# Patient Record
Sex: FEMALE | Race: WHITE | NOT HISPANIC OR LATINO | Employment: OTHER | URBAN - METROPOLITAN AREA
[De-identification: names, ages, dates, MRNs, and addresses within clinical notes are randomized per-mention and may not be internally consistent; named-entity substitution may affect disease eponyms.]

---

## 2017-10-02 ENCOUNTER — CONVERSION ENCOUNTER (OUTPATIENT)
Dept: MAMMOGRAPHY | Facility: HOSPITAL | Age: 67
End: 2017-10-02

## 2018-01-04 ENCOUNTER — OFFICE VISIT CONVERTED (OUTPATIENT)
Dept: ORTHOPEDIC SURGERY | Facility: CLINIC | Age: 68
End: 2018-01-04
Attending: PHYSICIAN ASSISTANT

## 2018-02-06 ENCOUNTER — OFFICE VISIT CONVERTED (OUTPATIENT)
Dept: ORTHOPEDIC SURGERY | Facility: CLINIC | Age: 68
End: 2018-02-06
Attending: PHYSICIAN ASSISTANT

## 2018-02-16 ENCOUNTER — OFFICE VISIT CONVERTED (OUTPATIENT)
Dept: ORTHOPEDIC SURGERY | Facility: CLINIC | Age: 68
End: 2018-02-16
Attending: PHYSICIAN ASSISTANT

## 2018-03-05 ENCOUNTER — OFFICE VISIT CONVERTED (OUTPATIENT)
Dept: UROLOGY | Facility: CLINIC | Age: 68
End: 2018-03-05
Attending: UROLOGY

## 2018-03-06 ENCOUNTER — OFFICE VISIT CONVERTED (OUTPATIENT)
Dept: ORTHOPEDIC SURGERY | Facility: CLINIC | Age: 68
End: 2018-03-06
Attending: PHYSICIAN ASSISTANT

## 2018-04-04 ENCOUNTER — OFFICE VISIT CONVERTED (OUTPATIENT)
Dept: UROLOGY | Facility: CLINIC | Age: 68
End: 2018-04-04
Attending: UROLOGY

## 2018-04-13 ENCOUNTER — PROCEDURE VISIT CONVERTED (OUTPATIENT)
Dept: UROLOGY | Facility: CLINIC | Age: 68
End: 2018-04-13
Attending: UROLOGY

## 2018-04-17 ENCOUNTER — OFFICE VISIT CONVERTED (OUTPATIENT)
Dept: ORTHOPEDIC SURGERY | Facility: CLINIC | Age: 68
End: 2018-04-17
Attending: PHYSICIAN ASSISTANT

## 2018-04-17 ENCOUNTER — CONVERSION ENCOUNTER (OUTPATIENT)
Dept: ORTHOPEDIC SURGERY | Facility: CLINIC | Age: 68
End: 2018-04-17

## 2018-05-09 ENCOUNTER — OFFICE VISIT CONVERTED (OUTPATIENT)
Dept: UROLOGY | Facility: CLINIC | Age: 68
End: 2018-05-09
Attending: UROLOGY

## 2018-05-09 ENCOUNTER — CONVERSION ENCOUNTER (OUTPATIENT)
Dept: SURGERY | Facility: CLINIC | Age: 68
End: 2018-05-09

## 2018-06-25 ENCOUNTER — CONVERSION ENCOUNTER (OUTPATIENT)
Dept: OTHER | Facility: HOSPITAL | Age: 68
End: 2018-06-25

## 2018-06-25 ENCOUNTER — OFFICE VISIT CONVERTED (OUTPATIENT)
Dept: OTHER | Facility: HOSPITAL | Age: 68
End: 2018-06-25
Attending: NURSE PRACTITIONER

## 2018-06-28 ENCOUNTER — OFFICE VISIT CONVERTED (OUTPATIENT)
Dept: SURGERY | Facility: CLINIC | Age: 68
End: 2018-06-28
Attending: NURSE PRACTITIONER

## 2018-07-26 ENCOUNTER — OFFICE VISIT CONVERTED (OUTPATIENT)
Dept: OTHER | Facility: HOSPITAL | Age: 68
End: 2018-07-26
Attending: NURSE PRACTITIONER

## 2018-08-01 ENCOUNTER — CONVERSION ENCOUNTER (OUTPATIENT)
Dept: SURGERY | Facility: CLINIC | Age: 68
End: 2018-08-01

## 2018-08-01 ENCOUNTER — OFFICE VISIT CONVERTED (OUTPATIENT)
Dept: UROLOGY | Facility: CLINIC | Age: 68
End: 2018-08-01
Attending: UROLOGY

## 2018-11-21 ENCOUNTER — OFFICE VISIT CONVERTED (OUTPATIENT)
Dept: OTHER | Facility: HOSPITAL | Age: 68
End: 2018-11-21
Attending: NURSE PRACTITIONER

## 2018-11-24 ENCOUNTER — CONVERSION ENCOUNTER (OUTPATIENT)
Dept: MAMMOGRAPHY | Facility: HOSPITAL | Age: 68
End: 2018-11-24

## 2018-11-30 ENCOUNTER — PATIENT OUTREACH - CONVERTED (OUTPATIENT)
Dept: OTHER | Facility: HOSPITAL | Age: 68
End: 2018-11-30
Attending: NURSE PRACTITIONER

## 2019-01-02 ENCOUNTER — HOSPITAL ENCOUNTER (OUTPATIENT)
Dept: RADIATION ONCOLOGY | Facility: HOSPITAL | Age: 69
Setting detail: RECURRING SERIES
Discharge: HOME | End: 2019-01-31
Attending: RADIOLOGY

## 2019-01-28 ENCOUNTER — HOSPITAL ENCOUNTER (OUTPATIENT)
Dept: OTHER | Facility: HOSPITAL | Age: 69
Discharge: HOME | End: 2019-01-28
Attending: INTERNAL MEDICINE

## 2019-01-28 LAB
ALBUMIN SERPL-MCNC: 4.2 G/DL (ref 3.5–5)
ALBUMIN/GLOB SERPL: 1.4 {RATIO} (ref 1.4–2.6)
ALP SERPL-CCNC: 100 U/L (ref 43–160)
ALT SERPL-CCNC: 8 U/L (ref 10–40)
ANION GAP SERPL CALC-SCNC: 16 MMOL/L (ref 8–19)
AST SERPL-CCNC: 19 U/L (ref 15–50)
BASOPHILS # BLD AUTO: 0.02 10*3/UL (ref 0–0.2)
BASOPHILS NFR BLD AUTO: 0.46 % (ref 0–3)
BILIRUB SERPL-MCNC: 0.22 MG/DL (ref 0.2–1.3)
BUN SERPL-MCNC: 13 MG/DL (ref 5–25)
BUN/CREAT SERPL: 21 {RATIO} (ref 6–20)
CALCIUM SERPL-MCNC: 9.2 MG/DL (ref 8.7–10.4)
CEA SERPL-MCNC: 3 NG/ML (ref 0–5)
CHLORIDE SERPL-SCNC: 108 MMOL/L (ref 99–111)
CONV CO2: 19 MMOL/L (ref 22–32)
CONV TOTAL PROTEIN: 7.1 G/DL (ref 6.3–8.2)
CREAT UR-MCNC: 0.61 MG/DL (ref 0.5–0.9)
EOSINOPHIL # BLD AUTO: 0.07 10*3/UL (ref 0–0.7)
EOSINOPHIL # BLD AUTO: 1.38 % (ref 0–7)
ERYTHROCYTE [DISTWIDTH] IN BLOOD BY AUTOMATED COUNT: 13.7 % (ref 11.5–14.5)
GFR SERPLBLD BASED ON 1.73 SQ M-ARVRAT: >60 ML/MIN/{1.73_M2}
GLOBULIN UR ELPH-MCNC: 2.9 G/DL (ref 2–3.5)
GLUCOSE SERPL-MCNC: 92 MG/DL (ref 65–99)
HBA1C MFR BLD: 11.5 G/DL (ref 12–16)
HCT VFR BLD AUTO: 31.4 % (ref 37–47)
LYMPHOCYTES # BLD AUTO: 1.12 10*3/UL (ref 1–5)
MCH RBC QN AUTO: 34.6 PG (ref 27–31)
MCHC RBC AUTO-ENTMCNC: 36.5 G/DL (ref 33–37)
MCV RBC AUTO: 95 FL (ref 81–99)
MONOCYTES # BLD AUTO: 0.33 10*3/UL (ref 0.2–1.2)
MONOCYTES NFR BLD AUTO: 6.82 % (ref 3–10)
NEUTROPHILS # BLD AUTO: 3.24 10*3/UL (ref 2–8)
NEUTROPHILS NFR BLD AUTO: 67.9 % (ref 30–85)
NRBC BLD AUTO-RTO: 0 % (ref 0–0.01)
OSMOLALITY SERPL CALC.SUM OF ELEC: 288 MOSM/KG (ref 273–304)
PLATELET # BLD AUTO: 205 10*3/UL (ref 130–400)
PMV BLD AUTO: 7.9 FL (ref 7.4–10.4)
POTASSIUM SERPL-SCNC: 4.1 MMOL/L (ref 3.5–5.3)
RBC # BLD AUTO: 3.31 10*6/UL (ref 4.2–5.4)
SODIUM SERPL-SCNC: 139 MMOL/L (ref 135–147)
VARIANT LYMPHS NFR BLD MANUAL: 23.4 % (ref 20–45)
WBC # BLD AUTO: 4.77 10*3/UL (ref 4.8–10.8)

## 2019-02-06 ENCOUNTER — HOSPITAL ENCOUNTER (OUTPATIENT)
Dept: RADIATION ONCOLOGY | Facility: HOSPITAL | Age: 69
Setting detail: RECURRING SERIES
Discharge: HOME | End: 2019-02-28
Attending: RADIOLOGY

## 2019-02-07 ENCOUNTER — HOSPITAL ENCOUNTER (OUTPATIENT)
Dept: NUCLEAR MEDICINE | Facility: HOSPITAL | Age: 69
Discharge: HOME | End: 2019-02-07
Attending: RADIOLOGY

## 2019-02-21 ENCOUNTER — HOSPITAL ENCOUNTER (OUTPATIENT)
Dept: OTHER | Facility: HOSPITAL | Age: 69
Discharge: HOME | End: 2019-02-21

## 2019-02-21 ENCOUNTER — OFFICE VISIT CONVERTED (OUTPATIENT)
Dept: OTHER | Facility: HOSPITAL | Age: 69
End: 2019-02-21
Attending: NURSE PRACTITIONER

## 2019-02-21 LAB
ALBUMIN SERPL-MCNC: 4.2 G/DL (ref 3.5–5)
ALBUMIN/GLOB SERPL: 1.4 {RATIO} (ref 1.4–2.6)
ALP SERPL-CCNC: 91 U/L (ref 43–160)
ALT SERPL-CCNC: 10 U/L (ref 10–40)
ANION GAP SERPL CALC-SCNC: 15 MMOL/L (ref 8–19)
AST SERPL-CCNC: 25 U/L (ref 15–50)
BASOPHILS # BLD AUTO: 0.03 10*3/UL (ref 0–0.2)
BASOPHILS NFR BLD AUTO: 0.8 % (ref 0–3)
BILIRUB SERPL-MCNC: 0.27 MG/DL (ref 0.2–1.3)
BUN SERPL-MCNC: 15 MG/DL (ref 5–25)
BUN/CREAT SERPL: 19 {RATIO} (ref 6–20)
CALCIUM SERPL-MCNC: 9.6 MG/DL (ref 8.7–10.4)
CHLORIDE SERPL-SCNC: 105 MMOL/L (ref 99–111)
CHOLEST SERPL-MCNC: 178 MG/DL (ref 107–200)
CHOLEST/HDLC SERPL: 3 {RATIO} (ref 3–6)
CONV ABS IMM GRAN: 0.02 10*3/UL (ref 0–0.2)
CONV CO2: 22 MMOL/L (ref 22–32)
CONV CREATININE URINE, RANDOM: 70.3 MG/DL (ref 10–300)
CONV IMMATURE GRAN: 0.5 % (ref 0–1.8)
CONV MICROALBUM.,U,RANDOM: <12 MG/L (ref 0–20)
CONV TOTAL PROTEIN: 7.1 G/DL (ref 6.3–8.2)
CREAT UR-MCNC: 0.77 MG/DL (ref 0.5–0.9)
DEPRECATED RDW RBC AUTO: 51.2 FL (ref 36.4–46.3)
EOSINOPHIL # BLD AUTO: 0.03 10*3/UL (ref 0–0.7)
EOSINOPHIL # BLD AUTO: 0.8 % (ref 0–7)
ERYTHROCYTE [DISTWIDTH] IN BLOOD BY AUTOMATED COUNT: 13.6 % (ref 11.7–14.4)
EST. AVERAGE GLUCOSE BLD GHB EST-MCNC: 120 MG/DL
GFR SERPLBLD BASED ON 1.73 SQ M-ARVRAT: >60 ML/MIN/{1.73_M2}
GLOBULIN UR ELPH-MCNC: 2.9 G/DL (ref 2–3.5)
GLUCOSE SERPL-MCNC: 82 MG/DL (ref 65–99)
HBA1C MFR BLD: 11.4 G/DL (ref 12–16)
HBA1C MFR BLD: 5.8 % (ref 3.5–5.7)
HCT VFR BLD AUTO: 35.3 % (ref 37–47)
HDLC SERPL-MCNC: 60 MG/DL (ref 40–60)
LDLC SERPL CALC-MCNC: 81 MG/DL (ref 70–100)
LYMPHOCYTES # BLD AUTO: 0.96 10*3/UL (ref 1–5)
MCH RBC QN AUTO: 32.9 PG (ref 27–31)
MCHC RBC AUTO-ENTMCNC: 32.3 G/DL (ref 33–37)
MCV RBC AUTO: 102 FL (ref 81–99)
MICROALBUMIN/CREAT UR: 17.1 MG/G{CRE} (ref 0–35)
MONOCYTES # BLD AUTO: 0.33 10*3/UL (ref 0.2–1.2)
MONOCYTES NFR BLD AUTO: 8.4 % (ref 3–10)
NEUTROPHILS # BLD AUTO: 2.54 10*3/UL (ref 2–8)
NEUTROPHILS NFR BLD AUTO: 64.9 % (ref 30–85)
NRBC CBCN: 0 % (ref 0–0.7)
OSMOLALITY SERPL CALC.SUM OF ELEC: 284 MOSM/KG (ref 273–304)
PLATELET # BLD AUTO: 205 10*3/UL (ref 130–400)
PMV BLD AUTO: 11.2 FL (ref 9.4–12.3)
POTASSIUM SERPL-SCNC: 5.1 MMOL/L (ref 3.5–5.3)
RBC # BLD AUTO: 3.46 10*6/UL (ref 4.2–5.4)
SODIUM SERPL-SCNC: 137 MMOL/L (ref 135–147)
TRIGL SERPL-MCNC: 186 MG/DL (ref 40–150)
TSH SERPL-ACNC: 2.26 M[IU]/L (ref 0.27–4.2)
VARIANT LYMPHS NFR BLD MANUAL: 24.6 % (ref 20–45)
VLDLC SERPL-MCNC: 37 MG/DL (ref 5–37)
WBC # BLD AUTO: 3.91 10*3/UL (ref 4.8–10.8)

## 2019-02-28 ENCOUNTER — PATIENT OUTREACH - CONVERTED (OUTPATIENT)
Dept: OTHER | Facility: HOSPITAL | Age: 69
End: 2019-02-28
Attending: NURSE PRACTITIONER

## 2019-03-11 ENCOUNTER — HOSPITAL ENCOUNTER (OUTPATIENT)
Dept: PET IMAGING | Facility: HOSPITAL | Age: 69
Discharge: HOME | End: 2019-03-11
Attending: INTERNAL MEDICINE

## 2019-03-14 ENCOUNTER — HOSPITAL ENCOUNTER (OUTPATIENT)
Dept: RADIATION ONCOLOGY | Facility: HOSPITAL | Age: 69
Setting detail: RECURRING SERIES
Discharge: HOME | End: 2019-03-31
Attending: RADIOLOGY

## 2019-03-19 ENCOUNTER — HOSPITAL ENCOUNTER (OUTPATIENT)
Dept: OTHER | Facility: HOSPITAL | Age: 69
Discharge: HOME | End: 2019-03-19
Attending: NURSE PRACTITIONER

## 2019-03-19 LAB
ALBUMIN SERPL-MCNC: 4.5 G/DL (ref 3.5–5)
ALBUMIN/GLOB SERPL: 1.6 {RATIO} (ref 1.4–2.6)
ALP SERPL-CCNC: 103 U/L (ref 43–160)
ALT SERPL-CCNC: 8 U/L (ref 10–40)
ANION GAP SERPL CALC-SCNC: 15 MMOL/L (ref 8–19)
AST SERPL-CCNC: 22 U/L (ref 15–50)
BASOPHILS # BLD AUTO: 0.02 10*3/UL (ref 0–0.2)
BASOPHILS NFR BLD AUTO: 0.5 % (ref 0–3)
BILIRUB SERPL-MCNC: 0.37 MG/DL (ref 0.2–1.3)
BUN SERPL-MCNC: 12 MG/DL (ref 5–25)
BUN/CREAT SERPL: 17 {RATIO} (ref 6–20)
CALCIUM SERPL-MCNC: 9.5 MG/DL (ref 8.7–10.4)
CHLORIDE SERPL-SCNC: 103 MMOL/L (ref 99–111)
CONV ABS IMM GRAN: 0.02 10*3/UL (ref 0–0.2)
CONV CO2: 24 MMOL/L (ref 22–32)
CONV IMMATURE GRAN: 0.5 % (ref 0–1.8)
CONV TOTAL PROTEIN: 7.3 G/DL (ref 6.3–8.2)
CREAT UR-MCNC: 0.69 MG/DL (ref 0.5–0.9)
DEPRECATED RDW RBC AUTO: 45.1 FL (ref 36.4–46.3)
EOSINOPHIL # BLD AUTO: 0.03 10*3/UL (ref 0–0.7)
EOSINOPHIL # BLD AUTO: 0.7 % (ref 0–7)
ERYTHROCYTE [DISTWIDTH] IN BLOOD BY AUTOMATED COUNT: 12.8 % (ref 11.7–14.4)
GFR SERPLBLD BASED ON 1.73 SQ M-ARVRAT: >60 ML/MIN/{1.73_M2}
GLOBULIN UR ELPH-MCNC: 2.8 G/DL (ref 2–3.5)
GLUCOSE SERPL-MCNC: 90 MG/DL (ref 65–99)
HBA1C MFR BLD: 11.7 G/DL (ref 12–16)
HCT VFR BLD AUTO: 35 % (ref 37–47)
LYMPHOCYTES # BLD AUTO: 1.11 10*3/UL (ref 1–5)
MCH RBC QN AUTO: 32.4 PG (ref 27–31)
MCHC RBC AUTO-ENTMCNC: 33.4 G/DL (ref 33–37)
MCV RBC AUTO: 97 FL (ref 81–99)
MONOCYTES # BLD AUTO: 0.39 10*3/UL (ref 0.2–1.2)
MONOCYTES NFR BLD AUTO: 8.9 % (ref 3–10)
NEUTROPHILS # BLD AUTO: 2.81 10*3/UL (ref 2–8)
NEUTROPHILS NFR BLD AUTO: 64.1 % (ref 30–85)
NRBC CBCN: 0 % (ref 0–0.7)
OSMOLALITY SERPL CALC.SUM OF ELEC: 285 MOSM/KG (ref 273–304)
PLATELET # BLD AUTO: 213 10*3/UL (ref 130–400)
PMV BLD AUTO: 10.9 FL (ref 9.4–12.3)
POTASSIUM SERPL-SCNC: 3.9 MMOL/L (ref 3.5–5.3)
RBC # BLD AUTO: 3.61 10*6/UL (ref 4.2–5.4)
SODIUM SERPL-SCNC: 138 MMOL/L (ref 135–147)
VARIANT LYMPHS NFR BLD MANUAL: 25.3 % (ref 20–45)
WBC # BLD AUTO: 4.38 10*3/UL (ref 4.8–10.8)

## 2019-04-29 ENCOUNTER — HOSPITAL ENCOUNTER (OUTPATIENT)
Dept: ONCOLOGY | Facility: HOSPITAL | Age: 69
Discharge: HOME | End: 2019-04-29
Attending: NURSE PRACTITIONER

## 2019-04-30 ENCOUNTER — PATIENT OUTREACH - CONVERTED (OUTPATIENT)
Dept: OTHER | Facility: HOSPITAL | Age: 69
End: 2019-04-30
Attending: NURSE PRACTITIONER

## 2019-06-03 ENCOUNTER — OFFICE VISIT CONVERTED (OUTPATIENT)
Dept: OTHER | Facility: HOSPITAL | Age: 69
End: 2019-06-03
Attending: NURSE PRACTITIONER

## 2019-06-03 ENCOUNTER — CONVERSION ENCOUNTER (OUTPATIENT)
Dept: OTHER | Facility: HOSPITAL | Age: 69
End: 2019-06-03

## 2019-06-03 ENCOUNTER — HOSPITAL ENCOUNTER (OUTPATIENT)
Dept: OTHER | Facility: HOSPITAL | Age: 69
Discharge: HOME | End: 2019-06-03

## 2019-06-03 LAB
ANION GAP SERPL CALC-SCNC: 19 MMOL/L (ref 8–19)
BASOPHILS # BLD AUTO: 0.03 10*3/UL (ref 0–0.2)
BASOPHILS NFR BLD AUTO: 0.4 % (ref 0–3)
BUN SERPL-MCNC: 15 MG/DL (ref 5–25)
BUN/CREAT SERPL: 20 {RATIO} (ref 6–20)
CALCIUM SERPL-MCNC: 9.5 MG/DL (ref 8.7–10.4)
CHLORIDE SERPL-SCNC: 106 MMOL/L (ref 99–111)
CONV ABS IMM GRAN: 0.04 10*3/UL (ref 0–0.2)
CONV CO2: 20 MMOL/L (ref 22–32)
CONV IMMATURE GRAN: 0.6 % (ref 0–1.8)
CREAT UR-MCNC: 0.75 MG/DL (ref 0.5–0.9)
DEPRECATED RDW RBC AUTO: 48.4 FL (ref 36.4–46.3)
EOSINOPHIL # BLD AUTO: 0.04 10*3/UL (ref 0–0.7)
EOSINOPHIL # BLD AUTO: 0.6 % (ref 0–7)
ERYTHROCYTE [DISTWIDTH] IN BLOOD BY AUTOMATED COUNT: 13.7 % (ref 11.7–14.4)
EST. AVERAGE GLUCOSE BLD GHB EST-MCNC: 100 MG/DL
FERRITIN SERPL-MCNC: 59 NG/ML (ref 10–200)
GFR SERPLBLD BASED ON 1.73 SQ M-ARVRAT: >60 ML/MIN/{1.73_M2}
GLUCOSE SERPL-MCNC: 93 MG/DL (ref 65–99)
HBA1C MFR BLD: 11.6 G/DL (ref 12–16)
HBA1C MFR BLD: 5.1 % (ref 3.5–5.7)
HCT VFR BLD AUTO: 35.3 % (ref 37–47)
IRON SATN MFR SERPL: 19 % (ref 20–55)
IRON SERPL-MCNC: 68 UG/DL (ref 60–170)
LYMPHOCYTES # BLD AUTO: 1.19 10*3/UL (ref 1–5)
MCH RBC QN AUTO: 31.9 PG (ref 27–31)
MCHC RBC AUTO-ENTMCNC: 32.9 G/DL (ref 33–37)
MCV RBC AUTO: 97 FL (ref 81–99)
MONOCYTES # BLD AUTO: 0.55 10*3/UL (ref 0.2–1.2)
MONOCYTES NFR BLD AUTO: 8.2 % (ref 3–10)
NEUTROPHILS # BLD AUTO: 4.88 10*3/UL (ref 2–8)
NEUTROPHILS NFR BLD AUTO: 72.5 % (ref 30–85)
NRBC CBCN: 0 % (ref 0–0.7)
OSMOLALITY SERPL CALC.SUM OF ELEC: 293 MOSM/KG (ref 273–304)
PLATELET # BLD AUTO: 202 10*3/UL (ref 130–400)
PMV BLD AUTO: 11.5 FL (ref 9.4–12.3)
POTASSIUM SERPL-SCNC: 4.1 MMOL/L (ref 3.5–5.3)
RBC # BLD AUTO: 3.64 10*6/UL (ref 4.2–5.4)
SODIUM SERPL-SCNC: 141 MMOL/L (ref 135–147)
TIBC SERPL-MCNC: 363 UG/DL (ref 245–450)
TRANSFERRIN SERPL-MCNC: 254 MG/DL (ref 250–380)
VARIANT LYMPHS NFR BLD MANUAL: 17.7 % (ref 20–45)
WBC # BLD AUTO: 6.73 10*3/UL (ref 4.8–10.8)

## 2019-06-19 ENCOUNTER — HOSPITAL ENCOUNTER (OUTPATIENT)
Dept: CT IMAGING | Facility: HOSPITAL | Age: 69
Discharge: HOME | End: 2019-06-19
Attending: NURSE PRACTITIONER

## 2019-06-21 ENCOUNTER — HOSPITAL ENCOUNTER (OUTPATIENT)
Dept: OTHER | Facility: HOSPITAL | Age: 69
Discharge: HOME | End: 2019-06-21
Attending: INTERNAL MEDICINE

## 2019-06-25 ENCOUNTER — HOSPITAL ENCOUNTER (OUTPATIENT)
Dept: RADIATION ONCOLOGY | Facility: HOSPITAL | Age: 69
Discharge: HOME | End: 2019-06-25
Attending: RADIOLOGY

## 2019-06-25 ENCOUNTER — HOSPITAL ENCOUNTER (OUTPATIENT)
Dept: OTHER | Facility: HOSPITAL | Age: 69
Discharge: HOME | End: 2019-06-25
Attending: INTERNAL MEDICINE

## 2019-06-25 LAB
ALBUMIN SERPL-MCNC: 4.3 G/DL (ref 3.5–5)
ALBUMIN/GLOB SERPL: 1.6 {RATIO} (ref 1.4–2.6)
ALP SERPL-CCNC: 107 U/L (ref 43–160)
ALT SERPL-CCNC: 11 U/L (ref 10–40)
ANION GAP SERPL CALC-SCNC: 17 MMOL/L (ref 8–19)
AST SERPL-CCNC: 25 U/L (ref 15–50)
BASOPHILS # BLD AUTO: 0.03 10*3/UL (ref 0–0.2)
BASOPHILS NFR BLD AUTO: 0.7 % (ref 0–3)
BILIRUB SERPL-MCNC: 0.4 MG/DL (ref 0.2–1.3)
BUN SERPL-MCNC: 15 MG/DL (ref 5–25)
BUN/CREAT SERPL: 18 {RATIO} (ref 6–20)
CALCIUM SERPL-MCNC: 9.2 MG/DL (ref 8.7–10.4)
CEA SERPL-MCNC: 2.3 NG/ML (ref 0–5)
CHLORIDE SERPL-SCNC: 101 MMOL/L (ref 99–111)
CONV ABS IMM GRAN: 0.03 10*3/UL (ref 0–0.2)
CONV CO2: 24 MMOL/L (ref 22–32)
CONV IMMATURE GRAN: 0.7 % (ref 0–1.8)
CONV TOTAL PROTEIN: 7 G/DL (ref 6.3–8.2)
CREAT UR-MCNC: 0.82 MG/DL (ref 0.5–0.9)
DEPRECATED RDW RBC AUTO: 49 FL (ref 36.4–46.3)
EOSINOPHIL # BLD AUTO: 0.04 10*3/UL (ref 0–0.7)
EOSINOPHIL # BLD AUTO: 1 % (ref 0–7)
ERYTHROCYTE [DISTWIDTH] IN BLOOD BY AUTOMATED COUNT: 13.8 % (ref 11.7–14.4)
GFR SERPLBLD BASED ON 1.73 SQ M-ARVRAT: >60 ML/MIN/{1.73_M2}
GLOBULIN UR ELPH-MCNC: 2.7 G/DL (ref 2–3.5)
GLUCOSE SERPL-MCNC: 144 MG/DL (ref 65–99)
HBA1C MFR BLD: 11.5 G/DL (ref 12–16)
HCT VFR BLD AUTO: 35.2 % (ref 37–47)
LYMPHOCYTES # BLD AUTO: 1.22 10*3/UL (ref 1–5)
MCH RBC QN AUTO: 31.8 PG (ref 27–31)
MCHC RBC AUTO-ENTMCNC: 32.7 G/DL (ref 33–37)
MCV RBC AUTO: 97.2 FL (ref 81–99)
MONOCYTES # BLD AUTO: 0.44 10*3/UL (ref 0.2–1.2)
MONOCYTES NFR BLD AUTO: 10.5 % (ref 3–10)
NEUTROPHILS # BLD AUTO: 2.43 10*3/UL (ref 2–8)
NEUTROPHILS NFR BLD AUTO: 58 % (ref 30–85)
NRBC CBCN: 0 % (ref 0–0.7)
OSMOLALITY SERPL CALC.SUM OF ELEC: 289 MOSM/KG (ref 273–304)
PLATELET # BLD AUTO: 196 10*3/UL (ref 130–400)
PMV BLD AUTO: 11 FL (ref 9.4–12.3)
POTASSIUM SERPL-SCNC: 4.3 MMOL/L (ref 3.5–5.3)
RBC # BLD AUTO: 3.62 10*6/UL (ref 4.2–5.4)
SODIUM SERPL-SCNC: 138 MMOL/L (ref 135–147)
VARIANT LYMPHS NFR BLD MANUAL: 29.1 % (ref 20–45)
WBC # BLD AUTO: 4.19 10*3/UL (ref 4.8–10.8)

## 2019-07-03 ENCOUNTER — OFFICE VISIT CONVERTED (OUTPATIENT)
Dept: SURGERY | Facility: CLINIC | Age: 69
End: 2019-07-03
Attending: NURSE PRACTITIONER

## 2019-07-03 ENCOUNTER — CONVERSION ENCOUNTER (OUTPATIENT)
Dept: SURGERY | Facility: CLINIC | Age: 69
End: 2019-07-03

## 2019-07-12 ENCOUNTER — HOSPITAL ENCOUNTER (OUTPATIENT)
Dept: SURGERY | Facility: HOSPITAL | Age: 69
Setting detail: HOSPITAL OUTPATIENT SURGERY
Discharge: HOME | End: 2019-07-12
Attending: SURGERY

## 2019-07-25 ENCOUNTER — HOSPITAL ENCOUNTER (OUTPATIENT)
Dept: OTHER | Facility: HOSPITAL | Age: 69
Discharge: HOME | End: 2019-07-25
Attending: INTERNAL MEDICINE

## 2019-07-29 ENCOUNTER — PATIENT OUTREACH - CONVERTED (OUTPATIENT)
Dept: OTHER | Facility: HOSPITAL | Age: 69
End: 2019-07-29
Attending: NURSE PRACTITIONER

## 2019-08-22 ENCOUNTER — HOSPITAL ENCOUNTER (OUTPATIENT)
Dept: OTHER | Facility: HOSPITAL | Age: 69
Discharge: HOME | End: 2019-08-22
Attending: INTERNAL MEDICINE

## 2019-08-23 ENCOUNTER — CONVERSION ENCOUNTER (OUTPATIENT)
Dept: OTHER | Facility: HOSPITAL | Age: 69
End: 2019-08-23

## 2019-08-23 ENCOUNTER — OFFICE VISIT CONVERTED (OUTPATIENT)
Dept: OTHER | Facility: HOSPITAL | Age: 69
End: 2019-08-23
Attending: NURSE PRACTITIONER

## 2019-08-29 ENCOUNTER — PATIENT OUTREACH - CONVERTED (OUTPATIENT)
Dept: OTHER | Facility: HOSPITAL | Age: 69
End: 2019-08-29
Attending: NURSE PRACTITIONER

## 2019-09-09 ENCOUNTER — HOSPITAL ENCOUNTER (OUTPATIENT)
Dept: OTHER | Facility: HOSPITAL | Age: 69
Discharge: HOME | End: 2019-09-09

## 2019-09-09 LAB
BASOPHILS # BLD AUTO: 0.03 10*3/UL (ref 0–0.2)
BASOPHILS NFR BLD AUTO: 0.6 % (ref 0–3)
CONV ABS IMM GRAN: 0.04 10*3/UL (ref 0–0.2)
CONV IMMATURE GRAN: 0.8 % (ref 0–1.8)
DEPRECATED RDW RBC AUTO: 47.6 FL (ref 36.4–46.3)
EOSINOPHIL # BLD AUTO: 0.06 10*3/UL (ref 0–0.7)
EOSINOPHIL # BLD AUTO: 1.3 % (ref 0–7)
ERYTHROCYTE [DISTWIDTH] IN BLOOD BY AUTOMATED COUNT: 13.3 % (ref 11.7–14.4)
EST. AVERAGE GLUCOSE BLD GHB EST-MCNC: 126 MG/DL
HBA1C MFR BLD: 6 % (ref 3.5–5.7)
HCT VFR BLD AUTO: 35.5 % (ref 37–47)
HGB BLD-MCNC: 11.8 G/DL (ref 12–16)
LYMPHOCYTES # BLD AUTO: 1.46 10*3/UL (ref 1–5)
LYMPHOCYTES NFR BLD AUTO: 30.5 % (ref 20–45)
MCH RBC QN AUTO: 32.2 PG (ref 27–31)
MCHC RBC AUTO-ENTMCNC: 33.2 G/DL (ref 33–37)
MCV RBC AUTO: 96.7 FL (ref 81–99)
MONOCYTES # BLD AUTO: 0.42 10*3/UL (ref 0.2–1.2)
MONOCYTES NFR BLD AUTO: 8.8 % (ref 3–10)
NEUTROPHILS # BLD AUTO: 2.77 10*3/UL (ref 2–8)
NEUTROPHILS NFR BLD AUTO: 58 % (ref 30–85)
NRBC CBCN: 0 % (ref 0–0.7)
PLATELET # BLD AUTO: 198 10*3/UL (ref 130–400)
PMV BLD AUTO: 12 FL (ref 9.4–12.3)
RBC # BLD AUTO: 3.67 10*6/UL (ref 4.2–5.4)
WBC # BLD AUTO: 4.78 10*3/UL (ref 4.8–10.8)

## 2019-09-17 ENCOUNTER — HOSPITAL ENCOUNTER (OUTPATIENT)
Dept: CT IMAGING | Facility: HOSPITAL | Age: 69
Discharge: HOME | End: 2019-09-17
Attending: INTERNAL MEDICINE

## 2019-09-17 LAB
CREAT BLD-MCNC: 0.7 MG/DL (ref 0.6–1.4)
GFR SERPLBLD BASED ON 1.73 SQ M-ARVRAT: >60 ML/MIN/{1.73_M2}

## 2019-09-25 ENCOUNTER — HOSPITAL ENCOUNTER (OUTPATIENT)
Dept: ONCOLOGY | Facility: HOSPITAL | Age: 69
Discharge: HOME | End: 2019-09-25
Attending: INTERNAL MEDICINE

## 2019-09-25 LAB
ALBUMIN SERPL-MCNC: 4.5 G/DL (ref 3.5–5)
ALBUMIN/GLOB SERPL: 1.7 {RATIO} (ref 1.4–2.6)
ALP SERPL-CCNC: 124 U/L (ref 43–160)
ALT SERPL-CCNC: 10 U/L (ref 10–40)
ANION GAP SERPL CALC-SCNC: 18 MMOL/L (ref 8–19)
AST SERPL-CCNC: 25 U/L (ref 15–50)
BASOPHILS # BLD AUTO: 0.03 10*3/UL (ref 0–0.2)
BASOPHILS NFR BLD AUTO: 0.7 % (ref 0–3)
BILIRUB SERPL-MCNC: 0.23 MG/DL (ref 0.2–1.3)
BUN SERPL-MCNC: 13 MG/DL (ref 5–25)
BUN/CREAT SERPL: 19 {RATIO} (ref 6–20)
CALCIUM SERPL-MCNC: 9 MG/DL (ref 8.7–10.4)
CEA SERPL-MCNC: 3.1 NG/ML (ref 0–5)
CHLORIDE SERPL-SCNC: 103 MMOL/L (ref 99–111)
CONV ABS IMM GRAN: 0.04 10*3/UL (ref 0–0.2)
CONV CO2: 22 MMOL/L (ref 22–32)
CONV IMMATURE GRAN: 0.9 % (ref 0–1.8)
CONV TOTAL PROTEIN: 7.2 G/DL (ref 6.3–8.2)
CREAT UR-MCNC: 0.67 MG/DL (ref 0.5–0.9)
DEPRECATED RDW RBC AUTO: 46.9 FL (ref 36.4–46.3)
EOSINOPHIL # BLD AUTO: 0.04 10*3/UL (ref 0–0.7)
EOSINOPHIL # BLD AUTO: 0.9 % (ref 0–7)
ERYTHROCYTE [DISTWIDTH] IN BLOOD BY AUTOMATED COUNT: 13.2 % (ref 11.7–14.4)
GFR SERPLBLD BASED ON 1.73 SQ M-ARVRAT: >60 ML/MIN/{1.73_M2}
GLOBULIN UR ELPH-MCNC: 2.7 G/DL (ref 2–3.5)
GLUCOSE SERPL-MCNC: 104 MG/DL (ref 65–99)
HCT VFR BLD AUTO: 35.5 % (ref 37–47)
HGB BLD-MCNC: 11.7 G/DL (ref 12–16)
LYMPHOCYTES # BLD AUTO: 1.64 10*3/UL (ref 1–5)
LYMPHOCYTES NFR BLD AUTO: 38.4 % (ref 20–45)
MCH RBC QN AUTO: 32.1 PG (ref 27–31)
MCHC RBC AUTO-ENTMCNC: 33 G/DL (ref 33–37)
MCV RBC AUTO: 97.5 FL (ref 81–99)
MONOCYTES # BLD AUTO: 0.45 10*3/UL (ref 0.2–1.2)
MONOCYTES NFR BLD AUTO: 10.5 % (ref 3–10)
NEUTROPHILS # BLD AUTO: 2.07 10*3/UL (ref 2–8)
NEUTROPHILS NFR BLD AUTO: 48.6 % (ref 30–85)
NRBC CBCN: 0 % (ref 0–0.7)
OSMOLALITY SERPL CALC.SUM OF ELEC: 288 MOSM/KG (ref 273–304)
PLATELET # BLD AUTO: 200 10*3/UL (ref 130–400)
PMV BLD AUTO: 11.7 FL (ref 9.4–12.3)
POTASSIUM SERPL-SCNC: 3.9 MMOL/L (ref 3.5–5.3)
RBC # BLD AUTO: 3.64 10*6/UL (ref 4.2–5.4)
SODIUM SERPL-SCNC: 139 MMOL/L (ref 135–147)
WBC # BLD AUTO: 4.27 10*3/UL (ref 4.8–10.8)

## 2019-09-26 ENCOUNTER — HOSPITAL ENCOUNTER (OUTPATIENT)
Dept: GENERAL RADIOLOGY | Facility: HOSPITAL | Age: 69
Discharge: HOME | End: 2019-09-26
Attending: INTERNAL MEDICINE

## 2019-09-26 LAB
CANCER AG15-3 SERPL-ACNC: 38.4 U/ML (ref 0–25)
CANCER AG27-29 SERPL-ACNC: 40 U/ML (ref 0–38.6)

## 2019-09-30 ENCOUNTER — PATIENT OUTREACH - CONVERTED (OUTPATIENT)
Dept: OTHER | Facility: HOSPITAL | Age: 69
End: 2019-09-30
Attending: NURSE PRACTITIONER

## 2019-10-02 ENCOUNTER — HOSPITAL ENCOUNTER (OUTPATIENT)
Dept: GENERAL RADIOLOGY | Facility: HOSPITAL | Age: 69
Discharge: HOME | End: 2019-10-02
Attending: INTERNAL MEDICINE

## 2019-10-03 ENCOUNTER — HOSPITAL ENCOUNTER (OUTPATIENT)
Dept: OTHER | Facility: HOSPITAL | Age: 69
Discharge: HOME | End: 2019-10-03
Attending: INTERNAL MEDICINE

## 2019-11-06 ENCOUNTER — OFFICE VISIT CONVERTED (OUTPATIENT)
Dept: NEUROSURGERY | Facility: CLINIC | Age: 69
End: 2019-11-06
Attending: PHYSICIAN ASSISTANT

## 2019-11-13 ENCOUNTER — HOSPITAL ENCOUNTER (OUTPATIENT)
Dept: OTHER | Facility: HOSPITAL | Age: 69
Discharge: HOME | End: 2019-11-13
Attending: NURSE PRACTITIONER

## 2019-11-25 ENCOUNTER — PATIENT OUTREACH - CONVERTED (OUTPATIENT)
Dept: OTHER | Facility: HOSPITAL | Age: 69
End: 2019-11-25
Attending: NURSE PRACTITIONER

## 2019-12-03 ENCOUNTER — HOSPITAL ENCOUNTER (OUTPATIENT)
Dept: GENERAL RADIOLOGY | Facility: HOSPITAL | Age: 69
Discharge: HOME | End: 2019-12-03
Attending: INTERNAL MEDICINE

## 2019-12-11 ENCOUNTER — HOSPITAL ENCOUNTER (OUTPATIENT)
Dept: CT IMAGING | Facility: HOSPITAL | Age: 69
Discharge: HOME | End: 2019-12-11
Attending: NURSE PRACTITIONER

## 2019-12-27 ENCOUNTER — PATIENT OUTREACH - CONVERTED (OUTPATIENT)
Dept: OTHER | Facility: HOSPITAL | Age: 69
End: 2019-12-27
Attending: NURSE PRACTITIONER

## 2020-01-13 ENCOUNTER — HOSPITAL ENCOUNTER (OUTPATIENT)
Dept: OTHER | Facility: HOSPITAL | Age: 70
Discharge: HOME | End: 2020-01-13
Attending: INTERNAL MEDICINE

## 2020-01-13 ENCOUNTER — HOSPITAL ENCOUNTER (OUTPATIENT)
Dept: CT IMAGING | Facility: HOSPITAL | Age: 70
Discharge: HOME | End: 2020-01-13
Attending: INTERNAL MEDICINE

## 2020-01-13 LAB
ALBUMIN SERPL-MCNC: 4.3 G/DL (ref 3.5–5)
ALBUMIN/GLOB SERPL: 1.5 {RATIO} (ref 1.4–2.6)
ALP SERPL-CCNC: 109 U/L (ref 43–160)
ALT SERPL-CCNC: 11 U/L (ref 10–40)
ANION GAP SERPL CALC-SCNC: 17 MMOL/L (ref 8–19)
AST SERPL-CCNC: 27 U/L (ref 15–50)
BASOPHILS # BLD AUTO: 0.04 10*3/UL (ref 0–0.2)
BASOPHILS NFR BLD AUTO: 0.7 % (ref 0–3)
BILIRUB SERPL-MCNC: 0.23 MG/DL (ref 0.2–1.3)
BUN SERPL-MCNC: 11 MG/DL (ref 5–25)
BUN/CREAT SERPL: 16 {RATIO} (ref 6–20)
CALCIUM SERPL-MCNC: 9.2 MG/DL (ref 8.7–10.4)
CEA SERPL-MCNC: 2.7 NG/ML (ref 0–5)
CHLORIDE SERPL-SCNC: 102 MMOL/L (ref 99–111)
CONV ABS IMM GRAN: 0.11 10*3/UL (ref 0–0.2)
CONV CO2: 21 MMOL/L (ref 22–32)
CONV IMMATURE GRAN: 1.8 % (ref 0–1.8)
CONV TOTAL PROTEIN: 7.2 G/DL (ref 6.3–8.2)
CREAT BLD-MCNC: 0.7 MG/DL (ref 0.6–1.4)
CREAT UR-MCNC: 0.68 MG/DL (ref 0.5–0.9)
DEPRECATED RDW RBC AUTO: 49.3 FL (ref 36.4–46.3)
EOSINOPHIL # BLD AUTO: 0.07 10*3/UL (ref 0–0.7)
EOSINOPHIL # BLD AUTO: 1.2 % (ref 0–7)
ERYTHROCYTE [DISTWIDTH] IN BLOOD BY AUTOMATED COUNT: 13.8 % (ref 11.7–14.4)
GFR SERPLBLD BASED ON 1.73 SQ M-ARVRAT: >60 ML/MIN/{1.73_M2}
GFR SERPLBLD BASED ON 1.73 SQ M-ARVRAT: >60 ML/MIN/{1.73_M2}
GLOBULIN UR ELPH-MCNC: 2.9 G/DL (ref 2–3.5)
GLUCOSE SERPL-MCNC: 100 MG/DL (ref 65–99)
HCT VFR BLD AUTO: 34.4 % (ref 37–47)
HGB BLD-MCNC: 11.3 G/DL (ref 12–16)
LYMPHOCYTES # BLD AUTO: 1.8 10*3/UL (ref 1–5)
LYMPHOCYTES NFR BLD AUTO: 29.7 % (ref 20–45)
MCH RBC QN AUTO: 31.7 PG (ref 27–31)
MCHC RBC AUTO-ENTMCNC: 32.8 G/DL (ref 33–37)
MCV RBC AUTO: 96.4 FL (ref 81–99)
MONOCYTES # BLD AUTO: 0.41 10*3/UL (ref 0.2–1.2)
MONOCYTES NFR BLD AUTO: 6.8 % (ref 3–10)
NEUTROPHILS # BLD AUTO: 3.64 10*3/UL (ref 2–8)
NEUTROPHILS NFR BLD AUTO: 59.8 % (ref 30–85)
NRBC CBCN: 0 % (ref 0–0.7)
OSMOLALITY SERPL CALC.SUM OF ELEC: 281 MOSM/KG (ref 273–304)
PLATELET # BLD AUTO: 242 10*3/UL (ref 130–400)
PMV BLD AUTO: 10.6 FL (ref 9.4–12.3)
POTASSIUM SERPL-SCNC: 4.4 MMOL/L (ref 3.5–5.3)
RBC # BLD AUTO: 3.57 10*6/UL (ref 4.2–5.4)
SODIUM SERPL-SCNC: 136 MMOL/L (ref 135–147)
WBC # BLD AUTO: 6.07 10*3/UL (ref 4.8–10.8)

## 2020-01-14 LAB
CANCER AG15-3 SERPL-ACNC: 33.7 U/ML (ref 0–25)
CANCER AG27-29 SERPL-ACNC: 28.1 U/ML (ref 0–38.6)

## 2020-01-15 ENCOUNTER — HOSPITAL ENCOUNTER (OUTPATIENT)
Dept: OTHER | Facility: HOSPITAL | Age: 70
Discharge: HOME | End: 2020-01-15
Attending: NURSE PRACTITIONER

## 2020-02-20 ENCOUNTER — HOSPITAL ENCOUNTER (OUTPATIENT)
Dept: OTHER | Facility: HOSPITAL | Age: 70
Discharge: HOME | End: 2020-02-20
Attending: NURSE PRACTITIONER

## 2020-04-15 ENCOUNTER — HOSPITAL ENCOUNTER (OUTPATIENT)
Dept: OTHER | Facility: HOSPITAL | Age: 70
Discharge: HOME | End: 2020-04-15
Attending: NURSE PRACTITIONER

## 2020-04-15 LAB
ALBUMIN SERPL-MCNC: 4.4 G/DL (ref 3.5–5)
ALBUMIN/GLOB SERPL: 1.6 {RATIO} (ref 1.4–2.6)
ALP SERPL-CCNC: 119 U/L (ref 43–160)
ALT SERPL-CCNC: 11 U/L (ref 10–40)
ANION GAP SERPL CALC-SCNC: 19 MMOL/L (ref 8–19)
AST SERPL-CCNC: 27 U/L (ref 15–50)
BASOPHILS # BLD AUTO: 0.03 10*3/UL (ref 0–0.2)
BASOPHILS NFR BLD AUTO: 0.6 % (ref 0–3)
BILIRUB SERPL-MCNC: 0.21 MG/DL (ref 0.2–1.3)
BUN SERPL-MCNC: 14 MG/DL (ref 5–25)
BUN/CREAT SERPL: 18 {RATIO} (ref 6–20)
CALCIUM SERPL-MCNC: 9.2 MG/DL (ref 8.7–10.4)
CEA SERPL-MCNC: 3.1 NG/ML (ref 0–5)
CHLORIDE SERPL-SCNC: 100 MMOL/L (ref 99–111)
CONV ABS IMM GRAN: 0.06 10*3/UL (ref 0–0.2)
CONV CO2: 21 MMOL/L (ref 22–32)
CONV IMMATURE GRAN: 1.2 % (ref 0–1.8)
CONV TOTAL PROTEIN: 7.1 G/DL (ref 6.3–8.2)
CREAT UR-MCNC: 0.77 MG/DL (ref 0.5–0.9)
DEPRECATED RDW RBC AUTO: 46.5 FL (ref 36.4–46.3)
EOSINOPHIL # BLD AUTO: 0.08 10*3/UL (ref 0–0.7)
EOSINOPHIL # BLD AUTO: 1.6 % (ref 0–7)
ERYTHROCYTE [DISTWIDTH] IN BLOOD BY AUTOMATED COUNT: 13.7 % (ref 11.7–14.4)
GFR SERPLBLD BASED ON 1.73 SQ M-ARVRAT: >60 ML/MIN/{1.73_M2}
GLOBULIN UR ELPH-MCNC: 2.7 G/DL (ref 2–3.5)
GLUCOSE SERPL-MCNC: 139 MG/DL (ref 65–99)
HCT VFR BLD AUTO: 31.9 % (ref 37–47)
HGB BLD-MCNC: 10.2 G/DL (ref 12–16)
LDH SERPL-CCNC: 188 U/L (ref 120–240)
LYMPHOCYTES # BLD AUTO: 1.42 10*3/UL (ref 1–5)
LYMPHOCYTES NFR BLD AUTO: 28.1 % (ref 20–45)
MCH RBC QN AUTO: 29.5 PG (ref 27–31)
MCHC RBC AUTO-ENTMCNC: 32 G/DL (ref 33–37)
MCV RBC AUTO: 92.2 FL (ref 81–99)
MONOCYTES # BLD AUTO: 0.46 10*3/UL (ref 0.2–1.2)
MONOCYTES NFR BLD AUTO: 9.1 % (ref 3–10)
NEUTROPHILS # BLD AUTO: 3.01 10*3/UL (ref 2–8)
NEUTROPHILS NFR BLD AUTO: 59.4 % (ref 30–85)
NRBC CBCN: 0 % (ref 0–0.7)
OSMOLALITY SERPL CALC.SUM OF ELEC: 285 MOSM/KG (ref 273–304)
PLATELET # BLD AUTO: 253 10*3/UL (ref 130–400)
PMV BLD AUTO: 11 FL (ref 9.4–12.3)
POTASSIUM SERPL-SCNC: 4.2 MMOL/L (ref 3.5–5.3)
RBC # BLD AUTO: 3.46 10*6/UL (ref 4.2–5.4)
SODIUM SERPL-SCNC: 136 MMOL/L (ref 135–147)
WBC # BLD AUTO: 5.06 10*3/UL (ref 4.8–10.8)

## 2020-04-16 LAB
CANCER AG15-3 SERPL-ACNC: 32.9 U/ML (ref 0–25)
FERRITIN SERPL-MCNC: 27 NG/ML (ref 10–200)
IRON SATN MFR SERPL: 11 % (ref 20–55)
IRON SERPL-MCNC: 45 UG/DL (ref 60–170)
TIBC SERPL-MCNC: 418 UG/DL (ref 245–450)
TRANSFERRIN SERPL-MCNC: 292 MG/DL (ref 250–380)

## 2020-04-30 ENCOUNTER — HOSPITAL ENCOUNTER (OUTPATIENT)
Dept: OTHER | Facility: HOSPITAL | Age: 70
Setting detail: RECURRING SERIES
Discharge: STILL ADMITTED OR ADMINISTRATIVE | End: 2020-07-10
Attending: NURSE PRACTITIONER

## 2020-06-19 ENCOUNTER — HOSPITAL ENCOUNTER (OUTPATIENT)
Dept: OTHER | Facility: HOSPITAL | Age: 70
Discharge: HOME | End: 2020-06-19
Attending: NURSE PRACTITIONER

## 2020-07-09 ENCOUNTER — OFFICE VISIT CONVERTED (OUTPATIENT)
Dept: ONCOLOGY | Facility: HOSPITAL | Age: 70
End: 2020-07-09
Attending: INTERNAL MEDICINE

## 2020-07-09 ENCOUNTER — HOSPITAL ENCOUNTER (OUTPATIENT)
Dept: OTHER | Facility: HOSPITAL | Age: 70
Discharge: HOME | End: 2020-07-09
Attending: INTERNAL MEDICINE

## 2020-07-09 LAB
ALBUMIN SERPL-MCNC: 4.1 G/DL (ref 3.5–5)
ALBUMIN/GLOB SERPL: 1.7 {RATIO} (ref 1.4–2.6)
ALP SERPL-CCNC: 119 U/L (ref 43–160)
ALT SERPL-CCNC: 10 U/L (ref 10–40)
ANION GAP SERPL CALC-SCNC: 13 MMOL/L (ref 8–19)
AST SERPL-CCNC: 24 U/L (ref 15–50)
BASOPHILS # BLD AUTO: 0.05 10*3/UL (ref 0–0.2)
BASOPHILS NFR BLD AUTO: 0.9 % (ref 0–3)
BILIRUB SERPL-MCNC: 0.18 MG/DL (ref 0.2–1.3)
BUN SERPL-MCNC: 11 MG/DL (ref 5–25)
BUN/CREAT SERPL: 15 {RATIO} (ref 6–20)
CALCIUM SERPL-MCNC: 9 MG/DL (ref 8.7–10.4)
CEA SERPL-MCNC: 3.1 NG/ML (ref 0–5)
CHLORIDE SERPL-SCNC: 104 MMOL/L (ref 99–111)
CONV ABS IMM GRAN: 0.06 10*3/UL (ref 0–0.54)
CONV CO2: 22 MMOL/L (ref 22–32)
CONV EOSINOPHILS PERCENT BY MANUAL COUNT: 0.9 % (ref 0–7)
CONV IMMATURE GRAN: 1 % (ref 0–0.4)
CONV TOTAL PROTEIN: 6.5 G/DL (ref 6.3–8.2)
CREAT UR-MCNC: 0.73 MG/DL (ref 0.5–0.9)
EOSINOPHIL # BLD MANUAL: 0.05 10*3/UL (ref 0–0.7)
ERYTHROCYTE [DISTWIDTH] IN BLOOD BY AUTOMATED COUNT: 15.7 % (ref 11.5–14.5)
ERYTHROCYTE [DISTWIDTH] IN BLOOD BY AUTOMATED COUNT: 56.1 FL
FERRITIN SERPL-MCNC: 130 NG/ML (ref 10–200)
GFR SERPLBLD BASED ON 1.73 SQ M-ARVRAT: >60 ML/MIN/{1.73_M2}
GLOBULIN UR ELPH-MCNC: 2.4 G/DL (ref 2–3.5)
GLUCOSE SERPL-MCNC: 115 MG/DL (ref 65–99)
HBA1C MFR BLD: 10.7 G/DL (ref 12–16)
HCT VFR BLD AUTO: 32.3 % (ref 37–47)
IRON SATN MFR SERPL: 19 % (ref 20–55)
IRON SERPL-MCNC: 67 UG/DL (ref 60–170)
LYMPHOCYTES # BLD AUTO: 1.83 10*3/UL (ref 1–5)
LYMPHOCYTES NFR BLD AUTO: 31.7 % (ref 20–45)
MCH RBC QN AUTO: 32.2 PG (ref 27–31)
MCHC RBC AUTO-ENTMCNC: 33.1 G/DL (ref 33–37)
MCV RBC AUTO: 97.3 FL (ref 81–99)
MONOCYTES # BLD AUTO: 0.47 10*3/UL (ref 0.2–1.2)
MONOCYTES NFR BLD MANUAL: 8.1 % (ref 3–10)
NEUTROPHILS # BLD AUTO: 3.31 10*3/UL (ref 2–8)
NEUTROPHILS NFR BLD MANUAL: 57.4 % (ref 30–85)
OSMOLALITY SERPL CALC.SUM OF ELEC: 280 MOSM/KG (ref 273–304)
PLATELET # BLD AUTO: 209 10*3/UL (ref 130–400)
PMV BLD AUTO: 10.2 FL (ref 7.4–10.4)
POTASSIUM SERPL-SCNC: 4.2 MMOL/L (ref 3.5–5.3)
RBC MORPH BLD: 3.32 10*6/UL (ref 4.2–5.4)
SODIUM SERPL-SCNC: 135 MMOL/L (ref 135–147)
TIBC SERPL-MCNC: 346 UG/DL (ref 245–450)
TRANSFERRIN SERPL-MCNC: 242 MG/DL (ref 250–380)
WBC # BLD AUTO: 5.77 10*3/UL (ref 4.8–10.8)

## 2020-07-15 ENCOUNTER — HOSPITAL ENCOUNTER (OUTPATIENT)
Dept: CT IMAGING | Facility: HOSPITAL | Age: 70
Discharge: HOME | End: 2020-07-15
Attending: INTERNAL MEDICINE

## 2020-07-23 ENCOUNTER — HOSPITAL ENCOUNTER (OUTPATIENT)
Dept: OTHER | Facility: HOSPITAL | Age: 70
Discharge: HOME | End: 2020-07-23
Attending: INTERNAL MEDICINE

## 2020-07-23 ENCOUNTER — OFFICE VISIT CONVERTED (OUTPATIENT)
Dept: ONCOLOGY | Facility: HOSPITAL | Age: 70
End: 2020-07-23
Attending: INTERNAL MEDICINE

## 2020-07-23 LAB
ALBUMIN SERPL-MCNC: 4.2 G/DL (ref 3.5–5)
ALBUMIN/GLOB SERPL: 1.8 {RATIO} (ref 1.4–2.6)
ALP SERPL-CCNC: 103 U/L (ref 43–160)
ALT SERPL-CCNC: 8 U/L (ref 10–40)
ANION GAP SERPL CALC-SCNC: 13 MMOL/L (ref 8–19)
AST SERPL-CCNC: 26 U/L (ref 15–50)
BASOPHILS # BLD AUTO: 0.03 10*3/UL (ref 0–0.2)
BASOPHILS NFR BLD AUTO: 0.4 % (ref 0–3)
BILIRUB SERPL-MCNC: <0.15 MG/DL (ref 0.2–1.3)
BUN SERPL-MCNC: 15 MG/DL (ref 5–25)
BUN/CREAT SERPL: 21 {RATIO} (ref 6–20)
CALCIUM SERPL-MCNC: 9.1 MG/DL (ref 8.7–10.4)
CHLORIDE SERPL-SCNC: 105 MMOL/L (ref 99–111)
CONV ABS IMM GRAN: 0.08 10*3/UL (ref 0–0.54)
CONV CO2: 23 MMOL/L (ref 22–32)
CONV EOSINOPHILS PERCENT BY MANUAL COUNT: 1.1 % (ref 0–7)
CONV IMMATURE GRAN: 1.1 % (ref 0–0.4)
CONV TOTAL PROTEIN: 6.5 G/DL (ref 6.3–8.2)
CREAT UR-MCNC: 0.7 MG/DL (ref 0.5–0.9)
EOSINOPHIL # BLD MANUAL: 0.08 10*3/UL (ref 0–0.7)
ERYTHROCYTE [DISTWIDTH] IN BLOOD BY AUTOMATED COUNT: 15.7 % (ref 11.5–14.5)
ERYTHROCYTE [DISTWIDTH] IN BLOOD BY AUTOMATED COUNT: 56.2 FL
GFR SERPLBLD BASED ON 1.73 SQ M-ARVRAT: >60 ML/MIN/{1.73_M2}
GLOBULIN UR ELPH-MCNC: 2.3 G/DL (ref 2–3.5)
GLUCOSE SERPL-MCNC: 107 MG/DL (ref 65–99)
HBA1C MFR BLD: 9 G/DL (ref 12–16)
HCT VFR BLD AUTO: 28 % (ref 37–47)
LYMPHOCYTES # BLD AUTO: 2.1 10*3/UL (ref 1–5)
LYMPHOCYTES NFR BLD AUTO: 29.9 % (ref 20–45)
MCH RBC QN AUTO: 31.6 PG (ref 27–31)
MCHC RBC AUTO-ENTMCNC: 32.1 G/DL (ref 33–37)
MCV RBC AUTO: 98.2 FL (ref 81–99)
MONOCYTES # BLD AUTO: 0.57 10*3/UL (ref 0.2–1.2)
MONOCYTES NFR BLD MANUAL: 8.1 % (ref 3–10)
NEUTROPHILS # BLD AUTO: 4.16 10*3/UL (ref 2–8)
NEUTROPHILS NFR BLD MANUAL: 59.4 % (ref 30–85)
OSMOLALITY SERPL CALC.SUM OF ELEC: 285 MOSM/KG (ref 273–304)
PATHOLOGY REVIEW: NORMAL
PLATELET # BLD AUTO: 220 10*3/UL (ref 130–400)
PMV BLD AUTO: 9.9 FL (ref 7.4–10.4)
POTASSIUM SERPL-SCNC: 4 MMOL/L (ref 3.5–5.3)
RBC MORPH BLD: 2.85 10*6/UL (ref 4.2–5.4)
SODIUM SERPL-SCNC: 137 MMOL/L (ref 135–147)
WBC # BLD AUTO: 7.02 10*3/UL (ref 4.8–10.8)

## 2020-07-24 ENCOUNTER — HOSPITAL ENCOUNTER (OUTPATIENT)
Dept: PREADMISSION TESTING | Facility: HOSPITAL | Age: 70
Discharge: HOME | End: 2020-07-24
Attending: SURGERY

## 2020-07-24 ENCOUNTER — OFFICE VISIT CONVERTED (OUTPATIENT)
Dept: SURGERY | Facility: CLINIC | Age: 70
End: 2020-07-24
Attending: SURGERY

## 2020-07-24 ENCOUNTER — CONVERSION ENCOUNTER (OUTPATIENT)
Dept: SURGERY | Facility: CLINIC | Age: 70
End: 2020-07-24

## 2020-07-27 LAB — SARS-COV-2 RNA SPEC QL NAA+PROBE: NOT DETECTED

## 2020-07-29 ENCOUNTER — HOSPITAL ENCOUNTER (OUTPATIENT)
Dept: GASTROENTEROLOGY | Facility: HOSPITAL | Age: 70
Setting detail: HOSPITAL OUTPATIENT SURGERY
Discharge: HOME | End: 2020-07-29
Attending: SURGERY

## 2020-07-29 LAB — GLUCOSE BLD-MCNC: 124 MG/DL (ref 65–99)

## 2020-08-04 ENCOUNTER — HOSPITAL ENCOUNTER (OUTPATIENT)
Dept: OTHER | Facility: HOSPITAL | Age: 70
Discharge: HOME | End: 2020-08-04
Attending: INTERNAL MEDICINE

## 2020-08-04 ENCOUNTER — OFFICE VISIT CONVERTED (OUTPATIENT)
Dept: ONCOLOGY | Facility: HOSPITAL | Age: 70
End: 2020-08-04
Attending: INTERNAL MEDICINE

## 2020-08-04 LAB
ALBUMIN SERPL-MCNC: 4.1 G/DL (ref 3.5–5)
ALBUMIN/GLOB SERPL: 1.8 {RATIO} (ref 1.4–2.6)
ALP SERPL-CCNC: 115 U/L (ref 43–160)
ALT SERPL-CCNC: 8 U/L (ref 10–40)
ANION GAP SERPL CALC-SCNC: 13 MMOL/L (ref 8–19)
AST SERPL-CCNC: 21 U/L (ref 15–50)
BASOPHILS # BLD AUTO: 0.05 10*3/UL (ref 0–0.2)
BASOPHILS NFR BLD AUTO: 0.8 % (ref 0–3)
BILIRUB SERPL-MCNC: <0.15 MG/DL (ref 0.2–1.3)
BUN SERPL-MCNC: 16 MG/DL (ref 5–25)
BUN/CREAT SERPL: 18 {RATIO} (ref 6–20)
CALCIUM SERPL-MCNC: 9 MG/DL (ref 8.7–10.4)
CHLORIDE SERPL-SCNC: 105 MMOL/L (ref 99–111)
CONV ABS IMM GRAN: 0.08 10*3/UL (ref 0–0.54)
CONV CO2: 23 MMOL/L (ref 22–32)
CONV EOSINOPHILS PERCENT BY MANUAL COUNT: 1.3 % (ref 0–7)
CONV IMMATURE GRAN: 1.3 % (ref 0–0.4)
CONV TOTAL PROTEIN: 6.4 G/DL (ref 6.3–8.2)
CREAT UR-MCNC: 0.88 MG/DL (ref 0.5–0.9)
EOSINOPHIL # BLD MANUAL: 0.08 10*3/UL (ref 0–0.7)
ERYTHROCYTE [DISTWIDTH] IN BLOOD BY AUTOMATED COUNT: 15.4 % (ref 11.5–14.5)
ERYTHROCYTE [DISTWIDTH] IN BLOOD BY AUTOMATED COUNT: 55.5 FL
FERRITIN SERPL-MCNC: 31 NG/ML (ref 10–200)
GFR SERPLBLD BASED ON 1.73 SQ M-ARVRAT: >60 ML/MIN/{1.73_M2}
GLOBULIN UR ELPH-MCNC: 2.3 G/DL (ref 2–3.5)
GLUCOSE SERPL-MCNC: 187 MG/DL (ref 65–99)
HBA1C MFR BLD: 8.3 G/DL (ref 12–16)
HCT VFR BLD AUTO: 26.2 % (ref 37–47)
IRON SATN MFR SERPL: 8 % (ref 20–55)
IRON SERPL-MCNC: 33 UG/DL (ref 60–170)
LYMPHOCYTES # BLD AUTO: 2.25 10*3/UL (ref 1–5)
LYMPHOCYTES NFR BLD AUTO: 35.6 % (ref 20–45)
MCH RBC QN AUTO: 30.7 PG (ref 27–31)
MCHC RBC AUTO-ENTMCNC: 31.7 G/DL (ref 33–37)
MCV RBC AUTO: 97 FL (ref 81–99)
MONOCYTES # BLD AUTO: 0.49 10*3/UL (ref 0.2–1.2)
MONOCYTES NFR BLD MANUAL: 7.8 % (ref 3–10)
NEUTROPHILS # BLD AUTO: 3.37 10*3/UL (ref 2–8)
NEUTROPHILS NFR BLD MANUAL: 53.2 % (ref 30–85)
OSMOLALITY SERPL CALC.SUM OF ELEC: 290 MOSM/KG (ref 273–304)
PATHOLOGY REVIEW: NORMAL
PLATELET # BLD AUTO: 233 10*3/UL (ref 130–400)
PMV BLD AUTO: 9.5 FL (ref 7.4–10.4)
POTASSIUM SERPL-SCNC: 3.9 MMOL/L (ref 3.5–5.3)
RBC MORPH BLD: 2.7 10*6/UL (ref 4.2–5.4)
SODIUM SERPL-SCNC: 137 MMOL/L (ref 135–147)
TIBC SERPL-MCNC: 413 UG/DL (ref 245–450)
TRANSFERRIN SERPL-MCNC: 289 MG/DL (ref 250–380)
WBC # BLD AUTO: 6.32 10*3/UL (ref 4.8–10.8)

## 2020-08-10 ENCOUNTER — HOSPITAL ENCOUNTER (OUTPATIENT)
Dept: OTHER | Facility: HOSPITAL | Age: 70
Setting detail: RECURRING SERIES
Discharge: HOME | End: 2020-11-08
Attending: INTERNAL MEDICINE

## 2020-08-27 ENCOUNTER — CONVERSION ENCOUNTER (OUTPATIENT)
Dept: GASTROENTEROLOGY | Facility: CLINIC | Age: 70
End: 2020-08-27

## 2020-08-27 ENCOUNTER — OFFICE VISIT CONVERTED (OUTPATIENT)
Dept: GASTROENTEROLOGY | Facility: CLINIC | Age: 70
End: 2020-08-27
Attending: NURSE PRACTITIONER

## 2020-09-03 ENCOUNTER — HOSPITAL ENCOUNTER (OUTPATIENT)
Dept: OTHER | Facility: HOSPITAL | Age: 70
Discharge: HOME | End: 2020-09-03
Attending: NURSE PRACTITIONER

## 2020-09-04 LAB
C DIFF TOX B STL QL CT TISS CULT: NEGATIVE
CONV 027 TOXIN: NEGATIVE

## 2020-09-06 LAB — BACTERIA SPEC AEROBE CULT: NORMAL

## 2020-09-16 ENCOUNTER — PROCEDURE VISIT CONVERTED (OUTPATIENT)
Dept: GASTROENTEROLOGY | Facility: CLINIC | Age: 70
End: 2020-09-16
Attending: NURSE PRACTITIONER

## 2020-09-18 ENCOUNTER — HOSPITAL ENCOUNTER (OUTPATIENT)
Dept: OTHER | Facility: HOSPITAL | Age: 70
Discharge: HOME | End: 2020-09-18
Attending: INTERNAL MEDICINE

## 2020-09-18 LAB
ALBUMIN SERPL-MCNC: 4.2 G/DL (ref 3.5–5)
ALBUMIN/GLOB SERPL: 1.8 {RATIO} (ref 1.4–2.6)
ALP SERPL-CCNC: 121 U/L (ref 43–160)
ALT SERPL-CCNC: 11 U/L (ref 10–40)
ANION GAP SERPL CALC-SCNC: 12 MMOL/L (ref 8–19)
AST SERPL-CCNC: 30 U/L (ref 15–50)
BASOPHILS # BLD AUTO: 0.04 10*3/UL (ref 0–0.2)
BASOPHILS NFR BLD AUTO: 0.5 % (ref 0–3)
BILIRUB SERPL-MCNC: 0.16 MG/DL (ref 0.2–1.3)
BUN SERPL-MCNC: 12 MG/DL (ref 5–25)
BUN/CREAT SERPL: 18 {RATIO} (ref 6–20)
CALCIUM SERPL-MCNC: 9 MG/DL (ref 8.7–10.4)
CHLORIDE SERPL-SCNC: 104 MMOL/L (ref 99–111)
CONV ABS IMM GRAN: 0.09 10*3/UL (ref 0–0.54)
CONV CO2: 23 MMOL/L (ref 22–32)
CONV EOSINOPHILS PERCENT BY MANUAL COUNT: 0.7 % (ref 0–7)
CONV IMMATURE GRAN: 1.2 % (ref 0–0.4)
CONV TOTAL PROTEIN: 6.5 G/DL (ref 6.3–8.2)
CREAT UR-MCNC: 0.68 MG/DL (ref 0.5–0.9)
EOSINOPHIL # BLD MANUAL: 0.05 10*3/UL (ref 0–0.7)
ERYTHROCYTE [DISTWIDTH] IN BLOOD BY AUTOMATED COUNT: 17.5 % (ref 11.5–14.5)
ERYTHROCYTE [DISTWIDTH] IN BLOOD BY AUTOMATED COUNT: 65.8 FL
FERRITIN SERPL-MCNC: 254 NG/ML (ref 10–200)
GFR SERPLBLD BASED ON 1.73 SQ M-ARVRAT: >60 ML/MIN/{1.73_M2}
GLOBULIN UR ELPH-MCNC: 2.3 G/DL (ref 2–3.5)
GLUCOSE SERPL-MCNC: 154 MG/DL (ref 65–99)
HBA1C MFR BLD: 9.8 G/DL (ref 12–16)
HCT VFR BLD AUTO: 30 % (ref 37–47)
IRON SATN MFR SERPL: 20 % (ref 20–55)
IRON SERPL-MCNC: 77 UG/DL (ref 60–170)
LYMPHOCYTES # BLD AUTO: 2.48 10*3/UL (ref 1–5)
LYMPHOCYTES NFR BLD AUTO: 32.7 % (ref 20–45)
MCH RBC QN AUTO: 33.2 PG (ref 27–31)
MCHC RBC AUTO-ENTMCNC: 32.7 G/DL (ref 33–37)
MCV RBC AUTO: 101.7 FL (ref 81–99)
MONOCYTES # BLD AUTO: 0.58 10*3/UL (ref 0.2–1.2)
MONOCYTES NFR BLD MANUAL: 7.7 % (ref 3–10)
NEUTROPHILS # BLD AUTO: 4.34 10*3/UL (ref 2–8)
NEUTROPHILS NFR BLD MANUAL: 57.2 % (ref 30–85)
OSMOLALITY SERPL CALC.SUM OF ELEC: 283 MOSM/KG (ref 273–304)
PLATELET # BLD AUTO: 202 10*3/UL (ref 130–400)
PMV BLD AUTO: 9.9 FL (ref 7.4–10.4)
POTASSIUM SERPL-SCNC: 3.8 MMOL/L (ref 3.5–5.3)
RBC MORPH BLD: 2.95 10*6/UL (ref 4.2–5.4)
SODIUM SERPL-SCNC: 135 MMOL/L (ref 135–147)
TIBC SERPL-MCNC: 386 UG/DL (ref 245–450)
TRANSFERRIN SERPL-MCNC: 270 MG/DL (ref 250–380)
WBC # BLD AUTO: 7.58 10*3/UL (ref 4.8–10.8)

## 2020-09-22 ENCOUNTER — OFFICE VISIT CONVERTED (OUTPATIENT)
Dept: ONCOLOGY | Facility: HOSPITAL | Age: 70
End: 2020-09-22
Attending: INTERNAL MEDICINE

## 2020-09-22 ENCOUNTER — HOSPITAL ENCOUNTER (OUTPATIENT)
Dept: ONCOLOGY | Facility: HOSPITAL | Age: 70
Discharge: HOME | End: 2020-09-22
Attending: INTERNAL MEDICINE

## 2020-09-26 ENCOUNTER — HOSPITAL ENCOUNTER (OUTPATIENT)
Dept: PREADMISSION TESTING | Facility: HOSPITAL | Age: 70
Discharge: HOME | End: 2020-09-26
Attending: INTERNAL MEDICINE

## 2020-09-27 LAB — SARS-COV-2 RNA SPEC QL NAA+PROBE: NOT DETECTED

## 2020-10-01 ENCOUNTER — HOSPITAL ENCOUNTER (OUTPATIENT)
Dept: GASTROENTEROLOGY | Facility: HOSPITAL | Age: 70
Setting detail: HOSPITAL OUTPATIENT SURGERY
Discharge: HOME | End: 2020-10-01
Attending: INTERNAL MEDICINE

## 2020-10-01 LAB — GLUCOSE BLD-MCNC: 123 MG/DL (ref 65–99)

## 2020-11-04 ENCOUNTER — OFFICE VISIT CONVERTED (OUTPATIENT)
Dept: GASTROENTEROLOGY | Facility: CLINIC | Age: 70
End: 2020-11-04
Attending: NURSE PRACTITIONER

## 2020-11-24 ENCOUNTER — OFFICE VISIT CONVERTED (OUTPATIENT)
Dept: ONCOLOGY | Facility: HOSPITAL | Age: 70
End: 2020-11-24
Attending: NURSE PRACTITIONER

## 2020-11-24 ENCOUNTER — HOSPITAL ENCOUNTER (OUTPATIENT)
Dept: OTHER | Facility: HOSPITAL | Age: 70
Discharge: HOME | End: 2020-11-24
Attending: NURSE PRACTITIONER

## 2020-11-24 LAB
ALBUMIN SERPL-MCNC: 3.9 G/DL (ref 3.5–5)
ALBUMIN/GLOB SERPL: 1.4 {RATIO} (ref 1.4–2.6)
ALP SERPL-CCNC: 116 U/L (ref 43–160)
ALT SERPL-CCNC: 8 U/L (ref 10–40)
ANION GAP SERPL CALC-SCNC: 11 MMOL/L (ref 8–19)
AST SERPL-CCNC: 26 U/L (ref 15–50)
BASOPHILS # BLD AUTO: 0.07 10*3/UL (ref 0–0.2)
BASOPHILS NFR BLD AUTO: 0.8 % (ref 0–3)
BILIRUB SERPL-MCNC: 0.18 MG/DL (ref 0.2–1.3)
BUN SERPL-MCNC: 13 MG/DL (ref 5–25)
BUN/CREAT SERPL: 13 {RATIO} (ref 6–20)
CALCIUM SERPL-MCNC: 8.7 MG/DL (ref 8.7–10.4)
CHLORIDE SERPL-SCNC: 103 MMOL/L (ref 99–111)
CONV ABS IMM GRAN: 0.12 10*3/UL (ref 0–0.54)
CONV CO2: 24 MMOL/L (ref 22–32)
CONV EOSINOPHILS PERCENT BY MANUAL COUNT: 0.4 % (ref 0–7)
CONV IMMATURE GRAN: 1.4 % (ref 0–0.4)
CONV TOTAL PROTEIN: 6.6 G/DL (ref 6.3–8.2)
CREAT UR-MCNC: 0.97 MG/DL (ref 0.5–0.9)
EOSINOPHIL # BLD MANUAL: 0.03 10*3/UL (ref 0–0.7)
ERYTHROCYTE [DISTWIDTH] IN BLOOD BY AUTOMATED COUNT: 20.7 % (ref 11.5–14.5)
ERYTHROCYTE [DISTWIDTH] IN BLOOD BY AUTOMATED COUNT: 74.2 FL
GFR SERPLBLD BASED ON 1.73 SQ M-ARVRAT: 58 ML/MIN/{1.73_M2}
GLOBULIN UR ELPH-MCNC: 2.7 G/DL (ref 2–3.5)
GLUCOSE SERPL-MCNC: 141 MG/DL (ref 65–99)
HBA1C MFR BLD: 8.1 G/DL (ref 12–16)
HCT VFR BLD AUTO: 24.6 % (ref 37–47)
LYMPHOCYTES # BLD AUTO: 2.54 10*3/UL (ref 1–5)
LYMPHOCYTES NFR BLD AUTO: 30.6 % (ref 20–45)
MCH RBC QN AUTO: 32.9 PG (ref 27–31)
MCHC RBC AUTO-ENTMCNC: 32.9 G/DL (ref 33–37)
MCV RBC AUTO: 100 FL (ref 81–99)
MONOCYTES # BLD AUTO: 0.59 10*3/UL (ref 0.2–1.2)
MONOCYTES NFR BLD MANUAL: 7.1 % (ref 3–10)
NEUTROPHILS # BLD AUTO: 4.96 10*3/UL (ref 2–8)
NEUTROPHILS NFR BLD MANUAL: 59.7 % (ref 30–85)
OSMOLALITY SERPL CALC.SUM OF ELEC: 283 MOSM/KG (ref 273–304)
PLATELET # BLD AUTO: 144 10*3/UL (ref 130–400)
PMV BLD AUTO: 10 FL (ref 7.4–10.4)
POTASSIUM SERPL-SCNC: 3.4 MMOL/L (ref 3.5–5.3)
RBC MORPH BLD: 2.46 10*6/UL (ref 4.2–5.4)
SODIUM SERPL-SCNC: 135 MMOL/L (ref 135–147)
WBC # BLD AUTO: 8.31 10*3/UL (ref 4.8–10.8)

## 2020-12-01 ENCOUNTER — HOSPITAL ENCOUNTER (OUTPATIENT)
Dept: OTHER | Facility: HOSPITAL | Age: 70
Discharge: HOME | End: 2020-12-01
Attending: NURSE PRACTITIONER

## 2020-12-01 ENCOUNTER — LAB REQUISITION (OUTPATIENT)
Dept: LAB | Facility: HOSPITAL | Age: 70
End: 2020-12-01

## 2020-12-01 DIAGNOSIS — Z00.00 ROUTINE GENERAL MEDICAL EXAMINATION AT A HEALTH CARE FACILITY: ICD-10-CM

## 2020-12-01 LAB
ALBUMIN SERPL-MCNC: 3.8 G/DL (ref 3.5–5)
ALBUMIN/GLOB SERPL: 1.4 {RATIO} (ref 1.4–2.6)
ALP SERPL-CCNC: 116 U/L (ref 43–160)
ALT SERPL-CCNC: 8 U/L (ref 10–40)
ANION GAP SERPL CALC-SCNC: 10 MMOL/L (ref 8–19)
AST SERPL-CCNC: 23 U/L (ref 15–50)
BASOPHILS # BLD AUTO: 0.07 10*3/UL (ref 0–0.2)
BASOPHILS NFR BLD AUTO: 1 % (ref 0–3)
BILIRUB SERPL-MCNC: 0.15 MG/DL (ref 0.2–1.3)
BUN SERPL-MCNC: 14 MG/DL (ref 5–25)
BUN/CREAT SERPL: 14 {RATIO} (ref 6–20)
CALCIUM SERPL-MCNC: 8.9 MG/DL (ref 8.7–10.4)
CEA SERPL-MCNC: 5.8 NG/ML (ref 0–5)
CHLORIDE SERPL-SCNC: 107 MMOL/L (ref 99–111)
CONV ABS IMM GRAN: 0.1 10*3/UL (ref 0–0.54)
CONV CO2: 21 MMOL/L (ref 22–32)
CONV EOSINOPHILS PERCENT BY MANUAL COUNT: 0.4 % (ref 0–7)
CONV IMMATURE GRAN: 1.4 % (ref 0–0.4)
CONV TOTAL PROTEIN: 6.5 G/DL (ref 6.3–8.2)
CREAT UR-MCNC: 0.97 MG/DL (ref 0.5–0.9)
EOSINOPHIL # BLD MANUAL: 0.03 10*3/UL (ref 0–0.7)
ERYTHROCYTE [DISTWIDTH] IN BLOOD BY AUTOMATED COUNT: 20.1 % (ref 11.5–14.5)
ERYTHROCYTE [DISTWIDTH] IN BLOOD BY AUTOMATED COUNT: 75.3 FL
FERRITIN SERPL-MCNC: 148 NG/ML (ref 10–200)
FOLATE SERPL-MCNC: 19.5 NG/ML (ref 4.78–24.2)
GFR SERPLBLD BASED ON 1.73 SQ M-ARVRAT: 58 ML/MIN/{1.73_M2}
GLOBULIN UR ELPH-MCNC: 2.7 G/DL (ref 2–3.5)
GLUCOSE SERPL-MCNC: 144 MG/DL (ref 65–99)
HBA1C MFR BLD: 7.6 G/DL (ref 12–16)
HCT VFR BLD AUTO: 23.4 % (ref 37–47)
IRON SATN MFR SERPL: 15 % (ref 20–55)
IRON SERPL-MCNC: 51 UG/DL (ref 60–170)
LDH SERPL-CCNC: 160 U/L (ref 120–240)
LYMPHOCYTES # BLD AUTO: 2.22 10*3/UL (ref 1–5)
LYMPHOCYTES NFR BLD AUTO: 30.2 % (ref 20–45)
MCH RBC QN AUTO: 32.8 PG (ref 27–31)
MCHC RBC AUTO-ENTMCNC: 32.5 G/DL (ref 33–37)
MCV RBC AUTO: 100.9 FL (ref 81–99)
MONOCYTES # BLD AUTO: 0.5 10*3/UL (ref 0.2–1.2)
MONOCYTES NFR BLD MANUAL: 6.8 % (ref 3–10)
NEUTROPHILS # BLD AUTO: 4.44 10*3/UL (ref 2–8)
NEUTROPHILS NFR BLD MANUAL: 60.2 % (ref 30–85)
OSMOLALITY SERPL CALC.SUM OF ELEC: 283 MOSM/KG (ref 273–304)
PLATELET # BLD AUTO: 202 10*3/UL (ref 130–400)
PMV BLD AUTO: 9.9 FL (ref 7.4–10.4)
POTASSIUM SERPL-SCNC: 3.2 MMOL/L (ref 3.5–5.3)
RBC MORPH BLD: 2.32 10*6/UL (ref 4.2–5.4)
SODIUM SERPL-SCNC: 135 MMOL/L (ref 135–147)
TIBC SERPL-MCNC: 343 UG/DL (ref 245–450)
TRANSFERRIN SERPL-MCNC: 240 MG/DL (ref 250–380)
VIT B12 SERPL-MCNC: 279 PG/ML (ref 211–911)
WBC # BLD AUTO: 7.36 10*3/UL (ref 4.8–10.8)

## 2020-12-01 PROCEDURE — 82746 ASSAY OF FOLIC ACID SERUM: CPT

## 2020-12-03 ENCOUNTER — OFFICE (OUTPATIENT)
Dept: URBAN - METROPOLITAN AREA CLINIC 75 | Facility: CLINIC | Age: 70
End: 2020-12-03

## 2020-12-03 VITALS — OXYGEN SATURATION: 95 % | HEART RATE: 68 BPM | HEIGHT: 66 IN | TEMPERATURE: 97.1 F | WEIGHT: 190 LBS

## 2020-12-03 DIAGNOSIS — D50.9 IRON DEFICIENCY ANEMIA, UNSPECIFIED: ICD-10-CM

## 2020-12-03 DIAGNOSIS — C18.9 MALIGNANT NEOPLASM OF COLON, UNSPECIFIED: ICD-10-CM

## 2020-12-03 DIAGNOSIS — K21.9 GASTRO-ESOPHAGEAL REFLUX DISEASE WITHOUT ESOPHAGITIS: ICD-10-CM

## 2020-12-03 DIAGNOSIS — K28.9 GASTROJEJUNAL ULCER, UNSPECIFIED AS ACUTE OR CHRONIC, WITHOU: ICD-10-CM

## 2020-12-03 PROCEDURE — 99204 OFFICE O/P NEW MOD 45 MIN: CPT | Performed by: INTERNAL MEDICINE

## 2020-12-10 ENCOUNTER — HOSPITAL ENCOUNTER (OUTPATIENT)
Dept: OTHER | Facility: HOSPITAL | Age: 70
Discharge: HOME | End: 2020-12-10
Attending: NURSE PRACTITIONER

## 2020-12-10 LAB
BASOPHILS # BLD AUTO: 0.07 10*3/UL (ref 0–0.2)
BASOPHILS NFR BLD AUTO: 0.9 % (ref 0–3)
CONV ABS IMM GRAN: 0.05 10*3/UL (ref 0–0.54)
CONV EOSINOPHILS PERCENT BY MANUAL COUNT: 0.5 % (ref 0–7)
CONV IMMATURE GRAN: 0.6 % (ref 0–0.4)
EOSINOPHIL # BLD MANUAL: 0.04 10*3/UL (ref 0–0.7)
ERYTHROCYTE [DISTWIDTH] IN BLOOD BY AUTOMATED COUNT: 18.1 % (ref 11.5–14.5)
ERYTHROCYTE [DISTWIDTH] IN BLOOD BY AUTOMATED COUNT: 68.2 FL
HBA1C MFR BLD: 6.9 G/DL (ref 12–16)
HCT VFR BLD AUTO: 22.6 % (ref 37–47)
LYMPHOCYTES # BLD AUTO: 2.66 10*3/UL (ref 1–5)
LYMPHOCYTES NFR BLD AUTO: 33.3 % (ref 20–45)
MCH RBC QN AUTO: 31.2 PG (ref 27–31)
MCHC RBC AUTO-ENTMCNC: 30.5 G/DL (ref 33–37)
MCV RBC AUTO: 102.3 FL (ref 81–99)
MONOCYTES # BLD AUTO: 0.58 10*3/UL (ref 0.2–1.2)
MONOCYTES NFR BLD MANUAL: 7.3 % (ref 3–10)
NEUTROPHILS # BLD AUTO: 4.58 10*3/UL (ref 2–8)
NEUTROPHILS NFR BLD MANUAL: 57.4 % (ref 30–85)
PLATELET # BLD AUTO: 257 10*3/UL (ref 130–400)
PMV BLD AUTO: 9.9 FL (ref 7.4–10.4)
RBC MORPH BLD: 2.21 10*6/UL (ref 4.2–5.4)
WBC # BLD AUTO: 7.98 10*3/UL (ref 4.8–10.8)

## 2020-12-11 ENCOUNTER — HOSPITAL ENCOUNTER (OUTPATIENT)
Dept: INFUSION THERAPY | Facility: HOSPITAL | Age: 70
Setting detail: RECURRING SERIES
Discharge: HOME | End: 2021-03-11
Attending: NURSE PRACTITIONER

## 2020-12-11 LAB
ABO GROUP BLD: NORMAL
ALBUMIN SERPL-MCNC: 4 G/DL (ref 3.5–5)
ALBUMIN/GLOB SERPL: 1.3 {RATIO} (ref 1.4–2.6)
ALP SERPL-CCNC: 122 U/L (ref 43–160)
ALT SERPL-CCNC: 8 U/L (ref 10–40)
ANION GAP SERPL CALC-SCNC: 16 MMOL/L (ref 8–19)
AST SERPL-CCNC: 27 U/L (ref 15–50)
BILIRUB SERPL-MCNC: <0.15 MG/DL (ref 0.2–1.3)
BLD GP AB SCN SERPL QL: NORMAL
BUN SERPL-MCNC: 12 MG/DL (ref 5–25)
BUN/CREAT SERPL: 11 {RATIO} (ref 6–20)
CALCIUM SERPL-MCNC: 8.8 MG/DL (ref 8.7–10.4)
CHLORIDE SERPL-SCNC: 106 MMOL/L (ref 99–111)
CONV ABD CONTROL: NORMAL
CONV CO2: 21 MMOL/L (ref 22–32)
CONV TOTAL PROTEIN: 7.1 G/DL (ref 6.3–8.2)
CREAT UR-MCNC: 1.13 MG/DL (ref 0.5–0.9)
GFR SERPLBLD BASED ON 1.73 SQ M-ARVRAT: 49 ML/MIN/{1.73_M2}
GLOBULIN UR ELPH-MCNC: 3.1 G/DL (ref 2–3.5)
GLUCOSE SERPL-MCNC: 192 MG/DL (ref 65–99)
Lab: NORMAL
OSMOLALITY SERPL CALC.SUM OF ELEC: 293 MOSM/KG (ref 273–304)
POTASSIUM SERPL-SCNC: 3.6 MMOL/L (ref 3.5–5.3)
RH BLD: NORMAL
SODIUM SERPL-SCNC: 139 MMOL/L (ref 135–147)

## 2020-12-12 LAB — BLOOD GROUP ANTIBODIES SERPL: NORMAL

## 2020-12-22 ENCOUNTER — HOSPITAL ENCOUNTER (OUTPATIENT)
Dept: PREADMISSION TESTING | Facility: HOSPITAL | Age: 70
Discharge: HOME | End: 2020-12-22
Attending: INTERNAL MEDICINE

## 2020-12-23 LAB — SARS-COV-2 RNA SPEC QL NAA+PROBE: NOT DETECTED

## 2020-12-29 ENCOUNTER — HOSPITAL ENCOUNTER (OUTPATIENT)
Dept: MAMMOGRAPHY | Facility: HOSPITAL | Age: 70
Discharge: HOME | End: 2020-12-29
Attending: INTERNAL MEDICINE

## 2020-12-30 ENCOUNTER — ON CAMPUS - OUTPATIENT (OUTPATIENT)
Dept: URBAN - METROPOLITAN AREA HOSPITAL 108 | Facility: HOSPITAL | Age: 70
End: 2020-12-30
Payer: MEDICARE

## 2020-12-30 DIAGNOSIS — K63.81 DIEULAFOY LESION OF INTESTINE: ICD-10-CM

## 2020-12-30 DIAGNOSIS — D50.0 IRON DEFICIENCY ANEMIA SECONDARY TO BLOOD LOSS (CHRONIC): ICD-10-CM

## 2020-12-30 DIAGNOSIS — R93.3 ABNORMAL FINDINGS ON DIAGNOSTIC IMAGING OF OTHER PARTS OF DI: ICD-10-CM

## 2020-12-30 PROCEDURE — 44378 SMALL BOWEL ENDOSCOPY: CPT

## 2021-01-01 ENCOUNTER — HOSPITAL ENCOUNTER (OUTPATIENT)
Dept: ONCOLOGY | Facility: HOSPITAL | Age: 71
Setting detail: INFUSION SERIES
Discharge: HOME | End: 2021-10-25

## 2021-01-01 ENCOUNTER — APPOINTMENT (OUTPATIENT)
Dept: NUCLEAR MEDICINE | Facility: HOSPITAL | Age: 71
End: 2021-01-01

## 2021-01-01 ENCOUNTER — TELEPHONE (OUTPATIENT)
Dept: ONCOLOGY | Facility: HOSPITAL | Age: 71
End: 2021-01-01

## 2021-01-01 ENCOUNTER — APPOINTMENT (OUTPATIENT)
Dept: ONCOLOGY | Facility: HOSPITAL | Age: 71
End: 2021-01-01

## 2021-01-01 ENCOUNTER — DOCUMENTATION (OUTPATIENT)
Dept: UROLOGY | Facility: CLINIC | Age: 71
End: 2021-01-01

## 2021-01-01 ENCOUNTER — SPECIALTY PHARMACY (OUTPATIENT)
Dept: PHARMACY | Facility: TELEHEALTH | Age: 71
End: 2021-01-01

## 2021-01-01 ENCOUNTER — HOSPITAL ENCOUNTER (OUTPATIENT)
Dept: ONCOLOGY | Facility: HOSPITAL | Age: 71
Setting detail: INFUSION SERIES
Discharge: HOME | End: 2021-12-07

## 2021-01-01 ENCOUNTER — SPECIALTY PHARMACY (OUTPATIENT)
Dept: PHARMACY | Facility: HOSPITAL | Age: 71
End: 2021-01-01

## 2021-01-01 ENCOUNTER — HOSPITAL ENCOUNTER (OUTPATIENT)
Dept: GENERAL RADIOLOGY | Facility: HOSPITAL | Age: 71
Discharge: HOME | End: 2021-12-07
Admitting: INTERNAL MEDICINE

## 2021-01-01 ENCOUNTER — HOSPITAL ENCOUNTER (OUTPATIENT)
Dept: ONCOLOGY | Facility: HOSPITAL | Age: 71
Setting detail: INFUSION SERIES
Discharge: HOME | End: 2021-08-23

## 2021-01-01 ENCOUNTER — HOSPITAL ENCOUNTER (OUTPATIENT)
Dept: ONCOLOGY | Facility: HOSPITAL | Age: 71
Setting detail: INFUSION SERIES
Discharge: HOME | End: 2021-09-14

## 2021-01-01 ENCOUNTER — APPOINTMENT (OUTPATIENT)
Dept: CT IMAGING | Facility: HOSPITAL | Age: 71
End: 2021-01-01

## 2021-01-01 ENCOUNTER — PREP FOR SURGERY (OUTPATIENT)
Dept: OTHER | Facility: HOSPITAL | Age: 71
End: 2021-01-01

## 2021-01-01 ENCOUNTER — OFFICE VISIT (OUTPATIENT)
Dept: ONCOLOGY | Facility: HOSPITAL | Age: 71
End: 2021-01-01

## 2021-01-01 ENCOUNTER — HOSPITAL ENCOUNTER (EMERGENCY)
Facility: HOSPITAL | Age: 71
Discharge: HOME | End: 2021-12-21
Attending: EMERGENCY MEDICINE | Admitting: EMERGENCY MEDICINE

## 2021-01-01 ENCOUNTER — HOSPITAL ENCOUNTER (OUTPATIENT)
Dept: ONCOLOGY | Facility: HOSPITAL | Age: 71
Setting detail: INFUSION SERIES
Discharge: HOME | End: 2021-12-14

## 2021-01-01 ENCOUNTER — TRANSCRIBE ORDERS (OUTPATIENT)
Dept: CASE MANAGEMENT | Facility: OTHER | Age: 71
End: 2021-01-01

## 2021-01-01 ENCOUNTER — HOSPITAL ENCOUNTER (OUTPATIENT)
Dept: ONCOLOGY | Facility: HOSPITAL | Age: 71
Setting detail: INFUSION SERIES
Discharge: HOME | End: 2021-11-23

## 2021-01-01 ENCOUNTER — HOSPITAL ENCOUNTER (OUTPATIENT)
Dept: INFUSION THERAPY | Facility: HOSPITAL | Age: 71
Discharge: HOME | End: 2021-10-18
Attending: INTERNAL MEDICINE | Admitting: INTERNAL MEDICINE

## 2021-01-01 ENCOUNTER — HOSPITAL ENCOUNTER (OUTPATIENT)
Dept: ONCOLOGY | Facility: HOSPITAL | Age: 71
Setting detail: INFUSION SERIES
Discharge: HOME | End: 2021-10-28

## 2021-01-01 ENCOUNTER — HOSPITAL ENCOUNTER (OUTPATIENT)
Dept: INFUSION THERAPY | Facility: HOSPITAL | Age: 71
Discharge: HOME | End: 2021-08-25
Attending: INTERNAL MEDICINE | Admitting: INTERNAL MEDICINE

## 2021-01-01 ENCOUNTER — HOSPITAL ENCOUNTER (EMERGENCY)
Facility: HOSPITAL | Age: 71
Discharge: HOME | End: 2021-11-09
Attending: EMERGENCY MEDICINE | Admitting: EMERGENCY MEDICINE

## 2021-01-01 ENCOUNTER — HOSPITAL ENCOUNTER (OUTPATIENT)
Dept: ONCOLOGY | Facility: HOSPITAL | Age: 71
Setting detail: INFUSION SERIES
Discharge: HOME | End: 2021-07-26

## 2021-01-01 ENCOUNTER — HOSPITAL ENCOUNTER (OUTPATIENT)
Dept: ONCOLOGY | Facility: HOSPITAL | Age: 71
Setting detail: INFUSION SERIES
Discharge: HOME | End: 2021-11-30

## 2021-01-01 ENCOUNTER — ANESTHESIA (OUTPATIENT)
Dept: PERIOP | Facility: HOSPITAL | Age: 71
End: 2021-01-01

## 2021-01-01 ENCOUNTER — TRANSCRIBE ORDERS (OUTPATIENT)
Dept: ADMINISTRATIVE | Facility: HOSPITAL | Age: 71
End: 2021-01-01

## 2021-01-01 ENCOUNTER — APPOINTMENT (OUTPATIENT)
Dept: GENERAL RADIOLOGY | Facility: HOSPITAL | Age: 71
End: 2021-01-01

## 2021-01-01 ENCOUNTER — HOSPITAL ENCOUNTER (OUTPATIENT)
Dept: ULTRASOUND IMAGING | Facility: HOSPITAL | Age: 71
Discharge: HOME | End: 2021-12-16

## 2021-01-01 ENCOUNTER — HOSPITAL ENCOUNTER (OUTPATIENT)
Dept: ONCOLOGY | Facility: HOSPITAL | Age: 71
Setting detail: INFUSION SERIES
Discharge: HOME | End: 2021-09-20

## 2021-01-01 ENCOUNTER — HOSPITAL ENCOUNTER (OUTPATIENT)
Dept: CT IMAGING | Facility: HOSPITAL | Age: 71
Discharge: HOME | End: 2021-10-26
Admitting: SPECIALIST

## 2021-01-01 ENCOUNTER — HOSPITAL ENCOUNTER (OUTPATIENT)
Dept: INFUSION THERAPY | Facility: HOSPITAL | Age: 71
Discharge: HOME | End: 2021-08-24
Attending: INTERNAL MEDICINE | Admitting: INTERNAL MEDICINE

## 2021-01-01 ENCOUNTER — HOSPITAL ENCOUNTER (OUTPATIENT)
Dept: ONCOLOGY | Facility: HOSPITAL | Age: 71
Setting detail: INFUSION SERIES
Discharge: HOME | End: 2021-10-21

## 2021-01-01 ENCOUNTER — TELEPHONE (OUTPATIENT)
Dept: UROLOGY | Facility: CLINIC | Age: 71
End: 2021-01-01

## 2021-01-01 ENCOUNTER — HOSPITAL ENCOUNTER (OUTPATIENT)
Dept: CARDIOLOGY | Facility: HOSPITAL | Age: 71
Discharge: HOME | End: 2021-11-17
Admitting: INTERNAL MEDICINE

## 2021-01-01 ENCOUNTER — ANESTHESIA EVENT (OUTPATIENT)
Dept: PERIOP | Facility: HOSPITAL | Age: 71
End: 2021-01-01

## 2021-01-01 ENCOUNTER — HOSPITAL ENCOUNTER (OUTPATIENT)
Dept: ONCOLOGY | Facility: HOSPITAL | Age: 71
Setting detail: INFUSION SERIES
Discharge: HOME | End: 2021-10-18

## 2021-01-01 ENCOUNTER — HOSPITAL ENCOUNTER (OUTPATIENT)
Dept: MAMMOGRAPHY | Facility: HOSPITAL | Age: 71
Discharge: HOME | End: 2021-12-16

## 2021-01-01 ENCOUNTER — HOSPITAL ENCOUNTER (OUTPATIENT)
Dept: ONCOLOGY | Facility: HOSPITAL | Age: 71
Setting detail: INFUSION SERIES
Discharge: HOME | End: 2021-12-13

## 2021-01-01 ENCOUNTER — CONSULT (OUTPATIENT)
Dept: RADIATION ONCOLOGY | Facility: HOSPITAL | Age: 71
End: 2021-01-01

## 2021-01-01 ENCOUNTER — HOSPITAL ENCOUNTER (OUTPATIENT)
Dept: ONCOLOGY | Facility: HOSPITAL | Age: 71
Setting detail: INFUSION SERIES
Discharge: HOME | End: 2021-12-21

## 2021-01-01 ENCOUNTER — HOSPITAL ENCOUNTER (OUTPATIENT)
Dept: ONCOLOGY | Facility: HOSPITAL | Age: 71
Setting detail: INFUSION SERIES
Discharge: HOME | End: 2021-12-28

## 2021-01-01 ENCOUNTER — TRANSCRIBE ORDERS (OUTPATIENT)
Dept: LAB | Facility: HOSPITAL | Age: 71
End: 2021-01-01

## 2021-01-01 ENCOUNTER — HOSPITAL ENCOUNTER (OUTPATIENT)
Dept: ONCOLOGY | Facility: HOSPITAL | Age: 71
Setting detail: INFUSION SERIES
End: 2021-01-01

## 2021-01-01 ENCOUNTER — HOSPITAL ENCOUNTER (OUTPATIENT)
Dept: ONCOLOGY | Facility: HOSPITAL | Age: 71
Setting detail: INFUSION SERIES
Discharge: HOME | End: 2021-11-08

## 2021-01-01 ENCOUNTER — HOSPITAL ENCOUNTER (OUTPATIENT)
Dept: CT IMAGING | Facility: HOSPITAL | Age: 71
Discharge: HOME | End: 2021-09-21

## 2021-01-01 ENCOUNTER — TELEPHONE (OUTPATIENT)
Dept: SURGERY | Facility: CLINIC | Age: 71
End: 2021-01-01

## 2021-01-01 ENCOUNTER — HOSPITAL ENCOUNTER (OUTPATIENT)
Dept: ONCOLOGY | Facility: HOSPITAL | Age: 71
Setting detail: INFUSION SERIES
Discharge: HOME | End: 2021-09-30

## 2021-01-01 ENCOUNTER — OFFICE VISIT (OUTPATIENT)
Dept: SURGERY | Facility: CLINIC | Age: 71
End: 2021-01-01

## 2021-01-01 ENCOUNTER — HOSPITAL ENCOUNTER (OUTPATIENT)
Facility: HOSPITAL | Age: 71
Setting detail: HOSPITAL OUTPATIENT SURGERY
Discharge: HOME | End: 2021-08-17
Attending: UROLOGY | Admitting: UROLOGY

## 2021-01-01 ENCOUNTER — HOSPITAL ENCOUNTER (OUTPATIENT)
Dept: INFUSION THERAPY | Facility: HOSPITAL | Age: 71
Discharge: HOME | End: 2021-01-01
Attending: INTERNAL MEDICINE | Admitting: INTERNAL MEDICINE

## 2021-01-01 VITALS
WEIGHT: 151.68 LBS | RESPIRATION RATE: 18 BRPM | OXYGEN SATURATION: 99 % | DIASTOLIC BLOOD PRESSURE: 72 MMHG | BODY MASS INDEX: 25.24 KG/M2 | SYSTOLIC BLOOD PRESSURE: 140 MMHG | TEMPERATURE: 98.2 F | HEART RATE: 89 BPM

## 2021-01-01 VITALS
DIASTOLIC BLOOD PRESSURE: 77 MMHG | WEIGHT: 158.73 LBS | BODY MASS INDEX: 26.45 KG/M2 | TEMPERATURE: 98.3 F | SYSTOLIC BLOOD PRESSURE: 142 MMHG | HEIGHT: 65 IN | HEART RATE: 87 BPM | OXYGEN SATURATION: 97 % | RESPIRATION RATE: 22 BRPM

## 2021-01-01 VITALS
DIASTOLIC BLOOD PRESSURE: 55 MMHG | WEIGHT: 153.22 LBS | OXYGEN SATURATION: 99 % | BODY MASS INDEX: 25.53 KG/M2 | SYSTOLIC BLOOD PRESSURE: 114 MMHG | TEMPERATURE: 97.9 F | HEART RATE: 82 BPM | RESPIRATION RATE: 18 BRPM | HEIGHT: 65 IN

## 2021-01-01 VITALS
BODY MASS INDEX: 27.58 KG/M2 | TEMPERATURE: 98.5 F | HEART RATE: 72 BPM | HEIGHT: 65 IN | WEIGHT: 165.57 LBS | SYSTOLIC BLOOD PRESSURE: 119 MMHG | OXYGEN SATURATION: 96 % | RESPIRATION RATE: 16 BRPM | DIASTOLIC BLOOD PRESSURE: 61 MMHG

## 2021-01-01 VITALS
DIASTOLIC BLOOD PRESSURE: 55 MMHG | WEIGHT: 153.22 LBS | BODY MASS INDEX: 25.5 KG/M2 | OXYGEN SATURATION: 99 % | TEMPERATURE: 97.9 F | RESPIRATION RATE: 18 BRPM | SYSTOLIC BLOOD PRESSURE: 114 MMHG | HEART RATE: 82 BPM

## 2021-01-01 VITALS
OXYGEN SATURATION: 99 % | TEMPERATURE: 97.8 F | WEIGHT: 156.53 LBS | RESPIRATION RATE: 20 BRPM | DIASTOLIC BLOOD PRESSURE: 61 MMHG | HEART RATE: 87 BPM | SYSTOLIC BLOOD PRESSURE: 132 MMHG | BODY MASS INDEX: 26.05 KG/M2

## 2021-01-01 VITALS
BODY MASS INDEX: 27.92 KG/M2 | HEART RATE: 81 BPM | WEIGHT: 167.55 LBS | HEIGHT: 65 IN | TEMPERATURE: 98.6 F | OXYGEN SATURATION: 99 % | DIASTOLIC BLOOD PRESSURE: 50 MMHG | SYSTOLIC BLOOD PRESSURE: 117 MMHG | RESPIRATION RATE: 16 BRPM

## 2021-01-01 VITALS — BODY MASS INDEX: 27.9 KG/M2 | WEIGHT: 167.44 LBS | HEIGHT: 65 IN

## 2021-01-01 VITALS
DIASTOLIC BLOOD PRESSURE: 66 MMHG | TEMPERATURE: 98.9 F | SYSTOLIC BLOOD PRESSURE: 123 MMHG | WEIGHT: 160 LBS | RESPIRATION RATE: 18 BRPM | HEIGHT: 65 IN | HEART RATE: 94 BPM | BODY MASS INDEX: 26.66 KG/M2 | OXYGEN SATURATION: 93 %

## 2021-01-01 VITALS
HEART RATE: 95 BPM | DIASTOLIC BLOOD PRESSURE: 79 MMHG | SYSTOLIC BLOOD PRESSURE: 151 MMHG | OXYGEN SATURATION: 98 % | TEMPERATURE: 99.1 F | RESPIRATION RATE: 18 BRPM

## 2021-01-01 VITALS
DIASTOLIC BLOOD PRESSURE: 57 MMHG | OXYGEN SATURATION: 100 % | SYSTOLIC BLOOD PRESSURE: 109 MMHG | RESPIRATION RATE: 16 BRPM | HEART RATE: 94 BPM | TEMPERATURE: 97.1 F | BODY MASS INDEX: 26.41 KG/M2 | WEIGHT: 158.73 LBS

## 2021-01-01 VITALS
TEMPERATURE: 97.6 F | DIASTOLIC BLOOD PRESSURE: 64 MMHG | HEART RATE: 94 BPM | OXYGEN SATURATION: 96 % | SYSTOLIC BLOOD PRESSURE: 122 MMHG | RESPIRATION RATE: 18 BRPM | WEIGHT: 158.73 LBS | BODY MASS INDEX: 26.41 KG/M2

## 2021-01-01 VITALS
RESPIRATION RATE: 20 BRPM | SYSTOLIC BLOOD PRESSURE: 116 MMHG | DIASTOLIC BLOOD PRESSURE: 59 MMHG | OXYGEN SATURATION: 97 % | WEIGHT: 154.32 LBS | HEART RATE: 96 BPM | OXYGEN SATURATION: 96 % | TEMPERATURE: 97 F | WEIGHT: 164.68 LBS | RESPIRATION RATE: 16 BRPM | BODY MASS INDEX: 25.68 KG/M2 | BODY MASS INDEX: 27.4 KG/M2 | TEMPERATURE: 98.4 F | DIASTOLIC BLOOD PRESSURE: 50 MMHG | SYSTOLIC BLOOD PRESSURE: 126 MMHG | HEART RATE: 94 BPM

## 2021-01-01 VITALS
TEMPERATURE: 98.1 F | OXYGEN SATURATION: 98 % | BODY MASS INDEX: 27.26 KG/M2 | HEART RATE: 83 BPM | SYSTOLIC BLOOD PRESSURE: 131 MMHG | DIASTOLIC BLOOD PRESSURE: 63 MMHG | WEIGHT: 163.8 LBS | RESPIRATION RATE: 18 BRPM

## 2021-01-01 VITALS
SYSTOLIC BLOOD PRESSURE: 118 MMHG | HEART RATE: 84 BPM | BODY MASS INDEX: 26.49 KG/M2 | TEMPERATURE: 99.1 F | DIASTOLIC BLOOD PRESSURE: 60 MMHG | WEIGHT: 159 LBS | OXYGEN SATURATION: 99 % | RESPIRATION RATE: 18 BRPM | HEIGHT: 65 IN

## 2021-01-01 VITALS — HEIGHT: 65 IN | BODY MASS INDEX: 26.42 KG/M2 | WEIGHT: 158.6 LBS

## 2021-01-01 VITALS
OXYGEN SATURATION: 97 % | DIASTOLIC BLOOD PRESSURE: 51 MMHG | TEMPERATURE: 98.2 F | HEART RATE: 91 BPM | SYSTOLIC BLOOD PRESSURE: 105 MMHG | RESPIRATION RATE: 18 BRPM

## 2021-01-01 VITALS
HEIGHT: 65 IN | TEMPERATURE: 98.1 F | RESPIRATION RATE: 20 BRPM | HEART RATE: 80 BPM | BODY MASS INDEX: 26.81 KG/M2 | WEIGHT: 160.94 LBS | DIASTOLIC BLOOD PRESSURE: 52 MMHG | OXYGEN SATURATION: 99 % | SYSTOLIC BLOOD PRESSURE: 111 MMHG

## 2021-01-01 VITALS
WEIGHT: 159.17 LBS | BODY MASS INDEX: 26.49 KG/M2 | SYSTOLIC BLOOD PRESSURE: 117 MMHG | RESPIRATION RATE: 18 BRPM | HEART RATE: 87 BPM | TEMPERATURE: 97.5 F | OXYGEN SATURATION: 100 % | DIASTOLIC BLOOD PRESSURE: 57 MMHG

## 2021-01-01 VITALS
DIASTOLIC BLOOD PRESSURE: 53 MMHG | HEART RATE: 64 BPM | WEIGHT: 158.95 LBS | TEMPERATURE: 99.2 F | BODY MASS INDEX: 26.45 KG/M2 | OXYGEN SATURATION: 96 % | RESPIRATION RATE: 18 BRPM | SYSTOLIC BLOOD PRESSURE: 108 MMHG

## 2021-01-01 VITALS
TEMPERATURE: 96.5 F | HEART RATE: 74 BPM | WEIGHT: 160.94 LBS | BODY MASS INDEX: 26.78 KG/M2 | RESPIRATION RATE: 18 BRPM | OXYGEN SATURATION: 100 % | SYSTOLIC BLOOD PRESSURE: 108 MMHG | DIASTOLIC BLOOD PRESSURE: 53 MMHG

## 2021-01-01 VITALS
HEART RATE: 97 BPM | RESPIRATION RATE: 22 BRPM | TEMPERATURE: 98.7 F | HEIGHT: 65 IN | DIASTOLIC BLOOD PRESSURE: 58 MMHG | SYSTOLIC BLOOD PRESSURE: 130 MMHG | OXYGEN SATURATION: 96 % | WEIGHT: 152.34 LBS | BODY MASS INDEX: 25.38 KG/M2

## 2021-01-01 VITALS
OXYGEN SATURATION: 98 % | SYSTOLIC BLOOD PRESSURE: 137 MMHG | TEMPERATURE: 99.3 F | DIASTOLIC BLOOD PRESSURE: 66 MMHG | RESPIRATION RATE: 18 BRPM | HEART RATE: 82 BPM

## 2021-01-01 VITALS
RESPIRATION RATE: 18 BRPM | HEIGHT: 65 IN | WEIGHT: 154.32 LBS | SYSTOLIC BLOOD PRESSURE: 122 MMHG | BODY MASS INDEX: 25.71 KG/M2 | HEART RATE: 92 BPM | OXYGEN SATURATION: 97 % | DIASTOLIC BLOOD PRESSURE: 54 MMHG | TEMPERATURE: 98.8 F

## 2021-01-01 VITALS
HEART RATE: 92 BPM | SYSTOLIC BLOOD PRESSURE: 122 MMHG | OXYGEN SATURATION: 97 % | BODY MASS INDEX: 25.68 KG/M2 | RESPIRATION RATE: 18 BRPM | WEIGHT: 154.32 LBS | TEMPERATURE: 98.8 F | DIASTOLIC BLOOD PRESSURE: 54 MMHG

## 2021-01-01 VITALS
DIASTOLIC BLOOD PRESSURE: 54 MMHG | WEIGHT: 152.12 LBS | BODY MASS INDEX: 25.31 KG/M2 | OXYGEN SATURATION: 95 % | SYSTOLIC BLOOD PRESSURE: 117 MMHG | RESPIRATION RATE: 20 BRPM | HEART RATE: 81 BPM | TEMPERATURE: 98.2 F

## 2021-01-01 VITALS
HEIGHT: 65 IN | TEMPERATURE: 97.8 F | HEART RATE: 87 BPM | RESPIRATION RATE: 20 BRPM | OXYGEN SATURATION: 99 % | WEIGHT: 156.53 LBS | BODY MASS INDEX: 26.08 KG/M2 | DIASTOLIC BLOOD PRESSURE: 61 MMHG | SYSTOLIC BLOOD PRESSURE: 132 MMHG

## 2021-01-01 VITALS — WEIGHT: 156.12 LBS | HEIGHT: 65 IN | BODY MASS INDEX: 26.01 KG/M2

## 2021-01-01 VITALS
HEART RATE: 98 BPM | TEMPERATURE: 99.5 F | DIASTOLIC BLOOD PRESSURE: 72 MMHG | OXYGEN SATURATION: 100 % | SYSTOLIC BLOOD PRESSURE: 121 MMHG | RESPIRATION RATE: 20 BRPM

## 2021-01-01 DIAGNOSIS — Z45.2 ENCOUNTER FOR ADJUSTMENT OR MANAGEMENT OF VASCULAR ACCESS DEVICE: ICD-10-CM

## 2021-01-01 DIAGNOSIS — D64.9 ANEMIA, UNSPECIFIED TYPE: Primary | ICD-10-CM

## 2021-01-01 DIAGNOSIS — D64.9 ANEMIA, UNSPECIFIED TYPE: ICD-10-CM

## 2021-01-01 DIAGNOSIS — R22.9 LOCALIZED SUPERFICIAL SWELLING, MASS, OR LUMP: ICD-10-CM

## 2021-01-01 DIAGNOSIS — Z85.3 HISTORY OF MALIGNANT NEOPLASM OF BREAST: ICD-10-CM

## 2021-01-01 DIAGNOSIS — C17.9 ADENOCARCINOMA OF SMALL BOWEL (HCC): Primary | ICD-10-CM

## 2021-01-01 DIAGNOSIS — C18.2 MALIGNANT NEOPLASM OF ASCENDING COLON (HCC): Primary | ICD-10-CM

## 2021-01-01 DIAGNOSIS — C78.6 PERITONEAL CARCINOMATOSIS (HCC): ICD-10-CM

## 2021-01-01 DIAGNOSIS — C18.8 OVERLAPPING MALIGNANT NEOPLASM OF COLON (HCC): Primary | ICD-10-CM

## 2021-01-01 DIAGNOSIS — Z45.2 ENCOUNTER FOR ADJUSTMENT OR MANAGEMENT OF VASCULAR ACCESS DEVICE: Primary | ICD-10-CM

## 2021-01-01 DIAGNOSIS — R53.81 PHYSICAL DECONDITIONING: ICD-10-CM

## 2021-01-01 DIAGNOSIS — K90.9 MALABSORPTION OF IRON: Primary | ICD-10-CM

## 2021-01-01 DIAGNOSIS — R53.83 OTHER FATIGUE: ICD-10-CM

## 2021-01-01 DIAGNOSIS — R51.9 ACUTE INTRACTABLE HEADACHE, UNSPECIFIED HEADACHE TYPE: ICD-10-CM

## 2021-01-01 DIAGNOSIS — R73.9 HYPERGLYCEMIA: Primary | ICD-10-CM

## 2021-01-01 DIAGNOSIS — Z12.11 SPECIAL SCREENING FOR MALIGNANT NEOPLASMS, COLON: ICD-10-CM

## 2021-01-01 DIAGNOSIS — R22.9 LOCALIZED SUPERFICIAL SWELLING, MASS, OR LUMP: Primary | ICD-10-CM

## 2021-01-01 DIAGNOSIS — E86.0 DEHYDRATION: ICD-10-CM

## 2021-01-01 DIAGNOSIS — Z12.11 SCREENING FOR MALIGNANT NEOPLASM OF COLON: Primary | ICD-10-CM

## 2021-01-01 DIAGNOSIS — M25.531 ACUTE PAIN OF RIGHT WRIST: ICD-10-CM

## 2021-01-01 DIAGNOSIS — Z00.00 ROUTINE GENERAL MEDICAL EXAMINATION AT A HEALTH CARE FACILITY: Primary | ICD-10-CM

## 2021-01-01 DIAGNOSIS — C17.9 ADENOCARCINOMA OF SMALL BOWEL (HCC): ICD-10-CM

## 2021-01-01 DIAGNOSIS — C18.9 MALIGNANT NEOPLASM OF COLON, UNSPECIFIED PART OF COLON (HCC): Primary | ICD-10-CM

## 2021-01-01 DIAGNOSIS — C18.8 OVERLAPPING MALIGNANT NEOPLASM OF COLON (HCC): ICD-10-CM

## 2021-01-01 DIAGNOSIS — Z12.31 SCREENING MAMMOGRAM, ENCOUNTER FOR: ICD-10-CM

## 2021-01-01 DIAGNOSIS — I77.89 ENLARGEMENT OF AORTIC ROOT (HCC): ICD-10-CM

## 2021-01-01 DIAGNOSIS — C18.9 MALIGNANT NEOPLASM OF COLON, UNSPECIFIED PART OF COLON (HCC): ICD-10-CM

## 2021-01-01 DIAGNOSIS — R73.9 HYPERGLYCEMIA: ICD-10-CM

## 2021-01-01 DIAGNOSIS — Z80.0 FAMILY HISTORY OF COLON CANCER: ICD-10-CM

## 2021-01-01 DIAGNOSIS — I77.89 ENLARGEMENT OF AORTIC ROOT (HCC): Primary | ICD-10-CM

## 2021-01-01 DIAGNOSIS — Z12.31 SCREENING MAMMOGRAM, ENCOUNTER FOR: Primary | ICD-10-CM

## 2021-01-01 DIAGNOSIS — R92.8 ABNORMAL SCREENING MAMMOGRAM: ICD-10-CM

## 2021-01-01 DIAGNOSIS — Z85.038 HISTORY OF COLON CANCER: ICD-10-CM

## 2021-01-01 DIAGNOSIS — R92.8 ABNORMAL SCREENING MAMMOGRAM: Primary | ICD-10-CM

## 2021-01-01 DIAGNOSIS — R11.0 MODERATE NAUSEA: ICD-10-CM

## 2021-01-01 DIAGNOSIS — R10.84 GENERALIZED ABDOMINAL PAIN: Primary | ICD-10-CM

## 2021-01-01 DIAGNOSIS — N39.0 UTI (URINARY TRACT INFECTION) WITH PYURIA: ICD-10-CM

## 2021-01-01 DIAGNOSIS — N13.30 HYDROURETERONEPHROSIS: ICD-10-CM

## 2021-01-01 DIAGNOSIS — C79.51 METASTASIS TO BONE (HCC): ICD-10-CM

## 2021-01-01 DIAGNOSIS — G89.3 CANCER RELATED PAIN: ICD-10-CM

## 2021-01-01 DIAGNOSIS — C79.51 BONE METASTASIS: ICD-10-CM

## 2021-01-01 DIAGNOSIS — E87.6 HYPOKALEMIA: ICD-10-CM

## 2021-01-01 DIAGNOSIS — E83.51 HYPOCALCEMIA: Primary | ICD-10-CM

## 2021-01-01 DIAGNOSIS — C18.2 MALIGNANT NEOPLASM OF ASCENDING COLON (HCC): ICD-10-CM

## 2021-01-01 DIAGNOSIS — R10.9 CHRONIC ABDOMINAL PAIN: Primary | ICD-10-CM

## 2021-01-01 DIAGNOSIS — G89.29 CHRONIC ABDOMINAL PAIN: Primary | ICD-10-CM

## 2021-01-01 DIAGNOSIS — R10.84 GENERALIZED ABDOMINAL PAIN: ICD-10-CM

## 2021-01-01 DIAGNOSIS — Z85.038 PERSONAL HISTORY OF COLON CANCER: ICD-10-CM

## 2021-01-01 DIAGNOSIS — R06.02 SOB (SHORTNESS OF BREATH): Primary | ICD-10-CM

## 2021-01-01 LAB
ABO GROUP BLD: NORMAL
ALBUMIN SERPL-MCNC: 2.96 G/DL (ref 3.5–5.2)
ALBUMIN SERPL-MCNC: 3 G/DL (ref 3.5–5.2)
ALBUMIN SERPL-MCNC: 3.23 G/DL (ref 3.5–5.2)
ALBUMIN SERPL-MCNC: 3.32 G/DL (ref 3.5–5.2)
ALBUMIN SERPL-MCNC: 3.45 G/DL (ref 3.5–5.2)
ALBUMIN SERPL-MCNC: 3.45 G/DL (ref 3.5–5.2)
ALBUMIN SERPL-MCNC: 3.6 G/DL (ref 3.5–5.2)
ALBUMIN SERPL-MCNC: 3.67 G/DL (ref 3.5–5.2)
ALBUMIN SERPL-MCNC: 3.7 G/DL (ref 3.5–5.2)
ALBUMIN SERPL-MCNC: 3.84 G/DL (ref 3.5–5.2)
ALBUMIN SERPL-MCNC: 3.86 G/DL (ref 3.5–5.2)
ALBUMIN SERPL-MCNC: 3.87 G/DL (ref 3.5–5.2)
ALBUMIN SERPL-MCNC: 3.87 G/DL (ref 3.5–5.2)
ALBUMIN SERPL-MCNC: 3.9 G/DL (ref 3.5–5.2)
ALBUMIN SERPL-MCNC: 3.98 G/DL (ref 3.5–5.2)
ALBUMIN SERPL-MCNC: 4.07 G/DL (ref 3.5–5.2)
ALBUMIN/GLOB SERPL: 0.7 G/DL
ALBUMIN/GLOB SERPL: 0.8 G/DL
ALBUMIN/GLOB SERPL: 0.8 G/DL
ALBUMIN/GLOB SERPL: 0.9 G/DL
ALBUMIN/GLOB SERPL: 1 G/DL
ALBUMIN/GLOB SERPL: 1.1 G/DL
ALBUMIN/GLOB SERPL: 1.2 G/DL
ALBUMIN/GLOB SERPL: 1.2 G/DL
ALP SERPL-CCNC: 115 U/L (ref 39–117)
ALP SERPL-CCNC: 118 U/L (ref 39–117)
ALP SERPL-CCNC: 121 U/L (ref 39–117)
ALP SERPL-CCNC: 124 U/L (ref 39–117)
ALP SERPL-CCNC: 127 U/L (ref 39–117)
ALP SERPL-CCNC: 130 U/L (ref 39–117)
ALP SERPL-CCNC: 131 U/L (ref 39–117)
ALP SERPL-CCNC: 132 U/L (ref 39–117)
ALP SERPL-CCNC: 134 U/L (ref 39–117)
ALP SERPL-CCNC: 140 U/L (ref 39–117)
ALP SERPL-CCNC: 141 U/L (ref 39–117)
ALP SERPL-CCNC: 144 U/L (ref 39–117)
ALP SERPL-CCNC: 146 U/L (ref 39–117)
ALP SERPL-CCNC: 147 U/L (ref 39–117)
ALP SERPL-CCNC: 172 U/L (ref 39–117)
ALP SERPL-CCNC: 222 U/L (ref 39–117)
ALT SERPL W P-5'-P-CCNC: 10 U/L (ref 1–33)
ALT SERPL W P-5'-P-CCNC: 20 U/L (ref 1–33)
ALT SERPL W P-5'-P-CCNC: 4 U/L (ref 1–33)
ALT SERPL W P-5'-P-CCNC: 5 U/L (ref 1–33)
ALT SERPL W P-5'-P-CCNC: 5 U/L (ref 1–33)
ALT SERPL W P-5'-P-CCNC: 6 U/L (ref 1–33)
ALT SERPL W P-5'-P-CCNC: 7 U/L (ref 1–33)
ALT SERPL W P-5'-P-CCNC: 8 U/L (ref 1–33)
ALT SERPL W P-5'-P-CCNC: <5 U/L (ref 1–33)
ANION GAP SERPL CALCULATED.3IONS-SCNC: 10.2 MMOL/L (ref 5–15)
ANION GAP SERPL CALCULATED.3IONS-SCNC: 10.6 MMOL/L (ref 5–15)
ANION GAP SERPL CALCULATED.3IONS-SCNC: 10.8 MMOL/L (ref 5–15)
ANION GAP SERPL CALCULATED.3IONS-SCNC: 10.8 MMOL/L (ref 5–15)
ANION GAP SERPL CALCULATED.3IONS-SCNC: 11.6 MMOL/L (ref 5–15)
ANION GAP SERPL CALCULATED.3IONS-SCNC: 12.3 MMOL/L (ref 5–15)
ANION GAP SERPL CALCULATED.3IONS-SCNC: 12.4 MMOL/L (ref 5–15)
ANION GAP SERPL CALCULATED.3IONS-SCNC: 12.7 MMOL/L (ref 5–15)
ANION GAP SERPL CALCULATED.3IONS-SCNC: 12.7 MMOL/L (ref 5–15)
ANION GAP SERPL CALCULATED.3IONS-SCNC: 14 MMOL/L (ref 5–15)
ANION GAP SERPL CALCULATED.3IONS-SCNC: 14.1 MMOL/L (ref 5–15)
ANION GAP SERPL CALCULATED.3IONS-SCNC: 5.3 MMOL/L (ref 5–15)
ANION GAP SERPL CALCULATED.3IONS-SCNC: 8.3 MMOL/L (ref 5–15)
ANION GAP SERPL CALCULATED.3IONS-SCNC: 8.7 MMOL/L (ref 5–15)
ANISOCYTOSIS BLD QL: NORMAL
ANTI-K: NORMAL
ANTIBODY TO LOW FREQUENCY ANTIGEN: NORMAL
AST SERPL-CCNC: 15 U/L (ref 1–32)
AST SERPL-CCNC: 20 U/L (ref 1–32)
AST SERPL-CCNC: 22 U/L (ref 1–32)
AST SERPL-CCNC: 22 U/L (ref 1–32)
AST SERPL-CCNC: 23 U/L (ref 1–32)
AST SERPL-CCNC: 23 U/L (ref 1–32)
AST SERPL-CCNC: 25 U/L (ref 1–32)
AST SERPL-CCNC: 25 U/L (ref 1–32)
AST SERPL-CCNC: 27 U/L (ref 1–32)
AST SERPL-CCNC: 27 U/L (ref 1–32)
AST SERPL-CCNC: 29 U/L (ref 1–32)
AST SERPL-CCNC: 30 U/L (ref 1–32)
AST SERPL-CCNC: 30 U/L (ref 1–32)
AST SERPL-CCNC: 31 U/L (ref 1–32)
AST SERPL-CCNC: 36 U/L (ref 1–32)
AST SERPL-CCNC: 44 U/L (ref 1–32)
BACTERIA UR QL AUTO: ABNORMAL /HPF
BACTERIA UR QL AUTO: ABNORMAL /HPF
BASOPHILS # BLD AUTO: 0.03 10*3/MM3 (ref 0–0.2)
BASOPHILS # BLD AUTO: 0.04 10*3/MM3 (ref 0–0.2)
BASOPHILS # BLD AUTO: 0.05 10*3/MM3 (ref 0–0.2)
BASOPHILS # BLD AUTO: 0.05 10*3/MM3 (ref 0–0.2)
BASOPHILS # BLD AUTO: 0.06 10*3/MM3 (ref 0–0.2)
BASOPHILS # BLD AUTO: 0.06 10*3/MM3 (ref 0–0.2)
BASOPHILS # BLD AUTO: 0.07 10*3/MM3 (ref 0–0.2)
BASOPHILS # BLD AUTO: 0.08 10*3/MM3 (ref 0–0.2)
BASOPHILS NFR BLD AUTO: 0.3 % (ref 0–1.5)
BASOPHILS NFR BLD AUTO: 0.4 % (ref 0–1.5)
BASOPHILS NFR BLD AUTO: 0.5 % (ref 0–1.5)
BASOPHILS NFR BLD AUTO: 0.6 % (ref 0–1.5)
BASOPHILS NFR BLD AUTO: 0.7 % (ref 0–1.5)
BASOPHILS NFR BLD AUTO: 0.7 % (ref 0–1.5)
BASOPHILS NFR BLD AUTO: 0.8 % (ref 0–1.5)
BB REFERENCE LAB SENDOUT: NORMAL
BB REFERENCE LAB SENDOUT: NORMAL
BH BB BLOOD EXPIRATION DATE: NORMAL
BH BB BLOOD TYPE BARCODE: 9500
BH BB DISPENSE STATUS: NORMAL
BH BB PRODUCT CODE: NORMAL
BH BB UNIT NUMBER: NORMAL
BILIRUB SERPL-MCNC: 0.2 MG/DL (ref 0–1.2)
BILIRUB SERPL-MCNC: 0.3 MG/DL (ref 0–1.2)
BILIRUB SERPL-MCNC: 0.3 MG/DL (ref 0–1.2)
BILIRUB SERPL-MCNC: <0.2 MG/DL (ref 0–1.2)
BILIRUB UR QL STRIP: NEGATIVE
BILIRUB UR QL STRIP: NEGATIVE
BLD GP AB SCN SERPL QL: NEGATIVE
BLD GP AB SCN SERPL QL: NEGATIVE
BLD GP AB SCN SERPL QL: POSITIVE
BUN SERPL-MCNC: 10 MG/DL (ref 8–23)
BUN SERPL-MCNC: 11 MG/DL (ref 8–23)
BUN SERPL-MCNC: 11 MG/DL (ref 8–23)
BUN SERPL-MCNC: 12 MG/DL (ref 8–23)
BUN SERPL-MCNC: 15 MG/DL (ref 8–23)
BUN SERPL-MCNC: 16 MG/DL (ref 8–23)
BUN SERPL-MCNC: 17 MG/DL (ref 8–23)
BUN SERPL-MCNC: 18 MG/DL (ref 8–23)
BUN SERPL-MCNC: 19 MG/DL (ref 8–23)
BUN SERPL-MCNC: 20 MG/DL (ref 8–23)
BUN SERPL-MCNC: 25 MG/DL (ref 8–23)
BUN/CREAT SERPL: 10.9 (ref 7–25)
BUN/CREAT SERPL: 11.3 (ref 7–25)
BUN/CREAT SERPL: 11.5 (ref 7–25)
BUN/CREAT SERPL: 13.5 (ref 7–25)
BUN/CREAT SERPL: 15.5 (ref 7–25)
BUN/CREAT SERPL: 16 (ref 7–25)
BUN/CREAT SERPL: 16.3 (ref 7–25)
BUN/CREAT SERPL: 16.5 (ref 7–25)
BUN/CREAT SERPL: 16.7 (ref 7–25)
BUN/CREAT SERPL: 17.3 (ref 7–25)
BUN/CREAT SERPL: 18.1 (ref 7–25)
BUN/CREAT SERPL: 18.2 (ref 7–25)
BUN/CREAT SERPL: 18.3 (ref 7–25)
BUN/CREAT SERPL: 20.6 (ref 7–25)
BUN/CREAT SERPL: 21.8 (ref 7–25)
BUN/CREAT SERPL: 22.7 (ref 7–25)
CA-I BLD-MCNC: 4.7 MG/DL (ref 4.6–5.4)
CA-I SERPL ISE-MCNC: 1.17 MMOL/L (ref 1.15–1.35)
CALCIUM SPEC-SCNC: 7.3 MG/DL (ref 8.6–10.5)
CALCIUM SPEC-SCNC: 7.6 MG/DL (ref 8.6–10.5)
CALCIUM SPEC-SCNC: 7.8 MG/DL (ref 8.6–10.5)
CALCIUM SPEC-SCNC: 8.1 MG/DL (ref 8.6–10.5)
CALCIUM SPEC-SCNC: 8.2 MG/DL (ref 8.6–10.5)
CALCIUM SPEC-SCNC: 8.3 MG/DL (ref 8.6–10.5)
CALCIUM SPEC-SCNC: 8.4 MG/DL (ref 8.6–10.5)
CALCIUM SPEC-SCNC: 8.5 MG/DL (ref 8.6–10.5)
CALCIUM SPEC-SCNC: 8.5 MG/DL (ref 8.6–10.5)
CALCIUM SPEC-SCNC: 8.7 MG/DL (ref 8.6–10.5)
CALCIUM SPEC-SCNC: 8.8 MG/DL (ref 8.6–10.5)
CALCIUM SPEC-SCNC: 8.9 MG/DL (ref 8.6–10.5)
CALCIUM SPEC-SCNC: 8.9 MG/DL (ref 8.6–10.5)
CALCIUM SPEC-SCNC: 9.1 MG/DL (ref 8.6–10.5)
CALCIUM SPEC-SCNC: 9.1 MG/DL (ref 8.6–10.5)
CALCIUM SPEC-SCNC: 9.4 MG/DL (ref 8.6–10.5)
CEA SERPL-MCNC: 12.5 NG/ML
CEA SERPL-MCNC: 13.2 NG/ML
CEA SERPL-MCNC: 13.8 NG/ML
CEA SERPL-MCNC: 14.3 NG/ML
CHLORIDE SERPL-SCNC: 100 MMOL/L (ref 98–107)
CHLORIDE SERPL-SCNC: 102 MMOL/L (ref 98–107)
CHLORIDE SERPL-SCNC: 103 MMOL/L (ref 98–107)
CHLORIDE SERPL-SCNC: 105 MMOL/L (ref 98–107)
CHLORIDE SERPL-SCNC: 94 MMOL/L (ref 98–107)
CHLORIDE SERPL-SCNC: 96 MMOL/L (ref 98–107)
CHLORIDE SERPL-SCNC: 96 MMOL/L (ref 98–107)
CHLORIDE SERPL-SCNC: 97 MMOL/L (ref 98–107)
CHLORIDE SERPL-SCNC: 98 MMOL/L (ref 98–107)
CHLORIDE SERPL-SCNC: 99 MMOL/L (ref 98–107)
CLARITY UR: ABNORMAL
CLARITY UR: ABNORMAL
CO2 SERPL-SCNC: 20 MMOL/L (ref 22–29)
CO2 SERPL-SCNC: 21.2 MMOL/L (ref 22–29)
CO2 SERPL-SCNC: 21.6 MMOL/L (ref 22–29)
CO2 SERPL-SCNC: 21.8 MMOL/L (ref 22–29)
CO2 SERPL-SCNC: 22.3 MMOL/L (ref 22–29)
CO2 SERPL-SCNC: 22.3 MMOL/L (ref 22–29)
CO2 SERPL-SCNC: 22.4 MMOL/L (ref 22–29)
CO2 SERPL-SCNC: 22.7 MMOL/L (ref 22–29)
CO2 SERPL-SCNC: 23.2 MMOL/L (ref 22–29)
CO2 SERPL-SCNC: 23.3 MMOL/L (ref 22–29)
CO2 SERPL-SCNC: 23.4 MMOL/L (ref 22–29)
CO2 SERPL-SCNC: 23.7 MMOL/L (ref 22–29)
CO2 SERPL-SCNC: 24.3 MMOL/L (ref 22–29)
CO2 SERPL-SCNC: 24.9 MMOL/L (ref 22–29)
CO2 SERPL-SCNC: 25.3 MMOL/L (ref 22–29)
CO2 SERPL-SCNC: 25.7 MMOL/L (ref 22–29)
COLD AUTO ANTIBODY: NORMAL
COLOR UR: YELLOW
COLOR UR: YELLOW
CREAT SERPL-MCNC: 0.87 MG/DL (ref 0.57–1)
CREAT SERPL-MCNC: 0.89 MG/DL (ref 0.57–1)
CREAT SERPL-MCNC: 0.92 MG/DL (ref 0.57–1)
CREAT SERPL-MCNC: 0.94 MG/DL (ref 0.57–1)
CREAT SERPL-MCNC: 0.96 MG/DL (ref 0.57–1)
CREAT SERPL-MCNC: 0.97 MG/DL (ref 0.57–1)
CREAT SERPL-MCNC: 0.98 MG/DL (ref 0.57–1)
CREAT SERPL-MCNC: 0.99 MG/DL (ref 0.57–1)
CREAT SERPL-MCNC: 1.04 MG/DL (ref 0.57–1)
CREAT SERPL-MCNC: 1.04 MG/DL (ref 0.57–1)
CREAT SERPL-MCNC: 1.05 MG/DL (ref 0.57–1)
CREAT SERPL-MCNC: 1.1 MG/DL (ref 0.57–1)
CREAT SERPL-MCNC: 1.1 MG/DL (ref 0.57–1)
CREAT SERPL-MCNC: 1.14 MG/DL (ref 0.57–1)
CROSSMATCH INTERPRETATION: NORMAL
D-LACTATE SERPL-SCNC: 1.2 MMOL/L (ref 0.5–2)
DACRYOCYTES BLD QL SMEAR: NORMAL
DEPRECATED RDW RBC AUTO: 67 FL (ref 37–54)
DEPRECATED RDW RBC AUTO: 68.2 FL (ref 37–54)
DEPRECATED RDW RBC AUTO: 68.7 FL (ref 37–54)
DEPRECATED RDW RBC AUTO: 70.2 FL (ref 37–54)
DEPRECATED RDW RBC AUTO: 70.3 FL (ref 37–54)
DEPRECATED RDW RBC AUTO: 71.8 FL (ref 37–54)
DEPRECATED RDW RBC AUTO: 74 FL (ref 37–54)
DEPRECATED RDW RBC AUTO: 75.5 FL (ref 37–54)
DEPRECATED RDW RBC AUTO: 75.7 FL (ref 37–54)
DEPRECATED RDW RBC AUTO: 76.3 FL (ref 37–54)
DEPRECATED RDW RBC AUTO: 77.8 FL (ref 37–54)
DEPRECATED RDW RBC AUTO: 77.8 FL (ref 37–54)
DEPRECATED RDW RBC AUTO: 78.8 FL (ref 37–54)
DEPRECATED RDW RBC AUTO: 80.7 FL (ref 37–54)
DEPRECATED RDW RBC AUTO: 85.4 FL (ref 37–54)
DEPRECATED RDW RBC AUTO: 90.2 FL (ref 37–54)
DIGOXIN SERPL-MCNC: 0.7 NG/ML (ref 0.6–1.2)
DIGOXIN SERPL-MCNC: 0.71 NG/ML (ref 0.6–1.2)
EOSINOPHIL # BLD AUTO: 0.04 10*3/MM3 (ref 0–0.4)
EOSINOPHIL # BLD AUTO: 0.05 10*3/MM3 (ref 0–0.4)
EOSINOPHIL # BLD AUTO: 0.05 10*3/MM3 (ref 0–0.4)
EOSINOPHIL # BLD AUTO: 0.06 10*3/MM3 (ref 0–0.4)
EOSINOPHIL # BLD AUTO: 0.07 10*3/MM3 (ref 0–0.4)
EOSINOPHIL # BLD AUTO: 0.08 10*3/MM3 (ref 0–0.4)
EOSINOPHIL # BLD AUTO: 0.08 10*3/MM3 (ref 0–0.4)
EOSINOPHIL # BLD AUTO: 0.09 10*3/MM3 (ref 0–0.4)
EOSINOPHIL # BLD AUTO: 0.12 10*3/MM3 (ref 0–0.4)
EOSINOPHIL NFR BLD AUTO: 0.4 % (ref 0.3–6.2)
EOSINOPHIL NFR BLD AUTO: 0.5 % (ref 0.3–6.2)
EOSINOPHIL NFR BLD AUTO: 0.6 % (ref 0.3–6.2)
EOSINOPHIL NFR BLD AUTO: 0.6 % (ref 0.3–6.2)
EOSINOPHIL NFR BLD AUTO: 0.7 % (ref 0.3–6.2)
EOSINOPHIL NFR BLD AUTO: 0.8 % (ref 0.3–6.2)
EOSINOPHIL NFR BLD AUTO: 0.9 % (ref 0.3–6.2)
EOSINOPHIL NFR BLD AUTO: 1 % (ref 0.3–6.2)
EOSINOPHIL NFR BLD AUTO: 1 % (ref 0.3–6.2)
EOSINOPHIL NFR BLD AUTO: 1.2 % (ref 0.3–6.2)
ERYTHROCYTE [DISTWIDTH] IN BLOOD BY AUTOMATED COUNT: 19.3 % (ref 12.3–15.4)
ERYTHROCYTE [DISTWIDTH] IN BLOOD BY AUTOMATED COUNT: 19.4 % (ref 12.3–15.4)
ERYTHROCYTE [DISTWIDTH] IN BLOOD BY AUTOMATED COUNT: 19.5 % (ref 12.3–15.4)
ERYTHROCYTE [DISTWIDTH] IN BLOOD BY AUTOMATED COUNT: 19.6 % (ref 12.3–15.4)
ERYTHROCYTE [DISTWIDTH] IN BLOOD BY AUTOMATED COUNT: 19.6 % (ref 12.3–15.4)
ERYTHROCYTE [DISTWIDTH] IN BLOOD BY AUTOMATED COUNT: 19.9 % (ref 12.3–15.4)
ERYTHROCYTE [DISTWIDTH] IN BLOOD BY AUTOMATED COUNT: 20.8 % (ref 12.3–15.4)
ERYTHROCYTE [DISTWIDTH] IN BLOOD BY AUTOMATED COUNT: 21.2 % (ref 12.3–15.4)
ERYTHROCYTE [DISTWIDTH] IN BLOOD BY AUTOMATED COUNT: 21.3 % (ref 12.3–15.4)
ERYTHROCYTE [DISTWIDTH] IN BLOOD BY AUTOMATED COUNT: 21.8 % (ref 12.3–15.4)
ERYTHROCYTE [DISTWIDTH] IN BLOOD BY AUTOMATED COUNT: 22 % (ref 12.3–15.4)
ERYTHROCYTE [DISTWIDTH] IN BLOOD BY AUTOMATED COUNT: 22.1 % (ref 12.3–15.4)
ERYTHROCYTE [DISTWIDTH] IN BLOOD BY AUTOMATED COUNT: 22.5 % (ref 12.3–15.4)
ERYTHROCYTE [DISTWIDTH] IN BLOOD BY AUTOMATED COUNT: 22.9 % (ref 12.3–15.4)
ERYTHROCYTE [DISTWIDTH] IN BLOOD BY AUTOMATED COUNT: 23.3 % (ref 12.3–15.4)
ERYTHROCYTE [DISTWIDTH] IN BLOOD BY AUTOMATED COUNT: 24.4 % (ref 12.3–15.4)
FERRITIN SERPL-MCNC: 48.04 NG/ML (ref 13–150)
FOLATE SERPL-MCNC: 10.2 NG/ML (ref 4.78–24.2)
GFR SERPL CREATININE-BSD FRML MDRD: 47 ML/MIN/1.73
GFR SERPL CREATININE-BSD FRML MDRD: 49 ML/MIN/1.73
GFR SERPL CREATININE-BSD FRML MDRD: 49 ML/MIN/1.73
GFR SERPL CREATININE-BSD FRML MDRD: 52 ML/MIN/1.73
GFR SERPL CREATININE-BSD FRML MDRD: 55 ML/MIN/1.73
GFR SERPL CREATININE-BSD FRML MDRD: 56 ML/MIN/1.73
GFR SERPL CREATININE-BSD FRML MDRD: 57 ML/MIN/1.73
GFR SERPL CREATININE-BSD FRML MDRD: 59 ML/MIN/1.73
GFR SERPL CREATININE-BSD FRML MDRD: 60 ML/MIN/1.73
GFR SERPL CREATININE-BSD FRML MDRD: 62 ML/MIN/1.73
GFR SERPL CREATININE-BSD FRML MDRD: 64 ML/MIN/1.73
GLOBULIN UR ELPH-MCNC: 3.3 GM/DL
GLOBULIN UR ELPH-MCNC: 3.3 GM/DL
GLOBULIN UR ELPH-MCNC: 3.4 GM/DL
GLOBULIN UR ELPH-MCNC: 3.4 GM/DL
GLOBULIN UR ELPH-MCNC: 3.5 GM/DL
GLOBULIN UR ELPH-MCNC: 3.5 GM/DL
GLOBULIN UR ELPH-MCNC: 3.6 GM/DL
GLOBULIN UR ELPH-MCNC: 3.7 GM/DL
GLOBULIN UR ELPH-MCNC: 3.7 GM/DL
GLOBULIN UR ELPH-MCNC: 4 GM/DL
GLOBULIN UR ELPH-MCNC: 4.1 GM/DL
GLOBULIN UR ELPH-MCNC: 4.4 GM/DL
GLOBULIN UR ELPH-MCNC: 4.5 GM/DL
GLUCOSE BLDC GLUCOMTR-MCNC: 144 MG/DL (ref 70–99)
GLUCOSE BLDC GLUCOMTR-MCNC: 307 MG/DL (ref 70–99)
GLUCOSE BLDC GLUCOMTR-MCNC: 460 MG/DL (ref 70–99)
GLUCOSE SERPL-MCNC: 128 MG/DL (ref 65–99)
GLUCOSE SERPL-MCNC: 154 MG/DL (ref 65–99)
GLUCOSE SERPL-MCNC: 160 MG/DL (ref 65–99)
GLUCOSE SERPL-MCNC: 161 MG/DL (ref 65–99)
GLUCOSE SERPL-MCNC: 163 MG/DL (ref 65–99)
GLUCOSE SERPL-MCNC: 178 MG/DL (ref 65–99)
GLUCOSE SERPL-MCNC: 191 MG/DL (ref 65–99)
GLUCOSE SERPL-MCNC: 196 MG/DL (ref 65–99)
GLUCOSE SERPL-MCNC: 206 MG/DL (ref 65–99)
GLUCOSE SERPL-MCNC: 230 MG/DL (ref 65–99)
GLUCOSE SERPL-MCNC: 254 MG/DL (ref 65–99)
GLUCOSE SERPL-MCNC: 280 MG/DL (ref 65–99)
GLUCOSE SERPL-MCNC: 282 MG/DL (ref 65–99)
GLUCOSE SERPL-MCNC: 337 MG/DL (ref 65–99)
GLUCOSE SERPL-MCNC: 432 MG/DL (ref 65–99)
GLUCOSE SERPL-MCNC: 495 MG/DL (ref 65–99)
GLUCOSE UR STRIP-MCNC: ABNORMAL MG/DL
GLUCOSE UR STRIP-MCNC: NEGATIVE MG/DL
HCT VFR BLD AUTO: 18.8 % (ref 34–46.6)
HCT VFR BLD AUTO: 19.3 % (ref 34–46.6)
HCT VFR BLD AUTO: 22.5 % (ref 34–46.6)
HCT VFR BLD AUTO: 23.3 % (ref 34–46.6)
HCT VFR BLD AUTO: 23.4 % (ref 34–46.6)
HCT VFR BLD AUTO: 23.4 % (ref 34–46.6)
HCT VFR BLD AUTO: 24.1 % (ref 34–46.6)
HCT VFR BLD AUTO: 24.7 % (ref 34–46.6)
HCT VFR BLD AUTO: 24.7 % (ref 34–46.6)
HCT VFR BLD AUTO: 24.9 % (ref 34–46.6)
HCT VFR BLD AUTO: 25 % (ref 34–46.6)
HCT VFR BLD AUTO: 25.9 % (ref 34–46.6)
HCT VFR BLD AUTO: 26.3 % (ref 34–46.6)
HCT VFR BLD AUTO: 26.3 % (ref 34–46.6)
HCT VFR BLD AUTO: 27.3 % (ref 34–46.6)
HCT VFR BLD AUTO: 27.8 % (ref 34–46.6)
HGB BLD-MCNC: 5.6 G/DL (ref 12–15.9)
HGB BLD-MCNC: 5.8 G/DL (ref 12–15.9)
HGB BLD-MCNC: 6.9 G/DL (ref 12–15.9)
HGB BLD-MCNC: 7.3 G/DL (ref 12–15.9)
HGB BLD-MCNC: 7.4 G/DL (ref 12–15.9)
HGB BLD-MCNC: 7.7 G/DL (ref 12–15.9)
HGB BLD-MCNC: 7.7 G/DL (ref 12–15.9)
HGB BLD-MCNC: 7.8 G/DL (ref 12–15.9)
HGB BLD-MCNC: 7.8 G/DL (ref 12–15.9)
HGB BLD-MCNC: 8.1 G/DL (ref 12–15.9)
HGB BLD-MCNC: 8.3 G/DL (ref 12–15.9)
HGB BLD-MCNC: 8.4 G/DL (ref 12–15.9)
HGB BLD-MCNC: 8.6 G/DL (ref 12–15.9)
HGB BLD-MCNC: 8.8 G/DL (ref 12–15.9)
HGB UR QL STRIP.AUTO: ABNORMAL
HGB UR QL STRIP.AUTO: ABNORMAL
HIGH TITER LOW AVIDITY ANTIBODY: NORMAL
HIGH TITER LOW AVIDITY ANTIBODY: NORMAL
HOLD SPECIMEN: NORMAL
HYALINE CASTS UR QL AUTO: ABNORMAL /LPF
HYALINE CASTS UR QL AUTO: ABNORMAL /LPF
HYPOCHROMIA BLD QL: NORMAL
IMM GRANULOCYTES # BLD AUTO: 0.06 10*3/MM3 (ref 0–0.05)
IMM GRANULOCYTES # BLD AUTO: 0.07 10*3/MM3 (ref 0–0.05)
IMM GRANULOCYTES # BLD AUTO: 0.08 10*3/MM3 (ref 0–0.05)
IMM GRANULOCYTES # BLD AUTO: 0.09 10*3/MM3 (ref 0–0.05)
IMM GRANULOCYTES # BLD AUTO: 0.11 10*3/MM3 (ref 0–0.05)
IMM GRANULOCYTES # BLD AUTO: 0.11 10*3/MM3 (ref 0–0.05)
IMM GRANULOCYTES # BLD AUTO: 0.12 10*3/MM3 (ref 0–0.05)
IMM GRANULOCYTES # BLD AUTO: 0.12 10*3/MM3 (ref 0–0.05)
IMM GRANULOCYTES # BLD AUTO: 0.13 10*3/MM3 (ref 0–0.05)
IMM GRANULOCYTES # BLD AUTO: 0.14 10*3/MM3 (ref 0–0.05)
IMM GRANULOCYTES # BLD AUTO: 0.15 10*3/MM3 (ref 0–0.05)
IMM GRANULOCYTES # BLD AUTO: 0.16 10*3/MM3 (ref 0–0.05)
IMM GRANULOCYTES NFR BLD AUTO: 0.6 % (ref 0–0.5)
IMM GRANULOCYTES NFR BLD AUTO: 0.8 % (ref 0–0.5)
IMM GRANULOCYTES NFR BLD AUTO: 0.8 % (ref 0–0.5)
IMM GRANULOCYTES NFR BLD AUTO: 0.9 % (ref 0–0.5)
IMM GRANULOCYTES NFR BLD AUTO: 1 % (ref 0–0.5)
IMM GRANULOCYTES NFR BLD AUTO: 1.3 % (ref 0–0.5)
IMM GRANULOCYTES NFR BLD AUTO: 1.3 % (ref 0–0.5)
IMM GRANULOCYTES NFR BLD AUTO: 1.4 % (ref 0–0.5)
IMM GRANULOCYTES NFR BLD AUTO: 1.5 % (ref 0–0.5)
IMM GRANULOCYTES NFR BLD AUTO: 1.5 % (ref 0–0.5)
IMM GRANULOCYTES NFR BLD AUTO: 1.6 % (ref 0–0.5)
IMM GRANULOCYTES NFR BLD AUTO: 1.7 % (ref 0–0.5)
IRON 24H UR-MRATE: 21 MCG/DL (ref 37–145)
IRON SATN MFR SERPL: 6 % (ref 20–50)
KETONES UR QL STRIP: NEGATIVE
KETONES UR QL STRIP: NEGATIVE
LARGE PLATELETS: NORMAL
LARGE PLATELETS: NORMAL
LEUKOCYTE ESTERASE UR QL STRIP.AUTO: ABNORMAL
LEUKOCYTE ESTERASE UR QL STRIP.AUTO: ABNORMAL
LIPASE SERPL-CCNC: 145 U/L (ref 13–60)
LIPASE SERPL-CCNC: 21 U/L (ref 13–60)
LIPASE SERPL-CCNC: 54 U/L (ref 13–60)
LYMPHOCYTES # BLD AUTO: 1.43 10*3/MM3 (ref 0.7–3.1)
LYMPHOCYTES # BLD AUTO: 1.79 10*3/MM3 (ref 0.7–3.1)
LYMPHOCYTES # BLD AUTO: 2.01 10*3/MM3 (ref 0.7–3.1)
LYMPHOCYTES # BLD AUTO: 2.52 10*3/MM3 (ref 0.7–3.1)
LYMPHOCYTES # BLD AUTO: 2.65 10*3/MM3 (ref 0.7–3.1)
LYMPHOCYTES # BLD AUTO: 2.68 10*3/MM3 (ref 0.7–3.1)
LYMPHOCYTES # BLD AUTO: 2.73 10*3/MM3 (ref 0.7–3.1)
LYMPHOCYTES # BLD AUTO: 2.74 10*3/MM3 (ref 0.7–3.1)
LYMPHOCYTES # BLD AUTO: 2.76 10*3/MM3 (ref 0.7–3.1)
LYMPHOCYTES # BLD AUTO: 2.81 10*3/MM3 (ref 0.7–3.1)
LYMPHOCYTES # BLD AUTO: 2.82 10*3/MM3 (ref 0.7–3.1)
LYMPHOCYTES # BLD AUTO: 2.88 10*3/MM3 (ref 0.7–3.1)
LYMPHOCYTES # BLD AUTO: 2.96 10*3/MM3 (ref 0.7–3.1)
LYMPHOCYTES # BLD AUTO: 2.97 10*3/MM3 (ref 0.7–3.1)
LYMPHOCYTES # BLD AUTO: 3.62 10*3/MM3 (ref 0.7–3.1)
LYMPHOCYTES # BLD AUTO: 4.07 10*3/MM3 (ref 0.7–3.1)
LYMPHOCYTES NFR BLD AUTO: 16.6 % (ref 19.6–45.3)
LYMPHOCYTES NFR BLD AUTO: 21 % (ref 19.6–45.3)
LYMPHOCYTES NFR BLD AUTO: 21.9 % (ref 19.6–45.3)
LYMPHOCYTES NFR BLD AUTO: 24.9 % (ref 19.6–45.3)
LYMPHOCYTES NFR BLD AUTO: 26.8 % (ref 19.6–45.3)
LYMPHOCYTES NFR BLD AUTO: 27.4 % (ref 19.6–45.3)
LYMPHOCYTES NFR BLD AUTO: 27.5 % (ref 19.6–45.3)
LYMPHOCYTES NFR BLD AUTO: 28.2 % (ref 19.6–45.3)
LYMPHOCYTES NFR BLD AUTO: 28.6 % (ref 19.6–45.3)
LYMPHOCYTES NFR BLD AUTO: 28.7 % (ref 19.6–45.3)
LYMPHOCYTES NFR BLD AUTO: 31.4 % (ref 19.6–45.3)
LYMPHOCYTES NFR BLD AUTO: 31.9 % (ref 19.6–45.3)
LYMPHOCYTES NFR BLD AUTO: 32.5 % (ref 19.6–45.3)
LYMPHOCYTES NFR BLD AUTO: 33.7 % (ref 19.6–45.3)
LYMPHOCYTES NFR BLD AUTO: 35.6 % (ref 19.6–45.3)
LYMPHOCYTES NFR BLD AUTO: 39.7 % (ref 19.6–45.3)
MACROCYTES BLD QL SMEAR: NORMAL
MACROCYTES BLD QL SMEAR: NORMAL
MAGNESIUM SERPL-MCNC: 1.1 MG/DL (ref 1.6–2.4)
MAGNESIUM SERPL-MCNC: 1.2 MG/DL (ref 1.6–2.4)
MAGNESIUM SERPL-MCNC: 1.3 MG/DL (ref 1.6–2.4)
MAGNESIUM SERPL-MCNC: 1.3 MG/DL (ref 1.6–2.4)
MAGNESIUM SERPL-MCNC: 1.4 MG/DL (ref 1.6–2.4)
MAGNESIUM SERPL-MCNC: 1.4 MG/DL (ref 1.6–2.4)
MAGNESIUM SERPL-MCNC: 1.5 MG/DL (ref 1.6–2.4)
MAGNESIUM SERPL-MCNC: 1.5 MG/DL (ref 1.6–2.4)
MAGNESIUM SERPL-MCNC: 1.6 MG/DL (ref 1.6–2.4)
MAGNESIUM SERPL-MCNC: 1.6 MG/DL (ref 1.6–2.4)
MCH RBC QN AUTO: 29.2 PG (ref 26.6–33)
MCH RBC QN AUTO: 29.5 PG (ref 26.6–33)
MCH RBC QN AUTO: 29.9 PG (ref 26.6–33)
MCH RBC QN AUTO: 30 PG (ref 26.6–33)
MCH RBC QN AUTO: 30.4 PG (ref 26.6–33)
MCH RBC QN AUTO: 30.4 PG (ref 26.6–33)
MCH RBC QN AUTO: 30.5 PG (ref 26.6–33)
MCH RBC QN AUTO: 30.8 PG (ref 26.6–33)
MCH RBC QN AUTO: 31 PG (ref 26.6–33)
MCH RBC QN AUTO: 31.1 PG (ref 26.6–33)
MCH RBC QN AUTO: 31.2 PG (ref 26.6–33)
MCH RBC QN AUTO: 31.4 PG (ref 26.6–33)
MCH RBC QN AUTO: 31.5 PG (ref 26.6–33)
MCH RBC QN AUTO: 31.9 PG (ref 26.6–33)
MCH RBC QN AUTO: 32.1 PG (ref 26.6–33)
MCH RBC QN AUTO: 32.2 PG (ref 26.6–33)
MCHC RBC AUTO-ENTMCNC: 29.8 G/DL (ref 31.5–35.7)
MCHC RBC AUTO-ENTMCNC: 30.1 G/DL (ref 31.5–35.7)
MCHC RBC AUTO-ENTMCNC: 30.7 G/DL (ref 31.5–35.7)
MCHC RBC AUTO-ENTMCNC: 30.7 G/DL (ref 31.5–35.7)
MCHC RBC AUTO-ENTMCNC: 30.8 G/DL (ref 31.5–35.7)
MCHC RBC AUTO-ENTMCNC: 30.8 G/DL (ref 31.5–35.7)
MCHC RBC AUTO-ENTMCNC: 31.2 G/DL (ref 31.5–35.7)
MCHC RBC AUTO-ENTMCNC: 31.2 G/DL (ref 31.5–35.7)
MCHC RBC AUTO-ENTMCNC: 31.3 G/DL (ref 31.5–35.7)
MCHC RBC AUTO-ENTMCNC: 31.5 G/DL (ref 31.5–35.7)
MCHC RBC AUTO-ENTMCNC: 31.6 G/DL (ref 31.5–35.7)
MCHC RBC AUTO-ENTMCNC: 31.6 G/DL (ref 31.5–35.7)
MCHC RBC AUTO-ENTMCNC: 31.7 G/DL (ref 31.5–35.7)
MCHC RBC AUTO-ENTMCNC: 31.8 G/DL (ref 31.5–35.7)
MCHC RBC AUTO-ENTMCNC: 31.9 G/DL (ref 31.5–35.7)
MCHC RBC AUTO-ENTMCNC: 32 G/DL (ref 31.5–35.7)
MCV RBC AUTO: 100.4 FL (ref 79–97)
MCV RBC AUTO: 100.4 FL (ref 79–97)
MCV RBC AUTO: 101.6 FL (ref 79–97)
MCV RBC AUTO: 103.9 FL (ref 79–97)
MCV RBC AUTO: 95.6 FL (ref 79–97)
MCV RBC AUTO: 96.1 FL (ref 79–97)
MCV RBC AUTO: 96.3 FL (ref 79–97)
MCV RBC AUTO: 97.4 FL (ref 79–97)
MCV RBC AUTO: 97.9 FL (ref 79–97)
MCV RBC AUTO: 97.9 FL (ref 79–97)
MCV RBC AUTO: 98.3 FL (ref 79–97)
MCV RBC AUTO: 98.8 FL (ref 79–97)
MCV RBC AUTO: 98.8 FL (ref 79–97)
MCV RBC AUTO: 99.2 FL (ref 79–97)
MCV RBC AUTO: 99.6 FL (ref 79–97)
MCV RBC AUTO: 99.6 FL (ref 79–97)
MICROCYTES BLD QL: NORMAL
MONOCYTES # BLD AUTO: 0.46 10*3/MM3 (ref 0.1–0.9)
MONOCYTES # BLD AUTO: 0.47 10*3/MM3 (ref 0.1–0.9)
MONOCYTES # BLD AUTO: 0.5 10*3/MM3 (ref 0.1–0.9)
MONOCYTES # BLD AUTO: 0.57 10*3/MM3 (ref 0.1–0.9)
MONOCYTES # BLD AUTO: 0.58 10*3/MM3 (ref 0.1–0.9)
MONOCYTES # BLD AUTO: 0.6 10*3/MM3 (ref 0.1–0.9)
MONOCYTES # BLD AUTO: 0.64 10*3/MM3 (ref 0.1–0.9)
MONOCYTES # BLD AUTO: 0.65 10*3/MM3 (ref 0.1–0.9)
MONOCYTES # BLD AUTO: 0.67 10*3/MM3 (ref 0.1–0.9)
MONOCYTES # BLD AUTO: 0.7 10*3/MM3 (ref 0.1–0.9)
MONOCYTES # BLD AUTO: 0.7 10*3/MM3 (ref 0.1–0.9)
MONOCYTES # BLD AUTO: 0.71 10*3/MM3 (ref 0.1–0.9)
MONOCYTES # BLD AUTO: 0.74 10*3/MM3 (ref 0.1–0.9)
MONOCYTES # BLD AUTO: 0.77 10*3/MM3 (ref 0.1–0.9)
MONOCYTES # BLD AUTO: 0.77 10*3/MM3 (ref 0.1–0.9)
MONOCYTES # BLD AUTO: 0.97 10*3/MM3 (ref 0.1–0.9)
MONOCYTES NFR BLD AUTO: 4.6 % (ref 5–12)
MONOCYTES NFR BLD AUTO: 5.4 % (ref 5–12)
MONOCYTES NFR BLD AUTO: 5.8 % (ref 5–12)
MONOCYTES NFR BLD AUTO: 6.4 % (ref 5–12)
MONOCYTES NFR BLD AUTO: 6.5 % (ref 5–12)
MONOCYTES NFR BLD AUTO: 6.6 % (ref 5–12)
MONOCYTES NFR BLD AUTO: 6.9 % (ref 5–12)
MONOCYTES NFR BLD AUTO: 7 % (ref 5–12)
MONOCYTES NFR BLD AUTO: 7 % (ref 5–12)
MONOCYTES NFR BLD AUTO: 7.1 % (ref 5–12)
MONOCYTES NFR BLD AUTO: 7.1 % (ref 5–12)
MONOCYTES NFR BLD AUTO: 7.5 % (ref 5–12)
MONOCYTES NFR BLD AUTO: 7.6 % (ref 5–12)
MONOCYTES NFR BLD AUTO: 8.1 % (ref 5–12)
MONOCYTES NFR BLD AUTO: 8.1 % (ref 5–12)
MONOCYTES NFR BLD AUTO: 8.9 % (ref 5–12)
NEUTROPHILS NFR BLD AUTO: 4.46 10*3/MM3 (ref 1.7–7)
NEUTROPHILS NFR BLD AUTO: 5.08 10*3/MM3 (ref 1.7–7)
NEUTROPHILS NFR BLD AUTO: 5.47 10*3/MM3 (ref 1.7–7)
NEUTROPHILS NFR BLD AUTO: 5.5 10*3/MM3 (ref 1.7–7)
NEUTROPHILS NFR BLD AUTO: 5.5 10*3/MM3 (ref 1.7–7)
NEUTROPHILS NFR BLD AUTO: 5.56 10*3/MM3 (ref 1.7–7)
NEUTROPHILS NFR BLD AUTO: 5.68 10*3/MM3 (ref 1.7–7)
NEUTROPHILS NFR BLD AUTO: 5.78 10*3/MM3 (ref 1.7–7)
NEUTROPHILS NFR BLD AUTO: 5.87 10*3/MM3 (ref 1.7–7)
NEUTROPHILS NFR BLD AUTO: 53.7 % (ref 42.7–76)
NEUTROPHILS NFR BLD AUTO: 54.1 % (ref 42.7–76)
NEUTROPHILS NFR BLD AUTO: 56.6 % (ref 42.7–76)
NEUTROPHILS NFR BLD AUTO: 58.4 % (ref 42.7–76)
NEUTROPHILS NFR BLD AUTO: 59.7 % (ref 42.7–76)
NEUTROPHILS NFR BLD AUTO: 59.8 % (ref 42.7–76)
NEUTROPHILS NFR BLD AUTO: 6.01 10*3/MM3 (ref 1.7–7)
NEUTROPHILS NFR BLD AUTO: 6.16 10*3/MM3 (ref 1.7–7)
NEUTROPHILS NFR BLD AUTO: 6.24 10*3/MM3 (ref 1.7–7)
NEUTROPHILS NFR BLD AUTO: 6.27 10*3/MM3 (ref 1.7–7)
NEUTROPHILS NFR BLD AUTO: 6.4 10*3/MM3 (ref 1.7–7)
NEUTROPHILS NFR BLD AUTO: 6.62 10*3/MM3 (ref 1.7–7)
NEUTROPHILS NFR BLD AUTO: 61.8 % (ref 42.7–76)
NEUTROPHILS NFR BLD AUTO: 61.8 % (ref 42.7–76)
NEUTROPHILS NFR BLD AUTO: 61.9 % (ref 42.7–76)
NEUTROPHILS NFR BLD AUTO: 62.5 % (ref 42.7–76)
NEUTROPHILS NFR BLD AUTO: 64 % (ref 42.7–76)
NEUTROPHILS NFR BLD AUTO: 64.3 % (ref 42.7–76)
NEUTROPHILS NFR BLD AUTO: 65 % (ref 42.7–76)
NEUTROPHILS NFR BLD AUTO: 68.2 % (ref 42.7–76)
NEUTROPHILS NFR BLD AUTO: 7.74 10*3/MM3 (ref 1.7–7)
NEUTROPHILS NFR BLD AUTO: 70.6 % (ref 42.7–76)
NEUTROPHILS NFR BLD AUTO: 71.4 % (ref 42.7–76)
NITRITE UR QL STRIP: NEGATIVE
NITRITE UR QL STRIP: NEGATIVE
NRBC BLD AUTO-RTO: 0 /100 WBC (ref 0–0.2)
OVALOCYTES BLD QL SMEAR: NORMAL
OVALOCYTES BLD QL SMEAR: NORMAL
PH UR STRIP.AUTO: 5.5 [PH] (ref 5–8)
PH UR STRIP.AUTO: 5.5 [PH] (ref 5–8)
PHOSPHATE SERPL-MCNC: 3.1 MG/DL (ref 2.5–4.5)
PHOSPHATE SERPL-MCNC: 3.3 MG/DL (ref 2.5–4.5)
PLAT MORPH BLD: NORMAL
PLATELET # BLD AUTO: 283 10*3/MM3 (ref 140–450)
PLATELET # BLD AUTO: 322 10*3/MM3 (ref 140–450)
PLATELET # BLD AUTO: 335 10*3/MM3 (ref 140–450)
PLATELET # BLD AUTO: 347 10*3/MM3 (ref 140–450)
PLATELET # BLD AUTO: 367 10*3/MM3 (ref 140–450)
PLATELET # BLD AUTO: 382 10*3/MM3 (ref 140–450)
PLATELET # BLD AUTO: 383 10*3/MM3 (ref 140–450)
PLATELET # BLD AUTO: 384 10*3/MM3 (ref 140–450)
PLATELET # BLD AUTO: 392 10*3/MM3 (ref 140–450)
PLATELET # BLD AUTO: 398 10*3/MM3 (ref 140–450)
PLATELET # BLD AUTO: 410 10*3/MM3 (ref 140–450)
PLATELET # BLD AUTO: 459 10*3/MM3 (ref 140–450)
PLATELET # BLD AUTO: 460 10*3/MM3 (ref 140–450)
PLATELET # BLD AUTO: 471 10*3/MM3 (ref 140–450)
PLATELET # BLD AUTO: 491 10*3/MM3 (ref 140–450)
PLATELET # BLD AUTO: 514 10*3/MM3 (ref 140–450)
PMV BLD AUTO: 9.1 FL (ref 6–12)
PMV BLD AUTO: 9.2 FL (ref 6–12)
PMV BLD AUTO: 9.2 FL (ref 6–12)
PMV BLD AUTO: 9.3 FL (ref 6–12)
PMV BLD AUTO: 9.3 FL (ref 6–12)
PMV BLD AUTO: 9.4 FL (ref 6–12)
PMV BLD AUTO: 9.5 FL (ref 6–12)
PMV BLD AUTO: 9.6 FL (ref 6–12)
PMV BLD AUTO: 9.6 FL (ref 6–12)
PMV BLD AUTO: 9.7 FL (ref 6–12)
PMV BLD AUTO: 9.8 FL (ref 6–12)
PMV BLD AUTO: 9.9 FL (ref 6–12)
POIKILOCYTOSIS BLD QL SMEAR: NORMAL
POLYCHROMASIA BLD QL SMEAR: NORMAL
POLYCHROMASIA BLD QL SMEAR: NORMAL
POTASSIUM SERPL-SCNC: 2.7 MMOL/L (ref 3.5–5.2)
POTASSIUM SERPL-SCNC: 3 MMOL/L (ref 3.5–5.2)
POTASSIUM SERPL-SCNC: 3.2 MMOL/L (ref 3.5–5.2)
POTASSIUM SERPL-SCNC: 3.4 MMOL/L (ref 3.5–5.2)
POTASSIUM SERPL-SCNC: 3.5 MMOL/L (ref 3.5–5.2)
POTASSIUM SERPL-SCNC: 3.5 MMOL/L (ref 3.5–5.2)
POTASSIUM SERPL-SCNC: 3.7 MMOL/L (ref 3.5–5.2)
POTASSIUM SERPL-SCNC: 3.9 MMOL/L (ref 3.5–5.2)
POTASSIUM SERPL-SCNC: 4.1 MMOL/L (ref 3.5–5.2)
POTASSIUM SERPL-SCNC: 4.2 MMOL/L (ref 3.5–5.2)
POTASSIUM SERPL-SCNC: 4.7 MMOL/L (ref 3.5–5.2)
POTASSIUM SERPL-SCNC: 4.7 MMOL/L (ref 3.5–5.2)
PROT SERPL-MCNC: 6.5 G/DL (ref 6–8.5)
PROT SERPL-MCNC: 6.7 G/DL (ref 6–8.5)
PROT SERPL-MCNC: 6.8 G/DL (ref 6–8.5)
PROT SERPL-MCNC: 6.9 G/DL (ref 6–8.5)
PROT SERPL-MCNC: 7.1 G/DL (ref 6–8.5)
PROT SERPL-MCNC: 7.1 G/DL (ref 6–8.5)
PROT SERPL-MCNC: 7.2 G/DL (ref 6–8.5)
PROT SERPL-MCNC: 7.3 G/DL (ref 6–8.5)
PROT SERPL-MCNC: 7.4 G/DL (ref 6–8.5)
PROT SERPL-MCNC: 7.4 G/DL (ref 6–8.5)
PROT SERPL-MCNC: 7.7 G/DL (ref 6–8.5)
PROT SERPL-MCNC: 7.9 G/DL (ref 6–8.5)
PROT SERPL-MCNC: 8.1 G/DL (ref 6–8.5)
PROT SERPL-MCNC: 8.3 G/DL (ref 6–8.5)
PROT UR QL STRIP: ABNORMAL
PROT UR QL STRIP: ABNORMAL
QT INTERVAL: 331 MS
RBC # BLD AUTO: 1.9 10*6/MM3 (ref 3.77–5.28)
RBC # BLD AUTO: 1.92 10*6/MM3 (ref 3.77–5.28)
RBC # BLD AUTO: 2.31 10*6/MM3 (ref 3.77–5.28)
RBC # BLD AUTO: 2.32 10*6/MM3 (ref 3.77–5.28)
RBC # BLD AUTO: 2.35 10*6/MM3 (ref 3.77–5.28)
RBC # BLD AUTO: 2.37 10*6/MM3 (ref 3.77–5.28)
RBC # BLD AUTO: 2.43 10*6/MM3 (ref 3.77–5.28)
RBC # BLD AUTO: 2.5 10*6/MM3 (ref 3.77–5.28)
RBC # BLD AUTO: 2.51 10*6/MM3 (ref 3.77–5.28)
RBC # BLD AUTO: 2.53 10*6/MM3 (ref 3.77–5.28)
RBC # BLD AUTO: 2.57 10*6/MM3 (ref 3.77–5.28)
RBC # BLD AUTO: 2.58 10*6/MM3 (ref 3.77–5.28)
RBC # BLD AUTO: 2.62 10*6/MM3 (ref 3.77–5.28)
RBC # BLD AUTO: 2.74 10*6/MM3 (ref 3.77–5.28)
RBC # BLD AUTO: 2.75 10*6/MM3 (ref 3.77–5.28)
RBC # BLD AUTO: 2.84 10*6/MM3 (ref 3.77–5.28)
RBC # UR STRIP: ABNORMAL /HPF
RBC # UR: ABNORMAL /HPF
REF LAB TEST METHOD: ABNORMAL
REF LAB TEST METHOD: ABNORMAL
RH BLD: NEGATIVE
SMALL PLATELETS BLD QL SMEAR: NORMAL
SODIUM SERPL-SCNC: 129 MMOL/L (ref 136–145)
SODIUM SERPL-SCNC: 130 MMOL/L (ref 136–145)
SODIUM SERPL-SCNC: 130 MMOL/L (ref 136–145)
SODIUM SERPL-SCNC: 131 MMOL/L (ref 136–145)
SODIUM SERPL-SCNC: 132 MMOL/L (ref 136–145)
SODIUM SERPL-SCNC: 132 MMOL/L (ref 136–145)
SODIUM SERPL-SCNC: 134 MMOL/L (ref 136–145)
SODIUM SERPL-SCNC: 135 MMOL/L (ref 136–145)
SODIUM SERPL-SCNC: 137 MMOL/L (ref 136–145)
SP GR UR STRIP: 1.01 (ref 1–1.03)
SP GR UR STRIP: 1.02 (ref 1–1.03)
SQUAMOUS #/AREA URNS HPF: ABNORMAL /HPF
SQUAMOUS #/AREA URNS HPF: ABNORMAL /HPF
T&S EXPIRATION DATE: NORMAL
TARGETS BLD QL SMEAR: NORMAL
TIBC SERPL-MCNC: 352 MCG/DL (ref 298–536)
TRANSFERRIN SERPL-MCNC: 236 MG/DL (ref 200–360)
TSH SERPL DL<=0.05 MIU/L-ACNC: 1.86 UIU/ML (ref 0.27–4.2)
UNIT  ABO: NORMAL
UNIT  RH: NORMAL
UROBILINOGEN UR QL STRIP: ABNORMAL
UROBILINOGEN UR QL STRIP: ABNORMAL
VIT B12 BLD-MCNC: 164 PG/ML (ref 211–946)
WBC # BLD AUTO: 10 10*3/MM3 (ref 3.4–10.8)
WBC # BLD AUTO: 10.16 10*3/MM3 (ref 3.4–10.8)
WBC # BLD AUTO: 11.92 10*3/MM3 (ref 3.4–10.8)
WBC # BLD AUTO: 8.68 10*3/MM3 (ref 3.4–10.8)
WBC # BLD AUTO: 9.16 10*3/MM3 (ref 3.4–10.8)
WBC # BLD AUTO: 9.17 10*3/MM3 (ref 3.4–10.8)
WBC # BLD AUTO: 9.29 10*3/MM3 (ref 3.4–10.8)
WBC # BLD AUTO: 9.36 10*3/MM3 (ref 3.4–10.8)
WBC # BLD AUTO: 9.5 10*3/MM3 (ref 3.4–10.8)
WBC # UR STRIP: ABNORMAL /HPF
WBC MORPH BLD: NORMAL
WBC NRBC COR # BLD: 10.24 10*3/MM3 (ref 3.4–10.8)
WBC NRBC COR # BLD: 10.29 10*3/MM3 (ref 3.4–10.8)
WBC NRBC COR # BLD: 7.87 10*3/MM3 (ref 3.4–10.8)
WBC NRBC COR # BLD: 8.52 10*3/MM3 (ref 3.4–10.8)
WBC NRBC COR # BLD: 8.63 10*3/MM3 (ref 3.4–10.8)
WBC NRBC COR # BLD: 9.18 10*3/MM3 (ref 3.4–10.8)
WBC NRBC COR # BLD: 9.98 10*3/MM3 (ref 3.4–10.8)
WBC UR QL AUTO: ABNORMAL /HPF
WHOLE BLOOD HOLD SPECIMEN: NORMAL
YEAST URNS QL MICRO: ABNORMAL /HPF

## 2021-01-01 PROCEDURE — 84100 ASSAY OF PHOSPHORUS: CPT | Performed by: INTERNAL MEDICINE

## 2021-01-01 PROCEDURE — 86900 BLOOD TYPING SEROLOGIC ABO: CPT | Performed by: INTERNAL MEDICINE

## 2021-01-01 PROCEDURE — 86880 COOMBS TEST DIRECT: CPT

## 2021-01-01 PROCEDURE — 25010000002 HEPARIN LOCK FLUSH PER 10 UNITS: Performed by: INTERNAL MEDICINE

## 2021-01-01 PROCEDURE — 83735 ASSAY OF MAGNESIUM: CPT | Performed by: INTERNAL MEDICINE

## 2021-01-01 PROCEDURE — 85025 COMPLETE CBC W/AUTO DIFF WBC: CPT

## 2021-01-01 PROCEDURE — 82728 ASSAY OF FERRITIN: CPT | Performed by: INTERNAL MEDICINE

## 2021-01-01 PROCEDURE — 99283 EMERGENCY DEPT VISIT LOW MDM: CPT

## 2021-01-01 PROCEDURE — 82962 GLUCOSE BLOOD TEST: CPT

## 2021-01-01 PROCEDURE — 85025 COMPLETE CBC W/AUTO DIFF WBC: CPT | Performed by: EMERGENCY MEDICINE

## 2021-01-01 PROCEDURE — 25010000002 CETUXIMAB PER 10 MG: Performed by: INTERNAL MEDICINE

## 2021-01-01 PROCEDURE — 36430 TRANSFUSION BLD/BLD COMPNT: CPT

## 2021-01-01 PROCEDURE — 96375 TX/PRO/DX INJ NEW DRUG ADDON: CPT

## 2021-01-01 PROCEDURE — 80053 COMPREHEN METABOLIC PANEL: CPT | Performed by: EMERGENCY MEDICINE

## 2021-01-01 PROCEDURE — 83690 ASSAY OF LIPASE: CPT | Performed by: INTERNAL MEDICINE

## 2021-01-01 PROCEDURE — 86901 BLOOD TYPING SEROLOGIC RH(D): CPT | Performed by: INTERNAL MEDICINE

## 2021-01-01 PROCEDURE — 96374 THER/PROPH/DIAG INJ IV PUSH: CPT

## 2021-01-01 PROCEDURE — 25010000002 METOCLOPRAMIDE PER 10 MG: Performed by: ANESTHESIOLOGY

## 2021-01-01 PROCEDURE — 36415 COLL VENOUS BLD VENIPUNCTURE: CPT

## 2021-01-01 PROCEDURE — 25010000002 METHYLPREDNISOLONE PER 125 MG: Performed by: INTERNAL MEDICINE

## 2021-01-01 PROCEDURE — 77065 DX MAMMO INCL CAD UNI: CPT

## 2021-01-01 PROCEDURE — 25010000002 MIDAZOLAM PER 1MG: Performed by: ANESTHESIOLOGY

## 2021-01-01 PROCEDURE — S0260 H&P FOR SURGERY: HCPCS | Performed by: NURSE PRACTITIONER

## 2021-01-01 PROCEDURE — 80053 COMPREHEN METABOLIC PANEL: CPT | Performed by: INTERNAL MEDICINE

## 2021-01-01 PROCEDURE — 86906 BLD TYPING SEROLOGIC RH PHNT: CPT

## 2021-01-01 PROCEDURE — 80053 COMPREHEN METABOLIC PANEL: CPT

## 2021-01-01 PROCEDURE — 86902 BLOOD TYPE ANTIGEN DONOR EA: CPT

## 2021-01-01 PROCEDURE — 96413 CHEMO IV INFUSION 1 HR: CPT

## 2021-01-01 PROCEDURE — 86900 BLOOD TYPING SEROLOGIC ABO: CPT

## 2021-01-01 PROCEDURE — 0 IOPAMIDOL PER 1 ML: Performed by: EMERGENCY MEDICINE

## 2021-01-01 PROCEDURE — 86920 COMPATIBILITY TEST SPIN: CPT

## 2021-01-01 PROCEDURE — 99214 OFFICE O/P EST MOD 30 MIN: CPT | Performed by: INTERNAL MEDICINE

## 2021-01-01 PROCEDURE — 99215 OFFICE O/P EST HI 40 MIN: CPT | Performed by: INTERNAL MEDICINE

## 2021-01-01 PROCEDURE — C2617 STENT, NON-COR, TEM W/O DEL: HCPCS | Performed by: UROLOGY

## 2021-01-01 PROCEDURE — 86921 COMPATIBILITY TEST INCUBATE: CPT

## 2021-01-01 PROCEDURE — 25010000002 MAGNESIUM SULFATE 2 GM/50ML SOLUTION: Performed by: EMERGENCY MEDICINE

## 2021-01-01 PROCEDURE — 25010000002 DIPHENHYDRAMINE PER 50 MG: Performed by: INTERNAL MEDICINE

## 2021-01-01 PROCEDURE — 74018 RADEX ABDOMEN 1 VIEW: CPT

## 2021-01-01 PROCEDURE — 96415 CHEMO IV INFUSION ADDL HR: CPT

## 2021-01-01 PROCEDURE — 80162 ASSAY OF DIGOXIN TOTAL: CPT | Performed by: EMERGENCY MEDICINE

## 2021-01-01 PROCEDURE — 73100 X-RAY EXAM OF WRIST: CPT

## 2021-01-01 PROCEDURE — G0463 HOSPITAL OUTPT CLINIC VISIT: HCPCS

## 2021-01-01 PROCEDURE — 93010 ELECTROCARDIOGRAM REPORT: CPT | Performed by: INTERNAL MEDICINE

## 2021-01-01 PROCEDURE — 83690 ASSAY OF LIPASE: CPT | Performed by: EMERGENCY MEDICINE

## 2021-01-01 PROCEDURE — 74177 CT ABD & PELVIS W/CONTRAST: CPT

## 2021-01-01 PROCEDURE — 25010000002 FERRIC CARBOXYMALTOSE 750 MG/15ML SOLUTION: Performed by: INTERNAL MEDICINE

## 2021-01-01 PROCEDURE — 96366 THER/PROPH/DIAG IV INF ADDON: CPT

## 2021-01-01 PROCEDURE — P9016 RBC LEUKOCYTES REDUCED: HCPCS

## 2021-01-01 PROCEDURE — 36591 DRAW BLOOD OFF VENOUS DEVICE: CPT

## 2021-01-01 PROCEDURE — 86870 RBC ANTIBODY IDENTIFICATION: CPT | Performed by: INTERNAL MEDICINE

## 2021-01-01 PROCEDURE — 70470 CT HEAD/BRAIN W/O & W/DYE: CPT

## 2021-01-01 PROCEDURE — 25010000002 ONDANSETRON PER 1 MG: Performed by: EMERGENCY MEDICINE

## 2021-01-01 PROCEDURE — 85025 COMPLETE CBC W/AUTO DIFF WBC: CPT | Performed by: INTERNAL MEDICINE

## 2021-01-01 PROCEDURE — 86850 RBC ANTIBODY SCREEN: CPT | Performed by: INTERNAL MEDICINE

## 2021-01-01 PROCEDURE — 82746 ASSAY OF FOLIC ACID SERUM: CPT | Performed by: INTERNAL MEDICINE

## 2021-01-01 PROCEDURE — 25010000002 MAGNESIUM SULFATE IN D5W 1G/100ML (PREMIX) 1-5 GM/100ML-% SOLUTION: Performed by: INTERNAL MEDICINE

## 2021-01-01 PROCEDURE — 83690 ASSAY OF LIPASE: CPT

## 2021-01-01 PROCEDURE — 81001 URINALYSIS AUTO W/SCOPE: CPT | Performed by: EMERGENCY MEDICINE

## 2021-01-01 PROCEDURE — 82378 CARCINOEMBRYONIC ANTIGEN: CPT | Performed by: INTERNAL MEDICINE

## 2021-01-01 PROCEDURE — 93005 ELECTROCARDIOGRAM TRACING: CPT | Performed by: INTERNAL MEDICINE

## 2021-01-01 PROCEDURE — P9040 RBC LEUKOREDUCED IRRADIATED: HCPCS

## 2021-01-01 PROCEDURE — 86870 RBC ANTIBODY IDENTIFICATION: CPT

## 2021-01-01 PROCEDURE — G0463 HOSPITAL OUTPT CLINIC VISIT: HCPCS | Performed by: INTERNAL MEDICINE

## 2021-01-01 PROCEDURE — 86922 COMPATIBILITY TEST ANTIGLOB: CPT

## 2021-01-01 PROCEDURE — 96365 THER/PROPH/DIAG IV INF INIT: CPT

## 2021-01-01 PROCEDURE — 85007 BL SMEAR W/DIFF WBC COUNT: CPT | Performed by: INTERNAL MEDICINE

## 2021-01-01 PROCEDURE — 96360 HYDRATION IV INFUSION INIT: CPT

## 2021-01-01 PROCEDURE — 25010000002 LEVOFLOXACIN PER 250 MG: Performed by: UROLOGY

## 2021-01-01 PROCEDURE — 86850 RBC ANTIBODY SCREEN: CPT

## 2021-01-01 PROCEDURE — 83735 ASSAY OF MAGNESIUM: CPT | Performed by: EMERGENCY MEDICINE

## 2021-01-01 PROCEDURE — C1769 GUIDE WIRE: HCPCS | Performed by: UROLOGY

## 2021-01-01 PROCEDURE — 82330 ASSAY OF CALCIUM: CPT | Performed by: INTERNAL MEDICINE

## 2021-01-01 PROCEDURE — 85007 BL SMEAR W/DIFF WBC COUNT: CPT | Performed by: EMERGENCY MEDICINE

## 2021-01-01 PROCEDURE — 82607 VITAMIN B-12: CPT | Performed by: INTERNAL MEDICINE

## 2021-01-01 PROCEDURE — 99204 OFFICE O/P NEW MOD 45 MIN: CPT | Performed by: RADIOLOGY

## 2021-01-01 PROCEDURE — 84443 ASSAY THYROID STIM HORMONE: CPT | Performed by: INTERNAL MEDICINE

## 2021-01-01 PROCEDURE — 76642 ULTRASOUND BREAST LIMITED: CPT

## 2021-01-01 PROCEDURE — 0 IOPAMIDOL PER 1 ML: Performed by: INTERNAL MEDICINE

## 2021-01-01 PROCEDURE — 63710000001 INSULIN REGULAR HUMAN PER 5 UNITS: Performed by: EMERGENCY MEDICINE

## 2021-01-01 PROCEDURE — 84466 ASSAY OF TRANSFERRIN: CPT | Performed by: INTERNAL MEDICINE

## 2021-01-01 PROCEDURE — 71250 CT THORAX DX C-: CPT

## 2021-01-01 PROCEDURE — 25010000002 PROPOFOL 10 MG/ML EMULSION: Performed by: NURSE ANESTHETIST, CERTIFIED REGISTERED

## 2021-01-01 PROCEDURE — 83605 ASSAY OF LACTIC ACID: CPT | Performed by: EMERGENCY MEDICINE

## 2021-01-01 PROCEDURE — 25010000002 MORPHINE PER 10 MG: Performed by: EMERGENCY MEDICINE

## 2021-01-01 PROCEDURE — 76000 FLUOROSCOPY <1 HR PHYS/QHP: CPT

## 2021-01-01 PROCEDURE — 86901 BLOOD TYPING SEROLOGIC RH(D): CPT

## 2021-01-01 PROCEDURE — 25010000002 HYDROMORPHONE 1 MG/ML SOLUTION: Performed by: EMERGENCY MEDICINE

## 2021-01-01 PROCEDURE — 25010000002 CEFTRIAXONE PER 250 MG: Performed by: EMERGENCY MEDICINE

## 2021-01-01 PROCEDURE — 71260 CT THORAX DX C+: CPT

## 2021-01-01 PROCEDURE — 96361 HYDRATE IV INFUSION ADD-ON: CPT

## 2021-01-01 PROCEDURE — 81001 URINALYSIS AUTO W/SCOPE: CPT

## 2021-01-01 PROCEDURE — 86886 COOMBS TEST INDIRECT TITER: CPT

## 2021-01-01 PROCEDURE — 52332 CYSTOSCOPY AND TREATMENT: CPT | Performed by: UROLOGY

## 2021-01-01 PROCEDURE — 83540 ASSAY OF IRON: CPT | Performed by: INTERNAL MEDICINE

## 2021-01-01 PROCEDURE — G0279 TOMOSYNTHESIS, MAMMO: HCPCS

## 2021-01-01 DEVICE — STNT LITHOSTENT 7F 24CM: Type: IMPLANTABLE DEVICE | Site: URETER | Status: FUNCTIONAL

## 2021-01-01 RX ORDER — HEPARIN SODIUM (PORCINE) LOCK FLUSH IV SOLN 100 UNIT/ML 100 UNIT/ML
500 SOLUTION INTRAVENOUS AS NEEDED
Status: DISCONTINUED | OUTPATIENT
Start: 2021-01-01 | End: 2021-01-01 | Stop reason: HOSPADM

## 2021-01-01 RX ORDER — SODIUM CHLORIDE 9 MG/ML
250 INJECTION, SOLUTION INTRAVENOUS ONCE
Status: COMPLETED | OUTPATIENT
Start: 2021-01-01 | End: 2021-01-01

## 2021-01-01 RX ORDER — SODIUM CHLORIDE 0.9 % (FLUSH) 0.9 %
20 SYRINGE (ML) INJECTION AS NEEDED
Status: DISCONTINUED | OUTPATIENT
Start: 2021-01-01 | End: 2021-01-01 | Stop reason: HOSPADM

## 2021-01-01 RX ORDER — HEPARIN SODIUM (PORCINE) LOCK FLUSH IV SOLN 100 UNIT/ML 100 UNIT/ML
500 SOLUTION INTRAVENOUS AS NEEDED
Status: CANCELLED | OUTPATIENT
Start: 2021-01-01

## 2021-01-01 RX ORDER — METOCLOPRAMIDE HYDROCHLORIDE 5 MG/ML
10 INJECTION INTRAMUSCULAR; INTRAVENOUS ONCE AS NEEDED
Status: COMPLETED | OUTPATIENT
Start: 2021-01-01 | End: 2021-01-01

## 2021-01-01 RX ORDER — DIPHENHYDRAMINE HYDROCHLORIDE 50 MG/ML
50 INJECTION INTRAMUSCULAR; INTRAVENOUS AS NEEDED
Status: CANCELLED | OUTPATIENT
Start: 2021-01-01

## 2021-01-01 RX ORDER — SODIUM CHLORIDE 9 MG/ML
250 INJECTION, SOLUTION INTRAVENOUS AS NEEDED
Status: DISCONTINUED | OUTPATIENT
Start: 2021-01-01 | End: 2021-01-01 | Stop reason: HOSPADM

## 2021-01-01 RX ORDER — POTASSIUM CHLORIDE 750 MG/1
40 CAPSULE, EXTENDED RELEASE ORAL ONCE
Status: DISCONTINUED | OUTPATIENT
Start: 2021-01-01 | End: 2021-01-01

## 2021-01-01 RX ORDER — ONDANSETRON 4 MG/1
4 TABLET, FILM COATED ORAL ONCE AS NEEDED
Status: DISCONTINUED | OUTPATIENT
Start: 2021-01-01 | End: 2021-01-01 | Stop reason: HOSPADM

## 2021-01-01 RX ORDER — CEFDINIR 300 MG/1
CAPSULE ORAL EVERY 12 HOURS
Status: ON HOLD | COMMUNITY
End: 2022-01-01

## 2021-01-01 RX ORDER — METHYLPREDNISOLONE SODIUM SUCCINATE 125 MG/2ML
125 INJECTION, POWDER, LYOPHILIZED, FOR SOLUTION INTRAMUSCULAR; INTRAVENOUS ONCE
Status: CANCELLED | OUTPATIENT
Start: 2021-01-01

## 2021-01-01 RX ORDER — MEPERIDINE HYDROCHLORIDE 25 MG/ML
12.5 INJECTION INTRAMUSCULAR; INTRAVENOUS; SUBCUTANEOUS
Status: DISCONTINUED | OUTPATIENT
Start: 2021-01-01 | End: 2021-01-01 | Stop reason: HOSPADM

## 2021-01-01 RX ORDER — SODIUM CHLORIDE 0.9 % (FLUSH) 0.9 %
20 SYRINGE (ML) INJECTION AS NEEDED
Status: CANCELLED | OUTPATIENT
Start: 2021-01-01

## 2021-01-01 RX ORDER — MIDAZOLAM HYDROCHLORIDE 2 MG/2ML
2 INJECTION, SOLUTION INTRAMUSCULAR; INTRAVENOUS ONCE
Status: COMPLETED | OUTPATIENT
Start: 2021-01-01 | End: 2021-01-01

## 2021-01-01 RX ORDER — LEVOFLOXACIN 5 MG/ML
500 INJECTION, SOLUTION INTRAVENOUS ONCE
Status: CANCELLED | OUTPATIENT
Start: 2021-01-01 | End: 2021-01-01

## 2021-01-01 RX ORDER — IBUPROFEN 600 MG/1
600 TABLET ORAL EVERY 6 HOURS PRN
Status: DISCONTINUED | OUTPATIENT
Start: 2021-01-01 | End: 2021-01-01 | Stop reason: HOSPADM

## 2021-01-01 RX ORDER — SODIUM CHLORIDE 9 MG/ML
250 INJECTION, SOLUTION INTRAVENOUS ONCE
Status: CANCELLED | OUTPATIENT
Start: 2021-01-01

## 2021-01-01 RX ORDER — MEPERIDINE HYDROCHLORIDE 25 MG/ML
25 INJECTION INTRAMUSCULAR; INTRAVENOUS; SUBCUTANEOUS
Status: CANCELLED | OUTPATIENT
Start: 2021-01-01

## 2021-01-01 RX ORDER — SODIUM CHLORIDE 0.9 % (FLUSH) 0.9 %
10 SYRINGE (ML) INJECTION AS NEEDED
Status: DISCONTINUED | OUTPATIENT
Start: 2021-01-01 | End: 2021-01-01 | Stop reason: HOSPADM

## 2021-01-01 RX ORDER — LEVOFLOXACIN 5 MG/ML
500 INJECTION, SOLUTION INTRAVENOUS ONCE
Status: COMPLETED | OUTPATIENT
Start: 2021-01-01 | End: 2021-01-01

## 2021-01-01 RX ORDER — FAMOTIDINE 10 MG/ML
20 INJECTION, SOLUTION INTRAVENOUS ONCE
Status: COMPLETED | OUTPATIENT
Start: 2021-01-01 | End: 2021-01-01

## 2021-01-01 RX ORDER — AMOXICILLIN AND CLAVULANATE POTASSIUM 875; 125 MG/1; MG/1
1 TABLET, FILM COATED ORAL 2 TIMES DAILY
Qty: 14 TABLET | Refills: 0 | Status: ON HOLD | OUTPATIENT
Start: 2021-01-01 | End: 2022-01-01

## 2021-01-01 RX ORDER — SODIUM CHLORIDE 0.9 % (FLUSH) 0.9 %
10 SYRINGE (ML) INJECTION AS NEEDED
Status: CANCELLED | OUTPATIENT
Start: 2021-01-01

## 2021-01-01 RX ORDER — FAMOTIDINE 10 MG/ML
20 INJECTION, SOLUTION INTRAVENOUS ONCE
Status: CANCELLED | OUTPATIENT
Start: 2021-01-01

## 2021-01-01 RX ORDER — POTASSIUM CHLORIDE 750 MG/1
40 CAPSULE, EXTENDED RELEASE ORAL ONCE
Status: COMPLETED | OUTPATIENT
Start: 2021-01-01 | End: 2021-01-01

## 2021-01-01 RX ORDER — SODIUM CHLORIDE 9 MG/ML
250 INJECTION, SOLUTION INTRAVENOUS AS NEEDED
Status: CANCELLED | OUTPATIENT
Start: 2021-01-01

## 2021-01-01 RX ORDER — HEPARIN SODIUM (PORCINE) LOCK FLUSH IV SOLN 100 UNIT/ML 100 UNIT/ML
400 SOLUTION INTRAVENOUS ONCE
Status: COMPLETED | OUTPATIENT
Start: 2021-01-01 | End: 2021-01-01

## 2021-01-01 RX ORDER — OXYCODONE HYDROCHLORIDE 5 MG/1
5 CAPSULE ORAL EVERY 4 HOURS PRN
Qty: 90 CAPSULE | Refills: 0 | Status: ON HOLD | OUTPATIENT
Start: 2021-01-01 | End: 2022-01-01

## 2021-01-01 RX ORDER — PROMETHAZINE HYDROCHLORIDE 12.5 MG/1
12.5 TABLET ORAL ONCE AS NEEDED
Status: DISCONTINUED | OUTPATIENT
Start: 2021-01-01 | End: 2021-01-01 | Stop reason: HOSPADM

## 2021-01-01 RX ORDER — SODIUM CHLORIDE 9 MG/ML
100 INJECTION, SOLUTION INTRAVENOUS CONTINUOUS
Status: DISCONTINUED | OUTPATIENT
Start: 2021-01-01 | End: 2021-01-01 | Stop reason: HOSPADM

## 2021-01-01 RX ORDER — ONDANSETRON 2 MG/ML
4 INJECTION INTRAMUSCULAR; INTRAVENOUS ONCE
Status: COMPLETED | OUTPATIENT
Start: 2021-01-01 | End: 2021-01-01

## 2021-01-01 RX ORDER — ONDANSETRON 2 MG/ML
4 INJECTION INTRAMUSCULAR; INTRAVENOUS ONCE AS NEEDED
Status: DISCONTINUED | OUTPATIENT
Start: 2021-01-01 | End: 2021-01-01 | Stop reason: HOSPADM

## 2021-01-01 RX ORDER — CROMOLYN SODIUM 40 MG/ML
1 SOLUTION/ DROPS OPHTHALMIC 4 TIMES DAILY
COMMUNITY

## 2021-01-01 RX ORDER — SODIUM, POTASSIUM,MAG SULFATES 17.5-3.13G
SOLUTION, RECONSTITUTED, ORAL ORAL
Qty: 354 ML | Refills: 0 | Status: SHIPPED | OUTPATIENT
Start: 2021-01-01 | End: 2021-01-01

## 2021-01-01 RX ORDER — ONDANSETRON HYDROCHLORIDE 8 MG/1
8 TABLET, FILM COATED ORAL EVERY 8 HOURS PRN
Qty: 60 TABLET | Refills: 4 | Status: SHIPPED | OUTPATIENT
Start: 2021-01-01

## 2021-01-01 RX ORDER — ACETAMINOPHEN 325 MG/1
650 TABLET ORAL ONCE
Status: CANCELLED | OUTPATIENT
Start: 2021-01-01

## 2021-01-01 RX ORDER — ACETAMINOPHEN 325 MG/1
650 TABLET ORAL ONCE
Status: COMPLETED | OUTPATIENT
Start: 2021-01-01 | End: 2021-01-01

## 2021-01-01 RX ORDER — MAGNESIUM SULFATE HEPTAHYDRATE 40 MG/ML
2 INJECTION, SOLUTION INTRAVENOUS ONCE
Status: COMPLETED | OUTPATIENT
Start: 2021-01-01 | End: 2021-01-01

## 2021-01-01 RX ORDER — PROMETHAZINE HYDROCHLORIDE 12.5 MG/1
25 TABLET ORAL ONCE AS NEEDED
Status: DISCONTINUED | OUTPATIENT
Start: 2021-01-01 | End: 2021-01-01 | Stop reason: HOSPADM

## 2021-01-01 RX ORDER — POTASSIUM CHLORIDE 1.5 G/1.77G
POWDER, FOR SOLUTION ORAL EVERY 12 HOURS SCHEDULED
COMMUNITY
End: 2021-01-01

## 2021-01-01 RX ORDER — FAMOTIDINE 10 MG/ML
20 INJECTION, SOLUTION INTRAVENOUS AS NEEDED
Status: CANCELLED | OUTPATIENT
Start: 2021-01-01

## 2021-01-01 RX ORDER — SODIUM CHLORIDE 0.9 % (FLUSH) 0.9 %
10 SYRINGE (ML) INJECTION EVERY 12 HOURS SCHEDULED
Status: DISCONTINUED | OUTPATIENT
Start: 2021-01-01 | End: 2021-01-01 | Stop reason: HOSPADM

## 2021-01-01 RX ORDER — SODIUM CHLORIDE 0.9 % (FLUSH) 0.9 %
3 SYRINGE (ML) INJECTION EVERY 12 HOURS SCHEDULED
Status: CANCELLED | OUTPATIENT
Start: 2021-01-01

## 2021-01-01 RX ORDER — UREA 10 %
1 LOTION (ML) TOPICAL DAILY
COMMUNITY
End: 2022-01-01

## 2021-01-01 RX ORDER — CEFTRIAXONE SODIUM 1 G/50ML
1 INJECTION, SOLUTION INTRAVENOUS ONCE
Status: COMPLETED | OUTPATIENT
Start: 2021-01-01 | End: 2021-01-01

## 2021-01-01 RX ORDER — ACETAMINOPHEN 325 MG/1
650 TABLET ORAL ONCE
Status: DISCONTINUED | OUTPATIENT
Start: 2021-01-01 | End: 2021-01-01 | Stop reason: HOSPADM

## 2021-01-01 RX ORDER — CAPECITABINE 500 MG/1
1500 TABLET, FILM COATED ORAL
Qty: 42 TABLET | Refills: 11 | Status: SHIPPED | OUTPATIENT
Start: 2021-01-01 | End: 2022-01-01 | Stop reason: ALTCHOICE

## 2021-01-01 RX ORDER — POTASSIUM CHLORIDE 20 MEQ/1
TABLET, EXTENDED RELEASE ORAL
COMMUNITY
Start: 2021-01-01 | End: 2021-01-01 | Stop reason: SDUPTHER

## 2021-01-01 RX ORDER — SODIUM CHLORIDE 9 MG/ML
100 INJECTION, SOLUTION INTRAVENOUS CONTINUOUS
Status: CANCELLED | OUTPATIENT
Start: 2021-01-01

## 2021-01-01 RX ORDER — MAGNESIUM SULFATE 1 G/100ML
2 INJECTION INTRAVENOUS ONCE
Status: CANCELLED
Start: 2021-01-01 | End: 2021-01-01

## 2021-01-01 RX ORDER — SODIUM CHLORIDE 9 MG/ML
10 INJECTION INTRAVENOUS AS NEEDED
Status: DISCONTINUED | OUTPATIENT
Start: 2021-01-01 | End: 2021-01-01 | Stop reason: HOSPADM

## 2021-01-01 RX ORDER — METHYLPREDNISOLONE SODIUM SUCCINATE 125 MG/2ML
125 INJECTION, POWDER, LYOPHILIZED, FOR SOLUTION INTRAMUSCULAR; INTRAVENOUS ONCE
Status: COMPLETED | OUTPATIENT
Start: 2021-01-01 | End: 2021-01-01

## 2021-01-01 RX ORDER — CEFTRIAXONE SODIUM 1 G/50ML
1 INJECTION, SOLUTION INTRAVENOUS ONCE
Status: DISCONTINUED | OUTPATIENT
Start: 2021-01-01 | End: 2021-01-01 | Stop reason: SDUPTHER

## 2021-01-01 RX ORDER — HEPARIN SODIUM (PORCINE) LOCK FLUSH IV SOLN 100 UNIT/ML 100 UNIT/ML
500 SOLUTION INTRAVENOUS ONCE
Status: COMPLETED | OUTPATIENT
Start: 2021-01-01 | End: 2021-01-01

## 2021-01-01 RX ORDER — MAGNESIUM OXIDE 400 MG/1
TABLET ORAL
COMMUNITY
End: 2022-01-01 | Stop reason: SDUPTHER

## 2021-01-01 RX ORDER — HYDROCODONE BITARTRATE AND ACETAMINOPHEN 5; 325 MG/1; MG/1
1 TABLET ORAL EVERY 6 HOURS PRN
Qty: 120 TABLET | Refills: 0 | Status: ON HOLD | OUTPATIENT
Start: 2021-01-01 | End: 2022-01-01

## 2021-01-01 RX ORDER — ACETAMINOPHEN 500 MG
1000 TABLET ORAL ONCE
Status: COMPLETED | OUTPATIENT
Start: 2021-01-01 | End: 2021-01-01

## 2021-01-01 RX ORDER — PROMETHAZINE HYDROCHLORIDE 25 MG/1
25 SUPPOSITORY RECTAL ONCE AS NEEDED
Status: DISCONTINUED | OUTPATIENT
Start: 2021-01-01 | End: 2021-01-01 | Stop reason: HOSPADM

## 2021-01-01 RX ORDER — OXYCODONE HYDROCHLORIDE 5 MG/1
5 TABLET ORAL
Status: DISCONTINUED | OUTPATIENT
Start: 2021-01-01 | End: 2021-01-01 | Stop reason: HOSPADM

## 2021-01-01 RX ORDER — HEPARIN SODIUM (PORCINE) LOCK FLUSH IV SOLN 100 UNIT/ML 100 UNIT/ML
500 SOLUTION INTRAVENOUS AS NEEDED
Status: DISCONTINUED | OUTPATIENT
Start: 2021-01-01 | End: 2022-01-01 | Stop reason: HOSPADM

## 2021-01-01 RX ORDER — HEPARIN SODIUM (PORCINE) LOCK FLUSH IV SOLN 100 UNIT/ML 100 UNIT/ML
500 SOLUTION INTRAVENOUS ONCE
Status: DISCONTINUED | OUTPATIENT
Start: 2021-01-01 | End: 2021-01-01

## 2021-01-01 RX ORDER — SODIUM CHLORIDE 0.9 % (FLUSH) 0.9 %
10 SYRINGE (ML) INJECTION EVERY 12 HOURS SCHEDULED
Status: CANCELLED | OUTPATIENT
Start: 2021-01-01

## 2021-01-01 RX ORDER — SODIUM CHLORIDE, SODIUM LACTATE, POTASSIUM CHLORIDE, CALCIUM CHLORIDE 600; 310; 30; 20 MG/100ML; MG/100ML; MG/100ML; MG/100ML
9 INJECTION, SOLUTION INTRAVENOUS CONTINUOUS PRN
Status: DISCONTINUED | OUTPATIENT
Start: 2021-01-01 | End: 2021-01-01 | Stop reason: HOSPADM

## 2021-01-01 RX ORDER — MAGNESIUM SULFATE 1 G/100ML
2 INJECTION INTRAVENOUS ONCE
Status: COMPLETED | OUTPATIENT
Start: 2021-01-01 | End: 2021-01-01

## 2021-01-01 RX ORDER — POTASSIUM CHLORIDE 20 MEQ/1
20 TABLET, EXTENDED RELEASE ORAL DAILY
Qty: 14 TABLET | Refills: 0 | Status: SHIPPED | OUTPATIENT
Start: 2021-01-01 | End: 2022-01-01

## 2021-01-01 RX ORDER — ONDANSETRON HYDROCHLORIDE 8 MG/1
8 TABLET, FILM COATED ORAL 3 TIMES DAILY PRN
Qty: 30 TABLET | Refills: 5 | Status: CANCELLED | OUTPATIENT
Start: 2021-01-01

## 2021-01-01 RX ADMIN — ACETAMINOPHEN 650 MG: 325 TABLET ORAL at 10:02

## 2021-01-01 RX ADMIN — METHYLPREDNISOLONE SODIUM SUCCINATE 125 MG: 125 INJECTION, POWDER, FOR SOLUTION INTRAMUSCULAR; INTRAVENOUS at 12:18

## 2021-01-01 RX ADMIN — SODIUM CHLORIDE 250 ML: 9 INJECTION, SOLUTION INTRAVENOUS at 09:55

## 2021-01-01 RX ADMIN — INSULIN HUMAN 10 UNITS: 100 INJECTION, SOLUTION PARENTERAL at 21:59

## 2021-01-01 RX ADMIN — SODIUM CHLORIDE, PRESERVATIVE FREE 20 ML: 5 INJECTION INTRAVENOUS at 12:38

## 2021-01-01 RX ADMIN — SODIUM CHLORIDE, PRESERVATIVE FREE 20 ML: 5 INJECTION INTRAVENOUS at 09:55

## 2021-01-01 RX ADMIN — MORPHINE SULFATE 4 MG: 4 INJECTION INTRAVENOUS at 19:43

## 2021-01-01 RX ADMIN — HEPARIN SODIUM (PORCINE) LOCK FLUSH IV SOLN 100 UNIT/ML 500 UNITS: 100 SOLUTION at 09:55

## 2021-01-01 RX ADMIN — HEPARIN SODIUM (PORCINE) LOCK FLUSH IV SOLN 100 UNIT/ML 500 UNITS: 100 SOLUTION at 11:02

## 2021-01-01 RX ADMIN — POTASSIUM CHLORIDE 40 MEQ: 750 CAPSULE, EXTENDED RELEASE ORAL at 18:23

## 2021-01-01 RX ADMIN — DIPHENHYDRAMINE HYDROCHLORIDE 50 MG: 50 INJECTION, SOLUTION INTRAMUSCULAR; INTRAVENOUS at 12:20

## 2021-01-01 RX ADMIN — FAMOTIDINE 20 MG: 10 INJECTION INTRAVENOUS at 10:03

## 2021-01-01 RX ADMIN — SODIUM CHLORIDE, PRESERVATIVE FREE 20 ML: 5 INJECTION INTRAVENOUS at 11:33

## 2021-01-01 RX ADMIN — HEPARIN SODIUM (PORCINE) LOCK FLUSH IV SOLN 100 UNIT/ML 500 UNITS: 100 SOLUTION at 13:47

## 2021-01-01 RX ADMIN — HEPARIN SODIUM (PORCINE) LOCK FLUSH IV SOLN 100 UNIT/ML 500 UNITS: 100 SOLUTION at 10:14

## 2021-01-01 RX ADMIN — ACETAMINOPHEN 650 MG: 325 TABLET ORAL at 09:57

## 2021-01-01 RX ADMIN — DIPHENHYDRAMINE HYDROCHLORIDE 50 MG: 50 INJECTION, SOLUTION INTRAMUSCULAR; INTRAVENOUS at 10:09

## 2021-01-01 RX ADMIN — HEPARIN SODIUM (PORCINE) LOCK FLUSH IV SOLN 100 UNIT/ML 500 UNITS: 100 SOLUTION at 08:19

## 2021-01-01 RX ADMIN — ONDANSETRON 4 MG: 2 INJECTION INTRAMUSCULAR; INTRAVENOUS at 18:27

## 2021-01-01 RX ADMIN — FAMOTIDINE 20 MG: 10 INJECTION INTRAVENOUS at 09:58

## 2021-01-01 RX ADMIN — SODIUM CHLORIDE 250 ML: 9 INJECTION, SOLUTION INTRAVENOUS at 10:18

## 2021-01-01 RX ADMIN — HEPARIN SODIUM (PORCINE) LOCK FLUSH IV SOLN 100 UNIT/ML 500 UNITS: 100 SOLUTION at 15:06

## 2021-01-01 RX ADMIN — IOPAMIDOL 100 ML: 755 INJECTION, SOLUTION INTRAVENOUS at 20:20

## 2021-01-01 RX ADMIN — METHYLPREDNISOLONE SODIUM SUCCINATE 125 MG: 125 INJECTION, POWDER, FOR SOLUTION INTRAMUSCULAR; INTRAVENOUS at 10:19

## 2021-01-01 RX ADMIN — HEPARIN SODIUM (PORCINE) LOCK FLUSH IV SOLN 100 UNIT/ML 500 UNITS: 100 SOLUTION at 16:14

## 2021-01-01 RX ADMIN — SODIUM CHLORIDE, POTASSIUM CHLORIDE, SODIUM LACTATE AND CALCIUM CHLORIDE 1000 ML: 600; 310; 30; 20 INJECTION, SOLUTION INTRAVENOUS at 18:32

## 2021-01-01 RX ADMIN — FAMOTIDINE 20 MG: 10 INJECTION INTRAVENOUS at 12:16

## 2021-01-01 RX ADMIN — HEPARIN SODIUM (PORCINE) LOCK FLUSH IV SOLN 100 UNIT/ML 500 UNITS: 100 SOLUTION at 10:01

## 2021-01-01 RX ADMIN — HEPARIN SODIUM (PORCINE) LOCK FLUSH IV SOLN 100 UNIT/ML 500 UNITS: 100 SOLUTION at 13:26

## 2021-01-01 RX ADMIN — HYDROMORPHONE HYDROCHLORIDE 1 MG: 1 INJECTION, SOLUTION INTRAMUSCULAR; INTRAVENOUS; SUBCUTANEOUS at 18:29

## 2021-01-01 RX ADMIN — SODIUM CHLORIDE, PRESERVATIVE FREE 20 ML: 5 INJECTION INTRAVENOUS at 10:14

## 2021-01-01 RX ADMIN — SODIUM CHLORIDE 2000 ML: 9 INJECTION, SOLUTION INTRAVENOUS at 12:00

## 2021-01-01 RX ADMIN — MAGNESIUM SULFATE 2 G: 2 INJECTION INTRAVENOUS at 22:08

## 2021-01-01 RX ADMIN — SODIUM CHLORIDE 1000 ML: 9 INJECTION, SOLUTION INTRAVENOUS at 08:46

## 2021-01-01 RX ADMIN — SODIUM CHLORIDE, PRESERVATIVE FREE 20 ML: 5 INJECTION INTRAVENOUS at 10:40

## 2021-01-01 RX ADMIN — MAGNESIUM SULFATE IN DEXTROSE 2 G: 10 INJECTION, SOLUTION INTRAVENOUS at 13:07

## 2021-01-01 RX ADMIN — HEPARIN SODIUM (PORCINE) LOCK FLUSH IV SOLN 100 UNIT/ML 500 UNITS: 100 SOLUTION at 12:39

## 2021-01-01 RX ADMIN — LEVOFLOXACIN 500 MG: 5 INJECTION, SOLUTION INTRAVENOUS at 08:03

## 2021-01-01 RX ADMIN — CETUXIMAB 450 MG: 2 SOLUTION INTRAVENOUS at 12:41

## 2021-01-01 RX ADMIN — METHYLPREDNISOLONE SODIUM SUCCINATE 125 MG: 125 INJECTION, POWDER, FOR SOLUTION INTRAMUSCULAR; INTRAVENOUS at 10:00

## 2021-01-01 RX ADMIN — FAMOTIDINE 20 MG: 10 INJECTION INTRAVENOUS at 10:21

## 2021-01-01 RX ADMIN — SODIUM CHLORIDE, POTASSIUM CHLORIDE, SODIUM LACTATE AND CALCIUM CHLORIDE 9 ML/HR: 600; 310; 30; 20 INJECTION, SOLUTION INTRAVENOUS at 07:25

## 2021-01-01 RX ADMIN — HEPARIN SODIUM (PORCINE) LOCK FLUSH IV SOLN 100 UNIT/ML 500 UNITS: 100 SOLUTION at 11:08

## 2021-01-01 RX ADMIN — PROPOFOL 150 MCG/KG/MIN: 10 INJECTION, EMULSION INTRAVENOUS at 08:04

## 2021-01-01 RX ADMIN — SODIUM CHLORIDE 250 ML: 9 INJECTION, SOLUTION INTRAVENOUS at 12:55

## 2021-01-01 RX ADMIN — SODIUM CHLORIDE, PRESERVATIVE FREE 20 ML: 5 INJECTION INTRAVENOUS at 10:01

## 2021-01-01 RX ADMIN — ACETAMINOPHEN 650 MG: 325 TABLET ORAL at 10:18

## 2021-01-01 RX ADMIN — MIDAZOLAM HYDROCHLORIDE 2 MG: 1 INJECTION, SOLUTION INTRAMUSCULAR; INTRAVENOUS at 07:43

## 2021-01-01 RX ADMIN — METOCLOPRAMIDE HYDROCHLORIDE 10 MG: 5 INJECTION INTRAMUSCULAR; INTRAVENOUS at 07:43

## 2021-01-01 RX ADMIN — IOPAMIDOL 100 ML: 755 INJECTION, SOLUTION INTRAVENOUS at 19:14

## 2021-01-01 RX ADMIN — SODIUM CHLORIDE, PRESERVATIVE FREE 20 ML: 5 INJECTION INTRAVENOUS at 13:32

## 2021-01-01 RX ADMIN — SODIUM CHLORIDE 250 ML: 9 INJECTION, SOLUTION INTRAVENOUS at 10:11

## 2021-01-01 RX ADMIN — SODIUM CHLORIDE, PRESERVATIVE FREE 20 ML: 5 INJECTION INTRAVENOUS at 12:39

## 2021-01-01 RX ADMIN — HEPARIN SODIUM (PORCINE) LOCK FLUSH IV SOLN 100 UNIT/ML 500 UNITS: 100 SOLUTION at 22:31

## 2021-01-01 RX ADMIN — CEFTRIAXONE SODIUM 1 G: 1 INJECTION, SOLUTION INTRAVENOUS at 22:03

## 2021-01-01 RX ADMIN — SODIUM CHLORIDE, PRESERVATIVE FREE 20 ML: 5 INJECTION INTRAVENOUS at 11:02

## 2021-01-01 RX ADMIN — SODIUM CHLORIDE, PRESERVATIVE FREE 20 ML: 5 INJECTION INTRAVENOUS at 13:46

## 2021-01-01 RX ADMIN — METHYLPREDNISOLONE SODIUM SUCCINATE 125 MG: 125 INJECTION, POWDER, FOR SOLUTION INTRAMUSCULAR; INTRAVENOUS at 10:07

## 2021-01-01 RX ADMIN — DIPHENHYDRAMINE HYDROCHLORIDE 50 MG: 50 INJECTION, SOLUTION INTRAMUSCULAR; INTRAVENOUS at 10:23

## 2021-01-01 RX ADMIN — SODIUM CHLORIDE, PRESERVATIVE FREE 20 ML: 5 INJECTION INTRAVENOUS at 08:19

## 2021-01-01 RX ADMIN — SODIUM CHLORIDE, PRESERVATIVE FREE 20 ML: 5 INJECTION INTRAVENOUS at 13:25

## 2021-01-01 RX ADMIN — SODIUM CHLORIDE, PRESERVATIVE FREE 20 ML: 5 INJECTION INTRAVENOUS at 11:57

## 2021-01-01 RX ADMIN — HEPARIN SODIUM (PORCINE) LOCK FLUSH IV SOLN 100 UNIT/ML 500 UNITS: 100 SOLUTION at 15:22

## 2021-01-01 RX ADMIN — SODIUM CHLORIDE, PRESERVATIVE FREE 20 ML: 5 INJECTION INTRAVENOUS at 10:37

## 2021-01-01 RX ADMIN — HEPARIN SODIUM (PORCINE) LOCK FLUSH IV SOLN 100 UNIT/ML 500 UNITS: 100 SOLUTION at 13:32

## 2021-01-01 RX ADMIN — IOPAMIDOL 100 ML: 755 INJECTION, SOLUTION INTRAVENOUS at 16:22

## 2021-01-01 RX ADMIN — SODIUM CHLORIDE 250 ML: 9 INJECTION, SOLUTION INTRAVENOUS at 12:11

## 2021-01-01 RX ADMIN — HEPARIN SODIUM (PORCINE) LOCK FLUSH IV SOLN 100 UNIT/ML 500 UNITS: 100 SOLUTION at 11:33

## 2021-01-01 RX ADMIN — CETUXIMAB 450 MG: 2 SOLUTION INTRAVENOUS at 10:41

## 2021-01-01 RX ADMIN — CETUXIMAB 700 MG: 2 SOLUTION INTRAVENOUS at 11:01

## 2021-01-01 RX ADMIN — FAMOTIDINE 20 MG: 10 INJECTION INTRAVENOUS at 19:43

## 2021-01-01 RX ADMIN — SODIUM CHLORIDE 10 ML: 9 INJECTION, SOLUTION INTRAMUSCULAR; INTRAVENOUS; SUBCUTANEOUS at 19:11

## 2021-01-01 RX ADMIN — HEPARIN SODIUM (PORCINE) LOCK FLUSH IV SOLN 100 UNIT/ML 500 UNITS: 100 SOLUTION at 11:57

## 2021-01-01 RX ADMIN — SODIUM CHLORIDE 1000 ML: 9 INJECTION, SOLUTION INTRAVENOUS at 19:43

## 2021-01-01 RX ADMIN — SODIUM CHLORIDE, PRESERVATIVE FREE 20 ML: 5 INJECTION INTRAVENOUS at 11:07

## 2021-01-01 RX ADMIN — SODIUM CHLORIDE 1000 ML: 9 INJECTION, SOLUTION INTRAVENOUS at 13:15

## 2021-01-01 RX ADMIN — HEPARIN SODIUM (PORCINE) LOCK FLUSH IV SOLN 100 UNIT/ML 500 UNITS: 100 SOLUTION at 10:41

## 2021-01-01 RX ADMIN — FERRIC CARBOXYMALTOSE INJECTION 750 MG: 50 INJECTION, SOLUTION INTRAVENOUS at 12:56

## 2021-01-01 RX ADMIN — FERRIC CARBOXYMALTOSE INJECTION 750 MG: 50 INJECTION, SOLUTION INTRAVENOUS at 10:12

## 2021-01-01 RX ADMIN — DIPHENHYDRAMINE HYDROCHLORIDE 50 MG: 50 INJECTION, SOLUTION INTRAMUSCULAR; INTRAVENOUS at 10:02

## 2021-01-01 RX ADMIN — HEPARIN SODIUM (PORCINE) LOCK FLUSH IV SOLN 100 UNIT/ML 500 UNITS: 100 SOLUTION at 10:37

## 2021-01-01 RX ADMIN — SODIUM CHLORIDE, PRESERVATIVE FREE 20 ML: 5 INJECTION INTRAVENOUS at 16:14

## 2021-01-01 RX ADMIN — HEPARIN SODIUM (PORCINE) LOCK FLUSH IV SOLN 100 UNIT/ML 400 UNITS: 100 SOLUTION at 19:10

## 2021-01-01 RX ADMIN — ONDANSETRON 4 MG: 2 INJECTION INTRAMUSCULAR; INTRAVENOUS at 19:43

## 2021-01-01 RX ADMIN — ACETAMINOPHEN 1000 MG: 500 TABLET ORAL at 07:25

## 2021-01-01 RX ADMIN — CETUXIMAB 450 MG: 2 SOLUTION INTRAVENOUS at 10:28

## 2021-01-01 RX ADMIN — ACETAMINOPHEN 650 MG: 325 TABLET ORAL at 12:14

## 2021-01-01 RX ADMIN — HEPARIN SODIUM (PORCINE) LOCK FLUSH IV SOLN 100 UNIT/ML 500 UNITS: 100 SOLUTION at 12:38

## 2021-01-05 ENCOUNTER — OFFICE VISIT CONVERTED (OUTPATIENT)
Dept: ONCOLOGY | Facility: HOSPITAL | Age: 71
End: 2021-01-05
Attending: INTERNAL MEDICINE

## 2021-01-05 ENCOUNTER — HOSPITAL ENCOUNTER (OUTPATIENT)
Dept: OTHER | Facility: HOSPITAL | Age: 71
Discharge: HOME | End: 2021-01-05
Attending: INTERNAL MEDICINE

## 2021-01-05 LAB
ABO GROUP BLD: NORMAL
ALBUMIN SERPL-MCNC: 4.1 G/DL (ref 3.5–5)
ALBUMIN/GLOB SERPL: 1.3 {RATIO} (ref 1.4–2.6)
ALP SERPL-CCNC: 135 U/L (ref 43–160)
ALT SERPL-CCNC: 9 U/L (ref 10–40)
ANION GAP SERPL CALC-SCNC: 14 MMOL/L (ref 8–19)
AST SERPL-CCNC: 21 U/L (ref 15–50)
BASOPHILS # BLD AUTO: 0.07 10*3/UL (ref 0–0.2)
BASOPHILS NFR BLD AUTO: 1 % (ref 0–3)
BB HOLD TUBE: NORMAL
BILIRUB SERPL-MCNC: 0.17 MG/DL (ref 0.2–1.3)
BLD GP AB SCN SERPL QL: NORMAL
BUN SERPL-MCNC: 21 MG/DL (ref 5–25)
BUN/CREAT SERPL: 19 {RATIO} (ref 6–20)
CALCIUM SERPL-MCNC: 8.9 MG/DL (ref 8.7–10.4)
CHLORIDE SERPL-SCNC: 105 MMOL/L (ref 99–111)
CONV ABD CONTROL: NORMAL
CONV ABS IMM GRAN: 0.05 10*3/UL (ref 0–0.54)
CONV CO2: 22 MMOL/L (ref 22–32)
CONV EOSINOPHILS PERCENT BY MANUAL COUNT: 1 % (ref 0–7)
CONV IMMATURE GRAN: 0.7 % (ref 0–0.4)
CONV TOTAL PROTEIN: 7.3 G/DL (ref 6.3–8.2)
CREAT UR-MCNC: 1.08 MG/DL (ref 0.5–0.9)
EOSINOPHIL # BLD MANUAL: 0.07 10*3/UL (ref 0–0.7)
ERYTHROCYTE [DISTWIDTH] IN BLOOD BY AUTOMATED COUNT: 16.1 % (ref 11.5–14.5)
ERYTHROCYTE [DISTWIDTH] IN BLOOD BY AUTOMATED COUNT: 55 FL
GFR SERPLBLD BASED ON 1.73 SQ M-ARVRAT: 51 ML/MIN/{1.73_M2}
GLOBULIN UR ELPH-MCNC: 3.2 G/DL (ref 2–3.5)
GLUCOSE SERPL-MCNC: 110 MG/DL (ref 65–99)
HBA1C MFR BLD: 7.7 G/DL (ref 12–16)
HCT VFR BLD AUTO: 24.7 % (ref 37–47)
LYMPHOCYTES # BLD AUTO: 2.33 10*3/UL (ref 1–5)
LYMPHOCYTES NFR BLD AUTO: 32.6 % (ref 20–45)
MCH RBC QN AUTO: 29.2 PG (ref 27–31)
MCHC RBC AUTO-ENTMCNC: 31.2 G/DL (ref 33–37)
MCV RBC AUTO: 93.6 FL (ref 81–99)
MONOCYTES # BLD AUTO: 0.57 10*3/UL (ref 0.2–1.2)
MONOCYTES NFR BLD MANUAL: 8 % (ref 3–10)
NEUTROPHILS # BLD AUTO: 4.06 10*3/UL (ref 2–8)
NEUTROPHILS NFR BLD MANUAL: 56.7 % (ref 30–85)
OSMOLALITY SERPL CALC.SUM OF ELEC: 288 MOSM/KG (ref 273–304)
PLATELET # BLD AUTO: 322 10*3/UL (ref 130–400)
PMV BLD AUTO: 10.1 FL (ref 7.4–10.4)
POTASSIUM SERPL-SCNC: 3.7 MMOL/L (ref 3.5–5.3)
RBC MORPH BLD: 2.64 10*6/UL (ref 4.2–5.4)
RH BLD: NORMAL
SODIUM SERPL-SCNC: 137 MMOL/L (ref 135–147)
WBC # BLD AUTO: 7.15 10*3/UL (ref 4.8–10.8)

## 2021-01-06 ENCOUNTER — HOSPITAL ENCOUNTER (OUTPATIENT)
Dept: ONCOLOGY | Facility: HOSPITAL | Age: 71
Setting detail: RECURRING SERIES
Discharge: HOME | End: 2021-04-06
Attending: INTERNAL MEDICINE

## 2021-01-08 ENCOUNTER — HOSPITAL ENCOUNTER (OUTPATIENT)
Dept: INFUSION THERAPY | Facility: HOSPITAL | Age: 71
Discharge: HOME | End: 2021-01-08
Attending: INTERNAL MEDICINE

## 2021-01-21 ENCOUNTER — HOSPITAL ENCOUNTER (OUTPATIENT)
Dept: CT IMAGING | Facility: HOSPITAL | Age: 71
Discharge: HOME | End: 2021-01-21
Attending: INTERNAL MEDICINE

## 2021-01-22 ENCOUNTER — OFFICE VISIT CONVERTED (OUTPATIENT)
Dept: ONCOLOGY | Facility: HOSPITAL | Age: 71
End: 2021-01-22
Attending: INTERNAL MEDICINE

## 2021-01-22 ENCOUNTER — HOSPITAL ENCOUNTER (OUTPATIENT)
Dept: OTHER | Facility: HOSPITAL | Age: 71
Discharge: HOME | End: 2021-01-22
Attending: INTERNAL MEDICINE

## 2021-01-25 ENCOUNTER — HOSPITAL ENCOUNTER (OUTPATIENT)
Dept: PREADMISSION TESTING | Facility: HOSPITAL | Age: 71
Discharge: HOME | End: 2021-01-25
Attending: UROLOGY

## 2021-01-25 ENCOUNTER — CONVERSION ENCOUNTER (OUTPATIENT)
Dept: SURGERY | Facility: CLINIC | Age: 71
End: 2021-01-25

## 2021-01-25 ENCOUNTER — OFFICE VISIT CONVERTED (OUTPATIENT)
Dept: UROLOGY | Facility: CLINIC | Age: 71
End: 2021-01-25
Attending: UROLOGY

## 2021-01-26 LAB — SARS-COV-2 RNA SPEC QL NAA+PROBE: NOT DETECTED

## 2021-01-28 ENCOUNTER — HOSPITAL ENCOUNTER (OUTPATIENT)
Dept: PERIOP | Facility: HOSPITAL | Age: 71
Setting detail: HOSPITAL OUTPATIENT SURGERY
Discharge: HOME | End: 2021-01-28
Attending: UROLOGY

## 2021-01-28 LAB
ANION GAP SERPL CALC-SCNC: 15 MMOL/L (ref 8–19)
BUN SERPL-MCNC: 20 MG/DL (ref 5–25)
BUN/CREAT SERPL: 16 {RATIO} (ref 6–20)
CALCIUM SERPL-MCNC: 8.8 MG/DL (ref 8.7–10.4)
CHLORIDE SERPL-SCNC: 103 MMOL/L (ref 99–111)
CONV CO2: 22 MMOL/L (ref 22–32)
CREAT UR-MCNC: 1.23 MG/DL (ref 0.5–0.9)
GFR SERPLBLD BASED ON 1.73 SQ M-ARVRAT: 44 ML/MIN/{1.73_M2}
GLUCOSE BLD-MCNC: 106 MG/DL (ref 65–99)
GLUCOSE SERPL-MCNC: 132 MG/DL (ref 65–99)
OSMOLALITY SERPL CALC.SUM OF ELEC: 286 MOSM/KG (ref 273–304)
POTASSIUM SERPL-SCNC: 3.8 MMOL/L (ref 3.5–5.3)
SODIUM SERPL-SCNC: 136 MMOL/L (ref 135–147)

## 2021-01-29 ENCOUNTER — HOSPITAL ENCOUNTER (OUTPATIENT)
Dept: OTHER | Facility: HOSPITAL | Age: 71
Discharge: HOME | End: 2021-01-29
Attending: NURSE PRACTITIONER

## 2021-01-29 ENCOUNTER — OFFICE VISIT CONVERTED (OUTPATIENT)
Dept: ONCOLOGY | Facility: HOSPITAL | Age: 71
End: 2021-01-29
Attending: INTERNAL MEDICINE

## 2021-01-29 LAB
ABO GROUP BLD: NORMAL
ALBUMIN SERPL-MCNC: 4 G/DL (ref 3.5–5)
ALBUMIN/GLOB SERPL: 1.2 {RATIO} (ref 1.4–2.6)
ALP SERPL-CCNC: 113 U/L (ref 43–160)
ALT SERPL-CCNC: 10 U/L (ref 10–40)
ANION GAP SERPL CALC-SCNC: 17 MMOL/L (ref 8–19)
AST SERPL-CCNC: 26 U/L (ref 15–50)
BASOPHILS # BLD AUTO: 0.07 10*3/UL (ref 0–0.2)
BASOPHILS NFR BLD AUTO: 0.3 % (ref 0–3)
BB HOLD TUBE: NORMAL
BILIRUB SERPL-MCNC: 0.2 MG/DL (ref 0.2–1.3)
BLD GP AB SCN SERPL QL: NORMAL
BUN SERPL-MCNC: 19 MG/DL (ref 5–25)
BUN/CREAT SERPL: 15 {RATIO} (ref 6–20)
CALCIUM SERPL-MCNC: 8.7 MG/DL (ref 8.7–10.4)
CEA SERPL-MCNC: 6.4 NG/ML (ref 0–5)
CHLORIDE SERPL-SCNC: 102 MMOL/L (ref 99–111)
CONV ABD CONTROL: NORMAL
CONV ABS IMM GRAN: 0.26 10*3/UL (ref 0–0.2)
CONV CO2: 21 MMOL/L (ref 22–32)
CONV IMMATURE GRAN: 1.1 % (ref 0–1.8)
CONV TOTAL PROTEIN: 7.4 G/DL (ref 6.3–8.2)
CREAT UR-MCNC: 1.31 MG/DL (ref 0.5–0.9)
DEPRECATED RDW RBC AUTO: 54.1 FL (ref 36.4–46.3)
EOSINOPHIL # BLD AUTO: 0.06 10*3/UL (ref 0–0.7)
EOSINOPHIL # BLD AUTO: 0.3 % (ref 0–7)
ERYTHROCYTE [DISTWIDTH] IN BLOOD BY AUTOMATED COUNT: 16.8 % (ref 11.7–14.4)
GFR SERPLBLD BASED ON 1.73 SQ M-ARVRAT: 41 ML/MIN/{1.73_M2}
GLOBULIN UR ELPH-MCNC: 3.4 G/DL (ref 2–3.5)
GLUCOSE SERPL-MCNC: 160 MG/DL (ref 65–99)
HCT VFR BLD AUTO: 19.5 % (ref 37–47)
HGB BLD-MCNC: 5.9 G/DL (ref 12–16)
LYMPHOCYTES # BLD AUTO: 3.33 10*3/UL (ref 1–5)
LYMPHOCYTES NFR BLD AUTO: 14.7 % (ref 20–45)
MCH RBC QN AUTO: 26.6 PG (ref 27–31)
MCHC RBC AUTO-ENTMCNC: 30.3 G/DL (ref 33–37)
MCV RBC AUTO: 87.8 FL (ref 81–99)
MONOCYTES # BLD AUTO: 1.04 10*3/UL (ref 0.2–1.2)
MONOCYTES NFR BLD AUTO: 4.6 % (ref 3–10)
NEUTROPHILS # BLD AUTO: 17.94 10*3/UL (ref 2–8)
NEUTROPHILS NFR BLD AUTO: 79 % (ref 30–85)
NRBC CBCN: 0.1 % (ref 0–0.7)
OSMOLALITY SERPL CALC.SUM OF ELEC: 288 MOSM/KG (ref 273–304)
PLATELET # BLD AUTO: 367 10*3/UL (ref 130–400)
PMV BLD AUTO: 9.9 FL (ref 9.4–12.3)
POTASSIUM SERPL-SCNC: 3.6 MMOL/L (ref 3.5–5.3)
RBC # BLD AUTO: 2.22 10*6/UL (ref 4.2–5.4)
RH BLD: NORMAL
SODIUM SERPL-SCNC: 136 MMOL/L (ref 135–147)
WBC # BLD AUTO: 22.7 10*3/UL (ref 4.8–10.8)

## 2021-01-30 ENCOUNTER — HOSPITAL ENCOUNTER (OUTPATIENT)
Dept: INFUSION THERAPY | Facility: HOSPITAL | Age: 71
Discharge: HOME | End: 2021-01-30
Attending: INTERNAL MEDICINE

## 2021-01-30 LAB
BLOOD GROUP ANTIBODIES SERPL: NORMAL
Lab: NORMAL

## 2021-02-01 ENCOUNTER — HOSPITAL ENCOUNTER (OUTPATIENT)
Dept: INFUSION THERAPY | Facility: HOSPITAL | Age: 71
Discharge: HOME | End: 2021-02-01
Attending: INTERNAL MEDICINE

## 2021-02-03 ENCOUNTER — TELEPHONE CONVERTED (OUTPATIENT)
Dept: UROLOGY | Facility: CLINIC | Age: 71
End: 2021-02-03
Attending: UROLOGY

## 2021-02-23 ENCOUNTER — HOSPITAL ENCOUNTER (OUTPATIENT)
Dept: OTHER | Facility: HOSPITAL | Age: 71
Discharge: HOME | End: 2021-02-23
Attending: INTERNAL MEDICINE

## 2021-02-23 ENCOUNTER — OFFICE VISIT CONVERTED (OUTPATIENT)
Dept: ONCOLOGY | Facility: HOSPITAL | Age: 71
End: 2021-02-23
Attending: INTERNAL MEDICINE

## 2021-02-23 LAB
ALBUMIN SERPL-MCNC: 4.2 G/DL (ref 3.5–5)
ALBUMIN/GLOB SERPL: 1.3 {RATIO} (ref 1.4–2.6)
ALP SERPL-CCNC: 146 U/L (ref 43–160)
ALT SERPL-CCNC: 10 U/L (ref 10–40)
ANION GAP SERPL CALC-SCNC: 17 MMOL/L (ref 8–19)
APPEARANCE UR: ABNORMAL
AST SERPL-CCNC: 27 U/L (ref 15–50)
BASOPHILS # BLD AUTO: 0.06 10*3/UL (ref 0–0.2)
BASOPHILS NFR BLD AUTO: 0.7 % (ref 0–3)
BILIRUB SERPL-MCNC: <0.15 MG/DL (ref 0.2–1.3)
BILIRUB UR QL: NEGATIVE
BUN SERPL-MCNC: 14 MG/DL (ref 5–25)
BUN/CREAT SERPL: 11 {RATIO} (ref 6–20)
CALCIUM SERPL-MCNC: 9 MG/DL (ref 8.7–10.4)
CHLORIDE SERPL-SCNC: 101 MMOL/L (ref 99–111)
COLOR UR: YELLOW
CONV ABS IMM GRAN: 0.09 10*3/UL (ref 0–0.2)
CONV BACTERIA: ABNORMAL
CONV CO2: 21 MMOL/L (ref 22–32)
CONV COLLECTION SOURCE (UA): ABNORMAL
CONV IMMATURE GRAN: 1.1 % (ref 0–1.8)
CONV TOTAL PROTEIN: 7.5 G/DL (ref 6.3–8.2)
CONV UROBILINOGEN IN URINE BY AUTOMATED TEST STRIP: 0.2 {EHRLICHU}/DL (ref 0.1–1)
CREAT UR-MCNC: 1.22 MG/DL (ref 0.5–0.9)
DEPRECATED RDW RBC AUTO: 53.9 FL (ref 36.4–46.3)
EOSINOPHIL # BLD AUTO: 0.09 10*3/UL (ref 0–0.7)
EOSINOPHIL # BLD AUTO: 1.1 % (ref 0–7)
ERYTHROCYTE [DISTWIDTH] IN BLOOD BY AUTOMATED COUNT: 17.2 % (ref 11.7–14.4)
GFR SERPLBLD BASED ON 1.73 SQ M-ARVRAT: 44 ML/MIN/{1.73_M2}
GLOBULIN UR ELPH-MCNC: 3.3 G/DL (ref 2–3.5)
GLUCOSE SERPL-MCNC: 123 MG/DL (ref 65–99)
GLUCOSE UR QL: NEGATIVE MG/DL
HCT VFR BLD AUTO: 22.7 % (ref 37–47)
HGB BLD-MCNC: 6.6 G/DL (ref 12–16)
HGB UR QL STRIP: ABNORMAL
KETONES UR QL STRIP: NEGATIVE MG/DL
LEUKOCYTE ESTERASE UR QL STRIP: ABNORMAL
LYMPHOCYTES # BLD AUTO: 3.17 10*3/UL (ref 1–5)
LYMPHOCYTES NFR BLD AUTO: 37.5 % (ref 20–45)
MCH RBC QN AUTO: 24.8 PG (ref 27–31)
MCHC RBC AUTO-ENTMCNC: 29.1 G/DL (ref 33–37)
MCV RBC AUTO: 85.3 FL (ref 81–99)
MONOCYTES # BLD AUTO: 0.5 10*3/UL (ref 0.2–1.2)
MONOCYTES NFR BLD AUTO: 5.9 % (ref 3–10)
NEUTROPHILS # BLD AUTO: 4.54 10*3/UL (ref 2–8)
NEUTROPHILS NFR BLD AUTO: 53.7 % (ref 30–85)
NITRITE UR QL STRIP: NEGATIVE
NRBC CBCN: 0.2 % (ref 0–0.7)
OSMOLALITY SERPL CALC.SUM OF ELEC: 282 MOSM/KG (ref 273–304)
PH UR STRIP.AUTO: 5 [PH] (ref 5–8)
PLATELET # BLD AUTO: 444 10*3/UL (ref 130–400)
PMV BLD AUTO: 10 FL (ref 9.4–12.3)
POTASSIUM SERPL-SCNC: 3.8 MMOL/L (ref 3.5–5.3)
PROT UR QL: ABNORMAL MG/DL
RBC # BLD AUTO: 2.66 10*6/UL (ref 4.2–5.4)
RBC #/AREA URNS HPF: ABNORMAL /[HPF]
SODIUM SERPL-SCNC: 135 MMOL/L (ref 135–147)
SP GR UR: 1.01 (ref 1–1.03)
WBC # BLD AUTO: 8.45 10*3/UL (ref 4.8–10.8)
WBC #/AREA URNS HPF: ABNORMAL /[HPF]

## 2021-02-24 LAB
ABO GROUP BLD: NORMAL
BB HOLD TUBE: NORMAL
BLD GP AB SCN SERPL QL: NORMAL
BLOOD GROUP ANTIBODIES SERPL: NORMAL
CONV ABD CONTROL: NORMAL
FERRITIN SERPL-MCNC: 21 NG/ML (ref 10–200)
IRON SATN MFR SERPL: 6 % (ref 20–55)
IRON SERPL-MCNC: 25 UG/DL (ref 60–170)
RH BLD: NORMAL
TIBC SERPL-MCNC: 436 UG/DL (ref 245–450)
TRANSFERRIN SERPL-MCNC: 305 MG/DL (ref 250–380)

## 2021-02-25 ENCOUNTER — HOSPITAL ENCOUNTER (OUTPATIENT)
Dept: INFUSION THERAPY | Facility: HOSPITAL | Age: 71
Discharge: HOME | End: 2021-02-25
Attending: NURSE PRACTITIONER

## 2021-02-25 LAB
AMPICILLIN SUSC ISLT: 16
AMPICILLIN+SULBAC SUSC ISLT: 4
BACTERIA UR CULT: ABNORMAL
CEFAZOLIN SUSC ISLT: <=4
CEFEPIME SUSC ISLT: <=0.12
CEFTAZIDIME SUSC ISLT: <=1
CEFTRIAXONE SUSC ISLT: <=0.25
CIPROFLOXACIN SUSC ISLT: <=0.25
ERTAPENEM SUSC ISLT: <=0.12
GENTAMICIN SUSC ISLT: <=1
LEVOFLOXACIN SUSC ISLT: <=0.12
NITROFURANTOIN SUSC ISLT: 64
PIP+TAZO SUSC ISLT: <=4
TMP SMX SUSC ISLT: >=320
TOBRAMYCIN SUSC ISLT: <=1

## 2021-03-03 ENCOUNTER — HOSPITAL ENCOUNTER (OUTPATIENT)
Dept: OTHER | Facility: HOSPITAL | Age: 71
Setting detail: RECURRING SERIES
Discharge: HOME | End: 2021-03-03
Attending: INTERNAL MEDICINE

## 2021-03-03 LAB
BASOPHILS # BLD AUTO: 0.05 10*3/UL (ref 0–0.2)
BASOPHILS NFR BLD AUTO: 0.6 % (ref 0–3)
BB HOLD TUBE: NORMAL
CONV ABS IMM GRAN: 0.05 10*3/UL (ref 0–0.54)
CONV EOSINOPHILS PERCENT BY MANUAL COUNT: 0.8 % (ref 0–7)
CONV IMMATURE GRAN: 0.6 % (ref 0–0.4)
EOSINOPHIL # BLD MANUAL: 0.07 10*3/UL (ref 0–0.7)
ERYTHROCYTE [DISTWIDTH] IN BLOOD BY AUTOMATED COUNT: 18.4 % (ref 11.5–14.5)
ERYTHROCYTE [DISTWIDTH] IN BLOOD BY AUTOMATED COUNT: 54.4 FL
HBA1C MFR BLD: 7 G/DL (ref 12–16)
HCT VFR BLD AUTO: 23.6 % (ref 37–47)
LYMPHOCYTES # BLD AUTO: 2.84 10*3/UL (ref 1–5)
LYMPHOCYTES NFR BLD AUTO: 32.4 % (ref 20–45)
MCH RBC QN AUTO: 23.8 PG (ref 27–31)
MCHC RBC AUTO-ENTMCNC: 29.7 G/DL (ref 33–37)
MCV RBC AUTO: 80.3 FL (ref 81–99)
MONOCYTES # BLD AUTO: 0.59 10*3/UL (ref 0.2–1.2)
MONOCYTES NFR BLD MANUAL: 6.7 % (ref 3–10)
NEUTROPHILS # BLD AUTO: 5.16 10*3/UL (ref 2–8)
NEUTROPHILS NFR BLD MANUAL: 58.9 % (ref 30–85)
PLATELET # BLD AUTO: 331 10*3/UL (ref 130–400)
PMV BLD AUTO: 9.3 FL (ref 7.4–10.4)
RBC MORPH BLD: 2.94 10*6/UL (ref 4.2–5.4)
WBC # BLD AUTO: 8.76 10*3/UL (ref 4.8–10.8)

## 2021-03-11 ENCOUNTER — OFFICE VISIT CONVERTED (OUTPATIENT)
Dept: ONCOLOGY | Facility: HOSPITAL | Age: 71
End: 2021-03-11
Attending: INTERNAL MEDICINE

## 2021-03-11 ENCOUNTER — HOSPITAL ENCOUNTER (OUTPATIENT)
Dept: OTHER | Facility: HOSPITAL | Age: 71
Discharge: HOME | End: 2021-03-11
Attending: INTERNAL MEDICINE

## 2021-03-11 LAB
BASOPHILS # BLD AUTO: 0.05 10*3/UL (ref 0–0.2)
BASOPHILS NFR BLD AUTO: 0.6 % (ref 0–3)
BB HOLD TUBE: NORMAL
CONV ABS IMM GRAN: 0.12 10*3/UL (ref 0–0.54)
CONV EOSINOPHILS PERCENT BY MANUAL COUNT: 0.9 % (ref 0–7)
CONV IMMATURE GRAN: 1.4 % (ref 0–0.4)
EOSINOPHIL # BLD MANUAL: 0.08 10*3/UL (ref 0–0.7)
ERYTHROCYTE [DISTWIDTH] IN BLOOD BY AUTOMATED COUNT: 23.3 % (ref 11.5–14.5)
ERYTHROCYTE [DISTWIDTH] IN BLOOD BY AUTOMATED COUNT: 62.2 FL
HBA1C MFR BLD: 7.4 G/DL (ref 12–16)
HCT VFR BLD AUTO: 24.6 % (ref 37–47)
LYMPHOCYTES # BLD AUTO: 2.31 10*3/UL (ref 1–5)
LYMPHOCYTES NFR BLD AUTO: 27 % (ref 20–45)
MCH RBC QN AUTO: 25.3 PG (ref 27–31)
MCHC RBC AUTO-ENTMCNC: 30.1 G/DL (ref 33–37)
MCV RBC AUTO: 84 FL (ref 81–99)
MONOCYTES # BLD AUTO: 0.55 10*3/UL (ref 0.2–1.2)
MONOCYTES NFR BLD MANUAL: 6.4 % (ref 3–10)
NEUTROPHILS # BLD AUTO: 5.44 10*3/UL (ref 2–8)
NEUTROPHILS NFR BLD MANUAL: 63.7 % (ref 30–85)
PLATELET # BLD AUTO: 379 10*3/UL (ref 130–400)
PMV BLD AUTO: 9.3 FL (ref 7.4–10.4)
RBC MORPH BLD: 2.93 10*6/UL (ref 4.2–5.4)
WBC # BLD AUTO: 8.55 10*3/UL (ref 4.8–10.8)

## 2021-03-25 ENCOUNTER — HOSPITAL ENCOUNTER (OUTPATIENT)
Dept: CT IMAGING | Facility: HOSPITAL | Age: 71
Discharge: HOME | End: 2021-03-25
Attending: INTERNAL MEDICINE

## 2021-03-25 LAB
BASOPHILS # BLD AUTO: 0.08 10*3/UL (ref 0–0.2)
BASOPHILS NFR BLD AUTO: 0.9 % (ref 0–3)
CEA SERPL-MCNC: 9.4 NG/ML (ref 0–5)
CONV ABS IMM GRAN: 0.11 10*3/UL (ref 0–0.2)
CONV ANISOCYTES: NORMAL
CONV HYPOCHROMIA IN BLOOD BY LIGHT MICROSCOPY: SLIGHT
CONV IMMATURE GRAN: 1.3 % (ref 0–1.8)
CREAT BLD-MCNC: 1 MG/DL (ref 0.6–1.4)
DEPRECATED RDW RBC AUTO: 94.4 FL (ref 36.4–46.3)
EOSINOPHIL # BLD AUTO: 0.08 10*3/UL (ref 0–0.7)
EOSINOPHIL # BLD AUTO: 0.9 % (ref 0–7)
ERYTHROCYTE [DISTWIDTH] IN BLOOD BY AUTOMATED COUNT: 28.5 % (ref 11.7–14.4)
GFR SERPLBLD BASED ON 1.73 SQ M-ARVRAT: 56 ML/MIN/{1.73_M2}
HCT VFR BLD AUTO: 29.2 % (ref 37–47)
HGB BLD-MCNC: 8.7 G/DL (ref 12–16)
LYMPHOCYTES # BLD AUTO: 2.79 10*3/UL (ref 1–5)
LYMPHOCYTES NFR BLD AUTO: 32.2 % (ref 20–45)
MCH RBC QN AUTO: 28 PG (ref 27–31)
MCHC RBC AUTO-ENTMCNC: 29.8 G/DL (ref 33–37)
MCV RBC AUTO: 93.9 FL (ref 81–99)
MONOCYTES # BLD AUTO: 0.69 10*3/UL (ref 0.2–1.2)
MONOCYTES NFR BLD AUTO: 8 % (ref 3–10)
NEUTROPHILS # BLD AUTO: 4.91 10*3/UL (ref 2–8)
NEUTROPHILS NFR BLD AUTO: 56.7 % (ref 30–85)
NRBC CBCN: 0 % (ref 0–0.7)
OVALOCYTES BLD QL SMEAR: NORMAL
PLATELET # BLD AUTO: 416 10*3/UL (ref 130–400)
PMV BLD AUTO: 9.8 FL (ref 9.4–12.3)
POIKILOCYTOSIS BLD QL SMEAR: SLIGHT
POLYCHROMASIA BLD QL SMEAR: SLIGHT
RBC # BLD AUTO: 3.11 10*6/UL (ref 4.2–5.4)
WBC # BLD AUTO: 8.66 10*3/UL (ref 4.8–10.8)

## 2021-03-30 ENCOUNTER — OFFICE VISIT CONVERTED (OUTPATIENT)
Dept: ONCOLOGY | Facility: HOSPITAL | Age: 71
End: 2021-03-30
Attending: INTERNAL MEDICINE

## 2021-03-30 ENCOUNTER — HOSPITAL ENCOUNTER (OUTPATIENT)
Dept: OTHER | Facility: HOSPITAL | Age: 71
Discharge: HOME | End: 2021-03-30
Attending: INTERNAL MEDICINE

## 2021-03-30 LAB
ALBUMIN SERPL-MCNC: 3.7 G/DL (ref 3.5–5)
ALBUMIN/GLOB SERPL: 0.9 {RATIO} (ref 1.4–2.6)
ALP SERPL-CCNC: 132 U/L (ref 43–160)
ALT SERPL-CCNC: 8 U/L (ref 10–40)
ANION GAP SERPL CALC-SCNC: 16 MMOL/L (ref 8–19)
AST SERPL-CCNC: 25 U/L (ref 15–50)
BASOPHILS # BLD AUTO: 0.08 10*3/UL (ref 0–0.2)
BASOPHILS NFR BLD AUTO: 0.8 % (ref 0–3)
BB HOLD TUBE: NORMAL
BILIRUB SERPL-MCNC: 0.16 MG/DL (ref 0.2–1.3)
BUN SERPL-MCNC: 16 MG/DL (ref 5–25)
BUN/CREAT SERPL: 14 {RATIO} (ref 6–20)
CALCIUM SERPL-MCNC: 9.3 MG/DL (ref 8.7–10.4)
CHLORIDE SERPL-SCNC: 99 MMOL/L (ref 99–111)
CONV ABS IMM GRAN: 0.06 10*3/UL (ref 0–0.54)
CONV CO2: 24 MMOL/L (ref 22–32)
CONV EOSINOPHILS PERCENT BY MANUAL COUNT: 0.7 % (ref 0–7)
CONV IMMATURE GRAN: 0.6 % (ref 0–0.4)
CONV TOTAL PROTEIN: 7.6 G/DL (ref 6.3–8.2)
CREAT UR-MCNC: 1.17 MG/DL (ref 0.5–0.9)
EOSINOPHIL # BLD MANUAL: 0.07 10*3/UL (ref 0–0.7)
ERYTHROCYTE [DISTWIDTH] IN BLOOD BY AUTOMATED COUNT: ABNORMAL % (ref 11.5–14.5)
ERYTHROCYTE [DISTWIDTH] IN BLOOD BY AUTOMATED COUNT: ABNORMAL FL
FERRITIN SERPL-MCNC: 764 NG/ML (ref 10–200)
GFR SERPLBLD BASED ON 1.73 SQ M-ARVRAT: 47 ML/MIN/{1.73_M2}
GLOBULIN UR ELPH-MCNC: 3.9 G/DL (ref 2–3.5)
GLUCOSE SERPL-MCNC: 154 MG/DL (ref 65–99)
HBA1C MFR BLD: 8.8 G/DL (ref 12–16)
HCT VFR BLD AUTO: 28.4 % (ref 37–47)
IRON SERPL-MCNC: 59 UG/DL (ref 60–170)
LYMPHOCYTES # BLD AUTO: 2.61 10*3/UL (ref 1–5)
LYMPHOCYTES NFR BLD AUTO: 27.7 % (ref 20–45)
MCH RBC QN AUTO: 29.2 PG (ref 27–31)
MCHC RBC AUTO-ENTMCNC: 31 G/DL (ref 33–37)
MCV RBC AUTO: 94.4 FL (ref 81–99)
MONOCYTES # BLD AUTO: 0.64 10*3/UL (ref 0.2–1.2)
MONOCYTES NFR BLD MANUAL: 6.8 % (ref 3–10)
NEUTROPHILS # BLD AUTO: 5.96 10*3/UL (ref 2–8)
NEUTROPHILS NFR BLD MANUAL: 63.4 % (ref 30–85)
OSMOLALITY SERPL CALC.SUM OF ELEC: 284 MOSM/KG (ref 273–304)
PLATELET # BLD AUTO: 357 10*3/UL (ref 130–400)
PMV BLD AUTO: 9.3 FL (ref 7.4–10.4)
POTASSIUM SERPL-SCNC: 3.5 MMOL/L (ref 3.5–5.3)
RBC MORPH BLD: 3.01 10*6/UL (ref 4.2–5.4)
SODIUM SERPL-SCNC: 135 MMOL/L (ref 135–147)
WBC # BLD AUTO: 9.42 10*3/UL (ref 4.8–10.8)

## 2021-04-29 ENCOUNTER — OFFICE VISIT CONVERTED (OUTPATIENT)
Dept: SURGERY | Facility: CLINIC | Age: 71
End: 2021-04-29
Attending: SURGERY

## 2021-05-05 ENCOUNTER — OFFICE VISIT CONVERTED (OUTPATIENT)
Dept: SURGERY | Facility: CLINIC | Age: 71
End: 2021-05-05
Attending: SURGERY

## 2021-05-10 NOTE — H&P
History and Physical      Patient Name: Lorraine Dominguez   Patient ID: [id number]   Sex: Female   YOB: 1950    Primary Care Provider: Jo Ann SMITH   Referring Provider: Nicolás Bustillos MD    Visit Date: July 24, 2020    Provider: Cristian Hinton MD   Location: [facility]   Location Address: [address] [place] [address]  [id number]   Location Phone: [phone]          Chief Complaint  · Outpatient History & Physical / Surgical Orders  · Colon Consult      History Of Present Illness  Lorraine Dominguez is a 70 year old /White female who presents to the office today as a consult from Nicolás Bustillos MD.      Patient was referred for endoscopic evaluation for anemia and black-colored stools.  Patient recently saw Dr. Bustillos.  Her hemoglobin decreased from 11 to the 9 range in a 2-week time span.  She has been having black-colored stools.  No abdominal pain.  The patient takes pantoprazole and ranitidine.  She has a history of colon cancer and had surgery in 2018.  She also has a history of breast cancer.  She last had an EGD and a colonoscopy just about 1 year ago by Dr. Burton.  Patient denies any abdominal pain or nausea or vomiting.  Patient takes Advil 2 tablets in the morning and 2 in the evening for arthritis.       Past Medical History  Disease Name Date Onset Notes   Aftercare following right hip joint replacement surgery 02/06/2018 --    Allergic rhinitis, chronic --  --    Anemia, Unspecified --  --    Anxiety --  --    Arthritis --  --    Breast cancer 03/05/2018 --    Cancer --  --    Cataract --  --    Colon cancer 03/05/2018 --    Degenerative Disc Disease  --  --    Depression --  --    Diabetes mellitus type 2, noninsulin dependent --  --    Diabetic Foot Exam Last Completed 11/2018 --    Diabetic neuropathy 05/13/2019 --    Hiatal hernia --  --    High blood pressure --  --    High cholesterol --  --    Hydroureteronephrosis --  --    Hyperlipidemia  --  --    Leg pain --  --    Limb Swelling --  --    Malignant neoplasm of colon, unspecified part of colon 03/05/2018 --    Muscle cramps --  --    Neuropathy --  --    Primary osteoarthritis of right hip 01/04/2018 --    Reflux --  --    Screening for breast cancer 10/1/2017 [facility]   Screening for colon cancer 7/2018 [facility]   Shortness Of Air --  --    Type 2 diabetes mellitus with stage 2 chronic kidney disease, without long-term current use of insulin, uncontrolled 05/13/2019 --    Ureteral mass --  --          Past Surgical History  Procedure Name Date Notes   *Other Surgery 5/1/18 Robotic Resection Periureteral Mass   Back surgery --  --    Biopsy of pelvic mass with ultrasound guidance --  --    Breast --  --    Colon --  --    Colonoscopy 3/2016 --    Cornea transplant --  --    Cystoscopy and ureteroscopy with stent placement --  --    Cystoscopy with stent placement --  --    Cystoscopy with ureteroscopy and biopsy --  --    Cystoureteroscopy, with retrograde pyelogram and stent insertion or removal 7/27/18 Cysto, Right Retrograde, Right Ureteral Stent Removal   Hemicolectomy, right --  --    Hip replacement, right --  --    Joint Surgery --  --    Mastectomy, Left --  --    Partial Hysterectomy --  --    Port Placement --  --    Stents in Renal Arteries --  --          Medication List  Name Date Started Instructions   Accu-Chek Yelena Plus test strp miscellaneous strip 12/26/2019 test blood sugar bid dx e11.9   Accu-Chek Cora miscellaneous misc 12/17/2019 check glucose BID   Accu-Chek SmartView Test Strip miscellaneous strip 12/17/2019 Check glucose BID   BD Alcohol Swabs topical pads, medicated 03/27/2019 TEST BLOOD GLUCOSE EVERY DAY   blood-glucose meter miscellaneous kit 12/09/2019 Dexcom Glucometer , Strips , lancets. Check twice a day check once in AM and once before bed .   Celebrex 400 mg oral capsule 11/15/2019 TAKE 1 CAPSULE EVERY DAY   escitalopram oxalate 10 mg oral tablet 10/23/2019 TAKE 1 TABLET IN  THE MORNING AND TAKE 2 TABLETS AT BEDTIME   gabapentin 300 mg oral capsule 08/23/2019 take 1 capsule (300 mg) by oral route 3 times per day for 90 days   lancets miscellaneous misc 12/26/2019 test blood sugar bid dx e11.9   lorazepam 1 mg oral tablet 10/10/2019 1 po in am and 2 po qhs as needed for insomnia and anxiety   pantoprazole 40 mg oral tablet,delayed release (DR/EC) 12/03/2019 TAKE 1 TABLET EVERY DAY   pravastatin 80 mg oral tablet  take 1 tablet (80 mg) by oral route once daily         Allergy List  Allergen Name Date Reaction Notes   NO KNOWN DRUG ALLERGIES --  --  --        Allergies Reconciled  Family Medical History  Disease Name Relative/Age Notes   Stroke Mother/   Mother   Heart Disease Father/   Father   Cancer, Unspecified Sister/   Sister   Diabetes, unspecified type Mother/  Son/   Mother; Son  Mother   Heart Attack (MI)  --    Family history of breast cancer Sister/30s   Sister/30s   Family history of certain chronic disabling diseases; arthritis Father/  Mother/  Son/   Mother; Father; Son   Family history of Arthritis Brother/  Father/  Mother/  Sister/  Son/   Mother; Father; Sister; Brother; Son   Family history of cancer Sister/   Sister   Family history of stroke Mother/   Mother   Family history of heart disease Mother/   Mother   Family history of diabetes mellitus Brother/  Daughter/  Mother/  Son/   Mother; Brother; Daughter; Son         Social History  Finding Status Start/Stop Quantity Notes   Alcohol Never --/-- --  11/06/2019 - does not drink  02/16/2018 - 01/04/2018 - drinks no   Caffeine Current - status unknown --/-- --  drinks coffee  drinks coffee; 5 or more times per day  drinks coffee   Homemaker --  --/-- --  --    lives with spouse --  --/-- --  --     --  --/-- --  --    Recreational Drug Use Never --/-- --  no   Second hand smoke exposure Never --/-- --  no   Tobacco Never --/-- --  never smoker  never smoker  never a smoker         Immunizations  NameDate  "Admin Mfg Trade Name Lot Number Route Inj VIS Given VIS Publication   [id number]/01/[id number] NE Not Entered  NE NE     Comments:          Review of Systems  · Constitutional  o Denies  o : chills, fever  · Gastrointestinal  o Denies  o : nausea, vomiting      Vitals  Date Time BP Position Site L\R Cuff Size HR RR TEMP (F) WT  HT  BMI kg/m2 BSA m2 O2 Sat HC       07/24/2020 01:01 PM       15  180lbs 0oz 5'  5\" 29.95 1.94           Physical Examination  · Constitutional  o Appearance  o :  present in room, alert and in no acute distress  · Head and Face  o Head  o :   § Inspection  § : no visable deformities or lesions  · Eyes  o Conjunctivae  o : clear  o Sclerae  o : clear  · Neck  o Inspection/Palpation  o : normal appearance, no masses, trachea midline  · Respiratory  o Respiratory Effort  o : breathing unlabored, respiratory effort appears normal  o Inspection of Chest  o : normal appearance, no retractions  · Cardiovascular  o Heart  o : regular rate and rhythm  · Gastrointestinal  o Abdominal Examination  o :   § Abdomen  § : soft, nondistended  · Skin and Subcutaneous Tissue  o General Inspection  o : no visible concerning rashes or lesions present  · Neurologic  o Cranial Nerves  o : no obvious motor deficits  o Sensation  o : no obvious sensory deficits  o Gait and Station  o :   § Gait Screening  § : normal gait, able to stand without diffculty  o Cerebellar Function  o : no obvious abnormalities  · Psychiatric  o Judgement and Insight  o : judgment and insight intact  o Mood and Affect  o : mood normal, affect appropriate          Assessment  · Pre-Surgical Orders     V72.84  · Colon Cancer, Personal History     V10.05  · Rectal Bleeding     569.3/K62.5  · Preop testing     V72.84/Z01.818  · Anemia     285.9/D64.9  · Black stools     792.1/K92.1      Plan  · Orders  o Colonoscopy (89894) - V72.84 - 07/29/2020  o Endoscopy (22991) - V72.84 - 07/29/2020  o [facility] Pre-Op Covid-19 Screening (12574) - - " 07/24/2020  · Medications  o Medications have been Reconciled  o Transition of Care or Provider Policy  · Instructions  o PLAN: Colonoscopy and Esophagogastroduodenoscopy  o Outpatient  o The indications, options, risks, benefits, and expected outcomes of the planned procedure were discussed with the patient and the patient agrees to proceed.   o IV: LR@ 30 ml/hr  o Anesthesia: MAC  o PLEASE SIGN PERMIT FOR: Colonscopy with possible biopsy and possible polypectomy  o PLEASE SIGN PERMIT FOR: Esophagogastroduodenoscopy with possible biopsy  o Electronically Identified Patient Education Materials Provided Electronically            Electronically Signed by: Cristian Hinton MD -Author on July 24, 2020 01:33:13 PM

## 2021-05-10 NOTE — PROCEDURES
Procedure Note      Patient Name: Lorraine Dominguez   Patient ID: [id number]   Sex: Female   YOB: 1950    Primary Care Provider: Shayna SMITH   Referring Provider: Nicolás Bustillos MD    Visit Date: September 16, 2020    Provider: LUIS Laurent   Location: [facility]   Location Address: [address] [place] [address], [address]  [id number]   Location Phone: [phone]          Lorraine Dominguez presents today for Capsule Endoscopy Procedure for anemia. She successfully swallowed capsule without difficulty or complications.           Assessment  · H/O endoscopy     V45.89/Z98.890  · Anemia due to blood loss     280.0/D50.0      Plan  · Orders  o Capsule endoscopy esophagus through ileum [facility] (02282) - - 09/16/2020  · Instructions  o Please see Capsule endoscopy report scanned into patient record.             Electronically Signed by: Buffy Mcclure, -Author on September 16, 2020 08:27:56 AM

## 2021-05-10 NOTE — H&P
History and Physical      Patient Name: Lorraine Dominguez   Patient ID: [id number]   Sex: Female   YOB: 1950    Primary Care Provider: Shayna SMITH   Referring Provider: Nicolás Bustillos MD    Visit Date: August 27, 2020    Provider: LUIS Laurent   Location: [facility]   Location Address: [address] [place] [address], [address]  [place], [place]  [id number]   Location Phone: [phone]          Chief Complaint  · Anemia      History Of Present Illness  The patient is a 70 year old /White female who presents on referral from Nicolás Bustillos MD for a gastroenterology evaluation.      She presents for evaluation of anemia.  PMH includes colon cancer (2015) with colon resection and chemo.      8/4/2020 CBC:  Hgb 8.3, Hct 26.2, plt 233.    CMP:  Creatinine 0.88, alk phos 115, AST 21, ALT 8, tbili <0.15.   Iron profile:  Iron 33, Iron sat 8%.      7/29/2020 EGD/Colonoscopy (Dr. Hinton) - normal esophagus and duodenum.  Stomach bx - chronic active gastritis with reactive gastropathy.  Moderate diverticulosis of the left side of the colon.        She reports receiving 2 iron infusions.       She admits intermittent diarrhea and reports that yesterday she experienced fecal incontinence.  2-3 bowel movements per day.  Reports that stool is black.  States that this has been present for the past 2 months.      Reports intermittent generalized abdominal pain that usually resolves within 20 minutes.      She admits GERD and h/o hiatal hernia.  Previously managed symptoms with zantac.  States that she hasn't had any medication for reflux since it was discontinued and is having heartburn on a daily basis.        >       Past Medical History  Aftercare following right hip joint replacement surgery; Allergic rhinitis, chronic; Anemia, Unspecified; Anxiety; Arthritis; Breast cancer; Cancer; Cataract; Colon cancer; Degenerative Disc Disease ; Depression; Diabetes mellitus type 2,  noninsulin dependent; Diabetic Foot Exam Last Completed; Diabetic neuropathy; Hiatal hernia; High blood pressure; High cholesterol; Hydroureteronephrosis; Hyperlipidemia; Leg pain; Limb Swelling; Malignant neoplasm of colon, unspecified part of colon; Muscle cramps; Neuropathy; Primary osteoarthritis of right hip; Reflux; Screening for breast cancer; Screening for colon cancer; Shortness Of Air; Type 2 diabetes mellitus with stage 2 chronic kidney disease, without long-term current use of insulin, uncontrolled; Ureteral mass         Past Surgical History  *Other Surgery; Back surgery; Biopsy of pelvic mass with ultrasound guidance; Breast; Colon; Colonoscopy; Cornea transplant; Cystoscopy and ureteroscopy with stent placement; Cystoscopy with stent placement; Cystoscopy with ureteroscopy and biopsy; Cystoureteroscopy, with retrograde pyelogram and stent insertion or removal; Hemicolectomy, right; Hip replacement, right; Joint Surgery; Mastectomy, Left; Partial Hysterectomy; Port Placement; Stents in Renal Arteries         Medication List  Accu-Chek Yelena Plus test strp miscellaneous strip; Accu-Chek Cora miscellaneous misc; Accu-Chek SmartView Test Strip miscellaneous strip; BD Alcohol Swabs topical pads, medicated; blood-glucose meter miscellaneous kit; Celebrex 400 mg oral capsule; escitalopram oxalate 10 mg oral tablet; gabapentin 300 mg oral capsule; lancets miscellaneous misc; lorazepam 1 mg oral tablet; metformin 500 mg oral tablet; pantoprazole 40 mg oral tablet,delayed release (DR/EC); pravastatin 80 mg oral tablet         Allergy List  NO KNOWN DRUG ALLERGIES       Allergies Reconciled  Family Medical History  Stroke; Heart Disease; Cancer, Unspecified; Diabetes, unspecified type; Heart Attack (MI); Family history of breast cancer; Family history of certain chronic disabling diseases; arthritis; Family history of Arthritis; Family history of cancer; Family history of stroke; Family history of heart disease;  "Family history of diabetes mellitus         Social History  Alcohol (Never); Caffeine (Current - status unknown); Homemaker; lives with spouse; ; Recreational Drug Use (Never); Second hand smoke exposure (Never); Tobacco (Never)         Immunizations  Name Date Admin   Influenza          Review of Systems  · Constitutional  o Denies  o : chills, fever  · Eyes  o Denies  o : blurred vision, changes in vision  · Cardiovascular  o Denies  o : chest pain, irregular heart beats  · Respiratory  o Denies  o : shortness of breath, cough  · Gastrointestinal  o Admits  o : See HPI  · Genitourinary  o Denies  o : dysuria, blood in urine  · Integument  o Denies  o : rash, new skin lesions  · Neurologic  o Denies  o : tingling or numbness, seizures  · Musculoskeletal  o Denies  o : joint pain, joint swelling  · Endocrine  o Denies  o : weight gain, weight loss  · Psychiatric  o Denies  o : anxiety, depression      Vitals  Date Time BP Position Site L\R Cuff Size HR RR TEMP (F) WT  HT  BMI kg/m2 BSA m2 O2 Sat        08/27/2020 02:30 /54 Sitting      99.8 181lbs 8oz 5'  5\" 30.2 1.94           Physical Examination  · Constitutional  o Appearance  o : well developed, well-nourished, in no acute distress  · Eyes  o Vision  o :   § Visual Fields  § : eyes move symmetrical in all directions  o Sclerae  o : anicteric  o Pupils and Irises  o : pupils equal and symmetrical  · Neck  o Inspection/Palpation  o : supple  · Respiratory  o Respiratory Effort  o : breathing unlabored  o Inspection of Chest  o : normal appearance, no retractions  o Auscultation of Lungs  o : clear to auscultation bilaterally  · Cardiovascular  o Heart  o :   § Auscultation of Heart  § : no murmurs, gallops or rubs  · Gastrointestinal  o Abdominal Examination  o : soft, nontender to palpation, with normal active bowel sounds, no appreciable hepatosplenomegaly  o Digital Rectal Exam  o : deferred  · Lymphatic  o Neck  o : no palpable " lymphadenopathy  · Skin and Subcutaneous Tissue  o General Inspection  o : without focal lesions; turgor is normal  · Psychiatric  o General  o : Alert and oriented x3  o Mood and Affect  o : Mood and affect are appropriate to circumstances  · Extremities  o Extremities  o : No edema, no cyanosis          Assessment  · Iron deficiency anemia due to chronic blood loss     280.0/D50.0  · Diarrhea     787.91/R19.7  · GERD (gastroesophageal reflux disease)     530.81/K21.9  · History of colon cancer     V10.05/Z85.038      Plan  · Orders  o C difficile Toxigenic Assay (PCR) [facility] (42366) - - 08/27/2020  o Stool culture and sensitivity (05178) - - 08/27/2020  o Fecal Occult Blood Diagnostic (Send to Lab) [facility] (96573) - - 08/27/2020  o Giardia and Cryptosporidium Enzyme Immunoassay [facility] (99078, 37728) - - 08/27/2020  o Lactoferrin (Fecal) Qualitative (24513) - - 08/27/2020  o Capsule endoscopy esophagus through ileum [facility] (47700) - - 08/27/2020  · Medications  o famotidine 20 mg oral tablet   SIG: take 1 tablet (20 mg) by oral route once daily at bedtime   DISP: (90) tablets with 2 refills  Prescribed on 08/27/2020     o Medications have been Reconciled  o Transition of Care or Provider Policy  · Instructions  o Information given on current diagnoses.  · Disposition  o Follow up 3 months            Electronically Signed by: LUIS Laurent -Author on August 27, 2020 07:48:47 PM

## 2021-05-11 ENCOUNTER — OFFICE VISIT CONVERTED (OUTPATIENT)
Dept: ONCOLOGY | Facility: HOSPITAL | Age: 71
End: 2021-05-11
Attending: INTERNAL MEDICINE

## 2021-05-11 ENCOUNTER — HOSPITAL ENCOUNTER (OUTPATIENT)
Dept: OTHER | Facility: HOSPITAL | Age: 71
Discharge: HOME | End: 2021-05-11
Attending: INTERNAL MEDICINE

## 2021-05-11 LAB
ALBUMIN SERPL-MCNC: 3.1 G/DL (ref 3.5–5)
ALBUMIN/GLOB SERPL: 0.9 {RATIO} (ref 1.4–2.6)
ALP SERPL-CCNC: 169 U/L (ref 43–160)
ALT SERPL-CCNC: 6 U/L (ref 10–40)
ANION GAP SERPL CALC-SCNC: 12 MMOL/L (ref 8–19)
AST SERPL-CCNC: 30 U/L (ref 15–50)
BASOPHILS # BLD AUTO: 0.06 10*3/UL (ref 0–0.2)
BASOPHILS NFR BLD AUTO: 0.6 % (ref 0–3)
BILIRUB SERPL-MCNC: 0.15 MG/DL (ref 0.2–1.3)
BUN SERPL-MCNC: 12 MG/DL (ref 5–25)
BUN/CREAT SERPL: 14 {RATIO} (ref 6–20)
CALCIUM SERPL-MCNC: 8.7 MG/DL (ref 8.7–10.4)
CEA SERPL-MCNC: 7.8 NG/ML (ref 0–5)
CHLORIDE SERPL-SCNC: 101 MMOL/L (ref 99–111)
CONV ABS IMM GRAN: 0.1 10*3/UL (ref 0–0.54)
CONV CO2: 28 MMOL/L (ref 22–32)
CONV EOSINOPHILS PERCENT BY MANUAL COUNT: 0.7 % (ref 0–7)
CONV IMMATURE GRAN: 1 % (ref 0–0.4)
CONV TOTAL PROTEIN: 6.4 G/DL (ref 6.3–8.2)
CREAT UR-MCNC: 0.83 MG/DL (ref 0.5–0.9)
EOSINOPHIL # BLD MANUAL: 0.07 10*3/UL (ref 0–0.7)
ERYTHROCYTE [DISTWIDTH] IN BLOOD BY AUTOMATED COUNT: 19.9 % (ref 11.5–14.5)
ERYTHROCYTE [DISTWIDTH] IN BLOOD BY AUTOMATED COUNT: 66.5 FL
FERRITIN SERPL-MCNC: 324 NG/ML (ref 10–200)
GFR SERPLBLD BASED ON 1.73 SQ M-ARVRAT: >60 ML/MIN/{1.73_M2}
GLOBULIN UR ELPH-MCNC: 3.3 G/DL (ref 2–3.5)
GLUCOSE SERPL-MCNC: 124 MG/DL (ref 65–99)
HBA1C MFR BLD: 8.7 G/DL (ref 12–16)
HCT VFR BLD AUTO: 27 % (ref 37–47)
IRON SATN MFR SERPL: 26 % (ref 20–55)
IRON SERPL-MCNC: 62 UG/DL (ref 60–170)
LYMPHOCYTES # BLD AUTO: 3.06 10*3/UL (ref 1–5)
LYMPHOCYTES NFR BLD AUTO: 30 % (ref 20–45)
MCH RBC QN AUTO: 29.7 PG (ref 27–31)
MCHC RBC AUTO-ENTMCNC: 32.2 G/DL (ref 33–37)
MCV RBC AUTO: 92.2 FL (ref 81–99)
MONOCYTES # BLD AUTO: 0.68 10*3/UL (ref 0.2–1.2)
MONOCYTES NFR BLD MANUAL: 6.7 % (ref 3–10)
NEUTROPHILS # BLD AUTO: 6.23 10*3/UL (ref 2–8)
NEUTROPHILS NFR BLD MANUAL: 61 % (ref 30–85)
OSMOLALITY SERPL CALC.SUM OF ELEC: 287 MOSM/KG (ref 273–304)
PLATELET # BLD AUTO: 429 10*3/UL (ref 130–400)
PMV BLD AUTO: 9.3 FL (ref 7.4–10.4)
POTASSIUM SERPL-SCNC: 2.8 MMOL/L (ref 3.5–5.3)
RBC MORPH BLD: 2.93 10*6/UL (ref 4.2–5.4)
SODIUM SERPL-SCNC: 138 MMOL/L (ref 135–147)
TIBC SERPL-MCNC: 242 UG/DL (ref 245–450)
TRANSFERRIN SERPL-MCNC: 169 MG/DL (ref 250–380)
WBC # BLD AUTO: 10.2 10*3/UL (ref 4.8–10.8)

## 2021-05-13 NOTE — PROGRESS NOTES
Progress Note      Patient Name: Lorraine Dominguez   Patient ID: [id number]   Sex: Female   YOB: 1950    Primary Care Provider: Shayna SMITH   Referring Provider: Nicolás Bustillos MD    Visit Date: November 4, 2020    Provider: LUIS Laurent   Location: [facility]   Location Address: [address] [place] [address], [address]  [place], [place]  [id number]   Location Phone: [phone]          Chief Complaint  · Follow-up of EGD      History Of Present Illness     Ms. Dominguez presents for follow-up of iron deficiency anemia, diarrhea, GERD and history of colon cancer.    8/28/2020 CBC: Hemoglobin 8.3, hematocrit 26.2, platelets 233.  CDF-negative, stool culture-normal, occult blood stool-positive, Giardia and cryptosporidium-negative, lactoferrin-positive.    9/16/2020 capsule endoscopy-large depressed ulcerated lesion with edematous edges bleeding is observed from the center of the ulcer it is located in the proximal jejunum.    10/1/2020 EGD-normal mucosa in the whole esophagus.  Small hiatal hernia.  Erythema in the antrum.  Normal duodenum.  Normal-appearing mucosa in the proximal jejunum.  Recommend referral for single balloon enteroscopy.    10/27/2020 CBC: Hemoglobin 7.1, hematocrit 23, platelets 240.  Iron 29 iron saturation 8%.    Reports that she had a blood transfusion and 2 iron infusions.  Completed these on Monday.  Reports melena.      Has f/u with PCP next week to recheck labs.             Past Medical History  Aftercare following right hip joint replacement surgery; Allergic rhinitis, chronic; Anemia, Unspecified; Anxiety; Arthritis; Breast cancer; Cancer; Cataract; Colon cancer; Degenerative Disc Disease ; Depression; Diabetes mellitus type 2, noninsulin dependent; Diabetic Foot Exam Last Completed; Diabetic neuropathy; Hiatal hernia; High blood pressure; High cholesterol; Hydroureteronephrosis; Hyperlipidemia; Leg pain; Limb Swelling; Malignant neoplasm  of colon, unspecified part of colon; Muscle cramps; Neuropathy; Primary osteoarthritis of right hip; Reflux; Screening for breast cancer; Screening for colon cancer; Shortness Of Air; Type 2 diabetes mellitus with stage 2 chronic kidney disease, without long-term current use of insulin, uncontrolled; Ureteral mass         Past Surgical History  *Other Surgery; Back surgery; Biopsy of pelvic mass with ultrasound guidance; Breast; Colon; Colonoscopy; Cornea transplant; Cystoscopy and ureteroscopy with stent placement; Cystoscopy with stent placement; Cystoscopy with ureteroscopy and biopsy; Cystoureteroscopy, with retrograde pyelogram and stent insertion or removal; Hemicolectomy, right; Hip replacement, right; Joint Surgery; Mastectomy, Left; Partial Hysterectomy; Port Placement; Stents in Renal Arteries         Medication List  Accu-Chek Yelena Plus test strp miscellaneous strip; Accu-Chek Cora miscellaneous misc; Accu-Chek SmartView Test Strip miscellaneous strip; BD Alcohol Swabs topical pads, medicated; blood-glucose meter miscellaneous kit; Carafate 1 gram oral tablet; escitalopram oxalate 10 mg oral tablet; famotidine 20 mg oral tablet; gabapentin 300 mg oral capsule; lancets miscellaneous misc; lorazepam 1 mg oral tablet; metformin 500 mg oral tablet; pantoprazole 40 mg oral tablet,delayed release (DR/EC); pravastatin 80 mg oral tablet         Allergy List  NO KNOWN DRUG ALLERGIES       Allergies Reconciled  Family Medical History  Stroke; Heart Disease; Cancer, Unspecified; Diabetes, unspecified type; Heart Attack (MI); Family history of breast cancer; Family history of certain chronic disabling diseases; arthritis; Family history of Arthritis; Family history of cancer; Family history of stroke; Family history of heart disease; Family history of diabetes mellitus         Social History  Alcohol (Never); Caffeine (Current - status unknown); Homemaker; lives with spouse; ; Recreational Drug Use (Never);  "Second hand smoke exposure (Never); Tobacco (Never)         Immunizations  Name Date Admin   Influenza 09/01/2018         Review of Systems  · Constitutional  o Denies  o : chills, fever  · Cardiovascular  o Denies  o : chest pain, irregular heart beats  · Respiratory  o Denies  o : cough, shortness of breath  · Gastrointestinal  o Admits  o : see HPI   · Endocrine  o Denies  o : weight gain, weight loss      Vitals  Date Time BP Position Site L\R Cuff Size HR RR TEMP (F) WT  HT  BMI kg/m2 BSA m2 O2 Sat FR L/min FiO2 HC       11/04/2020 09:26 /65 Sitting      99.4 187lbs 4oz 5'  5\" 31.16 1.97             Physical Examination  · Constitutional  o Appearance  o : Healthy-appearing, awake and alert in no acute distress  · Head and Face  o Head  o : Normocephalic with no worriesome skin lesions  · Eyes  o Vision  o :   § Visual Fields  § : eyes move symmetrical in all directions  o Sclerae  o : sclerae anicteric  o Pupils and Irises  o : pupils equal and symmetrical  · Neck  o Inspection/Palpation  o : Trachea is midline, no adenopathy  · Respiratory  o Respiratory Effort  o : Breathing is unlabored.  o Inspection of Chest  o : normal appearance  o Auscultation of Lungs  o : Chest is clear to auscultation bilaterally.  · Cardiovascular  o Heart  o :   § Auscultation of Heart  § : no murmurs, rubs, or gallops  o Peripheral Vascular System  o :   § Extremities  § : no cyanosis, clubbing or edema;   · Gastrointestinal  o Abdominal Examination  o : Abdomen is soft, nontender to palpation, with normal active bowel sounds, no appreciable hepatosplenomegaly.  o Digital Rectal Exam  o : deferred  · Skin and Subcutaneous Tissue  o General Inspection  o : without focal lesions; turgor is normal  · Psychiatric  o General  o : Alert and oriented x3  o Mood and Affect  o : Mood and affect are appropriate to circumstances  · Extremities  o Extremities  o : No edema, no cyanosis          Assessment  · Iron deficiency anemia due " to chronic blood loss     280.0/D50.0  · Melena     578.1/K92.1  · Jejunal ulcer     534.90/K28.9      Plan  · Medications  o Carafate 1 gram oral tablet   SIG: take 1 tablet (1 gram) by oral route 4 times per day on an empty stomach 1 hour before meals and at bedtime for 14 days   DISP: (56) Tablet with 0 refills  Refilled on 11/04/2020     o Celebrex 400 mg oral capsule   SIG: TAKE 1 CAPSULE EVERY DAY   DISP: (90) Capsule with -1 refills  Discontinued on 11/04/2020     o Medications have been Reconciled  · Instructions  o Information given on current diagnoses. Keep f/u with PCP regarding labs. Multiple attempts have been made to schedule patient for single balloon with [facility] without success. Will attempt to get patient scheduled at another facility.  · Disposition  o Follow up 3 months            Electronically Signed by: LUIS Laurent -Author on November 4, 2020 12:33:32 PM

## 2021-05-14 VITALS
HEIGHT: 65 IN | SYSTOLIC BLOOD PRESSURE: 120 MMHG | TEMPERATURE: 99.4 F | BODY MASS INDEX: 31.2 KG/M2 | DIASTOLIC BLOOD PRESSURE: 65 MMHG | WEIGHT: 187.25 LBS

## 2021-05-14 VITALS
DIASTOLIC BLOOD PRESSURE: 54 MMHG | HEIGHT: 65 IN | TEMPERATURE: 99.8 F | WEIGHT: 181.5 LBS | BODY MASS INDEX: 30.24 KG/M2 | SYSTOLIC BLOOD PRESSURE: 117 MMHG

## 2021-05-14 VITALS
HEART RATE: 87 BPM | OXYGEN SATURATION: 99 % | WEIGHT: 184.25 LBS | HEIGHT: 65 IN | DIASTOLIC BLOOD PRESSURE: 64 MMHG | SYSTOLIC BLOOD PRESSURE: 134 MMHG | BODY MASS INDEX: 30.7 KG/M2

## 2021-05-14 VITALS — BODY MASS INDEX: 28.49 KG/M2 | HEIGHT: 65 IN | WEIGHT: 171 LBS

## 2021-05-14 NOTE — PROGRESS NOTES
Progress Note      Patient Name: Lorraine Dominguez   Patient ID: [id number]   Sex: Female   YOB: 1950    Primary Care Provider: Shayna SMITH   Referring Provider: Nicolás Bustillos MD    Visit Date: January 25, 2021    Provider: Kelli Chi MD   Location: [facility] [place] [facility]   Location Address: [address] [place] [address]  [id number]   Location Phone: [phone]          Chief Complaint  · Patient here for urological concerns      History Of Present Illness  The patient is a 68 year old /White female , who is a follow up from Sanjiv Beltran MD , for evaluation of right hydronephrosis and an abnormal imaging study of the kidneys. The problem was discovered by CT scan approximately 1 week ago. The CT scan reveals right hydronephrosis and right hydroureter. The right ureter is dilated to the lower 1/3.   Additonal evaluation includes nothing to date. Her creatinine is normal.   The patient reports the following associated complaints: abdominal pain. She denies gross hematuria, flank pain, nausea, vomiting, previous kidney surgery, recurrent UTIs, fever, and chills. The patient has no history of stones. Prior  surgeries include excision of sabine-ureteral mass in 2018 which was metastatic colon cancer. The patient has a history of colon cancer and breast cancer with the most recent treatment in the year 2020. There is known recurrence.        Her most recent CT scan showed a recurrence of her right distal sabine-ureteral tumor consistent with recurrence of her colon cancer.  She has a small right mass either abutting or involving the distal right ureter.  She has hydroureteronephrosis above that level. She will have a ureteral stent placement.      PMH:  **********************************  4/3/18:  I reviewed her biopsy results with her.  They were inconconlusive.  There was not a visible tumor within the ureter that I could see.  I did take biopsies of the narrowed  area.      I attempted to remove the string from her stent and the stent was inadervently pulled.  I spoke to Dr. Ochoa.  He will attempt to do a percutaneous biopsy of her periurethral mass.      4/13/18:  I reviewed the pathology of her perc biopsy which revealed urothelial cells suspicious but not diagnostic for urothelial cancer.  I reviewed the path with Dr. Perez.  She is going to send it out for a second opinion.  The patient still has her indwelling stent in place.     5/9/18:  She had a cystogram this morning which was normal.  I removed a periureteral mass last week. That is what was causing her ureteral obstruction.  Unfortunately, the pathology revealed metastatic colon cancer.  She still has her stent in place.     8/1/18:  I removed her stent in the OR last week.  I also did a retrograde and while there was some narrowing her ureter did appear to drain well.  She has no new issues.  She started chemo yesterday for her met colon ca.           Past Medical History  Aftercare following right hip joint replacement surgery; Allergic rhinitis, chronic; Anemia, Unspecified; Anxiety; Arthritis; Breast cancer; Cancer; Cancer; Cataract; Colon cancer; Degenerative Disc Disease ; Depression; Diabetes; Diabetes mellitus type 2, noninsulin dependent; Diabetic Foot Exam Last Completed; Diabetic neuropathy; Hiatal hernia; High blood pressure; High cholesterol; Hydroureteronephrosis; Hyperlipidemia; Leg pain; Limb Swelling; Malignant neoplasm of colon, unspecified part of colon; Muscle cramps; Neuropathy; Primary osteoarthritis of right hip; Reflux; Screening for breast cancer; Screening for colon cancer; Shortness Of Air; Type 2 diabetes mellitus with stage 2 chronic kidney disease, without long-term current use of insulin, uncontrolled; Ureteral mass         Past Surgical History  *Other Surgery; Back surgery; Biopsy of pelvic mass with ultrasound guidance; Breast; Colon; Colonoscopy; Cornea transplant; Cystoscopy  "and ureteroscopy with stent placement; Cystoscopy with stent placement; Cystoscopy with ureteroscopy and biopsy; Cystoureteroscopy, with retrograde pyelogram and stent insertion or removal; Hemicolectomy, right; Hip replacement, right; Hysterectomy-Abdominal; Joint Surgery; Mastectomy, Left; Partial Hysterectomy; Port Placement; Stents in Renal Arteries         Medication List  Accu-Chek Yelena Plus test strp miscellaneous strip; Accu-Chek Cora miscellaneous misc; Accu-Chek SmartView Test Strip miscellaneous strip; BD Alcohol Swabs topical pads, medicated; blood-glucose meter miscellaneous kit; escitalopram oxalate 10 mg oral tablet; famotidine 20 mg oral tablet; gabapentin 300 mg oral capsule; lancets miscellaneous misc; lorazepam 1 mg oral tablet; metformin 500 mg oral tablet; pantoprazole 40 mg oral tablet,delayed release (DR/EC); pravastatin 80 mg oral tablet         Allergy List  NO KNOWN DRUG ALLERGIES         Family Medical History  Stroke; Heart Disease; Cancer, Unspecified; Diabetes, unspecified type; Heart Attack (MI); Family history of breast cancer; Family history of certain chronic disabling diseases; arthritis; Family history of Arthritis; Family history of cancer; Family history of stroke; Family history of heart disease; Family history of diabetes mellitus         Social History  Alcohol (Never); Caffeine (Current - status unknown); Homemaker; lives with spouse; ; Recreational Drug Use (Never); Second hand smoke exposure (Never); Tobacco (Never)         Immunizations  Name Date Admin   Influenza 09/01/2018         Review of Systems  · Constitutional  o Denies  o : fatigue, fever, chills, night sweats      Vitals  Date Time BP Position Site L\R Cuff Size HR RR TEMP (F) WT  HT  BMI kg/m2 BSA m2 O2 Sat FR L/min FiO2 HC       01/25/2021 11:43 /64 Sitting    87 - R   184lbs 4oz 5'  5\" 30.66 1.96 99 %            Physical Examination  · Constitutional  o Appearance  o : Well nourished, well " developed patient in no acute distress. Ambulating without difficulty.  · Respiratory  o Respiratory Effort  o : Breathing is unlabored without accessory muscle use  · Skin and Subcutaneous Tissue  o General Inspection  o : No rashes, lesions or areas of discoloration present. Skin turgor is normal.          Results  · In-Office Procedures  o Lab procedure  § Automated dipstick urinalysis with microscopy (83865)   § Color Ur: Yellow   § Clarity Ur: Clear   § Glucose Ur Ql Strip: Negative   § Bilirub Ur Ql Strip: Negative   § Ketones Ur Ql Strip: Negative   § Sp Gr Ur Qn: 1.015   § Hgb Ur Ql Strip: Trace-intact   § pH Ur-LsCnc: 5.0   § Prot Ur Ql Strip: Trace   § Urobilinogen Ur Strip-mCnc: 0.2   § Nitrite Ur Ql Strip: Positive   § WBC Est Ur Ql Strip: Trace   § RBC UrnS Qn HPF: 5   § WBC UrnS Qn HPF: 0   § Bacteria UrnS Qn HPF: 0   § Crystals UrnS Qn HPF: 0   § Epithelial Cells (non renal): 0 /HPF  § Epithelial Cells (renal): 0       Assessment  · Breast cancer     174.9/C50.919  · Colon cancer     153.9/C18.9  · Ureteral mass     593.9/N28.9  · Hydronephrosis, unspecified hydronephrosis type     591/N13.30  · Hydroureter     593.5/N13.4  · Malignant neoplasm of colon, unspecified part of colon     153.9/C18.9  · Breast cancer     174.9/C50.919    Problems Reconciled  Plan  · Medications  o Medications have been Reconciled  o Transition of Care or Provider Policy  · Instructions  o Schedule cystoscopy and stent placement.   o Provided education materials related to upcoming procedure.            Electronically Signed by: Kelli Chi MD -Author on January 25, 2021 12:51:26 PM

## 2021-05-14 NOTE — PROGRESS NOTES
Progress Note      Patient Name: Lorraine Dominguez   Patient ID: [id number]   Sex: Female   YOB: 1950    Primary Care Provider: Shayna SMITH   Referring Provider: Nicolás Bustillos MD    Visit Date: January 25, 2021    Provider: Kelli Chi MD   [place] [facility] [place] [facility]   Location Address: [address] [place] [address]  [id number]   Location Phone: [phone]          Chief Complaint  · Outpatient History & Physical / Surgical Orders      History Of Present Illness  [facility]  Outpatient History and Physical Surgical Orders  Preadmission Location: Phone Preadmission Time: 12:30 PM   Which Facility: [facility] Surgery Date: 01/28/2021 Preadmission Testing Date: 01/26/2021   Patient's Name: Lorraine Dominguez YOB: 1950   Chief complaint/history present illness: Right Hydronephrosis, Metastatic Colon Cancer   Current Medication List: Accu-Chek Yelena Plus test strp miscellaneous strip, Accu-Chek Cora miscellaneous misc, Accu-Chek SmartView Test Strip miscellaneous strip, BD Alcohol Swabs topical pads, medicated, blood-glucose meter miscellaneous kit, escitalopram oxalate 10 mg oral tablet, famotidine 20 mg oral tablet, gabapentin 300 mg oral capsule, lancets miscellaneous misc, lorazepam 1 mg oral tablet, metformin 500 mg oral tablet, pantoprazole 40 mg oral tablet,delayed release (DR/EC), and pravastatin 80 mg oral tablet   Allergies: NO KNOWN DRUG ALLERGIES   Significant past medical: Aftercare following right hip joint replacement surgery, Allergic rhinitis, chronic, Anemia, Unspecified, Anxiety, Arthritis, Breast cancer, Cancer, Cancer, Cataract, Colon cancer, Degenerative Disc Disease , Depression, Diabetes, Diabetes mellitus type 2, noninsulin dependent, Diabetic Foot Exam Last Completed, Diabetic neuropathy, Hiatal hernia, High blood pressure, High cholesterol, Hydroureteronephrosis, Hyperlipidemia, Leg pain, Limb Swelling,  "Malignant neoplasm of colon, unspecified part of colon, Muscle cramps, Neuropathy, Primary osteoarthritis of right hip, Reflux, Screening for breast cancer, Screening for colon cancer, Shortness Of Air, Type 2 diabetes mellitus with stage 2 chronic kidney disease, without long-term current use of insulin, uncontrolled, and Ureteral mass   Past Surgical History: *Other Surgery, Back surgery, Biopsy of pelvic mass with ultrasound guidance, Breast, Colon, Colonoscopy, Cornea transplant, Cystoscopy and ureteroscopy with stent placement, Cystoscopy with stent placement, Cystoscopy with ureteroscopy and biopsy, Cystoureteroscopy, with retrograde pyelogram and stent insertion or removal, Hemicolectomy, right, Hip replacement, right, Hysterectomy-Abdominal, Joint Surgery, Mastectomy, Left, Partial Hysterectomy, Port Placement, and Stents in Renal Arteries   Examination of heart and lungs: Regular rate, rhythm, no murmur, gallop, rub, Breath sounds normal, no distress, and Abdomen soft, non-tender, BSx4 are positive         Vitals  Date Time BP Position Site L\R Cuff Size HR RR TEMP (F) WT  HT  BMI kg/m2 BSA m2 O2 Sat FR L/min FiO2 HC       01/25/2021 11:43 /64 Sitting    87 - R   184lbs 4oz 5'  5\" 30.66 1.96 99 %                Assessment  · Encounter for preoperative examination for general surgical procedure     V72.84/Z01.818  · Hydronephrosis     591/N13.30  · Colon cancer metastasized to multiple sites     153.9/C18.9    Problems Reconciled  Plan  · Orders  o General Urology Surgery Order ([facility]) - V72.84/Z01.818, 591/N13.30, 153.9/C18.9 - 01/28/2021  o [facility] [place] Screening (03397) - V72.84/Z01.818, 591/N13.30, 153.9/C18.9 - 01/25/2021  · Medications  o Medications have been Reconciled  o Transition of Care or Provider Policy  · Instructions  o *****Surgical Orders******  o Pre-Operative Orders: Sign permit for Cystoscopy, Insertion of Right Ureteral Stent  o ****Patient Status****  o Outpatient "   o ********************  o General Sedation  o IV Fluids: LR @ 100 cc/hour  o IV Antibiotics (on call to OR):  o Levaquin 500 mg IV OCTOR.  o RISK AND BENEFITS:  o Possible risks/complications, benefits and alternatives to surgical or invasive procedure have been explained to patient and/or legal guardian.  o Electronically Identified Patient Education Materials Provided Electronically            Electronically Signed by: Sandhya Angela, -Author on January 25, 2021 12:54:17 PM  Electronically Co-signed by: Kelli Chi MD -Reviewer on January 25, 2021 05:32:05 PM

## 2021-05-14 NOTE — PROGRESS NOTES
Progress Note      Patient Name: Lorraine Dominguez   Patient ID: [id number]   Sex: Female   YOB: 1950    Primary Care Provider: Shayna SMITH   Referring Provider: Nicolás Bustillos MD    Visit Date: February 3, 2021    Provider: Kelli Chi MD   [place] [facility] [place] [facility]   Location Address: [address] [place] [address]  [id number]   Location Phone: [phone]          Chief Complaint  · urological issues      History Of Present Illness  The patient is a 70 year old /White female , who is a follow up from Sanjiv Beltran MD , for evaluation of right hydronephrosis and an abnormal imaging study of the kidneys. The problem was discovered by CT scan approximately 1 week ago. The CT scan reveals right hydronephrosis and right hydroureter. The right ureter is dilated to the lower 1/3.   Additonal evaluation includes nothing to date. Her creatinine is normal.   The patient reports the following associated complaints: abdominal pain. She denies gross hematuria, flank pain, nausea, vomiting, previous kidney surgery, recurrent UTIs, fever, and chills. The patient has no history of stones. Prior  surgeries include excision of sabine-ureteral mass in 2018 which was metastatic colon cancer. The patient has a history of colon cancer and breast cancer with the most recent treatment in the year 2020. There is known recurrence.        Her most recent CT scan showed a recurrence of her right distal sabine-ureteral tumor consistent with recurrence of her colon cancer.  She has a small right mass either abutting or involving the distal right ureter.  She has hydroureteronephrosis above that level.  I placed a right ureteral stent last week.  She is not having any other issues.      PMH:  **********************************  4/3/18:  I reviewed her biopsy results with her.  They were inconconlusive.  There was not a visible tumor within the ureter that I could see.  I did take  biopsies of the narrowed area.      I attempted to remove the string from her stent and the stent was inadervently pulled.  I spoke to Dr. Ochoa.  He will attempt to do a percutaneous biopsy of her periurethral mass.      4/13/18:  I reviewed the pathology of her perc biopsy which revealed urothelial cells suspicious but not diagnostic for urothelial cancer.  I reviewed the path with Dr. Perez.  She is going to send it out for a second opinion.  The patient still has her indwelling stent in place.     5/9/18:  She had a cystogram this morning which was normal.  I removed a periureteral mass last week. That is what was causing her ureteral obstruction.  Unfortunately, the pathology revealed metastatic colon cancer.  She still has her stent in place.     8/1/18:  I removed her stent in the OR last week.  I also did a retrograde and while there was some narrowing her ureter did appear to drain well.  She has no new issues.  She started chemo yesterday for her met colon ca.           TELEHEALTH TELEPHONE VISIT  Lorraine Dominguez is a 70 year old /White female who is presenting for evaluation via telehealth telephone visit. Verbal consent obtained before beginning visit.   Provider spent 8 minutes with the patient during the telehealth visit.   The following staff were present during this visit: Louis Sam       Past Medical History  Aftercare following right hip joint replacement surgery; Allergic rhinitis, chronic; Anemia, Unspecified; Anxiety; Arthritis; Breast cancer; Cancer; Cancer; Cataract; Colon cancer; Degenerative Disc Disease ; Depression; Diabetes; Diabetes mellitus type 2, noninsulin dependent; Diabetic Foot Exam Last Completed; Diabetic neuropathy; Hiatal hernia; High blood pressure; High cholesterol; Hydroureteronephrosis; Hyperlipidemia; Leg pain; Limb Swelling; Malignant neoplasm of colon, unspecified part of colon; Muscle cramps; Neuropathy; Primary osteoarthritis of right hip; Reflux;  Screening for breast cancer; Screening for colon cancer; Shortness Of Air; Type 2 diabetes mellitus with stage 2 chronic kidney disease, without long-term current use of insulin, uncontrolled; Ureteral mass         Past Surgical History  *Other Surgery; Back surgery; Biopsy of pelvic mass with ultrasound guidance; Breast; Colon; Colonoscopy; Cornea transplant; Cystoscopy and ureteroscopy with stent placement; Cystoscopy with stent placement; Cystoscopy with ureteroscopy and biopsy; Cystoureteroscopy, with retrograde pyelogram and stent insertion or removal; Hemicolectomy, right; Hip replacement, right; Hysterectomy-Abdominal; Joint Surgery; Mastectomy, Left; Partial Hysterectomy; Port Placement; Stents in Renal Arteries         Medication List  Accu-Chek Yelena Plus test strp miscellaneous strip; Accu-Chek Cora miscellaneous misc; Accu-Chek SmartView Test Strip miscellaneous strip; BD Alcohol Swabs topical pads, medicated; blood-glucose meter miscellaneous kit; escitalopram oxalate 10 mg oral tablet; famotidine 20 mg oral tablet; gabapentin 300 mg oral capsule; hydrocodone-acetaminophen 5-325 mg oral tablet; lancets miscellaneous misc; lorazepam 1 mg oral tablet; metformin 500 mg oral tablet; pantoprazole 40 mg oral tablet,delayed release (DR/EC); pravastatin 80 mg oral tablet         Allergy List  NO KNOWN DRUG ALLERGIES       Allergies Reconciled  Family Medical History  Stroke; Heart Disease; Cancer, Unspecified; Diabetes, unspecified type; Heart Attack (MI); Family history of breast cancer; Family history of certain chronic disabling diseases; arthritis; Family history of Arthritis; Family history of cancer; Family history of stroke; Family history of heart disease; Family history of diabetes mellitus         Social History  Alcohol (Never); Caffeine (Current - status unknown); Homemaker; lives with spouse; ; Recreational Drug Use (Never); Second hand smoke exposure (Never); Tobacco (Never)          Immunizations  Name Date Admin   Influenza 09/01/2018         Review of Systems  · Constitutional  o Denies  o : fatigue, night sweats  · Eyes  o Denies  o : double vision, blurred vision  · HENT  o Denies  o : vertigo, recent head injury  · Breasts  o Denies  o : abnormal changes in breast size, additional breast symptoms except as noted in the HPI  · Cardiovascular  o Denies  o : chest pain, irregular heart beats  · Respiratory  o Denies  o : shortness of breath, productive cough  · Gastrointestinal  o Denies  o : nausea, vomiting  · Genitourinary  o Admits  o : dysuria  o Denies  o : urinary retention  · Integument  o Denies  o : hair growth change, new skin lesions  · Neurologic  o Denies  o : altered mental status, seizures  · Musculoskeletal  o Denies  o : joint swelling, limitation of motion  · Endocrine  o Denies  o : cold intolerance, heat intolerance  · Heme-Lymph  o Denies  o : petechiae, lymph node enlargement or tenderness  · Allergic-Immunologic  o Denies  o : frequent illnesses              Assessment  · Breast cancer     174.9/C50.919  · Colon cancer     153.9/C18.9  · Ureteral mass     593.9/N28.9  · Hydronephrosis, unspecified hydronephrosis type     591/N13.30  · Hydroureter     593.5/N13.4  · Malignant neoplasm of colon, unspecified part of colon     153.9/C18.9  · Breast cancer     174.9/C50.919      Plan  · Orders  o Physican Telephone evaluation, 5-10 min (49171) - 591/N13.30, 593.5/N13.4, 153.9/C18.9 - 02/03/2021  · Medications  o Medications have been Reconciled  o Transition of Care or Provider Policy  · Instructions  o Follow up in a few months for follow up on her hydronephrosis. She likely will have to keep the stent indefinetly with changes every 6 months. She is seeing Dr. Nelson in [place] for possible treatment.   o FOLLOW-UP:  o In 3 months  o Electronically Identified Patient Education Materials Provided Electronically            Electronically Signed by: Kelli Chi  MD -Author on February 3, 2021 01:36:17 PM

## 2021-05-14 NOTE — PROGRESS NOTES
Progress Note      Patient Name: Lorraine Dominguez   Patient ID: [id number]   Sex: Female   YOB: 1950    Primary Care Provider: Shayna SMITH    Visit Date: April 29, 2021    Provider: Alli Carballo MD   Location: [facility]   Location Address: [address] [place] [address]  [id number]   Location Phone: [phone]          Chief Complaint  · Follow Up Surgery      History Of Present Illness  Lorraine Dominguez is a 71 year old /White female who presents today for a postoperative visit. She follows-up status post exploratory laparotomy and small bowel resection for what initially appeared to be a small bowel obstruction. The patient reports she has done well at home. She is eating and drinking normally. She is having regular bowel movements. She is having more frequent bowel movements than she was prior to the surgery.       Past Medical History  Aftercare following right hip joint replacement surgery; Allergic rhinitis, chronic; Anemia, Unspecified; Anxiety; Arthritis; Breast cancer; Cancer; Cancer; Cataract; Colon cancer; Degenerative Disc Disease ; Depression; Diabetes; Diabetes mellitus type 2, noninsulin dependent; Diabetic Foot Exam Last Completed; Diabetic neuropathy; Hiatal hernia; High blood pressure; High cholesterol; Hydroureteronephrosis; Hyperlipidemia; Leg pain; Limb Swelling; Malignant neoplasm of colon, unspecified part of colon; Muscle cramps; Neuropathy; Primary osteoarthritis of right hip; Reflux; Screening for breast cancer; Screening for colon cancer; Shortness Of Air; Type 2 diabetes mellitus with stage 2 chronic kidney disease, without long-term current use of insulin, uncontrolled; Ureteral mass         Past Surgical History  *Other Surgery; Back surgery; Biopsy of pelvic mass with ultrasound guidance; Breast; Colon; Colonoscopy; Cornea transplant; Cystoscopy and ureteroscopy with stent placement; Cystoscopy with stent placement;  Cystoscopy with ureteroscopy and biopsy; Cystoureteroscopy, with retrograde pyelogram and stent insertion or removal; Hemicolectomy, right; Hip replacement, right; Hysterectomy-Abdominal; Joint Surgery; Mastectomy, Left; Partial Hysterectomy; Port Placement; Stents in Renal Arteries         Medication List  Accu-Chek Yelena Plus test strp miscellaneous strip; Accu-Chek Cora miscellaneous misc; Accu-Chek SmartView Test Strip miscellaneous strip; BD Alcohol Swabs topical pads, medicated; blood-glucose meter miscellaneous kit; escitalopram oxalate 10 mg oral tablet; famotidine 20 mg oral tablet; gabapentin 300 mg oral capsule; hydrocodone-acetaminophen 5-325 mg oral tablet; lancets miscellaneous misc; lorazepam 1 mg oral tablet; metformin 500 mg oral tablet; metoprolol tartrate 25 mg oral tablet; pantoprazole 40 mg oral tablet,delayed release (DR/EC); pravastatin 80 mg oral tablet         Allergy List  NO KNOWN DRUG ALLERGIES         Family Medical History  Stroke; Heart Disease; Cancer, Unspecified; Diabetes, unspecified type; Heart Attack (MI); Family history of breast cancer; Family history of certain chronic disabling diseases; arthritis; Family history of Arthritis; Family history of cancer; Family history of stroke; Family history of heart disease; Family history of diabetes mellitus         Social History  Alcohol (Never); Caffeine (Current - status unknown); Homemaker; lives with spouse; ; Recreational Drug Use (Never); Second hand smoke exposure (Never); Tobacco (Never)         Immunizations  Name Date Admin   Influenza 09/01/2018         Review of Systems  · Cardiovascular  o Denies  o : chest pain on exertion, shortness of breath, lower extremity swelling  · Respiratory  o Denies  o : shortness of breath, coughing up blood  · Gastrointestinal  o Denies  o : chronic abdominal pain      Vitals  Date Time BP Position Site L\R Cuff Size HR RR TEMP (F) WT  HT  BMI kg/m2 BSA m2 O2 Sat FR L/min FiO2 HC      "  04/29/2021 10:54 AM         171lbs 0oz 5'  5\" 28.46 1.89             Physical Examination  · Constitutional  o Appearance  o : well developed, well-nourished, alert and in no acute distress  · Head and Face  o Head  o :   § Inspection  § : no deformities or lesions  · Eyes  o Conjunctivae  o : clear  o Sclerae  o : nonicteric  · Neck  o Inspection/Palpation  o : normal appearance, no masses or tenderness, trachea midline  · Respiratory  o Respiratory Effort  o : breathing unlabored  o Inspection of Chest  o : normal appearance, no retractions  · Cardiovascular  o Heart  o : regular rate and rhythm  · Gastrointestinal  o Abdominal Examination  o :   § Abdomen  § : abdomen is soft. Her midline incision appears a little red. She is having some drainage from the lower portion of the wound and the staples have come out of the lower portion of the wound. She has a JESSE in place with some serous drainage. She reports no fevers, chills, or anything at home.   · Lymphatic  o Neck  o : no lymphadenopathy present  o Axilla  o : no lymphadenopathy present  o Groin  o : no lymphadenopathy present  · Skin and Subcutaneous Tissue  o General Inspection  o : no rashes present, no lesions present, no areas of discoloration  · Neurologic  o Cranial Nerves  o : grossly intact  o Sensation  o : grossly intact  o Gait and Station  o :   § Gait Screening  § : normal gait, able to stand without diffculty  o Cerebellar Function  o : no obvious abnormalities  · Psychiatric  o Judgement and Insight  o : judgment and insight intact  o Mood and Affect  o : mood normal, affect appropriate          Assessment  · Encounter for examination following surgery     V67.00/Z09       Patient status post exploratory laparotomy and small bowel resection for a small bowel obstruction. Unfortunately the small bowel specimen came back with an adenocarcinoma. The patient does have a history of colon cancer. In discussion with the pathologist, the pathologist " feels that this is a new small bowel cancer, independent of her previous colon cancer. She had negative lymph nodes from the specimen that I submitted.       Plan  · Medications  o Medications have been Reconciled  o Transition of Care or Provider Policy  · Instructions  o Electronically Identified Patient Education Materials Provided Electronically     I removed the staples and drain today. I did open the wound a little bit to allow some drainage, as she had some erythema. It was just a seroma. It did not appear to have gross infection at this time. I would like to see her in one week for reevaluation of the wound.     While the patient was in the hospital, she developed afib so she will need evaluation by cardiology. Additionally, I will need to get her back to see Dr. Schroeder, given this new diagnosis of small bowel cancer. The hospitalist felt like she should see endocrinology as well while she was in the hospital. That is not a priority but certainly will need to be evaluated. I will follow-up with her again next week and we will arrange evaluation with her other consultants. I discussed all of this with the patient. All questions were answered.  She voiced understanding and agreed to proceed with the above plan.             Electronically Signed by: Bailey Lott-, -Author on April 30, 2021 11:16:20 AM  Electronically Co-signed by: Alli Carballo MD -Reviewer on May 3, 2021 02:36:41 PM

## 2021-05-15 VITALS
RESPIRATION RATE: 18 BRPM | TEMPERATURE: 98.4 F | HEIGHT: 65 IN | HEART RATE: 84 BPM | SYSTOLIC BLOOD PRESSURE: 118 MMHG | OXYGEN SATURATION: 96 % | WEIGHT: 173 LBS | DIASTOLIC BLOOD PRESSURE: 64 MMHG | BODY MASS INDEX: 28.82 KG/M2

## 2021-05-15 VITALS
BODY MASS INDEX: 29.04 KG/M2 | OXYGEN SATURATION: 99 % | HEIGHT: 65 IN | SYSTOLIC BLOOD PRESSURE: 118 MMHG | HEART RATE: 74 BPM | WEIGHT: 174.31 LBS | DIASTOLIC BLOOD PRESSURE: 67 MMHG

## 2021-05-15 VITALS
HEART RATE: 87 BPM | TEMPERATURE: 98 F | BODY MASS INDEX: 27.49 KG/M2 | SYSTOLIC BLOOD PRESSURE: 118 MMHG | HEIGHT: 65 IN | OXYGEN SATURATION: 92 % | DIASTOLIC BLOOD PRESSURE: 60 MMHG | WEIGHT: 165 LBS | RESPIRATION RATE: 18 BRPM

## 2021-05-15 VITALS — HEIGHT: 65 IN | WEIGHT: 171.25 LBS | BODY MASS INDEX: 28.53 KG/M2 | RESPIRATION RATE: 16 BRPM

## 2021-05-15 VITALS — HEIGHT: 65 IN | RESPIRATION RATE: 15 BRPM | BODY MASS INDEX: 29.99 KG/M2 | WEIGHT: 180 LBS

## 2021-05-16 VITALS
HEART RATE: 90 BPM | HEIGHT: 65 IN | SYSTOLIC BLOOD PRESSURE: 100 MMHG | WEIGHT: 159 LBS | BODY MASS INDEX: 26.49 KG/M2 | DIASTOLIC BLOOD PRESSURE: 56 MMHG | OXYGEN SATURATION: 98 % | RESPIRATION RATE: 18 BRPM | TEMPERATURE: 97.1 F

## 2021-05-16 VITALS — OXYGEN SATURATION: 98 % | BODY MASS INDEX: 28.93 KG/M2 | HEIGHT: 66 IN | HEART RATE: 82 BPM | WEIGHT: 180 LBS

## 2021-05-16 VITALS
HEART RATE: 99 BPM | TEMPERATURE: 97.1 F | OXYGEN SATURATION: 95 % | SYSTOLIC BLOOD PRESSURE: 118 MMHG | BODY MASS INDEX: 29.49 KG/M2 | WEIGHT: 177 LBS | DIASTOLIC BLOOD PRESSURE: 60 MMHG | RESPIRATION RATE: 18 BRPM | HEIGHT: 65 IN

## 2021-05-16 VITALS
DIASTOLIC BLOOD PRESSURE: 60 MMHG | SYSTOLIC BLOOD PRESSURE: 120 MMHG | WEIGHT: 177 LBS | TEMPERATURE: 97.8 F | RESPIRATION RATE: 18 BRPM | BODY MASS INDEX: 28.45 KG/M2 | HEIGHT: 66 IN | HEART RATE: 92 BPM | OXYGEN SATURATION: 95 %

## 2021-05-16 VITALS — BODY MASS INDEX: 28.12 KG/M2 | WEIGHT: 175 LBS | HEIGHT: 66 IN | OXYGEN SATURATION: 98 % | HEART RATE: 98 BPM

## 2021-05-16 VITALS
DIASTOLIC BLOOD PRESSURE: 60 MMHG | SYSTOLIC BLOOD PRESSURE: 106 MMHG | OXYGEN SATURATION: 96 % | WEIGHT: 158 LBS | HEIGHT: 66 IN | HEART RATE: 93 BPM | RESPIRATION RATE: 18 BRPM | TEMPERATURE: 98.8 F | BODY MASS INDEX: 25.39 KG/M2

## 2021-05-16 VITALS — OXYGEN SATURATION: 96 % | BODY MASS INDEX: 28.12 KG/M2 | WEIGHT: 175 LBS | HEIGHT: 66 IN | HEART RATE: 85 BPM

## 2021-05-16 VITALS — HEART RATE: 89 BPM | OXYGEN SATURATION: 95 % | HEIGHT: 66 IN | WEIGHT: 175 LBS | BODY MASS INDEX: 28.12 KG/M2

## 2021-05-16 VITALS — BODY MASS INDEX: 29.88 KG/M2 | HEIGHT: 65 IN | HEART RATE: 80 BPM | OXYGEN SATURATION: 98 % | WEIGHT: 179.37 LBS

## 2021-05-16 VITALS
BODY MASS INDEX: 29.25 KG/M2 | HEIGHT: 66 IN | WEIGHT: 182 LBS | DIASTOLIC BLOOD PRESSURE: 62 MMHG | SYSTOLIC BLOOD PRESSURE: 108 MMHG

## 2021-05-16 VITALS
HEIGHT: 65 IN | DIASTOLIC BLOOD PRESSURE: 78 MMHG | BODY MASS INDEX: 28.66 KG/M2 | WEIGHT: 172 LBS | SYSTOLIC BLOOD PRESSURE: 130 MMHG

## 2021-05-16 VITALS — HEIGHT: 65 IN | BODY MASS INDEX: 29.37 KG/M2 | WEIGHT: 176.25 LBS | RESPIRATION RATE: 16 BRPM

## 2021-05-16 VITALS
BODY MASS INDEX: 28.81 KG/M2 | HEIGHT: 66 IN | WEIGHT: 179.25 LBS | SYSTOLIC BLOOD PRESSURE: 122 MMHG | DIASTOLIC BLOOD PRESSURE: 72 MMHG

## 2021-05-19 ENCOUNTER — CONVERSION ENCOUNTER (OUTPATIENT)
Dept: SURGERY | Facility: CLINIC | Age: 71
End: 2021-05-19

## 2021-05-19 ENCOUNTER — OFFICE VISIT CONVERTED (OUTPATIENT)
Dept: SURGERY | Facility: CLINIC | Age: 71
End: 2021-05-19
Attending: SURGERY

## 2021-05-28 ENCOUNTER — OFFICE VISIT CONVERTED (OUTPATIENT)
Dept: ONCOLOGY | Facility: HOSPITAL | Age: 71
End: 2021-05-28
Attending: INTERNAL MEDICINE

## 2021-05-28 ENCOUNTER — HOSPITAL ENCOUNTER (OUTPATIENT)
Dept: OTHER | Facility: HOSPITAL | Age: 71
Discharge: HOME | End: 2021-05-28
Attending: INTERNAL MEDICINE

## 2021-05-28 VITALS
HEART RATE: 92 BPM | DIASTOLIC BLOOD PRESSURE: 68 MMHG | BODY MASS INDEX: 30.08 KG/M2 | WEIGHT: 180.56 LBS | HEIGHT: 65 IN | SYSTOLIC BLOOD PRESSURE: 127 MMHG | SYSTOLIC BLOOD PRESSURE: 126 MMHG | RESPIRATION RATE: 18 BRPM | OXYGEN SATURATION: 98 % | OXYGEN SATURATION: 98 % | WEIGHT: 180.78 LBS | TEMPERATURE: 97.2 F | HEIGHT: 65 IN | TEMPERATURE: 98.6 F | HEART RATE: 98 BPM | DIASTOLIC BLOOD PRESSURE: 61 MMHG | RESPIRATION RATE: 20 BRPM | TEMPERATURE: 98 F | DIASTOLIC BLOOD PRESSURE: 63 MMHG | BODY MASS INDEX: 30.41 KG/M2 | WEIGHT: 182.54 LBS | HEART RATE: 81 BPM | SYSTOLIC BLOOD PRESSURE: 113 MMHG | OXYGEN SATURATION: 98 % | BODY MASS INDEX: 30.08 KG/M2

## 2021-05-28 VITALS
DIASTOLIC BLOOD PRESSURE: 71 MMHG | OXYGEN SATURATION: 97 % | BODY MASS INDEX: 30.71 KG/M2 | DIASTOLIC BLOOD PRESSURE: 59 MMHG | TEMPERATURE: 99.2 F | OXYGEN SATURATION: 98 % | OXYGEN SATURATION: 98 % | DIASTOLIC BLOOD PRESSURE: 68 MMHG | HEART RATE: 78 BPM | TEMPERATURE: 98.6 F | SYSTOLIC BLOOD PRESSURE: 112 MMHG | TEMPERATURE: 98.8 F | HEART RATE: 117 BPM | SYSTOLIC BLOOD PRESSURE: 148 MMHG | WEIGHT: 184.53 LBS | BODY MASS INDEX: 31 KG/M2 | SYSTOLIC BLOOD PRESSURE: 120 MMHG | BODY MASS INDEX: 31.15 KG/M2 | RESPIRATION RATE: 22 BRPM | DIASTOLIC BLOOD PRESSURE: 57 MMHG | HEART RATE: 94 BPM | WEIGHT: 180.34 LBS | SYSTOLIC BLOOD PRESSURE: 105 MMHG | BODY MASS INDEX: 29.28 KG/M2 | TEMPERATURE: 98.2 F | WEIGHT: 186.29 LBS | TEMPERATURE: 99.7 F | RESPIRATION RATE: 22 BRPM | SYSTOLIC BLOOD PRESSURE: 140 MMHG | OXYGEN SATURATION: 99 % | BODY MASS INDEX: 29.64 KG/M2 | OXYGEN SATURATION: 98 % | SYSTOLIC BLOOD PRESSURE: 151 MMHG | OXYGEN SATURATION: 98 % | DIASTOLIC BLOOD PRESSURE: 67 MMHG | HEART RATE: 120 BPM | RESPIRATION RATE: 18 BRPM | TEMPERATURE: 97.7 F | RESPIRATION RATE: 18 BRPM | WEIGHT: 187.17 LBS | WEIGHT: 178.13 LBS | HEART RATE: 84 BPM | HEART RATE: 115 BPM | BODY MASS INDEX: 30.01 KG/M2 | WEIGHT: 175.93 LBS | RESPIRATION RATE: 18 BRPM | RESPIRATION RATE: 20 BRPM | HEART RATE: 109 BPM | DIASTOLIC BLOOD PRESSURE: 64 MMHG | TEMPERATURE: 97.2 F | OXYGEN SATURATION: 93 %

## 2021-05-28 VITALS
BODY MASS INDEX: 30.82 KG/M2 | DIASTOLIC BLOOD PRESSURE: 64 MMHG | HEART RATE: 87 BPM | RESPIRATION RATE: 18 BRPM | OXYGEN SATURATION: 99 % | WEIGHT: 185.19 LBS | SYSTOLIC BLOOD PRESSURE: 125 MMHG | TEMPERATURE: 98.2 F

## 2021-05-28 VITALS
TEMPERATURE: 97 F | WEIGHT: 183.2 LBS | HEART RATE: 80 BPM | OXYGEN SATURATION: 98 % | RESPIRATION RATE: 18 BRPM | SYSTOLIC BLOOD PRESSURE: 108 MMHG | DIASTOLIC BLOOD PRESSURE: 61 MMHG | HEIGHT: 65 IN | BODY MASS INDEX: 30.52 KG/M2

## 2021-05-28 LAB
ALBUMIN SERPL-MCNC: 3.4 G/DL (ref 3.5–5)
ALBUMIN/GLOB SERPL: 0.8 {RATIO} (ref 1.4–2.6)
ALP SERPL-CCNC: 213 U/L (ref 43–160)
ALT SERPL-CCNC: 19 U/L (ref 10–40)
ANION GAP SERPL CALC-SCNC: 13 MMOL/L (ref 8–19)
AST SERPL-CCNC: 40 U/L (ref 15–50)
BASOPHILS # BLD AUTO: 0.06 10*3/UL (ref 0–0.2)
BASOPHILS NFR BLD AUTO: 0.5 % (ref 0–3)
BILIRUB SERPL-MCNC: <0.15 MG/DL (ref 0.2–1.3)
BUN SERPL-MCNC: 26 MG/DL (ref 5–25)
BUN/CREAT SERPL: 23 {RATIO} (ref 6–20)
CALCIUM SERPL-MCNC: 9.3 MG/DL (ref 8.7–10.4)
CHLORIDE SERPL-SCNC: 94 MMOL/L (ref 99–111)
CONV ABS IMM GRAN: 0.42 10*3/UL (ref 0–0.54)
CONV CO2: 27 MMOL/L (ref 22–32)
CONV EOSINOPHILS PERCENT BY MANUAL COUNT: 0.6 % (ref 0–7)
CONV IMMATURE GRAN: 3.8 % (ref 0–0.4)
CONV TOTAL PROTEIN: 7.5 G/DL (ref 6.3–8.2)
CREAT UR-MCNC: 1.14 MG/DL (ref 0.5–0.9)
EOSINOPHIL # BLD MANUAL: 0.07 10*3/UL (ref 0–0.7)
ERYTHROCYTE [DISTWIDTH] IN BLOOD BY AUTOMATED COUNT: 18.3 % (ref 11.5–14.5)
ERYTHROCYTE [DISTWIDTH] IN BLOOD BY AUTOMATED COUNT: 62.2 FL
GFR SERPLBLD BASED ON 1.73 SQ M-ARVRAT: 48 ML/MIN/{1.73_M2}
GLOBULIN UR ELPH-MCNC: 4.1 G/DL (ref 2–3.5)
GLUCOSE SERPL-MCNC: 196 MG/DL (ref 65–99)
HBA1C MFR BLD: 8.2 G/DL (ref 12–16)
HCT VFR BLD AUTO: 26.9 % (ref 37–47)
LYMPHOCYTES # BLD AUTO: 3.8 10*3/UL (ref 1–5)
LYMPHOCYTES NFR BLD AUTO: 34.5 % (ref 20–45)
MCH RBC QN AUTO: 28.3 PG (ref 27–31)
MCHC RBC AUTO-ENTMCNC: 30.5 G/DL (ref 33–37)
MCV RBC AUTO: 92.8 FL (ref 81–99)
MONOCYTES # BLD AUTO: 0.56 10*3/UL (ref 0.2–1.2)
MONOCYTES NFR BLD MANUAL: 5.1 % (ref 3–10)
NEUTROPHILS # BLD AUTO: 6.09 10*3/UL (ref 2–8)
NEUTROPHILS NFR BLD MANUAL: 55.5 % (ref 30–85)
OSMOLALITY SERPL CALC.SUM OF ELEC: 280 MOSM/KG (ref 273–304)
PLATELET # BLD AUTO: 597 10*3/UL (ref 130–400)
PMV BLD AUTO: 8.9 FL (ref 7.4–10.4)
POTASSIUM SERPL-SCNC: 3.5 MMOL/L (ref 3.5–5.3)
RBC MORPH BLD: 2.9 10*6/UL (ref 4.2–5.4)
SODIUM SERPL-SCNC: 130 MMOL/L (ref 135–147)
WBC # BLD AUTO: 11 10*3/UL (ref 4.8–10.8)

## 2021-05-28 NOTE — PROGRESS NOTES
Patient: MARKO ESPAÑA     Acct: [id number]     Report: #[id number]  UNIT #: [id number]     : 1950    Encounter Date:2021  PRIMARY CARE: JOSE R MCGOWAN  ***Signed***  --------------------------------------------------------------------------------------------------------------------  NURSE INTAKE      Visit Type      Established Patient Visit            Chief Complaint      COLON CANCER            Referring Provider/Copies To      Primary Care Provider:  JOSE R MCGOWAN      Copies To:   JOSE R MCGOWAN            History and Present Illness      Past Oncology Illness History      1) Breast Cancer:      -RIGHT breast DCIS diagnosed 3/15/06; low-grade; s/p lumpectomy and adjuvant     XRT; ER: 100%, GA: 70%      -LEFT breast invasive lobular carcinoma; diagnosed 2012; staged pT3 pN0 M0     stage IIB, ER: 83%, GA: 0, HER2: 1+, Ki-67: 26%, treated with mastectomy and     SLND 2012, adjuvant DD AC and Taxotere, Adjuvant Arimidex then Femara     (19)            2) Colon Cancer:       -Diagnosed 5/7/15; Low grade; staged pT4a pN0 M0 stage IIB; 0/37 LN; no MMR     testing; RIGHT sided      -RIGHT hemicolectomy 5/7/15      -adjuvant Xeloda X 6 months      -Recurrent dx diagnosed via RIGHT ureter mass resection (18)      -FOLFIRI X 9 (-18)      -s/p XRT RIGHT pelvis total 5320 cGy (18-19)            *CT CAP 2021: Interim development of right-sided hydronephrosis and a soft     tissue mass along the right iliac chain measuring 2.9 x 1.2 cm.  There is a     second nodule identified below the aortic bifurcation in the midline measuring     1.4 cm.  This is consistent with disease recurrence.            3) BRIANNE:      -Ulcer in the small bowel found on capsule endoscopy      -clipped by Dr. Hopkins in [place] by balloon enteroscopy      -as sees Dr. Chasidy Pastor locally            4) right hydronephrosis:      -Noted on CT scan from 2021, secondary to soft  tissue mass      -Likely secondary to recurrent colon cancer, biopsy pending      -Cystoscopy and right ureteral stent (Dr. Chi) on 1/28/2021            HPI - Oncology Interim      Patient comes in today for symptom management.  She says she has had neck     stiffness since she received her Covid vaccine about 2 weeks ago.  She feels     like it has gotten worse over the past week.  She also has headaches and pain     when she bends over.  She also feels weak in general and is having more trouble     walking.  Her  came with her to clinic today.  She continues to have     significant abdominal pain and right-sided back pain.  She is taking oxycodone 5    mg 3 times a day.  On recent labs she was found to have a hemoglobin of 7.4 and     has persistent melena.  She recently got IV iron.            Clinical Trial Participant      No            ECOG Performance Status      1            Most Recent Lab Findings      Laboratory Tests      3/11/21 10:44            PAST, FAMILY   Past Medical History      Past Medical History:  Arthritis, Diabetes Type 2, High Cholesterol, Hyper    tension, Short of Air      Other PMH:        Iron deficiency anemia secondary GI bleed      Hematology/Oncology (F):  Anemia, Breast Cancer, Colorectal Cancer            Past Surgical History      Biopsy, Hysterectomy, Joint Replacement, VAD Placement            Family History      Family History:  Colorectal Cancer, Skin Cancer            Genetic testing negative            Social History      Lives independently:  Yes      Occupation:  housewife            Tobacco Use      Tobacco status:  Never smoker            Substance Use      Substance use:  Denies use            REVIEW OF SYSTEMS      General:  Admits: Fatigue;          Denies: Appetite Change, Fever, Night Sweats, Weight Gain, Weight Loss      Eye:  Denies Blurred Vision, Denies Corrective Lenses, Denies Diplopia, Denies     Vision Changes      ENT:  Denies Headache, Denies  Hearing Loss, Denies Hoarseness, Denies Sore     Throat      Cardiovascular:  Denies Chest Pain, Denies Palpitations      Respiratory:  Denies: Cough, Coughing Blood, Productive Cough, Shortness of Air,    Wheezing      Gastrointestinal:  Denies Bloody Stools, Denies Constipation, Denies Diarrhea,     Denies Nausea/Vomiting, Denies Problem Swallowing, Denies Unable to Control     Bowels      Other      Abdominal pain and back pain      Genitourinary:  Denies Blood in Urine; Admits Incontinence; Denies Painful     Urination      Musculoskeletal:  Denies Back Pain, Denies Muscle Pain, Denies Painful Joints      Other      Weakness and more difficulty walking      Integumentary:  Denies Itching, Denies Lesions, Denies Rash      Neurologic:  Denies Dizziness, Denies Numbness\Tingling, Denies Seizures      Other      Headaches and neck stiffness      Psychiatric:  Denies Anxiety, Denies Depression      Endocrine:  Denies Cold Intolerance, Denies Heat Intolerance      Hematologic/Lymphatic:  Denies Bruising, Denies Bleeding, Denies Enlarged Lymph     Nodes      Reproductive:  Denies: Menopause, Heavy Periods, Pregnant, Still Menstruating            VITAL SIGNS AND SCORES      Vitals      Weight 178 lbs 2.107 oz / 80.8 kg      Temperature 98.6 F / 37 C - Temporal      Pulse 84      Respirations 18      Blood Pressure 112/59 Sitting      Pulse Oximetry 98%, ROOM AIR            Pain Score      Experiencing any pain?:  Yes      Pain Scale Used:  Numerical      Pain Intensity:  3            Fatigue Score      Experiencing any fatigue?:  Yes      Fatigue (0-10 scale):  4            EXAM      General: Alert, cooperative, appears to be in significant pain      Eyes: Anicteric sclera, PERRLA      Respiratory: CTAB, normal respiratory effort      Abdomen: Normal active bowel sounds, significant tenderness in abdomen worse on     the right      Cardiovascular: RRR, no murmur, no lower extremity edema      Skin: Normal tone, no rash,  no lesions      Psychiatric: Appropriate affect, intact judgment      Neurologic: No focal sensory or motor deficits, + generalized weakness, no     dizziness      Musculoskeletal: normal muscle strength and tone but has significant difficulty     walking, has to hold onto her       Extremities: No clubbing, cyanosis, or deformities            PREVENTION      Hx Influenza Vaccination:  Yes      Date Influenza Vaccine Given:  Oct 5, 2020      Influenza Vaccine Declined:  No      2 or More Falls in Past Year?:  Yes      Fall Past Year with Injury?:  No      Hx Pneumococcal Vaccination:  Yes      Encouraged to follow-up with:  PCP regarding preventative exams.            ALLERGY/MEDS      Allergies      Coded Allergies:             NO KNOWN ALLERGIES (Unverified , 2/25/21)            Medications      Last Reconciled on 1/29/21 15:51 by RONI DUTTON      oxyCODONE IR (oxyCODONE IR) 5 Mg Tablet      5 MG PO Q3H PRN for PAIN, #90 TAB 0 Refills         Prov: RONI DUTTON         2/23/21       HYDROcodone-Acetaminophen 5-325 Mg (HYDROcodone-Acetaminophen 5-325 Mg) 1 Each     Tablet      1 TAB PO Q4H PRN for PAIN, #30 TAB         Prov: RONI DUTTON         1/29/21       Escitalopram Oxalate (Escitalopram Oxalate*) 20 Mg Tablet      20 MG PO HS, TAB         Reported         1/26/21       Escitalopram Oxalate (Escitalopram Oxalate*) 10 Mg Tablet      10 MG PO QAM, TAB         Reported         1/26/21       LORazepam (LORazepam) 2 Mg Tablet      2 MG PO HS, TAB         Reported         1/26/21       LORazepam (LORazepam) 1 Mg Tablet      1 MG PO QAM, TAB         Reported         1/26/21       Pravastatin Sodium (Pravastatin Sodium) 40 Mg Tablet      40 MG PO HS, #30 TAB 0 Refills         Reported         9/30/20       Gabapentin (Gabapentin) 400 Mg Capsule      400 MG PO TID, #90 CAP 0 Refills         Reported         9/30/20       Sucralfate (Carafate) 1 Gm Tab      1 GM PO QID, #120 TAB 0 Refills         Reported          9/30/20       metFORMIN ER (Glucophage XR) 500 Mg Tab.er.24h      500 MG PO BID, #60 TAB.ER 0 Refills         Reported         11/13/19       Budesonide/Formoterol Fumarate (Symbicort 160/4.5 Mcg) 10.2 Gm Inh      2 PUFF INH BID, #3 INH 1 Refill         Prov: Thang Blank         10/19/17       Pantoprazole (Protonix) 40 Mg Tablet      40 MG PO BID, #30 TAB 0 Refills         Reported         8/25/15      Medications Reviewed:  No Changes made to meds            IMPRESSION/PLAN      Diagnosis      Neck pain, acute - M54.2            Colon cancer - C18.9            Breast cancer - C50.919            Headache - R51.9            Notes      New Diagnostics      * Neck W/ Cont CT, SCHEDULED PROCEDURE         Dx: Neck pain, acute - M54.2      * CT Abd/Pelvis/Chest W/Contrast, SCHEDULED PROCEDURE         Dx: Colon cancer - C18.9      * CBC, Week         Dx: Colon cancer - C18.9      * Cea/Carcinoembryonic, Week         Dx: Colon cancer - C18.9      * Brain W/WO Cont MRI, As Soon As Possible         Dx: Colon cancer - C18.9            Plan      Colon cancer: Patient was initially diagnosed in 2015 with recurrence in 2018.      She completed surgical resection of both occurrences as well as chemotherapy and    radiation which completed in January 2019.  CT CAP for restaging unfortunately     showed recurrence in pelvic lymph nodes.  On 1/28/2021 she underwent cystoscopy     with right ureteral stent placement for obstructive hydronephrosis. I previously    referred her to Dr. Nelson at the [facility] who is planning     surgical resection. I will contact Dr. Nelson and try to clarify the plan     regarding surgery. She will f/u with me in 2 weeks.             Headaches and Neck Stiffness: I am concerned about metastatic spread of disease.    I will send her for Neck CT and MRI brain to better evaluate.             Small bowel ulcer: Patient reports she recently had an ulcer clipped by Dr. Granda    in  [place].  This ulcer was not able to be seen by Dr. Pastor who is her     local gastroenterologist.  I have also discussed this with Dr. Nelson.  She     continues to have melena.  We will check CBC, CMP, CEA and type and cross today.            Normocytic anemia: Secondary to ongoing GI bleeding.  We will recheck patient's     hemoglobin and type and cross today.  Unfortunately she has red cell antibodies     which makes crossmatching blood very difficult and timely.            Right Hydronephrosis: from external obstruction from new mass.  Cystoscopy and     right ureteral stent placement by Dr. Chi on 1/28/2021 as above.  Patient     reports persistent pain since her procedure.  She has f/u with Dr. Chi.              Abdominal pain: Secondary to underlying malignancy.  I will send a refill for 5     mg oxycodone as needed daily 3 to 4 hours. I encouraged the patient to take it     more frequently. When I see her in 2 weeks we will discuss adding a long acting     pain medication.             Breast cancer: Patient was diagnosed in 2006 and again in 2012.  Among other     treatment she had a left mastectomy.  Recent mammogram was negative on     12/29/2020.            Patient Education      Patient Education Provided:  Yes            Electronically signed by RONI DUTTON  05/07/2021 15:45       Disclaimer: Converted document may not contain table formatting or lab diagrams. Please see [employer] for the authenticated document.

## 2021-05-28 NOTE — PROGRESS NOTES
Patient: MARKO ESPAÑA     Acct: [id number]     Report: #[id number]  UNIT #: [id number]     : 1950    Encounter Date:2020  PRIMARY CARE: JOSE R MCGOWAN  ***Signed***  --------------------------------------------------------------------------------------------------------------------  NURSE INTAKE      Visit Type      Established Patient Visit            Chief Complaint      COLON CANCER IRON DEFICIENCY            Referring Provider/Copies To      Primary Care Provider:  JOSE R MCGOWAN      Copies To:   JOSE R MCGOWAN            History and Present Illness      Past Oncology Illness History      1) Breast Cancer:            -RIGHT breast DCIS diagnosed 3/15/06; low-grade; s/p lumpectomy and adjuvant     XRT; ER: 100%, RI: 70%      -LEFT breast invasive lobular carcinoma; diagnosed 2012; staged pT3 pN0 M0     stage IIB, ER: 83%, RI: 0, HER2: 1+, Ki-67: 26%            Treatment History:            1) s/p LEFT mastectomy and SLND 2012      2) s/p adjuvant DD AC and Taxotere      3) s/p adjuvant XRT chest wall      4) Adjuvant Arimidex      5) Arimidex changed to Femara      6) Ongoing Femara (19)      7) Plan to D/C Femara and surveillance (20)            2) Colon Cancer:             Diagnosed 5/7/15; Low grade; staged pT4a pN0 M0 stage IIB; 0/37 LN; no MMR testi    ng; RIGHT sided            Treatment History:            1) s/p RIGHT hemicolectomy 5/7/15      2) s/p adjuvant Xeloda X 6 months      3) Recurrent dx diagnosed via RIGHT ureter mass resection (18)      4) s/p FOLFIRI X 9 (-18)      5) s/p XRT RIGHT pelvis total 5320 cGy (18-19)      6) Restaging MRI (-); resumption of Femara (10/3/19)      7) Ongoing surveillance (19)      8) Plan for restaging studies (20)            9) Restaging CT CAP and Brain MRI: [facility] (7/15/20)            10) C/O Melena; referral to Surgery for EGD/[place]; Femara d/c (20)      11) EGD/[place] (-) (20)       12) IV iron given (8/10 and 8/17/20)            13) Improvement in cbc with IV iron; plan for surveillance and f/u with GI     (9/22/20)            HPI - Oncology Interim      Ms. Dominguez comes in for f/u regarding several chronic issues:            1) Breast Cancer: Ms. Dominguez comes in for follow-up.  In the interim, she was     seen by gastroenterology and underwent a capsule study.  She is unaware of the     results.  Unfortunately, she continues to have difficulty with melena.  She     describes it as black tarry bowel movements.  It is of mild to moderate     severity.  No other modifying factors or associated signs or symptoms.  She     reports that she is not taking oral iron at this time.            2) CRC: Again, Ms. Dominguez does complain of melanotic stools.  She denies other     symptoms of bleeding.            3) Iron Deficiency: Ms. Dominguez reports that she tolerated IV iron treatment     well.  She denies significant side effects.            Clinical Trial Participant      No            ECOG Performance Status      1            PAST, FAMILY   Past Medical History      Past Medical History:  Arthritis, Diabetes Type 2, High Cholesterol,     Hypertension, Short of Air      Hematology/Oncology (F):  Anemia, Breast Cancer, Colorectal Cancer            Past Surgical History      Biopsy, Hysterectomy, Joint Replacement, VAD Placement            Family History      Family History:  Colorectal Cancer, Skin Cancer            Social History      Lives independently:  Yes      Occupation:  housewife            Tobacco Use      Tobacco status:  Never smoker            Substance Use      Substance use:  Denies use            REVIEW OF SYSTEMS      General:  Admits: Fatigue;          Denies: Appetite Change, Fever, Night Sweats, Weight Gain, Weight Loss      Eye:  Denies Blurred Vision, Denies Corrective Lenses, Denies Diplopia, Denies     Vision Changes      ENT:  Denies Headache, Denies Hearing Loss, Denies  Hoarseness, Denies Sore     Throat      Cardiovascular:  Denies Chest Pain, Denies Palpitations      Respiratory:  Denies: Cough, Coughing Blood, Productive Cough, Shortness of Air,    Wheezing      Gastrointestinal:  Denies Bloody Stools, Denies Constipation, Denies Diarrhea,     Denies Nausea/Vomiting, Denies Problem Swallowing, Denies Unable to Control     Bowels      Genitourinary:  Denies Blood in Urine, Denies Incontinence, Denies Painful     Urination      Musculoskeletal:  Admits Back Pain (LOWER BACK); Denies Muscle Pain, Denies     Painful Joints      Integumentary:  Denies Itching, Denies Lesions, Denies Rash      Neurologic:  Denies Dizziness, Denies Numbness\Tingling, Denies Seizures      Psychiatric:  Denies Anxiety, Denies Depression      Endocrine:  Denies Cold Intolerance, Denies Heat Intolerance      Hematologic/Lymphatic:  Denies Bruising, Denies Bleeding, Denies Enlarged Lymph     Nodes      Reproductive:  Denies: Menopause, Heavy Periods, Pregnant, Still Menstruating            VITAL SIGNS AND SCORES      Vitals      Height 5 ft 5 in / 165.1 cm      Weight 183 lbs 3.236 oz / 83.1 kg      BSA 1.91 m2      BMI 30.5 kg/m2      Temperature 97.0 F / 36.11 C - Temporal      Pulse 80      Respirations 18      Blood Pressure 108/61 Sitting, Right Arm      Pulse Oximetry 98%, ROOM AIR            Pain Score      Pain Scale Used:  Numerical      Pain Intensity:  0            Fatigue Score      Experiencing any fatigue?:  Yes      Fatigue (0-10 scale):  4            PREVENTION      Date Influenza Vaccine Given:  Oct 1, 2019      Influenza Vaccine Declined:  No      2 or More Falls in Past Year?:  No      Fall Past Year with Injury?:  No      Hx Pneumococcal Vaccination:  No      Encouraged to follow-up with:  PCP regarding preventative exams.      [facility]      [facility]            ALLERGY/MEDS      Allergies      Coded Allergies:             NO KNOWN ALLERGIES (Unverified , 9/22/20)             Medications      Last Reconciled on 1/15/20 12:41 by JANE HENSON      Naproxen Sodium (Aleve) 220 Mg Tablet      220 MG PO BID, #100 TAB 0 Refills         Reported         7/28/20       Atorvastatin (Atorvastatin) 80 Mg Tablet      80 MG PO HS, #30 TAB 0 Refills         Reported         7/28/20       Gabapentin (Gabapentin) 300 Mg Capsule      300 MG PO TID, #90 CAP 0 Refills         Reported         7/28/20       metFORMIN ER (Glucophage XR) 500 Mg Tab.er.24h      500 MG PO BID, #60 TAB.ER 0 Refills         Reported         11/13/19       Escitalopram Oxalate (Escitalopram Oxalate*) 10 Mg Tablet      TAB         Reported         3/20/18       LORazepam (LORazepam) 1 Mg Tablet      MG, TAB         Reported         10/31/17       Budesonide/Formoterol Fumarate (Symbicort 160/4.5 Mcg) 10.2 Gm Inh      2 PUFF INH BID, #3 INH 1 Refill         Prov: Thang Blank         10/19/17       Pantoprazole (Protonix) 40 Mg Tablet      40 MG PO QDAY, #30 TAB 0 Refills         Reported         8/25/15      Medications Reviewed:  No Changes made to meds            IMPRESSION/PLAN      Impression      1) Breast Cancer: Ms. Dominguez is a 70-year-old female with a history of breast     cancer.  Overall, she is doing well with no clinical evidence of recurrent     disease.  She will undergo a screening mammogram in December 2020.  She will     otherwise continue with surveillance.            2) CRC: This is clinically stable.  She will continue with surveillance as is     consistent with the NCCN guidelines.            3) Iron Deficiency: She has improved with IV iron treatment.  Unfortunately, she    continues to have symptoms.  My recommendation is to follow-up with     gastroenterology regarding her recent capsule study and results.  In addition, I    would recommend monthly blood work including iron studies, with a plan for IV     iron treatment intermittently as necessary.  It is certainly possible that she     could  have a small bowel telangiectasia or other etiology for her bleeding, and     she may require long-term IV iron treatment as a consequence.  She indicates     understanding and is amenable to this plan.            Diagnosis      Breast cancer screening         Encounter for screening mammogram for malignant neoplasm of breast - Z12.31         Breast cancer screening modality: mammogram            History of breast cancer - Z85.3            Anemia         Iron deficiency anemia due to chronic blood loss - D50.0         Anemia type: iron deficiency         Iron deficiency anemia type: chronic blood loss            Colon cancer         Malignant neoplasm of ascending colon - C18.2         Colon location: ascending            Breast cancer         Malignant neoplasm of right breast in female, estrogen receptor positive,        unspecified site of breast - C50.911, Z17.0         Breast location: unspecified site of breast         Estrogen receptor status: positive         Patient sex: female         Laterality: right            Iron deficiency - E61.1            Notes      New Diagnostics      * Screening Mammo, 3 Months         Dx: Breast cancer screening - Z12.39      * CBC, Month         Dx: Anemia - D64.9      * CMP Comp Metabolic Panel, Month         Dx: Anemia - D64.9      * CBC, 2 Months         Dx: Anemia - D64.9      * CMP Comp Metabolic Panel, 2 Months         Dx: Anemia - D64.9            Plan      1) f/u capsule study result; patient to continue to follow with GI            2) Monthly cbc/cmp/iron studies            3) RTC 2 months with labs prior            Patient Education      Patient Education Provided:  Yes            Electronically signed by WILLY KAYE  09/22/2020 12:01       Disclaimer: Converted document may not contain table formatting or lab diagrams. Please see [employer] [facility] for the authenticated document.

## 2021-05-28 NOTE — PROGRESS NOTES
Patient: MARKO ESPAÑA     Acct: [id number]     Report: #[id number]  UNIT #: [id number]     : 1950    Encounter Date:2021  PRIMARY CARE: JOSE R MCGOWAN  ***Signed***  --------------------------------------------------------------------------------------------------------------------  NURSE INTAKE      Visit Type      Established Patient Visit            Chief Complaint      COLON CA            Referring Provider/Copies To      Primary Care Provider:  JOSE R MCGOWAN      Copies To:   JOSE R MCGOWAN            History and Present Illness      Past Oncology Illness History      1) Breast Cancer:      -RIGHT breast DCIS diagnosed 3/15/06; low-grade; s/p lumpectomy and adjuvant     XRT; ER: 100%, MA: 70%      -LEFT breast invasive lobular carcinoma; diagnosed 2012; staged pT3 pN0 M0     stage IIB, ER: 83%, MA: 0, HER2: 1+, Ki-67: 26%, treated with mastectomy and     SLND 2012, adjuvant DD AC and Taxotere, Adjuvant Arimidex then Femara     (19)            2) Colon Cancer:       -Diagnosed 5/7/15; Low grade; staged pT4a pN0 M0 stage IIB; 0/37 LN; no MMR     testing; RIGHT sided      -RIGHT hemicolectomy 5/7/15      -adjuvant Xeloda X 6 months      -Recurrent dx diagnosed via RIGHT ureter mass resection (18)      -FOLFIRI X 9 (-18)      -s/p XRT RIGHT pelvis total 5320 cGy (18-19)            3) BRIANNE:      -Ulcer in the small bowel found on capsule endoscopy      -clipped by Dr. Hopkins in [place] by balloon enteroscopy      -as sees Dr. Chasidy Pastor locally            [facility] [place] [facility] On[facility] Interim      Patient comes in today to review the results of her most recent scan which is a     CT CAP on 2021.  A portion of this shows that she likely has recurrence of     her colon cancer.  She has 2 new masses in her pelvis.  1 associated with the     right iliac chain which is 2.9 cm and causing external obstruction of the right     ureter.  I explained this to the patient  and told her that she needs to see Dr. Chi in urology early next week for likely stent placement.  Patient does say     that she generally feels unwell.  She has some abdominal cramps which she cannot    really specifically describe.  She did recently undergo a procedure by Dr. Maldonado    in [place] where she had a small bowel ulcer clipped.  She has had some pain     since this time in the left but she also has pain on the right now.            CONCLUSION:         1. Interim development of right-sided hydronephrosis and development of a soft     tissue mass along       the right iliac chain measuring 2.9 x 1.2 cm and likely the cause of the     patient's obstructed right       ureter.  There is a 2nd node identified just below the aortic bifurcation in the    midline measuring       1.4 cm.  The findings are consistent recurrence disease most likely from the     patient's colon       carcinoma.      2. Postop changes of prior mastectomy.  No evidence of recurrent disease in the     chest.      3. Incidental findings of hysterectomy and diverticulosis.  Postop changes of     right colon       resection.                 Alli Acosta MD             Electronically Signed and Approved By: Alli Acosta MD on 1/21/2021 at 11:29            Clinical Trial Participant      No            ECOG Performance Status      1            PAST, FAMILY   Past Medical History      Past Medical History:  Arthritis, Diabetes Type 2, High Cholesterol,     Hypertension, Short of Air      Other PMH:        Iron deficiency anemia secondary GI bleed      Hematology/Oncology (F):  Anemia, Breast Cancer, Colorectal Cancer            Past Surgical History      Biopsy, Hysterectomy, Joint Replacement, VAD Placement            Family History      Family History:  Colorectal Cancer, Skin Cancer            Genetic testing negative            Social History      Lives independently:  Yes      Occupation:  housewife            Tobacco Use       Tobacco status:  Never smoker            Substance Use      Substance use:  Denies use            REVIEW OF SYSTEMS      General:  Admits: Fatigue;          Denies: Appetite Change, Fever, Night Sweats, Weight Gain, Weight Loss      Eye:  Admits Corrective Lenses; Denies Blurred Vision, Denies Diplopia, Denies     Vision Changes      ENT:  Admits Headache; Denies Hearing Loss, Denies Hoarseness, Denies Sore     Throat      Cardiovascular:  Denies Chest Pain, Denies Palpitations      Respiratory:  Admits: Shortness of Air;          Denies: Cough, Coughing Blood, Productive Cough, Wheezing      Gastrointestinal:  Denies Bloody Stools, Denies Constipation, Denies Diarrhea,     Denies Nausea/Vomiting, Denies Problem Swallowing, Denies Unable to Control     Bowels      Genitourinary:  Denies Blood in Urine, Denies Incontinence, Denies Painful     Urination      Musculoskeletal:  Admits Back Pain, Admits Muscle Pain; Denies Painful Joints      Integumentary:  Denies Itching, Denies Lesions, Denies Rash      Neurologic:  Denies Dizziness, Denies Numbness\Tingling, Denies Seizures      Psychiatric:  Denies Anxiety, Denies Depression      Endocrine:  Denies Cold Intolerance, Denies Heat Intolerance      Hematologic/Lymphatic:  Denies Bruising, Denies Bleeding, Denies Enlarged Lymph     Nodes      Reproductive:  Denies: Menopause, Heavy Periods, Pregnant, Still Menstruating            VITAL SIGNS AND SCORES      Vitals      Weight 184 lbs 8.400 oz / 83.7 kg      Temperature 99.2 F / 37.33 C - Temporal      Pulse 117      Respirations 20      Blood Pressure 140/67 Sitting, Right Arm      Pulse Oximetry 99%, RM AIR            Pain Score      Experiencing any pain?:  Yes      Pain Scale Used:  Numerical      Pain Intensity:  5            Fatigue Score      Experiencing any fatigue?:  Yes      Fatigue (0-10 scale):  5            EXAM      General: Alert, cooperative, no acute distress      Eyes: Anicteric sclera, PERRLA       Respiratory: CTAB, normal respiratory effort      Abdomen: Normal active bowel sounds, significant tenderness with light palpation    of the lower right quadrant and lower left quadrant of the abdomen      Cardiovascular: RRR, no murmur, no lower extremity edema      Skin: Normal tone, no rash, no lesions      Psychiatric: Appropriate affect, intact judgment      Neurologic: No focal sensory or motor deficits, no weakness, numbness, dizziness      Musculoskeletal: Normal muscle strength and tone      Extremities: No clubbing, cyanosis, or deformities            PREVENTION      Hx Influenza Vaccination:  Yes      Date Influenza Vaccine Given:  Oct 5, 2020      Influenza Vaccine Declined:  No      2 or More Falls in Past Year?:  No      Fall Past Year with Injury?:  No      Hx Pneumococcal Vaccination:  Yes      Encouraged to follow-up with:  PCP regarding preventative exams.      Chart initiated by      DANYA ELDER MA            ALLERGY/MEDS      Allergies      Coded Allergies:             NO KNOWN ALLERGIES (Unverified , 1/22/21)            Medications      Last Reconciled on 1/22/21 17:35 by RONI DUTTON      Pravastatin Sodium (Pravastatin Sodium) 40 Mg Tablet      40 MG PO HS, #30 TAB 0 Refills         Reported         9/30/20       Gabapentin (Gabapentin) 400 Mg Capsule      400 MG PO TID, #90 CAP 0 Refills         Reported         9/30/20       Sucralfate (Carafate) 1 Gm Tab      1 GM PO QID, #120 TAB 0 Refills         Reported         9/30/20       metFORMIN ER (Glucophage XR) 500 Mg Tab.er.24h      500 MG PO BID, #60 TAB.ER 0 Refills         Reported         11/13/19       Escitalopram Oxalate (Escitalopram Oxalate*) 10 Mg Tablet      TAB         Reported         3/20/18       LORazepam (LORazepam) 1 Mg Tablet      MG, TAB         Reported         10/31/17       Budesonide/Formoterol Fumarate (Symbicort 160/4.5 Mcg) 10.2 Gm Inh      2 PUFF INH BID, #3 INH 1 Refill         Prov: Thang Blank         10/19/17        Pantoprazole (Protonix) 40 Mg Tablet      40 MG PO QDAY, #30 TAB 0 Refills         Reported         8/25/15      Medications Reviewed:  No Changes made to meds            IMPRESSION/PLAN      Diagnosis      Colon cancer - C18.9            Hydronephrosis, right - N13.30            Notes      New Referrals      * Urology, As Soon As Possible         [facility]         Reason for Referral: evaluate for hydronephrosis         Dx: Colon cancer - C18.9            Plan      Breast cancer: Patient was diagnosed in 2006 and again in 2012.  Among other     treatment she had a left mastectomy.  Recent mammogram was negative on     12/29/2020.  She will need a repeat mammogram in 1 year.            Colon cancer: Patient was initially diagnosed in 2015 with recurrence in 2018.      She completed surgical resection of both occurrences as well as chemotherapy and    radiation which completed in January 2019.  CT CAP for restaging unfortunately     shows likely recurrence and pelvic lymph nodes.  One is causing right ureteral     obstruction.  The patient is unsure who her previous colon surgeon was.  We will    look into this and refer the patient for evaluation.             Right Hydronephrosis: from external obstruction from new mass.  Patient has an     appointment with Dr. Chi on Monday. I have spoken to Dr. Chi about her     case.  She will likely place a stent on Tuesday.  I will follow-up with the     patient in one week to make further plans.              Normocytic anemia: Patient has a history of a bleeding small intestinal ulcer.      Check labs today which showed her hemoglobin is 7.7 and she is symptomatic with     fatigue shortness of breath.  She also has continuing melena.  I will plan     transfuse 1 unit of packed red blood cells.  Unfortunately she has antibodies so    transfusion will take some time 2 coordinate.  Also contacted Dr. Iraheta     regarding the plan of care.  She reviewed  the patient's case and will help     facilitate getting her back into see Dr. Maldonado for repeat endoscopy.            Patient Education      Patient Education Provided:  Yes            Electronically signed by RNOI DUTTON  01/22/2021 17:35       Disclaimer: Converted document may not contain table formatting or lab diagrams. Please see [facility] for the authenticated document.

## 2021-05-28 NOTE — PROGRESS NOTES
Patient: MARKO DOMINGUEZ     Acct: [id number]     Report: #[id number]  UNIT #: [id number]     : 1950    Encounter Date:2020  PRIMARY CARE: JOSE R MCGOWAN  ***Signed***  --------------------------------------------------------------------------------------------------------------------  NURSE INTAKE      Visit Type      Established Patient Visit            Chief Complaint      COLON CANCER            Referring Provider/Copies To      Referring Provider:  Frank Burton      Primary Care Provider:  JOSE R MCGOWAN      Copies To:   Frank Burton; JOSE R MCGOWAN            History and Present Illness      Past Oncology Illness History      1) Breast Cancer:            -RIGHT breast DCIS diagnosed 3/15/06; low-grade; s/p lumpectomy and adjuvant     XRT; ER: 100%, PA: 70%      -LEFT breast invasive lobular carcinoma; diagnosed 2012; staged pT3 pN0 M0     stage IIB, ER: 83%, PA: 0, HER2: 1+, Ki-67: 26%            Treatment History:            1) s/p LEFT mastectomy and SLND 2012      2) s/p adjuvant DD AC and Taxotere      3) s/p adjuvant XRT chest wall      4) Adjuvant Arimidex      5) Arimidex changed to Femara      6) Ongoing Femara (19)      7) Plan to D/C Femara and surveillance (20)            2) Colon Cancer:             Diagnosed 5/7/15; Low grade; staged pT4a pN0 M0 stage IIB; 0/37 LN; no MMR     testing; RIGHT sided            Treatment History:            1) s/p RIGHT hemicolectomy 5/7/15      2) s/p adjuvant Xeloda X 6 months      3) Recurrent dx diagnosed via RIGHT ureter mass resection (18)      4) s/p FOLFIRI X 9 (-18)      5) s/p XRT RIGHT pelvis total 5320 cGy (18-19)      6) Restaging MRI (-); resumption of Femara (10/3/19)      7) Ongoing surveillance (19)      8) Plan for restaging studies (20)            HPI - Oncology Interim      Ms. Dominguez comes in for f/u regarding several chronic issues:            1) Breast Cancer: Ms. Dominguez  comes in for follow-up.  In the interim, she     reports that she has been stable.  She denies fevers or chills or sweats.  She     denies palpable masses.  She continues on Femara.  She is tolerating this     adequately.  She denies hot flashes or focal bone aches or pains, with the     exception of pain located in the lumbar spine.  It is an achy pain of moderate     severity.  She reports that her last MRI was performed in October 2019.  She     reports that her pain medication was recently discontinued by primary care.      Since then, her pain is worsened.  It is of moderate severity.  No other     modifying factors or associated signs or symptoms.            2) CRC: Ms. Dominguez denies melena or hematochezia or other bleeding symptoms.      She does report difficulty with ataxia.  She describes it as unsteadiness on her    feet.  She reports that she has had several falls as a consequence.  Her     symptoms have been present for roughly 1 to 2 months.  They are of mild to     moderate severity.  No other modifying factors or associated signs or symptoms.            3) Iron Deficiency: Ms. Dominguez reports that she tolerates intermittent IV iron     treatment adequately.  She denies significant side effects.  She reports that     her most recent colonoscopy was roughly 1 year ago.  She also had an EGD     performed at that time which was negative for etiologies for bleeding            Metastatic Sites      Renal            Clinical Staging      1. Colon cancer Stage IIIC - T4aN0, mets in Feb 2018      2. Right breast cancer Stage 0      3. Left breast cancer Stage IIIB            Treatments      Chemotherapy      1. Colon cancer : 6 months of adjuvant Xeloda (completed 12/2015), followed by     FOLFIRI (9 cycles completed 12/2018)  and XRT (completed 1/2019) to operative     bed      2. Right breast cancer status post lumpectomy + XRT      3. Left breast cancer status post mastectomy with adjuvant chemotherapy and      followed by Arimidex (? completed in 2017)      Radiation Therapy      Colon cancer: Dec 14, 2018 - Jan 24, 2019: Underwent radiation therapy to the     right pelvis: 5320 cGy in 28 fractions.            Clinical Trial Participant      No            ECOG Performance Status      1            PAST, FAMILY   Past Medical History      Past Medical History:  Arthritis, Diabetes Type 2, High Cholesterol, Hypertens    ion, Short of Air      Hematology/Oncology (F):  Anemia, Breast Cancer (2006,2012), Colorectal Cancer            Past Surgical History      Biopsy, Hysterectomy (partial), Joint Replacement (right hip), VAD Placement            Family History      Family History:  Colorectal Cancer (sister), Skin Cancer (sister-on chest)            Social History      Marital Status:        Lives independently:  Yes      Occupation:  housewife            Tobacco Use      Tobacco status:  Never smoker            Alcohol Use      Alcohol intake:  None            Substance Use      Substance use:  Denies use            REVIEW OF SYSTEMS      General:  Admits: Fatigue;          Denies: Appetite Change, Fever, Night Sweats, Weight Gain, Weight Loss      Eye:  Denies Blurred Vision, Denies Corrective Lenses, Denies Diplopia, Denies     Vision Changes      ENT:  Denies Headache, Denies Hearing Loss, Denies Hoarseness, Denies Sore     Throat      Cardiovascular:  Denies Chest Pain, Denies Palpitations      Respiratory:  Denies: Cough, Coughing Blood, Productive Cough, Shortness of Air,    Wheezing      Gastrointestinal:  Admits Diarrhea; Denies Bloody Stools, Denies Constipation,     Denies Nausea/Vomiting, Denies Problem Swallowing, Denies Unable to Control     Bowels      Genitourinary:  Denies Blood in Urine, Denies Incontinence, Denies Painful     Urination      Musculoskeletal:  Admits Back Pain; Denies Muscle Pain, Denies Painful Joints      Integumentary:  Denies Itching, Denies Lesions, Denies Rash      Neurologic:   Denies Dizziness, Denies Numbness\Tingling, Denies Seizures      Psychiatric:  Denies Anxiety, Denies Depression      Endocrine:  Denies Cold Intolerance, Denies Heat Intolerance      Hematologic/Lymphatic:  Admits Bruising; Denies Bleeding, Denies Enlarged Lymph     Nodes      Reproductive:  Denies: Menopause, Heavy Periods, Pregnant, Still Menstruating            VITAL SIGNS AND SCORES      Vitals      Height 5 ft 5.00 in / 165.1 cm      Weight 180 lbs 8.908 oz / 81.9 kg      BSA 1.89 m2      BMI 30.0 kg/m2      Temperature 97.2 F / 36.22 C - Temporal      Pulse 81      Blood Pressure 113/61 Sitting, Right Arm      Pulse Oximetry 98%, ROOM AIR            Pain Score      Experiencing any pain?:  Yes      Pain Scale Used:  Numerical      Pain Intensity:  5            Fatigue Score      Experiencing any fatigue?:  Yes      Fatigue (0-10 scale):  7            EXAM      General Appearance:  Positive for: Alert, Oriented x3      Eye:  Positive for: Anicteric Sclerae, PERRLA      HEENT:  Negative for: Scleral Icterus, Thrush      Neck:  Positive for: Supple      Respiratory:  Negative for: Rales, Rhochi      Abdomen/Gastro:  Positive for: Soft;          Negative for: Hepatosplenomegaly      Cardiovascular:  Positive for: RRR;          Negative for: Rub      Skin:  Negative for: Induration, Lesions      Psychiatric:  Positive for: AAO X 3, Appropriate Affect      Lower Extremities:  Negative for: Edema            PREVENTION      Hx Influenza Vaccination:  Yes      Date Influenza Vaccine Given:  Oct 1, 2019      Influenza Vaccine Declined:  No      2 or More Falls in Past Year?:  Yes      Fall Past Year with Injury?:  No      Hx Pneumococcal Vaccination:  Yes      Encouraged to follow-up with:  PCP regarding preventative exams.      Chart initiated by      RHONDA LEAL CMA            ALLERGY/MEDS      Allergies      Coded Allergies:             NO KNOWN ALLERGIES (Unverified , 7/9/20)            Medications      Last  Reconciled on 1/15/20 12:41 by JANE HENSON      Cyanocobalamin (Vitamin B-12) Inj (Cyanocobalamin Inj) 1,000 Mcg/1 Ml Vial      1000 MCG IM WE, VIAL         Reported         5/7/20       celeCOXIB (CeleBREX) 400 Mg Capsule      400 MG PO QDAY, #30 CAP 0 Refills         Reported         4/30/20       Letrozole (Femara*) 2.5 Mg Tablet      2.5 MG PO QDAY, #90 TAB 3 Refills         Prov: WILLY KAYE         1/28/20       metFORMIN ER (Glucophage XR) 500 Mg Tab.er.24h      500 MG PO BID, #60 TAB.ER 0 Refills         Reported         11/13/19       HYDROcodone-Acetaminophen  Mg (HYDROcodone-Acetaminophen  Mg) 1 Each    Tablet      1 TAB PO Q4H PRN for BREAKTHROUGH PAIN, TAB 0 Refills         Reported         10/3/19       Loperamide (Loperamide) 2 Mg Capsule      2 MG PO ASDIR, #60 TAB 3 Refills         Prov: DERRICK BECKFORD         11/19/18       Ondansetron Hcl (ONDANSETRON HCL) 8 Mg Tablet      8 MG PO Q8H PRN for NAUSEA AND/OR VOMITING, #40 TAB 3 Refills         Prov: DERRICK BECKFORD         7/9/18       Prochlorperazine Maleate (Prochlorperazine Maleate) 10 Mg Tab      10 MG PO Q6H PRN for NAUSEA AND/OR VOMITING, #60 TAB 3 Refills         Prov: DERRICK BECKFORD         7/9/18       Pravastatin Sodium (Pravastatin Sodium) 80 Mg Tablet      80 MG PO HS, TAB         Reported         7/6/18       Escitalopram Oxalate (Escitalopram Oxalate*) 10 Mg Tablet      TAB         Reported         3/20/18       LORazepam (LORazepam) 1 Mg Tablet      MG, TAB         Reported         10/31/17       Budesonide/Formoterol Fumarate (Symbicort 160/4.5 Mcg) 10.2 Gm Inh      2 PUFF INH BID, #3 INH 1 Refill         Prov: Thang Blank         10/19/17       Lactobacillus Combo No.6 (PROBIOTIC COMPLEX) 1 Each Tablet      1 EACH PO BID, TAB         Reported         3/28/16       Pantoprazole (Protonix) 40 Mg Tablet      40 MG PO HS, #30 TAB 0 Refills         Reported         8/25/15      Medications Reviewed:  No Changes  made to meds            IMPRESSION/PLAN      Impression      1) Breast Cancer: Ms. Dominguez is a 70-year-old female with a history of bilateral    breast cancer.  Currently, she is on Femara and tolerating therapy well.  In     reviewing previous documentation by previous providers, it is not clear to me as    to why she is on extended aromatase inhibitor therapy at this time.  She is     certainly at high risk for recurrent disease given her history, but she has     completed over 5 years of adjuvant aromatase inhibitor therapy, and as a     consequence I think it is reasonable to discontinue therapy at this time and     proceed with surveillance.  We will first proceed with restaging studies     including MRI evaluation regarding her back pain.  If there is no clear evidence    of active malignancy, she will proceed with discontinuation of aromatase     inhibitor therapy and surveillance.  She will continue on vitamin D supplementat    ion as well.  In addition, given her reported history of ataxia, I would     recommend an MRI of the brain to assess for brain metastases this is a potential    explanation.  If her work-up is negative, I will refer her to neurology for     further assessment.            2) CRC: Again, I would recommend repeat imaging studies at this time for     surveillance given that she has a history of recurrent disease.  She will return    to review the results.            3) Iron Deficiency: We will repeat iron studies.  We will continue intermittent     IV iron as necessary to address this.            Diagnosis      Lobular carcinoma of left breast, stage 3 - C50.912            Colon cancer         Malignant neoplasm of ascending colon - C18.2         Colon location: ascending            Anemia         Anemia in other chronic diseases classified elsewhere - D63.8         Other causes of anemia: chronic disease, other            Notes      New Diagnostics      * CT Abd/Pelvis/Chest W/Contrast,  Week         Dx: Malignant neoplasm of ascending colon - C18.2      * Brain W/WO Cont MRI, Week         Dx: Ataxia - R27.0      * Iron Profile, Routine         Dx: Anemia in other chronic diseases classified elsewhere - D63.8      * Ferritin Level, 07/09/20         Dx: Anemia in other chronic diseases classified elsewhere - D63.8            Plan      1) CT CAP for restaging for CRC            2) MRI of Brain and lumbar spine to assess ataxia and back pain; if w/u (-)     referral to neurology regarding ataxia            3) iron studies today            4) RTC to review results in 2 weeks; likely d/c Femara at that time            The total time the visit was 45 minutes.  Over 50% the time a spent in     face-to-face counseling reviewing previous imaging study results, differential     diagnosis, current symptoms and evaluation, and coordination of care.            Patient Education      Patient Education Provided:  Yes            Electronically signed by WILLY KAYE  07/09/2020 12:20       Disclaimer: Converted document may not contain table formatting or lab diagrams. Please see "[employer] System for the authenticated document.

## 2021-05-28 NOTE — PROGRESS NOTES
Patient: MARKO ESPAÑA     Acct: [id number]     Report: #[id number]  UNIT #: [id number]     : 1950    Encounter Date:2021  PRIMARY CARE: JOSE R MCGOWAN  ***Signed***  --------------------------------------------------------------------------------------------------------------------  NURSE INTAKE      Visit Type      Established Patient Visit            Chief Complaint      COLON CA            Referring Provider/Copies To      Primary Care Provider:  JOSE R MCGOWAN      Copies To:   JOSE R MCGOWAN            History and Present Illness      Past Oncology Illness History      1) Breast Cancer:      -RIGHT breast DCIS diagnosed 3/15/06; low-grade; s/p lumpectomy and adjuvant     XRT; ER: 100%, HI: 70%      -LEFT breast invasive lobular carcinoma; diagnosed 2012; staged pT3 pN0 M0     stage IIB, ER: 83%, HI: 0, HER2: 1+, Ki-67: 26%, treated with mastectomy and     SLND 2012, adjuvant DD AC and Taxotere, Adjuvant Arimidex then Femara     (19)            2) Colon Cancer:       -Diagnosed 5/7/15; Low grade; staged pT4a pN0 M0 stage IIB; 0/37 LN; no MMR     testing; RIGHT sided      -RIGHT hemicolectomy 5/7/15      -adjuvant Xeloda X 6 months      -Recurrent dx diagnosed via RIGHT ureter mass resection (18)      -FOLFIRI X 9 (-18)      -s/p XRT RIGHT pelvis total 5320 cGy (18-19)            *CT CAP 2021: Interim development of right-sided hydronephrosis and a soft     tissue mass along the right iliac chain measuring 2.9 x 1.2 cm.  There is a     second nodule identified below the aortic bifurcation in the midline measuring     1.4 cm.  This is consistent with disease recurrence.            3) BRIANNE:      -Ulcer in the small bowel found on capsule endoscopy      -clipped by Dr. Hopkins in [place] by balloon enteroscopy      -as sees Dr. Chasidy Pastor locally            4) right hydronephrosis:      -Noted on CT scan from 2021, secondary to soft tissue  mass      -Likely secondary to recurrent colon cancer, biopsy pending      -Cystoscopy and right ureteral stent (Dr. Chi) on 1/28/2021            HPI - Oncology Interim      Patient comes in today for symptom management.  She has had hematuria off and on    since the urinary stent was placed.  She also has significant abdominal pain.      She now takes oxycodone every 3-4 hours and this just takes the edge off the     pain.  She also continues to have melena and is undergoing evaluation by Dr. Nelson.  She sees him again on March 4.            Clinical Trial Participant      No            ECOG Performance Status      1            PAST, FAMILY   Past Medical History      Past Medical History:  Arthritis, Diabetes Type 2, High Cholesterol,     Hypertension, Short of Air      Other PMH:        Iron deficiency anemia secondary GI bleed      Hematology/Oncology (F):  Anemia, Breast Cancer, Colorectal Cancer            Past Surgical History      Biopsy, Hysterectomy, Joint Replacement, VAD Placement            Family History      Family History:  Colorectal Cancer, Skin Cancer            Genetic testing negative            Social History      Lives independently:  Yes      Occupation:  housewife            Tobacco Use      Tobacco status:  Never smoker            Substance Use      Substance use:  Denies use            REVIEW OF SYSTEMS      General:  Admits: Fatigue;          Denies: Appetite Change, Fever, Night Sweats, Weight Gain, Weight Loss      Eye:  Admits Corrective Lenses; Denies Blurred Vision, Denies Diplopia, Denies     Vision Changes      ENT:  Denies Headache, Denies Hearing Loss, Denies Hoarseness, Denies Sore     Throat      Cardiovascular:  Denies Chest Pain, Denies Palpitations      Respiratory:  Admits: Shortness of Air;          Denies: Cough, Coughing Blood, Productive Cough, Wheezing      Gastrointestinal:  Denies Bloody Stools, Denies Constipation, Denies Diarrhea,     Denies  Nausea/Vomiting, Denies Problem Swallowing, Denies Unable to Control     Bowels      Genitourinary:  Denies Blood in Urine, Denies Incontinence, Denies Painful     Urination      Musculoskeletal:  Admits Back Pain, Admits Muscle Pain; Denies Painful Joints      Other      ACROSS STOMACH INTO BACK      Integumentary:  Denies Itching, Denies Lesions, Denies Rash      Neurologic:  Denies Dizziness, Denies Numbness\Tingling, Denies Seizures      Psychiatric:  Denies Anxiety, Denies Depression      Endocrine:  Denies Cold Intolerance, Denies Heat Intolerance      Hematologic/Lymphatic:  Denies Bruising, Denies Bleeding, Denies Enlarged Lymph     Nodes      Reproductive:  Denies: Menopause, Heavy Periods, Pregnant, Still Menstruating            VITAL SIGNS AND SCORES      Vitals      Weight 180 lbs 5.380 oz / 81.8 kg      Temperature 98.2 F / 36.78 C - Temporal      Pulse 115      Respirations 22      Pulse Oximetry 98%, RM AIR            Pain Score      Experiencing any pain?:  Yes      Pain Scale Used:  Numerical      Pain Intensity:  7            Fatigue Score      Experiencing any fatigue?:  Yes      Fatigue (0-10 scale):  5            EXAM      General: Obviously in significant pain during exam but not critically ill-    appearing      Eyes: Anicteric sclera, PERRLA      Respiratory: CTAB, normal respiratory effort      Abdomen: Normal active bowel sounds, severe tenderness even with light palpation      Cardiovascular: RRR, no murmur, no lower extremity edema      Skin: Normal tone, no rash, no lesions      Psychiatric: Appropriate affect, intact judgment      Neurologic: No focal sensory or motor deficits, no weakness, numbness, dizziness      Musculoskeletal: Normal muscle strength and tone      Extremities: No clubbing, cyanosis, or deformities            PREVENTION      Hx Influenza Vaccination:  Yes      Date Influenza Vaccine Given:  Oct 5, 2020      Influenza Vaccine Declined:  No      2 or More Falls in Past  Year?:  No      Fall Past Year with Injury?:  No      Hx Pneumococcal Vaccination:  Yes      Encouraged to follow-up with:  PCP regarding preventative exams.      Chart initiated by      DANYA ELDER MA            ALLERGY/MEDS      Allergies      Coded Allergies:             NO KNOWN ALLERGIES (Unverified , 2/23/21)            Medications      Last Reconciled on 1/29/21 15:51 by RONI DUTTON      HYDROcodone-Acetaminophen 5-325 Mg (HYDROcodone-Acetaminophen 5-325 Mg) 1 Each     Tablet      1 TAB PO Q4H PRN for PAIN, #30 TAB         Prov: RONI DUTTON         1/29/21       Escitalopram Oxalate (Escitalopram Oxalate*) 20 Mg Tablet      20 MG PO HS, TAB         Reported         1/26/21       Escitalopram Oxalate (Escitalopram Oxalate*) 10 Mg Tablet      10 MG PO QAM, TAB         Reported         1/26/21       LORazepam (LORazepam) 2 Mg Tablet      2 MG PO HS, TAB         Reported         1/26/21       LORazepam (LORazepam) 1 Mg Tablet      1 MG PO QAM, TAB         Reported         1/26/21       Pravastatin Sodium (Pravastatin Sodium) 40 Mg Tablet      40 MG PO HS, #30 TAB 0 Refills         Reported         9/30/20       Gabapentin (Gabapentin) 400 Mg Capsule      400 MG PO TID, #90 CAP 0 Refills         Reported         9/30/20       Sucralfate (Carafate) 1 Gm Tab      1 GM PO QID, #120 TAB 0 Refills         Reported         9/30/20       metFORMIN ER (Glucophage XR) 500 Mg Tab.er.24h      500 MG PO BID, #60 TAB.ER 0 Refills         Reported         11/13/19       Budesonide/Formoterol Fumarate (Symbicort 160/4.5 Mcg) 10.2 Gm Inh      2 PUFF INH BID, #3 INH 1 Refill         Prov: Thang Blank         10/19/17       Pantoprazole (Protonix) 40 Mg Tablet      40 MG PO BID, #30 TAB 0 Refills         Reported         8/25/15      Medications Reviewed:  No Changes made to meds            IMPRESSION/PLAN      Diagnosis      Colon cancer - C18.9            Anemia - D64.9            Burning with urination - R30.0             Notes      New Medications      * oxyCODONE IR 5 MG TABLET: 5 MG PO Q3H PRN PAIN #90      New Diagnostics      * CBC, Routine         Dx: Colon cancer - C18.9      * CMP Comp Metabolic Panel, Routine         Dx: Colon cancer - C18.9      * HOLD CLOT, Routine         Dx: Colon cancer - C18.9      * CBC, WEEKLY         Dx: Colon cancer - C18.9      * HOLD CLOT, WEEKLY         Dx: Colon cancer - C18.9      * Urinalysis, Routine         Dx: Burning with urination - R30.0      * Urine Culture, Routine         Dx: Burning with urination - R30.0      * Ferritin Level, Routine         Dx: Anemia - D64.9      * Iron Profile, Routine         Dx: Anemia - D64.9            Plan      Breast cancer: Patient was diagnosed in 2006 and again in 2012.  Among other     treatment she had a left mastectomy.  Recent mammogram was negative on     12/29/2020.  She will need a repeat mammogram in December of this year.            Colon cancer: Patient was initially diagnosed in 2015 with recurrence in 2018.      She completed surgical resection of both occurrences as well as chemotherapy and    radiation which completed in January 2019.  CT CAP for restaging unfortunately     shows likely recurrence in pelvic lymph nodes.  On 1/28/2021 she underwent     cystoscopy with right ureteral stent placement for obstructive hydronephrosis. I    previously referred her to Dr. Nelson at the [facility] who is     planning surgical resection. She sees him again on 3/4/21.            Small bowel ulcer: Patient reports she recently had an ulcer clipped by Dr. Granda    in [place].  This ulcer was not able to be seen by Dr. Pastor who is her     local gastroenterologist.  I have also discussed this with Dr. Nelson.  He     states that he can likely surgically resect the bleeding ulcer during her colon     cancer surgery.  She continues to have melena.  We will check CBC, CMP, and type    and cross today.            Right Hydronephrosis:  from external obstruction from new mass.  Cystoscopy and     right ureteral stent placement by Dr. Chi on 1/28/2021 as above.  Patient     reports persistent pain since her procedure.  I discussed the case with Dr. Chi.  Patient initially had hematuria off and on but not had in several days.            Normocytic anemia: Patient has a history of a bleeding small intestinal ulcer     and persistent melena.  Will check patient's labs as above and transfuse if     needed.            Abdominal pain: Secondary to underlying malignancy.  I will send a prescription     for 5 mg oxycodone as needed daily 3 to 4 hours.            Patient Education      Patient Education Provided:  Yes            Electronically signed by RONI DUTTON  03/28/2021 19:21       Disclaimer: Converted document may not contain table formatting or lab diagrams. Please see [employer] for the authenticated document.

## 2021-05-28 NOTE — PROGRESS NOTES
Patient: MARKO ESPAÑA     Acct: [id number]     Report: #[id number]  UNIT #: [id number]     : 1950    Encounter Date:2021  PRIMARY CARE: JOSE R MCGOWAN  ***Signed***  --------------------------------------------------------------------------------------------------------------------  NURSE INTAKE      Visit Type      Established Patient Visit            Chief Complaint      COLON CA            Referring Provider/Copies To      Primary Care Provider:  JOSE R MCGOWAN      Copies To:   JOSE R MCGOWAN            History and Present Illness      Past Oncology Illness History      1) Breast Cancer:      -RIGHT breast DCIS diagnosed 3/15/06; low-grade; s/p lumpectomy and adjuvant X    RT; ER: 100%, MO: 70%      -LEFT breast invasive lobular carcinoma; diagnosed 2012; staged pT3 pN0 M0     stage IIB, ER: 83%, MO: 0, HER2: 1+, Ki-67: 26%, treated with mastectomy and     SLND 2012, adjuvant DD AC and Taxotere, Adjuvant Arimidex then Femara     (19)            2) Colon Cancer:       -Diagnosed 5/7/15; Low grade; staged pT4a pN0 M0 stage IIB; 0/37 LN; no MMR     testing; RIGHT sided      -RIGHT hemicolectomy 5/7/15      -adjuvant Xeloda X 6 months      -Recurrent dx diagnosed via RIGHT ureter mass resection (18)      -FOLFIRI X 9 (-18)      -s/p XRT RIGHT pelvis total 5320 cGy (18-19)            *CT CAP 2021: Interim development of right-sided hydronephrosis and a soft     tissue mass along the right iliac chain measuring 2.9 x 1.2 cm.  There is a     second nodule identified below the aortic bifurcation in the midline measuring     1.4 cm.  This is consistent with disease recurrence.            3) BRIANNE:      -Ulcer in the small bowel found on capsule endoscopy      -clipped by Dr. Hopkins in [place] by balloon enteroscopy      -as sees Dr. Chasidy Pastor locally            4) right hydronephrosis:      -Noted on CT scan from 2021, secondary to soft tissue  mass      -Likely secondary to recurrent colon cancer, biopsy pending      -Cystoscopy and right ureteral stent (Dr. Chi) on 1/28/2021            HPI - Oncology Interim      Patient comes in today with her  to follow-up after her recent cystoscopy    and stent placement.  She is in a lot of pain after the biopsy.  She says she is    also had some nausea.  She took an antinausea medication this morning and has     helped a little bit but nothing is helped the pain.  She was only given Tylenol     after the procedure.  We discussed the plan of care going forward.  She is     agreeable to having a biopsy but would like for her to be next week so that she     has some time to heal after the cystoscopy and stent placement.            On further questioning the patient also revealed that she is still having     melena.  She has not had a bowel movement in 3 days but says that every time she    has a bowel movement it is dark, black.  She has not been back to see Dr. Granda     since she was found to have a small bowel ulcer that was bleeding but clipped.            Clinical Trial Participant      No            ECOG Performance Status      1            Most Recent Lab Findings      Laboratory Tests      1/28/21 07:25            PAST, FAMILY   Past Medical History      Past Medical History:  Arthritis, Diabetes Type 2, High Cholesterol,     Hypertension, Short of Air      Other PMH:        Iron deficiency anemia secondary GI bleed      Hematology/Oncology (F):  Anemia, Breast Cancer, Colorectal Cancer            Past Surgical History      Biopsy, Hysterectomy, Joint Replacement, VAD Placement            Family History      Family History:  Colorectal Cancer, Skin Cancer            Genetic testing negative            Social History      Lives independently:  Yes      Occupation:  housewife            Tobacco Use      Tobacco status:  Never smoker            Substance Use      Substance use:  Denies use             REVIEW OF SYSTEMS      General:  Admits: Fatigue;          Denies: Appetite Change, Fever, Night Sweats, Weight Gain, Weight Loss      Eye:  Admits Corrective Lenses; Denies Blurred Vision, Denies Diplopia, Denies     Vision Changes      ENT:  Denies Headache, Denies Hearing Loss, Denies Hoarseness, Denies Sore     Throat      Cardiovascular:  Denies Chest Pain, Denies Palpitations      Respiratory:  Admits: Shortness of Air;          Denies: Cough, Coughing Blood, Productive Cough, Wheezing      Gastrointestinal:  Admits Nausea/Vomiting (AFTER SURGERY); Denies Bloody Stools,    Denies Constipation, Denies Diarrhea, Denies Problem Swallowing, Denies Unable     to Control Bowels      Genitourinary:  Denies Blood in Urine, Denies Incontinence, Denies Painful     Urination      Musculoskeletal:  Denies Back Pain; Admits Muscle Pain; Denies Painful Joints      Integumentary:  Denies Itching, Denies Lesions, Denies Rash      Neurologic:  Denies Dizziness, Denies Numbness\Tingling, Denies Seizures      Psychiatric:  Denies Anxiety, Denies Depression      Endocrine:  Denies Cold Intolerance, Denies Heat Intolerance      Hematologic/Lymphatic:  Denies Bruising, Denies Bleeding, Denies Enlarged Lymph     Nodes      Reproductive:  Denies: Menopause, Heavy Periods, Pregnant, Still Menstruating            VITAL SIGNS AND SCORES      Vitals      Weight 186 lbs 4.619 oz / 84.5 kg      Temperature 97.2 F / 36.22 C - Temporal      Pulse 120      Respirations 22      Blood Pressure 151/68 Sitting, Right Arm      Pulse Oximetry 98%, RM AIR            Pain Score      Experiencing any pain?:  Yes      Pain Scale Used:  Numerical      Pain Intensity:  6            Fatigue Score      Experiencing any fatigue?:  Yes      Fatigue (0-10 scale):  5            EXAM      General: Alert, cooperative, no acute distress but in obvious pain      Eyes: Anicteric sclera, PERRLA      Respiratory: CTAB, normal respiratory effort      Abdomen: Normal  active bowel sounds, deferred abdominal exam due to significant     pain from recent procedure      Cardiovascular: RRR, no murmur, no lower extremity edema      Skin: Normal tone, no rash, no lesions      Psychiatric: Appropriate affect, intact judgment      Neurologic: No focal sensory or motor deficits, no weakness, numbness, dizziness      Musculoskeletal: Normal muscle strength and tone      Extremities: No clubbing, cyanosis, or deformities            PREVENTION      Hx Influenza Vaccination:  Yes      Date Influenza Vaccine Given:  Oct 5, 2020      Influenza Vaccine Declined:  No      2 or More Falls in Past Year?:  No      Fall Past Year with Injury?:  No      Hx Pneumococcal Vaccination:  Yes      Encouraged to follow-up with:  PCP regarding preventative exams.      Chart initiated by      DANYA ELDER MA            ALLERGY/MEDS      Allergies      Coded Allergies:             NO KNOWN ALLERGIES (Unverified , 1/29/21)            Medications      Last Reconciled on 1/29/21 15:51 by RONI DUTTON      HYDROcodone-Acetaminophen 5-325 Mg (HYDROcodone-Acetaminophen 5-325 Mg) 1 Each     Tablet      1 TAB PO Q4H PRN for PAIN, #30 TAB         Prov: RONI DUTTON         1/29/21       Escitalopram Oxalate (Escitalopram Oxalate*) 20 Mg Tablet      20 MG PO HS, TAB         Reported         1/26/21       Escitalopram Oxalate (Escitalopram Oxalate*) 10 Mg Tablet      10 MG PO QAM, TAB         Reported         1/26/21       LORazepam (LORazepam) 2 Mg Tablet      2 MG PO HS, TAB         Reported         1/26/21       LORazepam (LORazepam) 1 Mg Tablet      1 MG PO QAM, TAB         Reported         1/26/21       Pravastatin Sodium (Pravastatin Sodium) 40 Mg Tablet      40 MG PO HS, #30 TAB 0 Refills         Reported         9/30/20       Gabapentin (Gabapentin) 400 Mg Capsule      400 MG PO TID, #90 CAP 0 Refills         Reported         9/30/20       Sucralfate (Carafate) 1 Gm Tab      1 GM PO QID, #120 TAB 0 Refills          Reported         9/30/20       metFORMIN ER (Glucophage XR) 500 Mg Tab.er.24h      500 MG PO BID, #60 TAB.ER 0 Refills         Reported         11/13/19       Budesonide/Formoterol Fumarate (Symbicort 160/4.5 Mcg) 10.2 Gm Inh      2 PUFF INH BID, #3 INH 1 Refill         Prov: Thang Blank         10/19/17       Pantoprazole (Protonix) 40 Mg Tablet      40 MG PO BID, #30 TAB 0 Refills         Reported         8/25/15      Medications Reviewed:  No Changes made to meds            IMPRESSION/PLAN      Diagnosis      Colon cancer - C18.9            Iron deficiency - E61.1            Pelvic mass - R19.00            Pre-procedure lab exam - Z01.812            Recurrent colorectal adenocarcinoma - C19            Notes      New Medications      * HYDROcodone-Acetaminophen 5-325 Mg 1 EACH TABLET: 1 TAB PO Q4H PRN PAIN #30      New Diagnostics      * CBC, Routine         Dx: Colon cancer - C18.9      * CMP Comp Metabolic Panel, Routine         Dx: Colon cancer - C18.9      * Cea/Carcinoembryonic, Routine         Dx: Colon cancer - C18.9      * HOLD CLOT, Routine         Dx: Colon cancer - C18.9      * BIOPSY CT, SCHEDULED PROCEDURE         Dx: Colon cancer - C18.9      * [facility] Pre-Op Covid Screening, Routine         Dx: Pre-procedure lab exam - Z01.812      New Referrals      * Surgery, Routine         [facility]         Reason for Referral: Evaluate for surgery for recurrent colon cancer         Dx: Recurrent colorectal adenocarcinoma - C19            Plan      Breast cancer: Patient was diagnosed in 2006 and again in 2012.  Among other     treatment she had a left mastectomy.  Recent mammogram was negative on     12/29/2020.  She will need a repeat mammogram in December of this year.            Colon cancer: Patient was initially diagnosed in 2015 with recurrence in 2018.      She completed surgical resection of both occurrences as well as chemotherapy and    radiation which completed in January 2019.  CT CAP for  restaging unfortunately     shows likely recurrence in pelvic lymph nodes.  On 1/28/2021 she underwent     cystoscopy with right ureteral stent placement for obstructive hydronephrosis.      Looking back through the records it appears her previous colon cancer surgeon     was Dr. Back who is no longer working.  I have discussed her case with Dr. Nelson at the [facility] who is willing to see her for resection     of the tumor recurrence.  We will plan on IR guided biopsy prior to this     appointment.            Small bowel ulcer: Patient reports she recently had an ulcer clipped by Dr. Granda    in [place].  This ulcer was not able to be seen by Dr. Pastor who is her     local gastroenterologist.  I have also discussed this with Dr. Nelson.  He     states that he can likely surgically resect the bleeding ulcer during her colon     cancer surgery.  Therefore, I would not have to refer her back to Dr. Granda for     evaluation.  We will repeat labs including CBC, CMP, CEA, and type and cross     with anticipation that the patient may need repeat blood transfusion.            Right Hydronephrosis: from external obstruction from new mass.  Cystoscopy and     right ureteral stent placement by Dr. Sousa on 1/28/2021 as above.            Normocytic anemia: Patient has a history of a bleeding small intestinal ulcer.      Recent labs showed her hemoglobin is 7.7.  Patient reports not having a bowel     movement for 3 days but each time she does have a bowel movement it is dark and     black which is concerning for persistent melena.  Will check patient's labs as     above and transfuse if needed.            Patient Education      Patient Education Provided:  Yes            Electronically signed by RONI DUTTON  01/29/2021 15:52       Disclaimer: Converted document may not contain table formatting or lab diagrams. Please see [employer] for the authenticated document.

## 2021-05-28 NOTE — PROGRESS NOTES
Patient: MARKO ESPAÑA     Acct: [id number]     Report: #[id number]  UNIT #: [id number]     : 1950    Encounter Date:2020  PRIMARY CARE: JOSE R MCGOWAN  ***Signed***  --------------------------------------------------------------------------------------------------------------------  NURSE INTAKE      Visit Type      Established Patient Visit            Chief Complaint      COLON CA HERE FOR CHECK UP            Referring Provider/Copies To      Referring Provider:  Frank Burton      Primary Care Provider:  JOSE R MCGOWAN      Copies To:   JOSE R MCGOWAN            History and Present Illness      Past Oncology Illness History      1) Breast Cancer:            -RIGHT breast DCIS diagnosed 3/15/06; low-grade; s/p lumpectomy and adjuvant     XRT; ER: 100%, NM: 70%      -LEFT breast invasive lobular carcinoma; diagnosed 2012; staged pT3 pN0 M0     stage IIB, ER: 83%, NM: 0, HER2: 1+, Ki-67: 26%            Treatment History:            1) s/p LEFT mastectomy and SLND 2012      2) s/p adjuvant DD AC and Taxotere      3) s/p adjuvant XRT chest wall      4) Adjuvant Arimidex      5) Arimidex changed to Femara      6) Ongoing Femara (19)      7) Plan to D/C Femara and surveillance (20)            2) Colon Cancer:             Diagnosed 5/7/15; Low grade; staged pT4a pN0 M0 stage IIB; 0/37 LN; no MMR     testing; RIGHT sided            Treatment History:            1) s/p RIGHT hemicolectomy 5/7/15      2) s/p adjuvant Xeloda X 6 months      3) Recurrent dx diagnosed via RIGHT ureter mass resection (18)      4) s/p FOLFIRI X 9 (-12/4/18)      5) s/p XRT RIGHT pelvis total 5320 cGy (18-19)      6) Restaging MRI (-); resumption of Femara (10/3/19)      7) Ongoing surveillance (19)      8) Plan for restaging studies (20)            9) Restaging CT CAP and Brain MRI: [facility] (7/15/20)            10) C/O Melena; referral to Surgery for EGD/[place]; Femara d/c (20)       11) EGD/Christine (-) (7/29/20)      12)            HPI - Oncology Interim      Ms. Dominguez comes in for f/u regarding several chronic issues:            1) Breast Cancer: Ms. Dominguez comes in for follow-up.  In the interim, she     reports that she is stable.  She denies fevers or chills or sweats.  She denies     palpable breast masses.            2) Anemia: Ms. Dominguez does report ongoing difficulty with melena.  She has     occasional associated hematochezia.  It waxes and wanes, and is of mild to     moderate severity.  No other modifying factors or associated signs or symptoms.            3) CRC: Ms. Dominguez reports that she underwent endoscopic evaluation in the     interim.  Unfortunately, no cause for her melena was identified.  She continues     to have intermittent symptoms at this time.  She reports that she is not on oral    iron therapy at this time.            Metastatic Sites      Renal            Clinical Staging      1. Colon cancer Stage IIIC - T4aN0, mets in Feb 2018      2. Right breast cancer Stage 0      3. Left breast cancer Stage IIIB            Treatments      Chemotherapy      1. Colon cancer : 6 months of adjuvant Xeloda (completed 12/2015), followed by     FOLFIRI (9 cycles completed 12/2018)  and XRT (completed 1/2019) to operative     bed      2. Right breast cancer status post lumpectomy + XRT      3. Left breast cancer status post mastectomy with adjuvant chemotherapy and     followed by Arimidex (? completed in 2017)      Radiation Therapy      Colon cancer: Dec 14, 2018 - Jan 24, 2019: Underwent radiation therapy to the     right pelvis: 5320 cGy in 28 fractions.            Clinical Trial Participant      No            ECOG Performance Status      1            Most Recent Lab Findings      Laboratory Tests      7/23/20 10:05            7/23/20 10:07            PAST, FAMILY   Past Medical History      Past Medical History:  Arthritis, Diabetes Type 2, High Cholesterol,     Hypertension,  Short of Air      Hematology/Oncology (F):  Anemia, Breast Cancer (2006,2012), Colorectal Cancer            Past Surgical History      Biopsy, Hysterectomy (partial), Joint Replacement (right hip), VAD Placement            Family History      Family History:  Colorectal Cancer (sister), Skin Cancer (sister-on chest)            Social History      Marital Status:        Lives independently:  Yes      Occupation:  housewife            Tobacco Use      Tobacco status:  Never smoker            Alcohol Use      Alcohol intake:  None            Substance Use      Substance use:  Denies use            REVIEW OF SYSTEMS      General:  Admits: Fatigue      Musculoskeletal:  Admits Back Pain      Neurologic:  Admits Numbness\Tingling            VITAL SIGNS AND SCORES      Vitals      Height 5 ft 5.00 in / 165.1 cm      Weight 182 lbs 8.654 oz / 82.8 kg      BSA 1.90 m2      BMI 30.4 kg/m2      Temperature 98 F / 36.67 C - Temporal      Pulse 98      Respirations 20      Blood Pressure 126/63 Sitting, Right Arm      Pulse Oximetry 98%, ROOM AIR            Pain Score      Experiencing any pain?:  Yes (BACK)      Pain Scale Used:  Numerical      Pain Intensity:  6            Fatigue Score      Experiencing any fatigue?:  Yes      Fatigue (0-10 scale):  4            EXAM      General Appearance:  Positive for: Alert, Oriented x3      Eye:  Positive for: Anicteric Sclerae, PERRLA      HEENT:  Negative for: Scleral Icterus      Abdomen/Gastro:  Positive for: Soft      Skin:  Negative for: Induration, Lesions      Psychiatric:  Positive for: AAO X 3      Lower Extremities:  Negative for: Edema            PREVENTION      Hx Influenza Vaccination:  Yes      Date Influenza Vaccine Given:  Oct 1, 2019      Influenza Vaccine Declined:  No      2 or More Falls in Past Year?:  No      Fall Past Year with Injury?:  No      Hx Pneumococcal Vaccination:  Yes      Encouraged to follow-up with:  PCP regarding preventative exams.       Chart initiated by      ARMANDO DIAMOND            ALLERGY/MEDS      Allergies      Coded Allergies:             NO KNOWN ALLERGIES (Unverified , 7/29/20)            Medications      Last Reconciled on 1/15/20 12:41 by JANE HENSON      Naproxen Sodium (Aleve) 220 Mg Tablet      220 MG PO BID, #100 TAB 0 Refills         Reported         7/28/20       Atorvastatin (Atorvastatin) 80 Mg Tablet      80 MG PO HS, #30 TAB 0 Refills         Reported         7/28/20       Gabapentin (Gabapentin) 300 Mg Capsule      300 MG PO TID, #90 CAP 0 Refills         Reported         7/28/20       metFORMIN ER (Glucophage XR) 500 Mg Tab.er.24h      500 MG PO BID, #60 TAB.ER 0 Refills         Reported         11/13/19       Escitalopram Oxalate (Escitalopram Oxalate*) 10 Mg Tablet      TAB         Reported         3/20/18       LORazepam (LORazepam) 1 Mg Tablet      MG, TAB         Reported         10/31/17       Budesonide/Formoterol Fumarate (Symbicort 160/4.5 Mcg) 10.2 Gm Inh      2 PUFF INH BID, #3 INH 1 Refill         Prov: Thang Blank         10/19/17       Pantoprazole (Protonix) 40 Mg Tablet      40 MG PO QDAY, #30 TAB 0 Refills         Reported         8/25/15      Medications Reviewed:  No Changes made to meds            IMPRESSION/PLAN      Impression      1) Breast Cancer: Ms. Dominguez is a 70-year-old female with a history of breast     cancer.  She is stable at this time.  We will continue surveillance as is     consistent with the NCCN guidelines.            2) Anemia: This is worsening.  It is likely related to gastrointestinal bleeding    based upon her symptoms.  I would recommend continue close surveillance, and I     did caution her that she may require transfusion if this continues to worsen.      In addition, I would recommend a capsule study at this time for further     evaluation.  I would also recommend consideration for consultation with     gastroenterology.  She indicates understanding and is  amenable to this plan.  If    her iron studies are low, I would recommend IV iron to address this.            3) CRC: She will continue surveillance for this as appropriate.            Diagnosis      Anemia         Anemia in other chronic diseases classified elsewhere - D63.8         Other causes of anemia: chronic disease, other            Colon cancer         Malignant neoplasm of ascending colon - C18.2         Colon location: ascending            Breast cancer         Malignant neoplasm of central portion of left breast in female, estrogen        receptor positive - C50.112, Z17.0         Breast location: central portion of breast         Estrogen receptor status: positive         Patient sex: female         Laterality: left            Plan      1) Capsule study asap            2) If iron levels low, PAR injectafer and start ASAP            3) RTC 2-3 weeks to review results of capsule study; cbc/bmp prior            Patient Education      Patient Education Provided:  Yes            Time Spent Counseling Patient      Over 50% Time Counseling Pt:  Yes            Electronically signed by WILLY KAYE  08/04/2020 15:12       Disclaimer: Converted document may not contain table formatting or lab diagrams. Please see [employer] for the authenticated document.

## 2021-05-28 NOTE — PROGRESS NOTES
Patient: MARKO ESPAÑA     Acct: [id number]     Report: #[id number]  UNIT #: [id number]     : 1950    Encounter Date:2021  PRIMARY CARE: JOSE R MCGOWAN  ***Signed***  --------------------------------------------------------------------------------------------------------------------  NURSE INTAKE      Visit Type      Established Patient Visit            Chief Complaint      COLON CA            Referring Provider/Copies To      Primary Care Provider:  JOSE R MCGOWAN      Copies To:   JOSE R MCGOWAN            History and Present Illness      Past Oncology Illness History      1) Breast Cancer:      -RIGHT breast DCIS diagnosed 3/15/06; low-grade; s/p lumpectomy and adjuvant     XRT; ER: 100%, MT: 70%      -LEFT breast invasive lobular carcinoma; diagnosed 2012; staged pT3 pN0 M0     stage IIB, ER: 83%, MT: 0, HER2: 1+, Ki-67: 26%, treated with mastectomy and     SLND 2012, adjuvant DD AC and Taxotere, Adjuvant Arimidex then Femara     (19)            2) Colon Cancer:       -Diagnosed 5/7/15; Low grade; staged pT4a pN0 M0 stage IIB; 0/37 LN; no MMR     testing; RIGHT sided      -RIGHT hemicolectomy 5/7/15      -adjuvant Xeloda X 6 months      -Recurrent dx diagnosed via RIGHT ureter mass resection (18)      -FOLFIRI X 9 (-18)      -s/p XRT RIGHT pelvis total 5320 cGy (18-19)            3) BRIANNE:      -Ulcer in the small bowel found on capsule endoscopy      -clipped by Dr. Hopkins in [place] by balloon enteroscopy      -as sees Dr. Chasidy Pastor locally            hospitals - Oncology Interim      Patient comes in today 2 establish care with me.  She was previously seen by Dr. Bustillos.  She has a history of 2 breast cancer diagnoses as well as colon     cancer.  More recently she was found did have a bleeding ulcer in her small     intestine.  The patient reports that she was initially seen by Dr. Chasidy Pastor but then went a see Dr. Maldonado in [place]  for balloon enteroscopy 2     clamps the ulcer.  She says her melena has not significantly improved since that    procedure.  She feels like she is becoming increasingly fatigued and short of     breath like she does when she is severely anemic.            We also discussed her past oncologic history.  She had a left mastectomy but     still has her right breast intact.  I reviewed the results of the recent     mammogram with the patient.  This was done on 12/29/2020 and was negative for     evidence of breast cancer recurrence.  She will need another follow-up in 1     year.  Regarding her colon cancer.  Her most recent recurrence was treated thro    [facility] January 2019.  Therefore, she is 2 years out from completion of her therapy.     Her most recent CT scan was 6 months ago.  I will plan a get another now in     follow-up with her afterwards.  The patient does complain of significant left-    sided abdominal pain.  She says the pain is quite severe and radiates toward the    right side of her body.  It is most significant in the upper portion of her     abdomen.            The patient does report that she had genetic testing when she was being treated     by Dr. Beltran.  The testing was reportedly negative.            Clinical Trial Participant      No            ECOG Performance Status      1            PAST, FAMILY   Past Medical History      Past Medical History:  Arthritis, Diabetes Type 2, High Cholesterol,     Hypertension, Short of Air      Other PMH:        Iron deficiency anemia secondary GI bleed      Hematology/Oncology (F):  Anemia, Breast Cancer, Colorectal Cancer            Past Surgical History      Biopsy, Hysterectomy, Joint Replacement, VAD Placement            Family History      Family History:  Colorectal Cancer, Skin Cancer            Genetic testing negative            Social History      Lives independently:  Yes      Occupation:  housewife            Tobacco Use      Tobacco status:  Never  smoker            Substance Use      Substance use:  Denies use            REVIEW OF SYSTEMS      General:  Admits: Fatigue;          Denies: Appetite Change, Fever, Night Sweats, Weight Gain, Weight Loss      Eye:  Admits Corrective Lenses; Denies Blurred Vision, Denies Diplopia, Denies     Vision Changes      ENT:  Denies Headache, Denies Hearing Loss, Denies Hoarseness, Denies Sore     Throat      Cardiovascular:  Denies Chest Pain, Denies Palpitations      Respiratory:  Admits: Shortness of Air;          Denies: Cough, Coughing Blood, Productive Cough, Wheezing      Gastrointestinal:  Admits Bloody Stools; Denies Constipation, Denies Diarrhea,     Denies Nausea/Vomiting, Denies Problem Swallowing, Denies Unable to Control     Bowels      Other      Melena and left abdominal pain      Genitourinary:  Denies Blood in Urine, Denies Incontinence, Denies Painful     Urination      Musculoskeletal:  Admits Back Pain, Admits Muscle Pain; Denies Painful Joints      Integumentary:  Denies Itching, Denies Lesions, Denies Rash      Neurologic:  Denies Dizziness, Denies Numbness\Tingling, Denies Seizures      Psychiatric:  Denies Anxiety, Denies Depression      Endocrine:  Denies Cold Intolerance, Denies Heat Intolerance      Hematologic/Lymphatic:  Denies Bruising, Denies Bleeding, Denies Enlarged Lymph     Nodes      Reproductive:  Denies: Menopause, Heavy Periods, Pregnant, Still Menstruating            VITAL SIGNS AND SCORES      Vitals      Weight 187 lbs 2.729 oz / 84.9 kg      Temperature 98.8 F / 37.11 C - Temporal      Pulse 109      Respirations 18      Blood Pressure 148/71 Sitting, Right Arm      Pulse Oximetry 98%, RM AIR            Pain Score      Experiencing any pain?:  Yes      Pain Scale Used:  Numerical      Pain Intensity:  5            Fatigue Score      Experiencing any fatigue?:  Yes      Fatigue (0-10 scale):  6            EXAM      General: Alert, cooperative, no acute distress      Eyes: Anicteric  sclera, PERRLA      Respiratory: CTAB, normal respiratory effort      Abdomen: Normal active bowel sounds, significant tenderness of palpation left     upper quadrant      Cardiovascular: RRR, no murmur, no lower extremity edema      Skin: Normal tone, no rash, no lesions      Psychiatric: Appropriate affect, intact judgment      Neurologic: No focal sensory or motor deficits, no weakness, numbness, dizziness      Musculoskeletal: Normal muscle strength and tone      Extremities: No clubbing, cyanosis, or deformities            PREVENTION      Hx Influenza Vaccination:  Yes      Date Influenza Vaccine Given:  Oct 5, 2020      Influenza Vaccine Declined:  No      2 or More Falls in Past Year?:  No      Fall Past Year with Injury?:  No      Hx Pneumococcal Vaccination:  Yes      Encouraged to follow-up with:  PCP regarding preventative exams.      Chart initiated by      DANYA ELDER MA            ALLERGY/MEDS      Allergies      Coded Allergies:             NO KNOWN ALLERGIES (Unverified , 1/5/21)            Medications      Last Reconciled on 1/5/21 18:05 by RONI DUTTON      Pravastatin Sodium (Pravastatin Sodium) 40 Mg Tablet      40 MG PO HS, #30 TAB 0 Refills         Reported         9/30/20       Gabapentin (Gabapentin) 400 Mg Capsule      400 MG PO TID, #90 CAP 0 Refills         Reported         9/30/20       Sucralfate (Carafate) 1 Gm Tab      1 GM PO QID, #120 TAB 0 Refills         Reported         9/30/20       metFORMIN ER (Glucophage XR) 500 Mg Tab.er.24h      500 MG PO BID, #60 TAB.ER 0 Refills         Reported         11/13/19       Escitalopram Oxalate (Escitalopram Oxalate*) 10 Mg Tablet      TAB         Reported         3/20/18       LORazepam (LORazepam) 1 Mg Tablet      MG, TAB         Reported         10/31/17       Budesonide/Formoterol Fumarate (Symbicort 160/4.5 Mcg) 10.2 Gm Inh      2 PUFF INH BID, #3 INH 1 Refill         Prov: Thang Blank         10/19/17       Pantoprazole (Protonix) 40 Mg  Tablet      40 MG PO QDAY, #30 TAB 0 Refills         Reported         8/25/15      Medications Reviewed:  No Changes made to meds            IMPRESSION/PLAN      Diagnosis      Iron deficiency - E61.1            Colon cancer - C18.9            Anemia - D64.9            Breast cancer - C50.919            Notes      New Diagnostics      * CMP Comp Metabolic Panel, Routine         Dx: Iron deficiency - E61.1      * HOLD CLOT, Routine         Dx: Iron deficiency - E61.1      * CT Abd/Pelvis/Chest W/Contrast, SCHEDULED PROCEDURE         Dx: Iron deficiency - E61.1      * CCC CBC With Auto Diff, Routine         Dx: Iron deficiency - E61.1            Plan      Breast cancer: Patient was diagnosed in 2006 and again in 2012.  Among other     treatment she had a left mastectomy.  Recent mammogram was negative on     12/29/2020.  She will need a repeat mammogram in 1 year.            Colon cancer: Patient was initially diagnosed in 2015 with recurrence in 2018.      She completed surgical resection of both occurrences as well as chemotherapy and    radiation which completed in January 2019.  We will get a CT CAP for restaging.     If this is negative then the patient will only need annual CT scans for the next    3 years.  She will follow up with me after the CT scan discussed the results            Normocytic anemia: Patient has a history of a bleeding small intestinal ulcer.      Check labs today which showed her hemoglobin is 7.7 and she is symptomatic with     fatigue shortness of breath.  She also has continuing melena.  I will plan     transfuse 1 unit of packed red blood cells.  Unfortunately she has antibodies so    transfusion will take some time 2 coordinate.  Also contacted Dr. Iraheta     regarding the plan of care.  She reviewed the patient's case and will help     facilitate getting her back into see Dr. Maldonado for repeat endoscopy.            Patient Education      Patient Education Provided:  Yes             Electronically signed by RONI DUTTON  01/05/2021 18:05       Disclaimer: Converted document may not contain table formatting or lab diagrams. Please see "[employer] System for the authenticated document.

## 2021-05-28 NOTE — PROGRESS NOTES
Patient: MARKO ESPAÑA     Acct: [id number]     Report: #[id number]  UNIT #: [id number]     : 1950    Encounter Date:2021  PRIMARY CARE: JOSE R MCGOWAN  ***Signed***  --------------------------------------------------------------------------------------------------------------------  NURSE INTAKE      Visit Type      Established Patient Visit            Chief Complaint      COLON CA            Referring Provider/Copies To      Primary Care Provider:  JOSE R MCGOWAN      Copies To:   JOSE R MCGOWAN            History and Present Illness      Past Oncology Illness History      1) Breast Cancer:      -RIGHT breast DCIS diagnosed 3/15/06; low-grade; s/p lumpectomy and adjuvant     XRT; ER: 100%, TX: 70%      -LEFT breast invasive lobular carcinoma; diagnosed 2012; staged pT3 pN0 M0     stage IIB, ER: 83%, TX: 0, HER2: 1+, Ki-67: 26%, treated with mastectomy and     SLND 2012, adjuvant DD AC and Taxotere, Adjuvant Arimidex then Femara     (19)            2) Colon Cancer:       -Diagnosed 5/7/15; Low grade; staged pT4a pN0 M0 stage IIB; 0/37 LN; no MMR     testing; RIGHT sided      -RIGHT hemicolectomy 5/7/15      -adjuvant Xeloda X 6 months      -Recurrent dx diagnosed via RIGHT ureter mass resection (18)      -FOLFIRI X 9 (-18)      -s/p XRT RIGHT pelvis total 5320 cGy (18-19)            *CT CAP 2021: Interim development of right-sided hydronephrosis and a soft     tissue mass along the right iliac chain measuring 2.9 x 1.2 cm.  There is a     second nodule identified below the aortic bifurcation in the midline measuring     1.4 cm.  This is consistent with disease recurrence.            3) BRIANNE:      -Ulcer in the small bowel found on capsule endoscopy      -clipped by Dr. Hopkins in [place] by balloon enteroscopy      -as sees Dr. Chasidy Pastor locally      -Most recently evaluated by Dr. Nelson in [place]            4) right hydronephrosis:       -Noted on CT scan from 1/21/2021, secondary to soft tissue mass      -Likely secondary to recurrent colon cancer, biopsy pending      -Cystoscopy and right ureteral stent (Dr. Chi) on 1/28/2021            HPI - Oncology Interim      Patient comes into clinic for routine follow-up.  I was aware that she went to     her local hospital for swelling of her left neck which was found to be     thrombosis.  She was also anemic and needed blood transfusion so was transferred    to [facility] for evaluation by Dr. Nelson.  I contacted him to find out his     thoughts regarding her future treatment.  He told me that he discussed endoscopy    to find the source of bleeding followed by ex-lab.  Unfortunatley she did not     every seem fit enough for the proceedure and he was too worried about life-    threatening complications especially on anti-coagulation.              The patient tell me she is miserable now.  She has neck pain from the recent     thrombosis.  She also has a persistent abdominal pain that has not eased up     since her stent was placed hydronephrosis.  I reviewed the results of her last     CT scan which were on 3/25/2021. This confirms the thombosis as well as some     minimal progression of disease.              She last recieved a blood transfusion in the hospital 2 weeks ago.  She denies     any recent melana and is often somewhat constipated.              CT N/C/A/P on 3/25/21:             Nodule along the right internal iliac chain and adjacent to the distal right     ureter is slightly larger.  A nodule/lymph node between the common iliac     arteries is stable.             New area of soft tissue irregularity along the anterior medial aspect of the     ascending colon is nonspecific.  Malignancy is not excluded.  Recommend close     attention on follow-up or potential further characterization with PET-CT if     deemed clinically appropriate.             Right ureteral stent remains in place.  Right  hydronephrosis is persistent but     slightly improved.             Nearly occlusive thrombus in the distal right internal jugular vein and     throughout the left external jugular vein.            Clinical Trial Participant      No            ECOG Performance Status      1            Most Recent Lab Findings      Laboratory Tests      3/25/21 15:38            PAST, FAMILY   Past Medical History      Past Medical History:  Arthritis, Diabetes Type 2, High Cholesterol,     Hypertension, Short of Air      Other PMH:        Iron deficiency anemia secondary GI bleed      Hematology/Oncology (F):  Anemia, Breast Cancer, Colorectal Cancer            Past Surgical History      Biopsy, Hysterectomy, Joint Replacement, VAD Placement            Family History      Family History:  Colorectal Cancer, Skin Cancer            Genetic testing negative            Social History      Lives independently:  Yes      Occupation:  housewife            Tobacco Use      Tobacco status:  Never smoker            Substance Use      Substance use:  Denies use            REVIEW OF SYSTEMS      General:  Admits: Fatigue;          Denies: Appetite Change, Fever, Night Sweats, Weight Gain, Weight Loss      Eye:  Denies Blurred Vision, Denies Corrective Lenses, Denies Diplopia, Denies     Vision Changes      ENT:  Denies Headache, Denies Hearing Loss, Denies Hoarseness, Denies Sore     Throat      Cardiovascular:  Denies Chest Pain, Denies Palpitations      Respiratory:  Denies: Cough, Coughing Blood, Productive Cough, Shortness of Air,    Wheezing      Gastrointestinal:  Denies Bloody Stools, Denies Constipation, Denies Diarrhea,     Denies Nausea/Vomiting, Denies Problem Swallowing, Denies Unable to Control     Bowels      Genitourinary:  Denies Blood in Urine, Denies Incontinence, Denies Painful     Urination      Musculoskeletal:  Admits Back Pain; Denies Muscle Pain; Admits Painful Joints      Integumentary:  Denies Itching, Denies Lesions,  Denies Rash      Neurologic:  Denies Dizziness, Denies Numbness\Tingling, Denies Seizures      Psychiatric:  Denies Anxiety, Denies Depression      Endocrine:  Denies Cold Intolerance, Denies Heat Intolerance      Hematologic/Lymphatic:  Denies Bruising, Denies Bleeding, Denies Enlarged Lymph     Nodes      Reproductive:  Denies: Menopause, Heavy Periods, Pregnant, Still Menstruating            VITAL SIGNS AND SCORES      Vitals      Weight 175 lbs 14.833 oz / 79.8 kg      Temperature 99.7 F / 37.61 C - Temporal      Pulse 94      Blood Pressure 105/57 Sitting, Right Arm      Pulse Oximetry 93%, RM AIR            Pain Score      Experiencing any pain?:  Yes      Pain Scale Used:  Numerical      Pain Intensity:  6 (STOMACH AND NGUYEN PAIN)            Fatigue Score      Experiencing any fatigue?:  Yes      Fatigue (0-10 scale):  7            EXAM      General: Alert, cooperative, no acute distress but very ill appearing      Eyes: Anicteric sclera, PERRLA      Respiratory: CTAB, normal respiratory effort      Abdomen: Normal active bowel sounds, avoided abd palpation due to pain      Cardiovascular: RRR, no murmur, no lower extremity edema      Skin: Normal tone, no rash, no lesions      Psychiatric: Appropriate affect, intact judgment      Neurologic: No focal sensory or motor deficits, no weakness, numbness, dizziness      Musculoskeletal: reduced muscle strength and tone, only able to ambulate while     holding her husbands arm, very unsteady      Extremities: No clubbing, cyanosis, or deformities            PREVENTION      Hx Influenza Vaccination:  Yes      Date Influenza Vaccine Given:  Oct 5, 2020      Influenza Vaccine Declined:  No      2 or More Falls in Past Year?:  No      Fall Past Year with Injury?:  No      Hx Pneumococcal Vaccination:  Yes      Encouraged to follow-up with:  PCP regarding preventative exams.      Chart initiated by      OLIVIA RAMESH CMA            ALLERGY/MEDS      Allergies      Coded  Allergies:             NO KNOWN ALLERGIES (Unverified , 3/30/21)            Medications      Last Reconciled on 4/5/21 22:24 by RONI DUTTON      oxyCODONE IR (oxyCODONE IR) 5 Mg Tablet      5 MG PO Q3H PRN for PAIN, #90 TAB 0 Refills         Prov: RONI DUTTON         3/30/21       Senna (Senokot) 8.6 Mg Tablet      8.6 MG PO BID PRN for CONSTIPATION for 30 Days, #60 TAB 1 Refill         Prov: RONI DUTTON         3/30/21       Apixaban (Eliquis) 5 Mg Tablet      5 MG PO BID for 30 Days, #60 TAB         Reported         3/30/21       HYDROcodone-Acetaminophen 5-325 Mg (HYDROcodone-Acetaminophen 5-325 Mg) 1 Each     Tablet      1 TAB PO Q4H PRN for PAIN, #30 TAB         Prov: RONI DUTTON         1/29/21       Escitalopram Oxalate (Escitalopram Oxalate*) 20 Mg Tablet      20 MG PO HS, TAB         Reported         1/26/21       Escitalopram Oxalate (Escitalopram Oxalate*) 10 Mg Tablet      10 MG PO QAM, TAB         Reported         1/26/21       LORazepam (LORazepam) 2 Mg Tablet      2 MG PO HS, TAB         Reported         1/26/21       LORazepam (LORazepam) 1 Mg Tablet      1 MG PO QAM, TAB         Reported         1/26/21       Pravastatin Sodium (Pravastatin Sodium) 40 Mg Tablet      40 MG PO HS, #30 TAB 0 Refills         Reported         9/30/20       Gabapentin (Gabapentin) 400 Mg Capsule      400 MG PO TID, #90 CAP 0 Refills         Reported         9/30/20       Sucralfate (Carafate) 1 Gm Tab      1 GM PO QID, #120 TAB 0 Refills         Reported         9/30/20       metFORMIN ER (Glucophage XR) 500 Mg Tab.er.24h      500 MG PO BID, #60 TAB.ER 0 Refills         Reported         11/13/19       Budesonide/Formoterol Fumarate (Symbicort 160/4.5 Mcg) 10.2 Gm Inh      2 PUFF INH BID, #3 INH 1 Refill         Prov: Thang Blank         10/19/17       Pantoprazole (Protonix) 40 Mg Tablet      40 MG PO BID, #30 TAB 0 Refills         Reported         8/25/15      Medications Reviewed:  Changes made to meds             IMPRESSION/PLAN      Diagnosis      Anemia - D64.9            Colon cancer - C18.9            Notes      New Medications      * Apixaban (Eliquis) 5 MG TABLET: 5 MG PO BID 30 Days #60      * Senna (Senokot) 8.6 MG TABLET: 8.6 MG PO BID PRN CONSTIPATION 30 Days #60         Dx: Anemia - D64.9      * oxyCODONE IR 5 MG TABLET: 5 MG PO Q3H PRN PAIN #90      New Diagnostics      * Ferritin Level, Routine         Dx: Anemia - D64.9      * Iron Profile, Routine         Dx: Anemia - D64.9      * CMP Comp Metabolic Panel, Routine         Dx: Anemia - D64.9      * CBC With Auto Diff, Routine         Dx: Anemia - D64.9      * HOLD CLOT, Routine         Dx: Anemia - D64.9            Plan      Colon cancer: Patient was initially diagnosed in 2015 with recurrence in 2018.      She completed surgical resection of both occurrences as well as chemotherapy and    radiation which completed in January 2019.  CT CAP for restaging in January 2021    unfortunately showed a recurrence in pelvic lymph nodes.  On 1/28/2021 she     underwent cystoscopy with right ureteral stent placement for obstructive     hydronephrosis. I previously referred her to Dr. Nelson at the [facility] who was planning surgical resection but she was not found to be fit     enough for cancer surgery as well as resection of the small bowel ulcer. I     reviewed her recent scan which does show some progression.  Her CEA in increased    from 6.4 to 9.4 over the last few months.  We discussed the risks and benefits     to restarting Xeloda.  The patient is agreeable and will f/u with me in 2 weeks     to discuss her many health problems again.              Small bowel ulcer: Patient reports she recently had an ulcer clipped by Dr. Granda    in [place]. It is not in a location that can be reached by conventional     scope.  Not actively bleeding this week.  Not currently fit enough for resection    as above.   Will monitory closely.              Recent DVT: in the left subclavian and brachiocephalic veins, diagnosed in March 2021.  No on Eliquis 5mg BID. I recommend she stay on this for 3-6 months     depending on her bleeding difficulites during this time.              Right Hydronephrosis: from external obstruction from new mass.  Cystoscopy and     right ureteral stent placement by Dr. Chi on 1/28/2021 as above.  Patient     reports persistent pain since her procedure.  I discussed the case with Dr. Chi.  Patient initially had hematuria off and on but not had in several days.            Normocytic anemia: Patient has a history of a bleeding small intestinal ulcer     and persistent melena.  Will check patient's labs weekly and transfuse if     needed. She has several antibodies and requires a few days to find matched     blood.  Patient also has iron deficiency and cannot easily tolerate oral iron.     Furthermore she is on a PPI BID due to her history of gastric ulcer so she will     not likely absorb iron.  Will check labs today including CBC, type and cross and    iron studies and plan to start IV iron infusions.            Abdominal pain: Secondary to underlying malignancy.  I will refill the     prescription for 5 mg oxycodone as needed daily 3 to 4 hours. Enourage stool     softeners and laxatives while on narcotic pain medication.             Breast cancer: Patient was diagnosed in 2006 and again in 2012.  Among other     treatment she had a left mastectomy.  Recent mammogram was negative on     12/29/2020.  She will need a repeat mammogram in December of this year.            Patient Education      Patient Education Provided:  Yes            Electronically signed by RONI DUTTON  04/05/2021 22:24       Disclaimer: Converted document may not contain table formatting or lab diagrams. Please see [email or web address] for the authenticated document.

## 2021-05-28 NOTE — PROGRESS NOTES
Patient: MARKO ESPAÑA     Acct: [id number]     Report: #[id number]  UNIT #: [id number]     : 1950    Encounter Date:2020  PRIMARY CARE: JOSE R MCGOWAN  ***Signed***  --------------------------------------------------------------------------------------------------------------------  NURSE INTAKE      Visit Type      Established Patient Visit            Chief Complaint      COLON CANCER            Referring Provider/Copies To      Referring Provider:  Frank Burton      Primary Care Provider:  JOSE R MCGOWAN      Copies To:   JOSE R MCGOWAN            History and Present Illness      Past Oncology Illness History      1) Breast Cancer:            -RIGHT breast DCIS diagnosed 3/15/06; low-grade; s/p lumpectomy and adjuvant     XRT; ER: 100%, AZ: 70%      -LEFT breast invasive lobular carcinoma; diagnosed 2012; staged pT3 pN0 M0     stage IIB, ER: 83%, AZ: 0, HER2: 1+, Ki-67: 26%            Treatment History:            1) s/p LEFT mastectomy and SLND 2012      2) s/p adjuvant DD AC and Taxotere      3) s/p adjuvant XRT chest wall      4) Adjuvant Arimidex      5) Arimidex changed to Femara      6) Ongoing Femara (19)      7) Plan to D/C Femara and surveillance (20)            2) Colon Cancer:             Diagnosed 5/7/15; Low grade; staged pT4a pN0 M0 stage IIB; 0/37 LN; no MMR     testing; RIGHT sided            Treatment History:            1) s/p RIGHT hemicolectomy 5/7/15      2) s/p adjuvant Xeloda X 6 months      3) Recurrent dx diagnosed via RIGHT ureter mass resection (18)      4) s/p FOLFIRI X 9 (-18)      5) s/p XRT RIGHT pelvis total 5320 cGy (18-19)      6) Restaging MRI (-); resumption of Femara (10/3/19)      7) Ongoing surveillance (19)      8) Plan for restaging studies (20)            9) Restaging CT CAP and Brain MRI: [facility] (7/15/20)            10) C/O Melena; referral to Surgery for EGD/[place]; Femara d/c (20)            HPI -  Oncology Interim      Ms. Dominguez comes in for f/u regarding several chronic issues:            1) Colon Cancer: Ms. Dominguez comes in for follow-up.  She comes in to review the     results of her restaging CT scan.  In the interim, she reports that she is     developed bleeding.  She describes it as melanotic stools.  It is been present     for 1 to 2 weeks.  It is of mild severity.  No other modifying factors or     associated signs or symptoms.  She does report ongoing asthenia.  She also     reports ongoing ataxia.  It has not substantially changed in the interim.            2) Anemia: Again, Ms. Dominguez does complain of melanotic stools.  She denies     epigastric pain or dysphagia.            3) Breast Cancer: Ms. Dominguez continues on Femara.  She denies significant side     effects.            Metastatic Sites      Renal            Clinical Staging      1. Colon cancer Stage IIIC - T4aN0, mets in Feb 2018      2. Right breast cancer Stage 0      3. Left breast cancer Stage IIIB            Treatments      Chemotherapy      1. Colon cancer : 6 months of adjuvant Xeloda (completed 12/2015), followed by     FOLFIRI (9 cycles completed 12/2018)  and XRT (completed 1/2019) to operative     bed      2. Right breast cancer status post lumpectomy + XRT      3. Left breast cancer status post mastectomy with adjuvant chemotherapy and     followed by Arimidex (? completed in 2017)      Radiation Therapy      Colon cancer: Dec 14, 2018 - Jan 24, 2019: Underwent radiation therapy to the     right pelvis: 5320 cGy in 28 fractions.            Clinical Trial Participant      No            ECOG Performance Status      1            Most Recent Lab Findings      Laboratory Tests      7/9/20 09:00            7/9/20 09:04            Laboratory Tests            Test       7/9/20      09:00             Ferritin       130 ng/mL      ()            Most Recent Imaging Findings      PROCEDURE:   CT ABDOMEN; CT CHEST; CT PELVIS WITH  CONTRAST             COMPARISON:   [facility], CT, CT CHEST ABD PEL W CONTRAST,     1/13/2020, 9:57.             INDICATIONS:   COLON  AND LEFT BREAST CANCER WITH ATAXIA             TECHNIQUE:   After obtaining the patient's consent, CT images were obtained with    intravenous contrast       material.             FINDINGS:         Chest:  Chronic changes in the lungs are similar to the previous study.  No     suspicious pulmonary       nodule.  Previous left mastectomy.  Small, linear seroma in the operative bed is    stable.  No       adenopathy in the chest.  Ascending thoracic aorta measures 4.1 cm, similar to     the prior.  No       aggressive appearing bone change.             Abdomen:  Liver, spleen, adrenal glands, pancreas, gallbladder are unremarkable.     Cysts in the left       kidney, largest at the upper pole measures 4.4 cm and are unchanged.  Bowel     loops are nondilated.        Uncomplicated colonic diverticulosis.  Previous right hemicolectomy.  No     abnormal soft tissue at       the anastomosis.  No abdominal adenopathy.             Pelvis:  No pelvic mass or fluid.  Pelvic floor insufficiency with cystocele.      No pelvic       adenopathy.  No aggressive appearing bone change.             CONCLUSION:   No convincing evidence for active metastatic disease in the chest,    abdomen, or pelvis.              NIK ORANTES MD             Electronically Signed and Approved By: NIK ORANTES MD on 7/15/2020 at 10:39            PROCEDURE:   MRI BRAIN WITHOUT AND WITH CONTRAST             COMPARISON:   [facility], MR, BRAIN W/O CONTRAST, 7/06/2015,     17:33.             INDICATIONS:   ATAXIA, FREQUENT FALLS             CONTRAST:   15ML  Multihance I.V.             TECHNIQUE:   A variety of imaging planes and parameters were utilized for     visualization of suspected       pathology.  Images were performed without and with gadolinium contrast.             FINDINGS:          There is no acute infarction, acute intracranial hemorrhage, or extra-axial     collection. The       ventricles are normal in caliber, with no evidence of mass effect or midline     shift. The basal       cisterns are patent. No pathological parenchymal enhancement or mass is     identified.             The midline structures are intact.  There is a right lens replacement.  The left    globe and orbit       appear intact.  The intra-cranial vascular flow voids appear patent.             CONCLUSION:   No acute intracranial process identified.              CHERYL DIEHL MD             Electronically Signed and Approved By: CHERYL DIEHL MD on 7/15/2020 at 10:57            PAST, FAMILY   Past Medical History      Past Medical History:  Arthritis, Diabetes Type 2, High Cholesterol,     Hypertension, Short of Air      Hematology/Oncology (F):  Anemia, Breast Cancer (2006,2012), Colorectal Cancer            Past Surgical History      Biopsy, Hysterectomy (partial), Joint Replacement (right hip), VAD Placement            Family History      Family History:  Colorectal Cancer (sister), Skin Cancer (sister-on chest)            Social History      Marital Status:        Lives independently:  Yes      Occupation:  housewife            Tobacco Use      Tobacco status:  Never smoker            Alcohol Use      Alcohol intake:  None            Substance Use      Substance use:  Denies use            REVIEW OF SYSTEMS      General:  Admits: Fatigue;          Denies: Appetite Change, Fever, Night Sweats, Weight Gain, Weight Loss      Eye:  Denies Blurred Vision, Denies Corrective Lenses, Denies Diplopia, Denies     Vision Changes      ENT:  Denies Headache, Denies Hearing Loss, Denies Hoarseness, Denies Sore     Throat      Cardiovascular:  Denies Chest Pain, Denies Palpitations      Respiratory:  Denies: Cough, Coughing Blood, Productive Cough, Shortness of Air,    Wheezing      Gastrointestinal:  Denies Bloody  Stools, Denies Constipation, Denies Diarrhea,     Denies Nausea/Vomiting, Denies Problem Swallowing, Denies Unable to Control     Bowels      Genitourinary:  Denies Blood in Urine, Denies Incontinence, Denies Painful     Urination      Musculoskeletal:  Denies Back Pain, Denies Muscle Pain, Denies Painful Joints      Integumentary:  Denies Itching, Denies Lesions, Denies Rash      Neurologic:  Denies Dizziness, Denies Numbness\Tingling, Denies Seizures      Psychiatric:  Denies Anxiety, Denies Depression      Endocrine:  Denies Cold Intolerance, Denies Heat Intolerance      Hematologic/Lymphatic:  Denies Bruising, Denies Bleeding, Denies Enlarged Lymph     Nodes      Reproductive:  Denies: Menopause, Heavy Periods, Pregnant, Still Menstruating            VITAL SIGNS AND SCORES      Vitals      Weight 180 lbs 12.435 oz / 82.0 kg      Temperature 98.6 F / 37 C - Temporal      Pulse 92      Respirations 18      Blood Pressure 127/68 Sitting, Right Arm      Pulse Oximetry 98%, ROOM AIR            Pain Score      Experiencing any pain?:  No      Pain Scale Used:  Numerical      Pain Intensity:  0            Fatigue Score      Experiencing any fatigue?:  Yes      Fatigue (0-10 scale):  4            EXAM      General Appearance:  Positive for: Alert, Oriented x3      Eye:  Positive for: Anicteric Sclerae, PERRLA      HEENT:  Negative for: Scleral Icterus, Thrush      Neck:  Positive for: Supple      Respiratory:  Negative for: Rales, Rhochi      Abdomen/Gastro:  Positive for: Soft;          Negative for: Hepatosplenomegaly      Cardiovascular:  Positive for: RRR      Skin:  Negative for: Induration, Lesions      Psychiatric:  Positive for: AAO X 3, Appropriate Affect      Lower Extremities:  Negative for: Edema            PREVENTION      Hx Influenza Vaccination:  Yes      Date Influenza Vaccine Given:  Oct 1, 2019      Influenza Vaccine Declined:  No      2 or More Falls in Past Year?:  Yes      Fall Past Year with  Injury?:  No      Hx Pneumococcal Vaccination:  Yes      Encouraged to follow-up with:  PCP regarding preventative exams.      Chart initiated by      RYAN PLUMMER [facility]            ALLERGY/MEDS      Allergies      Coded Allergies:             NO KNOWN ALLERGIES (Unverified , 7/23/20)            Medications      Last Reconciled on 1/15/20 12:41 by JANE HENSON      Cyanocobalamin (Vitamin B-12) Inj (Cyanocobalamin Inj) 1,000 Mcg/1 Ml Vial      1000 MCG IM WE, VIAL         Reported         5/7/20       celeCOXIB (CeleBREX) 400 Mg Capsule      400 MG PO QDAY, #30 CAP 0 Refills         Reported         4/30/20       Letrozole (Femara*) 2.5 Mg Tablet      2.5 MG PO QDAY, #90 TAB 3 Refills         Prov: VARGAS WILLY         1/28/20       metFORMIN ER (Glucophage XR) 500 Mg Tab.er.24h      500 MG PO BID, #60 TAB.ER 0 Refills         Reported         11/13/19       HYDROcodone-Acetaminophen  Mg (HYDROcodone-Acetaminophen  Mg) 1 Each    Tablet      1 TAB PO Q4H PRN for BREAKTHROUGH PAIN, TAB 0 Refills         Reported         10/3/19       Loperamide (Loperamide) 2 Mg Capsule      2 MG PO ASDIR, #60 TAB 3 Refills         Prov: DERRICK BECKFORD         11/19/18       Ondansetron Hcl (ONDANSETRON HCL) 8 Mg Tablet      8 MG PO Q8H PRN for NAUSEA AND/OR VOMITING, #40 TAB 3 Refills         Prov: DERRICK BECKFORD         7/9/18       Prochlorperazine Maleate (Prochlorperazine Maleate) 10 Mg Tab      10 MG PO Q6H PRN for NAUSEA AND/OR VOMITING, #60 TAB 3 Refills         Prov: DERRICK BECKFORD         7/9/18       Pravastatin Sodium (Pravastatin Sodium) 80 Mg Tablet      80 MG PO HS, TAB         Reported         7/6/18       Escitalopram Oxalate (Escitalopram Oxalate*) 10 Mg Tablet      TAB         Reported         3/20/18       LORazepam (LORazepam) 1 Mg Tablet      MG, TAB         Reported         10/31/17       Budesonide/Formoterol Fumarate (Symbicort 160/4.5 Mcg) 10.2 Gm Inh      2 PUFF INH BID, #3 INH 1 Refill          Prov: Thang Blank         10/19/17       Lactobacillus Combo No.6 (PROBIOTIC COMPLEX) 1 Each Tablet      1 EACH PO BID, TAB         Reported         3/28/16       Pantoprazole (Protonix) 40 Mg Tablet      40 MG PO HS, #30 TAB 0 Refills         Reported         8/25/15      Medications Reviewed:  No Changes made to meds            IMPRESSION/PLAN      Impression      1) Colon Cancer: Ms. Dominguez is a 70-year-old female with a history of colorectal    cancer.  She has developed melanotic stools.  I reviewed with her today her     restaging CT scan.  Thankfully, there is no overt evidence of active or     progressive disease.  We will continue surveillance as is consistent with the     NCCN guidelines.  She indicates understanding and is amenable to this plan.            2) Anemia: Ms. Dominguez has developed melanotic stools.  I would recommend an EGD     and colonoscopy for further evaluation.  She will be referred to surgery to     proceed with this.  I will see her back roughly 2 weeks following the endoscopic    evaluation to review the results and to continue to follow her blood work     closely.  I did advise her that she may require transfusional support if her     anemia worsens.  She indicates understanding and is amenable to this plan.            3) Breast Cancer: She has been on aromatase inhibitor therapy for over 5 years.     She will discontinue therapy at this time and proceed with surveillance.            Diagnosis      Colon cancer         Malignant neoplasm of ascending colon - C18.2         Colon location: ascending            Anemia         Anemia in other chronic diseases classified elsewhere - D63.8         Other causes of anemia: chronic disease, other            Breast cancer         Malignant neoplasm of central portion of left breast in female, estrogen        receptor positive - C50.112, Z17.0         Breast location: central portion of breast         Estrogen receptor status: positive          Patient sex: female         Laterality: left            Black stools - K92.1            Notes      New Diagnostics      * Comp Metabolic Panel, 1 DAY         Dx: Malignant neoplasm of ascending colon - C18.2      * CBC With Auto Diff, 1 DAY         Dx: Anemia in other chronic diseases classified elsewhere - D63.8      * CBC, 2 Week         Dx: Malignant neoplasm of ascending colon - C18.2      * CMP Comp Metabolic Panel, 2 Week         Dx: Malignant neoplasm of ascending colon - C18.2      * Iron Profile, 2 Week         Dx: Malignant neoplasm of ascending colon - C18.2      * Ferritin Level, 2 Week         Dx: Malignant neoplasm of ascending colon - C18.2      New Referrals      * Surgery, Routine         [facility]         Reason for Referral: Evaluate for GI bleed, h/o colon cancer, anemia          Dx: Anemia in other chronic diseases classified elsewhere - D63.8            Plan      1) Referral to surgery for EGD and colonoscopy for GIB and anemia            2) d/c Femara            3) RTC 2-3 weeks with cbc/cmp/iron studies prior            Patient Education      Patient Education Provided:  Yes            Electronically signed by WILLY KAYE  07/23/2020 11:51       Disclaimer: Converted document may not contain table formatting or lab diagrams. Please see [facility] for the authenticated document.

## 2021-05-28 NOTE — PROGRESS NOTES
Patient: MARKO ESPAÑA     Acct: [id number]     Report: #[id number]  UNIT #: [id number]     : 1950    Encounter Date:2021  PRIMARY CARE: JOSE R MCGOWAN  ***Signed***  --------------------------------------------------------------------------------------------------------------------  NURSE INTAKE      Visit Type      Established Patient Visit            Chief Complaint      COLON CA/ ANEMIA            Referring Provider/Copies To      Primary Care Provider:  JOSE R MCGOWAN      Copies To:   JOSE R MCGOWAN            History and Present Illness      Past Oncology Illness History      1) Small Bowel Adenocarcinoma      - patient developed small bowel obstruction on 21 and underwent emergency     resection of the Ileum (Dr. Lacey)       - pathology from the ileum was positive for adenocarcinoma that appeared to be     small bowel in origin and represent a new primary malignancy, 2.2cm, grade 1, t    umor perforates the visceral peritoneum, pT4pN0, with 0/12 lymph nodes involved.      - Post surgical CT Abd/Pelvis 21:  Mild thickening of the wall of some     portion of the small intestine, could be post-surgical changes. Moderate     dilation of the right renal collecting system is unchanged with ureteral double-    J stent.        - CT CAP on 3/25 prior to surgery showed no evidence of disease in the chest      - Caris report pending            2) Colon Cancer:       -Diagnosed 5/7/15; Low grade; staged pT4a pN0 M0 stage IIB; 0/37 LN; no MMR     testing; RIGHT sided      -RIGHT hemicolectomy 5/7/15      -adjuvant Xeloda X 6 months      -Recurrent dx diagnosed via RIGHT ureter mass resection (18)      -FOLFIRI X 9 (-18)      -s/p XRT RIGHT pelvis total 5320 cGy (18-19)      -CARIS report from specimen dated 5/7/15: BRAF V600E mutation (possible benefit     from cetuximab), MSI stable, TMB-low, TAURUS-High (33%), TP53 mutated            *CT CAP 2021: Interim  development of right-sided hydronephrosis and a soft     tissue mass along the right iliac chain measuring 2.9 x 1.2 cm.  There is a     second nodule identified below the aortic bifurcation in the midline measuring     1.4 cm.  This was consistent with disease recurrence versus her new primary     small bowel cancer (see above).             3) Breast Cancer:      -RIGHT breast DCIS diagnosed 3/15/06; low-grade; s/p lumpectomy and adjuvant     XRT; ER: 100%, LA: 70%      -LEFT breast invasive lobular carcinoma; diagnosed 06/2012; staged pT3 pN0 M0     stage IIB, ER: 83%, LA: 0, HER2: 1+, Ki-67: 26%, treated with mastectomy and     SLND 04/2012, adjuvant DD AC and Taxotere, Adjuvant Arimidex then Femara     (11/13/19)            4) Anemia:      -multifactorial due to her history of GI bleeding from small bowel ulcer/tumor      -intolerant to oral iron. Treatment with IV iron in the past.       -iron studies on 5/11/12 were normal            5) right hydronephrosis:      -Noted on CT scan from 1/21/2021, secondary to soft tissue mass      -Likely secondary to recurrent colon cancer, biopsy pending      -Cystoscopy and right ureteral stent (Dr. Chi) on 1/28/2021            6) DVT:      -bracheocephalic and subclavian thrombosis March 2021 ([facility])            HPI - Oncology Interim      Patient comes in today with her  to follow-up after her recent surgery.      On 4/18/2021 she presented with a small bowel obstruction and had to have     emergency surgery by Dr. Carballo.  After the surgery contacted me to say that     pathology was concerning for new primary malignancy of the small bowel rather     than a metastatic lesion from her recurrent colon cancer.  Since then the     patient has been healing well.  She still says that there is some fluid coming     from the wound in her abdomen but it is clear.  She recently saw Dr. Carballo     and he was not concerned about the finding.  She is  just 3 weeks out from     surgery.  She says she feels much better than she did before the surgery.  She     is walking better and is able to participate in home physical therapy.  She does    not have as much pain as she was having prior to surgery.  However she does use     hydrocodone 2-3 times a day.            Clinical Trial Participant      No            ECOG Performance Status      2            Most Recent Lab Findings      Laboratory Tests      5/1/21 11:28            5/11/21 10:05            Laboratory Tests            Test       5/1/21      11:28             Magnesium Level       1.04 mg/dL      (1.60-2.30)            PAST, FAMILY   Past Medical History      Past Medical History:  Arthritis, Diabetes Type 2, High Cholesterol,     Hypertension, Short of Air      Other PMH:        Iron deficiency anemia secondary GI bleed      Hematology/Oncology (F):  Anemia, Breast Cancer, Colorectal Cancer            Past Surgical History      Biopsy, Hysterectomy, Joint Replacement, VAD Placement            Family History      Family History:  Colorectal Cancer, Skin Cancer            Genetic testing negative            Social History      Lives independently:  Yes      Occupation:  housewife            Tobacco Use      Tobacco status:  Never smoker            Substance Use      Substance use:  Denies use            REVIEW OF SYSTEMS      General:  Admits: Fatigue;          Denies: Appetite Change, Fever, Night Sweats, Weight Gain, Weight Loss      Eye:  Denies Blurred Vision, Denies Corrective Lenses, Denies Diplopia, Denies     Vision Changes      ENT:  Denies Headache, Denies Hearing Loss, Denies Hoarseness, Denies Sore     Throat      Cardiovascular:  Denies Chest Pain, Denies Palpitations      Respiratory:  Denies: Cough, Coughing Blood, Productive Cough, Shortness of Air,    Wheezing      Gastrointestinal:  Denies Bloody Stools, Denies Constipation, Denies Diarrhea,     Denies Nausea/Vomiting, Denies Problem  Swallowing, Denies Unable to Control     Bowels      Other      Abdominal pain      Genitourinary:  Denies Blood in Urine, Denies Incontinence, Denies Painful     Urination      Musculoskeletal:  Denies Back Pain, Denies Muscle Pain, Denies Painful Joints      Integumentary:  Denies Itching, Denies Lesions, Denies Rash      Neurologic:  Denies Dizziness, Denies Numbness\Tingling, Denies Seizures      Psychiatric:  Denies Anxiety, Denies Depression      Endocrine:  Denies Cold Intolerance, Denies Heat Intolerance      Hematologic/Lymphatic:  Denies Bruising, Denies Bleeding, Denies Enlarged Lymph     Nodes      Reproductive:  Denies: Menopause, Heavy Periods, Pregnant, Still Menstruating            VITAL SIGNS AND SCORES      Vitals      Temperature 97.7 F / 36.5 C - Temporal      Pulse 78      Respirations 18      Blood Pressure 120/64 Sitting, Left Arm      Pulse Oximetry 97%, RM AIR            Pain Score      Experiencing any pain?:  No      Pain Scale Used:  Numerical      Pain Intensity:  0            Fatigue Score      Experiencing any fatigue?:  No      Fatigue (0-10 scale):  0 (none)            EXAM      General: Alert, cooperative, no acute distress, appears more fit and generally     healthier than last time I saw her 6 weeks ago      Eyes: Anicteric sclera, PERRLA      Respiratory: CTAB, normal respiratory effort      Abdomen: Normal active bowel sounds, deferred palpation of the abdomen due to     recent surgery, surgical site covered in a bandage      Cardiovascular: RRR, no murmur, no lower extremity edema      Skin: Normal tone, mild to moderate skin rash on the abdomen and a line around     where the bandage tape is causing irritation      Psychiatric: Appropriate affect, intact judgment      Neurologic: No focal sensory or motor deficits, no weakness, numbness, dizziness      Musculoskeletal: Normal muscle strength and tone, improved ambulation      Extremities: No clubbing, cyanosis, or deformities             PREVENTION      Hx Influenza Vaccination:  Yes      Date Influenza Vaccine Given:  Oct 5, 2020      Influenza Vaccine Declined:  No      2 or More Falls in Past Year?:  No      Fall Past Year with Injury?:  No      Hx Pneumococcal Vaccination:  Yes      Encouraged to follow-up with:  PCP regarding preventative exams.      Chart initiated by      OLIVIA RAMESH CMA            ALLERGY/MEDS      Allergies      Coded Allergies:             NO KNOWN ALLERGIES (Unverified , 5/11/21)            Medications      Last Reconciled on 5/11/21 13:22 by RONI DUTTON      Digoxin (Digoxin*) 0.125 Mg Tablet      0.125 MG PO QDAY, #30 TAB 0 Refills         Prov: Alli Carballo         4/24/21       Metoprolol Succinate (Metoprolol Succinate) 100 Mg Tab.er.24h      50 MG PO BID, #60 TAB.SR.24H         Prov: Alli Carballo         4/24/21       HYDROcodone-Acetaminophen 5-325 Mg (Norco 5-325 Mg) 1 Each Tablet      1 TAB PO Q4H PRN for BREAKTHROUGH PAIN, #18 TAB 0 Refills         Prov: Alli Carballo         4/24/21       Saccharomyces Boulardii (Florastor) 250 Mg Capsule      250 MG PO BID, #20 CAP         Prov: Alli Carballo         4/24/21       Amoxicillin/Clavulanate K (Augmentin 875/125 Mg) 1 Each Tablet      875 MG PO BID for 7 Days, #14 TAB 0 Refills         Prov: Alli Carballo         4/24/21       Melatonin (Melatonin) 10 Mg Tablet      10 MG PO HS, TAB         Reported         4/11/21       oxyCODONE IR (oxyCODONE IR) 5 Mg Tablet      5 MG PO TID PRN for PAIN, TAB 0 Refills         Reported         4/11/21       Escitalopram Oxalate (Escitalopram Oxalate*) 20 Mg Tablet      20 MG PO QDAY, TAB         Reported         4/11/21       metFORMIN HCl (metFORMIN HCl) 500 Mg Tablet      500 MG PO BID, #60 TAB 0 Refills         Reported         4/11/21       Senna (Senokot) 8.6 Mg Tablet      8.6 MG PO BID PRN for CONSTIPATION for 30 Days, #60 TAB 1 Refill         Prov: RONI DUTTON         3/30/21       Apixaban  (Eliquis) 5 Mg Tablet      5 MG PO BID for 30 Days, #60 TAB         Reported         3/30/21       LORazepam (LORazepam) 2 Mg Tablet      2 MG PO HS, TAB         Reported         1/26/21       LORazepam (LORazepam) 1 Mg Tablet      1 MG PO QAM, TAB         Reported         1/26/21       Pravastatin Sodium (Pravastatin Sodium) 40 Mg Tablet      40 MG PO HS, #30 TAB 0 Refills         Reported         9/30/20       Gabapentin (Gabapentin) 400 Mg Capsule      400 MG PO TID, #90 CAP 0 Refills         Reported         9/30/20       Pantoprazole (Protonix) 40 Mg Tablet      40 MG PO BID, #30 TAB 0 Refills         Reported         8/25/15      Medications Reviewed:  No Changes made to meds            IMPRESSION/PLAN      Diagnosis      Colon cancer - C18.9            Anemia - D64.9            Notes      New Medications      * HYDROcodone-Acetaminophen 5-325 Mg (Norco 5-325 Mg) 1 EACH TABLET: 1 TAB PO       Q4H PRN BREAKTHROUGH PAIN #90      New Diagnostics      * CBC, 2 Week         Dx: Colon cancer - C18.9      * CMP Comp Metabolic Panel, 2 Week         Dx: Colon cancer - C18.9            Plan      1) Small Bowel Adenocarcinoma      - diagnosed as a new primary malignancy after SBO, path revealed pT4pN0. I have     spoken to Dr. Young in pathology to confirm his findings of primary small bowel    carcinoma.       - repeat CT A/P shows not definitive evidence of recurrent disease but she does     have thickening of other areas of the small bowel which are concerning for     residual disease.        - CEA improved only slightly from 9.4 to 7.8 after surgery.       - pt will RTC in 2 weeks when she has had a chance to heal more.  Then we will     plan to start Capecitabine.  Later, if she is able to tolerate it, I will give     her CAPEOX.        - I have spoken to Dr. Carballo who believes based on internal exam that the     patient has residual malignancy in the pelvis that cannot be seen on imaging.       - Dr. Carballo  will f/u with repeat endoscopy.              2) Colon Cancer:       -Diagnosed 5/7/15; Low grade; staged pT4a pN0 M0 stage IIB; 0/37 LN; no MMR     testing; RIGHT sided      -RIGHT hemicolectomy 5/7/15      -adjuvant Xeloda X 6 months      -Recurrent dx diagnosed via RIGHT ureter mass resection (5/1/18)      -FOLFIRI X 9 (7/31-12/4/18)      -s/p XRT RIGHT pelvis total 5320 cGy (12/14/18-1/24/19)      -the presence of recurrent colon cancer has not been confirmed given that the     known malignancy may be primary small bowel cancer instead.             *CT CAP 1/21/2021: Interim development of right-sided hydronephrosis and a soft     tissue mass along the right iliac chain measuring 2.9 x 1.2 cm.  There is a     second nodule identified below the aortic bifurcation in the midline measuring     1.4 cm.  This was consistent with disease recurrence versus her new primary     small bowel cancer (see above).             3) Breast Cancer:      -RIGHT breast DCIS diagnosed 3/15/06; low-grade; s/p lumpectomy and adjuvant     XRT; ER: 100%, OK: 70%      -LEFT breast invasive lobular carcinoma; diagnosed 06/2012; staged pT3 pN0 M0     stage IIB, ER: 83%, OK: 0, HER2: 1+, Ki-67: 26%, treated with mastectomy and     SLND 04/2012, adjuvant DD AC and Taxotere, Adjuvant Arimidex then Femara     (11/13/19)            4) Normocytic Anemia      -multifactorial, due at least in part to her history of GI bleeding      -intolerant to oral iron. Treatment with IV iron in the past.       -recent iron studies are normal. No need for IV iron at this time            5) right hydronephrosis:      -Noted on CT scan from 1/21/2021, secondary to soft tissue mass      -right ureteral stent (Dr. Chi)            6) DVT:      -bracheocephalic and subclavian thrombosis March 2021 ([facility])       -on Eliquis            7) Abdominal Pain:      -secondary to malignancy      -improved after surgery      -takes hydrocodone 2-3 times daily  for breakthrough pain. Will refill today            Patient Education      Patient Education Provided:  Yes            Electronically signed by RONI DUTTON  05/11/2021 15:14       Disclaimer: Converted document may not contain table formatting or lab diagrams. Please see [employer] for the authenticated document.

## 2021-06-04 ENCOUNTER — HOSPITAL ENCOUNTER (OUTPATIENT)
Dept: GASTROENTEROLOGY | Facility: HOSPITAL | Age: 71
Setting detail: HOSPITAL OUTPATIENT SURGERY
Discharge: HOME | End: 2021-06-04
Attending: SURGERY

## 2021-06-04 LAB — GLUCOSE BLD-MCNC: 147 MG/DL (ref 65–99)

## 2021-06-05 NOTE — PROGRESS NOTES
Progress Note      Patient Name: Lorraine Dominguez   Patient ID: [id number]   Sex: Female   YOB: 1950    Primary Care Provider: Shayna SMITH    Visit Date: May 19, 2021    Provider: Alli Carballo MD   Location: [facility]   Location Address: [address] [place] [address]  [id number]   Location Phone: [phone]          Chief Complaint  · Follow Up Office Visit      History Of Present Illness  Lorraine Dominguez is a 71 year old /White female who presents today for a postoperative visit. She follows-up status post exploratory laparotomy for bowel obstruction secondary to a new small bowel cancer. The patient reports she is doing pretty well at home. She is still eating relatively well. She is still having bowel movements. She has a small amount of drainage from her midline incision but that seems to be improving. Otherwise, no new complaints.       Past Medical History  Aftercare following right hip joint replacement surgery; Allergic rhinitis, chronic; Anemia, Unspecified; Anxiety; Arthritis; Breast cancer; Cancer; Cataract; Colon cancer; Degenerative Disc Disease ; Depression; Diabetes; Diabetes mellitus type 2, noninsulin dependent; Diabetic Foot Exam Last Completed; Diabetic neuropathy; Hiatal hernia; High blood pressure; High cholesterol; Hydroureteronephrosis; Hyperlipidemia; Leg pain; Limb Swelling; Malignant neoplasm of colon, unspecified part of colon; Muscle cramps; Neuropathy; Primary osteoarthritis of right hip; Reflux; Screening for breast cancer; Screening for colon cancer; Shortness Of Air; Type 2 diabetes mellitus with stage 2 chronic kidney disease, without long-term current use of insulin, uncontrolled; Ureteral mass         Past Surgical History  *Other Surgery; Back surgery; Biopsy of pelvic mass with ultrasound guidance; Breast; Colon; Colonoscopy; Cornea transplant; Cystoscopy and ureteroscopy with stent placement; Cystoscopy with  stent placement; Cystoscopy with ureteroscopy and biopsy; Cystoureteroscopy, with retrograde pyelogram and stent insertion or removal; Hemicolectomy, right; Hip replacement, right; Hysterectomy-Abdominal; Joint Surgery; Mastectomy, Left; Partial Hysterectomy; Port Placement; Stents in Renal Arteries         Medication List  Accu-Chek Yelena Plus test strp miscellaneous strip; Accu-Chek Cora miscellaneous misc; Accu-Chek SmartView Test Strip miscellaneous strip; BD Alcohol Swabs topical pads, medicated; blood-glucose meter miscellaneous kit; Calcium 600 600 mg calcium (1,500 mg) oral tablet; escitalopram oxalate 10 mg oral tablet; famotidine 20 mg oral tablet; gabapentin 300 mg oral capsule; hydrocodone-acetaminophen 5-325 mg oral tablet; lancets miscellaneous misc; lorazepam 1 mg oral tablet; magnesium 30 mg oral tablet; metformin 500 mg oral tablet; metoprolol tartrate 25 mg oral tablet; Norco 5-325 mg oral tablet; pantoprazole 40 mg oral tablet,delayed release (DR/EC); pravastatin 80 mg oral tablet         Allergy List  NO KNOWN DRUG ALLERGIES       Allergies Reconciled  Family Medical History  Stroke; Heart Disease; Cancer, Unspecified; Diabetes, unspecified type; Heart Attack (MI); Family history of breast cancer; Family history of certain chronic disabling diseases; arthritis; Family history of Arthritis; Family history of cancer; Family history of stroke; Family history of heart disease; Family history of diabetes mellitus         Social History  Alcohol (Never); Caffeine (Current - status unknown); Homemaker; lives with spouse; ; Recreational Drug Use (Never); Second hand smoke exposure (Never); Tobacco (Never)         Immunizations  Name Date Admin   Influenza 09/01/2018         Review of Systems  · Cardiovascular  o Denies  o : chest pain on exertion, shortness of breath, lower extremity swelling  · Respiratory  o Denies  o : shortness of breath, coughing up blood  · Gastrointestinal  o Denies  o :  "chronic abdominal pain      Vitals  Date Time BP Position Site L\R Cuff Size HR RR TEMP (F) WT  HT  BMI kg/m2 BSA m2 O2 Sat FR L/min FiO2 HC       05/19/2021 11:01 AM         156lbs 2oz 5'  5\" 25.98 1.8             Physical Examination  · Constitutional  o Appearance  o : well developed, well-nourished, alert and in no acute distress  · Head and Face  o Head  o :   § Inspection  § : no deformities or lesions  · Eyes  o Conjunctivae  o : clear  o Sclerae  o : nonicteric  · Neck  o Inspection/Palpation  o : normal appearance, no masses or tenderness, trachea midline  · Respiratory  o Respiratory Effort  o : breathing unlabored  o Inspection of Chest  o : normal appearance, no retractions  · Cardiovascular  o Heart  o : regular rate and rhythm  · Gastrointestinal  o Abdominal Examination  o :   § Abdomen  § : abdomen is soft. Her midline incision does appear to be healing. It is slow and she does have a small area that is open and draining but otherwise, everything looks pretty good from a healing standpoint.  · Lymphatic  o Neck  o : no lymphadenopathy present  o Axilla  o : no lymphadenopathy present  o Groin  o : no lymphadenopathy present  · Skin and Subcutaneous Tissue  o General Inspection  o : no rashes present, no lesions present, no areas of discoloration  · Neurologic  o Cranial Nerves  o : grossly intact  o Sensation  o : grossly intact  o Gait and Station  o :   § Gait Screening  § : normal gait, able to stand without diffculty  o Cerebellar Function  o : no obvious abnormalities  · Psychiatric  o Judgement and Insight  o : judgment and insight intact  o Mood and Affect  o : mood normal, affect appropriate          Assessment  · Encounter for examination following surgery     V67.00/Z09       Patient status post exploratory laparotomy with a new small bowel cancer.       Plan  · Medications  o Medications have been Reconciled  o Transition of Care or Provider Policy  · Instructions  o Electronically " Identified Patient Education Materials Provided Electronically     From a surgical standpoint, she seems to be doing well. I do expect her midline incision to completely heal in the next week or two and she should stop drainage hopefully in the near future. Due to the new findings of her small bowel cancer, Dr. Schroeder wanted me to evaluate her to see if we can see if there is any other disease anywhere else, specifically in her pelvis, as I did feel like there was some firmness and hardness when I did her exploration. I have discussed with the patient that I think the most reasonable next step would be a colonoscopy or, at the very least, a sigmoidoscopy to evaluate her pelvis. She reports that Dr. Schroeder wants to start the chemotherapy on the 28th so I will get in touch with Dr. Schroeder and she if she wants to do the colonoscopy before or after the chemotherapy treatment. We will go from there. Risks and benefits of the colonoscopy were discussed with the patient extensively. All questions were answered. The patient voiced understanding and agreed to proceed with the above plan.             Electronically Signed by: Bailey Lott-, -Author on May 21, 2021 02:16:35 PM  Electronically Co-signed by: Alli Carballo MD -Reviewer on May 22, 2021 05:01:46 PM

## 2021-06-05 NOTE — PROGRESS NOTES
Progress Note      Patient Name: Lorraine Dominguez   Patient ID: [id number]   Sex: Female   YOB: 1950    Primary Care Provider: Shayna SMITH    Visit Date: May 5, 2021    Provider: Alli Carballo MD   Location: [facility]   Location Address: [address] [place] [address]  [id number]   Location Phone: [phone]          Chief Complaint  · Follow Up Office Visit S/P Surgery      History Of Present Illness  Lorraine Dominguez is a 71 year old /White female who presents today for a postoperative visit. She follows-up status post exploratory laparotomy and small bowel resection for bowel obstruction with the findings of a new small bowel cancer. The patient has done well over the past week. She reports she continues to have bowel movements. No nausea or vomiting. She is still having some drainage from her midline incision. Otherwise, she is doing well.       Past Medical History  Aftercare following right hip joint replacement surgery; Allergic rhinitis, chronic; Anemia, Unspecified; Anxiety; Arthritis; Breast cancer; Cancer; Cataract; Colon cancer; Degenerative Disc Disease ; Depression; Diabetes; Diabetes mellitus type 2, noninsulin dependent; Diabetic Foot Exam Last Completed; Diabetic neuropathy; Hiatal hernia; High blood pressure; High cholesterol; Hydroureteronephrosis; Hyperlipidemia; Leg pain; Limb Swelling; Malignant neoplasm of colon, unspecified part of colon; Muscle cramps; Neuropathy; Primary osteoarthritis of right hip; Reflux; Screening for breast cancer; Screening for colon cancer; Shortness Of Air; Type 2 diabetes mellitus with stage 2 chronic kidney disease, without long-term current use of insulin, uncontrolled; Ureteral mass         Past Surgical History  *Other Surgery; Back surgery; Biopsy of pelvic mass with ultrasound guidance; Breast; Colon; Colonoscopy; Cornea transplant; Cystoscopy and ureteroscopy with stent placement; Cystoscopy with  stent placement; Cystoscopy with ureteroscopy and biopsy; Cystoureteroscopy, with retrograde pyelogram and stent insertion or removal; Hemicolectomy, right; Hip replacement, right; Hysterectomy-Abdominal; Joint Surgery; Mastectomy, Left; Partial Hysterectomy; Port Placement; Stents in Renal Arteries         Medication List  Accu-Chek Yelena Plus test strp miscellaneous strip; Accu-Chek Cora miscellaneous misc; Accu-Chek SmartView Test Strip miscellaneous strip; BD Alcohol Swabs topical pads, medicated; blood-glucose meter miscellaneous kit; Calcium 600 600 mg calcium (1,500 mg) oral tablet; escitalopram oxalate 10 mg oral tablet; famotidine 20 mg oral tablet; gabapentin 300 mg oral capsule; hydrocodone-acetaminophen 5-325 mg oral tablet; lancets miscellaneous misc; lorazepam 1 mg oral tablet; magnesium 30 mg oral tablet; metformin 500 mg oral tablet; metoprolol tartrate 25 mg oral tablet; Norco 5-325 mg oral tablet; pantoprazole 40 mg oral tablet,delayed release (DR/EC); pravastatin 80 mg oral tablet         Allergy List  NO KNOWN DRUG ALLERGIES       Allergies Reconciled  Family Medical History  Stroke; Heart Disease; Cancer, Unspecified; Diabetes, unspecified type; Heart Attack (MI); Family history of breast cancer; Family history of certain chronic disabling diseases; arthritis; Family history of Arthritis; Family history of cancer; Family history of stroke; Family history of heart disease; Family history of diabetes mellitus         Social History  Alcohol (Never); Caffeine (Current - status unknown); Homemaker; lives with spouse; ; Recreational Drug Use (Never); Second hand smoke exposure (Never); Tobacco (Never)         Immunizations  Name Date Admin   Influenza 09/01/2018         Review of Systems  · Cardiovascular  o Denies  o : chest pain on exertion, shortness of breath, lower extremity swelling  · Respiratory  o Denies  o : shortness of breath, coughing up blood  · Gastrointestinal  o Denies  o :  "chronic abdominal pain      Vitals  Date Time BP Position Site L\R Cuff Size HR RR TEMP (F) WT  HT  BMI kg/m2 BSA m2 O2 Sat FR L/min FiO2 HC       05/05/2021 09:31 AM         167lbs 7oz 5'  5\" 27.86 1.87             Physical Examination  · Constitutional  o Appearance  o : well developed, well-nourished, alert and in no acute distress  · Head and Face  o Head  o :   § Inspection  § : no deformities or lesions  · Eyes  o Conjunctivae  o : clear  o Sclerae  o : nonicteric  · Neck  o Inspection/Palpation  o : normal appearance, no masses or tenderness, trachea midline  · Respiratory  o Respiratory Effort  o : breathing unlabored  o Inspection of Chest  o : normal appearance, no retractions  · Cardiovascular  o Heart  o : regular rate and rhythm  · Gastrointestinal  o Abdominal Examination  o :   § Abdomen  § : abdomen is soft. Her midline incision does have an area next to the umbilicus that is a small sub cm hole, which is draining serous appearing fluid. The inferior portion of her wound has opened up a little bit. Her wound overall looks much better than it did the last time I saw it. There is much less erythema. The drainage is serous. It does not appear to be infected.  · Lymphatic  o Neck  o : no lymphadenopathy present  o Axilla  o : no lymphadenopathy present  o Groin  o : no lymphadenopathy present  · Skin and Subcutaneous Tissue  o General Inspection  o : no rashes present, no lesions present, no areas of discoloration  · Neurologic  o Cranial Nerves  o : grossly intact  o Sensation  o : grossly intact  o Gait and Station  o :   § Gait Screening  § : normal gait, able to stand without diffculty  o Cerebellar Function  o : no obvious abnormalities  · Psychiatric  o Judgement and Insight  o : judgment and insight intact  o Mood and Affect  o : mood normal, affect appropriate          Assessment  · Encounter for examination following surgery     V67.00/Z09       Patient with an open draining wound status post " exploratory laparotomy.       Plan  · Medications  o Medications have been Reconciled  o Transition of Care or Provider Policy  · Instructions  o Electronically Identified Patient Education Materials Provided Electronically     I will follow-up with her again for a wound check in two weeks. She should continue to maintain her follow-up appointments with Dr. Schroeder and Dr. Guerra. I discussed all of this with the patient. All questions were answered. She voiced understanding and agreed to proceed with the above plan.             Electronically Signed by: Bailey Lott-, -Author on May 7, 2021 12:32:34 PM  Electronically Co-signed by: Alli Carballo MD -Reviewer on May 8, 2021 09:50:40 PM

## 2021-06-06 VITALS
HEART RATE: 81 BPM | DIASTOLIC BLOOD PRESSURE: 63 MMHG | SYSTOLIC BLOOD PRESSURE: 102 MMHG | WEIGHT: 154.1 LBS | OXYGEN SATURATION: 99 % | RESPIRATION RATE: 16 BRPM | BODY MASS INDEX: 25.64 KG/M2 | TEMPERATURE: 97.2 F

## 2021-06-06 NOTE — PROGRESS NOTES
Patient: MARKO ESPAÑA     Acct: [id number]     Report: #[id number]  UNIT #: [id number]     : 1950    Encounter Date:2021  PRIMARY CARE: JOSE R MCGOWAN  ***Signed***  --------------------------------------------------------------------------------------------------------------------  NURSE INTAKE      Visit Type      Established Patient Visit            Chief Complaint      COLON CA            Referring Provider/Copies To      Primary Care Provider:  JOSE R MCGOWAN      Copies To:   JOSE R MCGOWAN            History and Present Illness      Past Oncology Illness History      1) Small Bowel Adenocarcinoma      - patient developed small bowel obstruction on 21 and underwent emergency     resection of the Ileum (Dr. Lacey)       - pathology from the ileum was positive for adenocarcinoma that appeared to be     small bowel in origin and represent a new primary malignancy, 2.2cm, grade 1,     tumor perforates the visceral peritoneum, pT4pN0, with 0/12 lymph nodes     involved.      - Post surgical CT Abd/Pelvis 21:  Mild thickening of the wall of some     portion of the small intestine, could be post-surgical changes. Moderate     dilation of the right renal collecting system is unchanged with ureteral double-    J stent.        - CT CAP on 3/25 prior to surgery showed no evidence of disease in the chest      - Caris report pending            2) Colon Cancer:       -Diagnosed 5/7/15; Low grade; staged pT4a pN0 M0 stage IIB; 0/37 LN; no MMR     testing; RIGHT sided      -RIGHT hemicolectomy 5/7/15      -adjuvant Xeloda X 6 months      -Recurrent dx diagnosed via RIGHT ureter mass resection (18)      -FOLFIRI X 9 (-18)      -s/p XRT RIGHT pelvis total 5320 cGy (18-19)      -CARIS report from specimen dated 5/7/15: BRAF V600E mutation (possible benefit     from cetuximab), MSI stable, TMB-low, TAURUS-High (33%), TP53 mutated            *CT CAP 2021: Interim  development of right-sided hydronephrosis and a soft     tissue mass along the right iliac chain measuring 2.9 x 1.2 cm.  There is a     second nodule identified below the aortic bifurcation in the midline measuring     1.4 cm.  This was consistent with disease recurrence versus her new primary     small bowel cancer (see above).             3) Breast Cancer:      -RIGHT breast DCIS diagnosed 3/15/06; low-grade; s/p lumpectomy and adjuvant     XRT; ER: 100%, CA: 70%      -LEFT breast invasive lobular carcinoma; diagnosed 06/2012; staged pT3 pN0 M0     stage IIB, ER: 83%, CA: 0, HER2: 1+, Ki-67: 26%, treated with mastectomy and     SLND 04/2012, adjuvant DD AC and Taxotere, Adjuvant Arimidex then Femara     (11/13/19)            4) Anemia:      -multifactorial due to her history of GI bleeding from small bowel ulcer/tumor      -intolerant to oral iron. Treatment with IV iron in the past.       -iron studies on 5/11/12 were normal            5) right hydronephrosis:      -Noted on CT scan from 1/21/2021, secondary to soft tissue mass      -Likely secondary to recurrent colon cancer, biopsy pending      -Cystoscopy and right ureteral stent (Dr. Chi) on 1/28/2021            6) DVT:      -bracheocephalic and subclavian thrombosis March 2021 ([facility])            HPI - Oncology Interim      Patient comes in today for follow-up.  She has a colonoscopy scheduled on June 4.  She is hoping there is no evidence of further disease.  She says she still     having some nausea but it is well controlled by the medications.  She needs a     refill on these.  I explained that we are still waiting on the molecular testing    from her small bowel cancer.  Regardless, the first option of capecitabine and     oxaliplatin is likely the best.  She has capecitabine which was delivered last     week.  We agreed that she should start it over the weekend.            I reviewed her labs which her hemoglobin is 8.2. Iron  levels are normal.  Her     creatinine is mildly elevated at 1.14.  She says she is staying well-hydrated.            We also discussed genetic testing.  She says she was previously tested when she     developed her second breast cancer.  She has not been tested since developing to    colon cancers.            She is doing much better since her small bowel resection.  Her  confirms     that she is getting stronger every day.            Clinical Trial Participant      No            ECOG Performance Status      2            Most Recent Lab Findings      Laboratory Tests      5/28/21 13:14            PAST, FAMILY   Past Medical History      Past Medical History:  Arthritis, Diabetes Type 2, High Cholesterol,     Hypertension, Short of Air      Other PMH:        Iron deficiency anemia secondary GI bleed      Hematology/Oncology (F):  Anemia, Breast Cancer, Colorectal Cancer            Past Surgical History      Biopsy, Hysterectomy, Joint Replacement, VAD Placement            Family History      Family History:  Colorectal Cancer, Skin Cancer            Genetic testing negative            Social History      Lives independently:  Yes      Occupation:  housewife            Tobacco Use      Tobacco status:  Never smoker            Substance Use      Substance use:  Denies use            REVIEW OF SYSTEMS      General:  Admits: Fatigue      Eye:  Denies Blurred Vision, Denies Corrective Lenses, Denies Diplopia, Denies     Vision Changes      ENT:  Denies Headache, Denies Hearing Loss, Denies Hoarseness, Denies Sore     Throat      Cardiovascular:  Denies Chest Pain, Denies Palpitations      Respiratory:  Denies: Cough, Coughing Blood, Productive Cough, Shortness of Air,    Wheezing      Gastrointestinal:  Denies Bloody Stools, Denies Constipation, Denies Diarrhea,     Denies Nausea/Vomiting, Denies Problem Swallowing, Denies Unable to Control     Bowels      Genitourinary:  Denies Blood in Urine, Denies  Incontinence, Denies Painful     Urination      Musculoskeletal:  Admits Back Pain; Denies Muscle Pain; Admits Painful Joints      Integumentary:  Denies Itching, Denies Lesions, Denies Rash      Neurologic:  Denies Dizziness, Denies Numbness\Tingling, Denies Seizures      Psychiatric:  Denies Anxiety, Denies Depression      Endocrine:  Denies Cold Intolerance, Denies Heat Intolerance      Hematologic/Lymphatic:  Denies Bruising, Denies Bleeding, Denies Enlarged Lymph     Nodes      Reproductive:  Denies: Menopause, Heavy Periods, Pregnant, Still Menstruating            VITAL SIGNS AND SCORES      Vitals      Weight 154 lbs 1.625 oz / 69.9 kg      Temperature 97.2 F / 36.22 C - Temporal      Pulse 81      Respirations 16      Blood Pressure 102/63 Sitting, Right Arm      Pulse Oximetry 99%, RM AIR            Pain Score      Experiencing any pain?:  Yes      Pain Scale Used:  Numerical      Pain Intensity:  3            Fatigue Score      Experiencing any fatigue?:  Yes      Fatigue (0-10 scale):  5            EXAM      General: Alert, cooperative, no acute distress      Eyes: Anicteric sclera, PERRLA      Respiratory: CTAB, normal respiratory effort      Abdomen: Normal active bowel sounds, mild diffuse tenderness       Cardiovascular: RRR, no murmur, no lower extremity edema      Skin: Normal tone, no rash, no lesions      Psychiatric: Appropriate affect, intact judgment      Neurologic: No focal sensory or motor deficits, no weakness, numbness, dizziness      Musculoskeletal: Normal muscle strength and tone, much more stable with     ambulation, able to walk without assistance now      Extremities: No clubbing, cyanosis, or deformities            PREVENTION      Hx Influenza Vaccination:  Yes      Date Influenza Vaccine Given:  Oct 5, 2020      Influenza Vaccine Declined:  No      2 or More Falls in Past Year?:  No      Fall Past Year with Injury?:  No      Hx Pneumococcal Vaccination:  Yes      Encouraged to  follow-up with:  PCP regarding preventative exams.      Chart initiated by      ML BURK MA            ALLERGY/MEDS      Allergies      Coded Allergies:             NO KNOWN ALLERGIES (Unverified , 5/28/21)            Medications      Last Reconciled on 5/11/21 13:22 by RONI DUTTON      HYDROcodone-Acetaminophen 5-325 Mg (Norco 5-325 Mg) 1 Each Tablet      1 TAB PO Q4H PRN for BREAKTHROUGH PAIN, #90 TAB 0 Refills         Prov: RONI DUTTON         5/11/21       Digoxin (Digoxin*) 0.125 Mg Tablet      0.125 MG PO QDAY, #30 TAB 0 Refills         Prov: Alli Carballo         4/24/21       Metoprolol Succinate (Metoprolol Succinate) 100 Mg Tab.er.24h      50 MG PO BID, #60 TAB.SR.24H         Prov: Alli Carballo         4/24/21       Saccharomyces Boulardii (Florastor) 250 Mg Capsule      250 MG PO BID, #20 CAP         Prov: Alli Carballo         4/24/21       Amoxicillin/Clavulanate K (Augmentin 875/125 Mg) 1 Each Tablet      875 MG PO BID for 7 Days, #14 TAB 0 Refills         Prov: Alli Carballo         4/24/21       Melatonin (Melatonin) 10 Mg Tablet      10 MG PO HS, TAB         Reported         4/11/21       oxyCODONE IR (oxyCODONE IR) 5 Mg Tablet      5 MG PO TID PRN for PAIN, TAB 0 Refills         Reported         4/11/21       Escitalopram Oxalate (Escitalopram Oxalate*) 20 Mg Tablet      20 MG PO QDAY, TAB         Reported         4/11/21       metFORMIN HCl (metFORMIN HCl) 500 Mg Tablet      500 MG PO BID, #60 TAB 0 Refills         Reported         4/11/21       Senna (Senokot) 8.6 Mg Tablet      8.6 MG PO BID PRN for CONSTIPATION for 30 Days, #60 TAB 1 Refill         Prov: RONI DUTTON         3/30/21       Apixaban (Eliquis) 5 Mg Tablet      5 MG PO BID for 30 Days, #60 TAB         Reported         3/30/21       LORazepam (LORazepam) 2 Mg Tablet      2 MG PO HS, TAB         Reported         1/26/21       LORazepam (LORazepam) 1 Mg Tablet      1 MG PO QAM, TAB         Reported         1/26/21        Pravastatin Sodium (Pravastatin Sodium) 40 Mg Tablet      40 MG PO HS, #30 TAB 0 Refills         Reported         9/30/20       Gabapentin (Gabapentin) 400 Mg Capsule      400 MG PO TID, #90 CAP 0 Refills         Reported         9/30/20       Pantoprazole (Protonix) 40 Mg Tablet      40 MG PO BID, #30 TAB 0 Refills         Reported         8/25/15      Medications Reviewed:  No Changes made to meds            IMPRESSION/PLAN      Diagnosis      Colon cancer - C18.9            Anemia - D64.9            Adenocarcinoma of small bowel - C17.9            Notes      New Diagnostics      * CBC With Auto Diff, 3 Week         Dx: Anemia - D64.9      * Comp Metabolic Panel, 3 Week         Dx: Anemia - D64.9      * Cea/Carcinoembryonic, 3 Week         Dx: Anemia - D64.9            Plan      1) Small Bowel Adenocarcinoma      - diagnosed as a new primary malignancy after SBO, path revealed pT4pN0. I have     spoken to Dr. Young in pathology to confirm his findings of primary small bowel    carcinoma.       - repeat CT A/P shows not definitive evidence of recurrent disease but she does     have thickening of other areas of the small bowel which are concerning for     residual disease.        - CEA improved only slightly from 9.4 to 7.8 after surgery.       - pt will RTC in 2 weeks when she has had a chance to heal more.  Then we will     plan to start Capecitabine.  Later, if she is able to tolerate it, I will give     her CAPEOX.        - I have spoken to Dr. Carballo who believes based on internal exam that the     patient has residual malignancy in the pelvis that cannot be seen on imaging.       - Dr. Carballo will f/u with repeat endoscopy on 6/4/21.  The patient will     start Xeloda the following day.       - I will f/u by phone in about 3-4 weeks and plan to repeat CBC, CMP and CEA.              2) Colon Cancer:       -Diagnosed 5/7/15; Low grade; staged pT4a pN0 M0 stage IIB; 0/37 LN; no MMR     testing; RIGHT  sided      -RIGHT hemicolectomy 5/7/15      -adjuvant Xeloda X 6 months      -Recurrent dx diagnosed via RIGHT ureter mass resection (5/1/18)      -FOLFIRI X 9 (7/31-12/4/18)      -s/p XRT RIGHT pelvis total 5320 cGy (12/14/18-1/24/19)      -the presence of recurrent colon cancer has not been confirmed given that the kn    own malignancy may be primary small bowel cancer instead. Colonoscopy pending as    above.             3) Breast Cancer:      -RIGHT breast DCIS diagnosed 3/15/06; low-grade; s/p lumpectomy and adjuvant     XRT; ER: 100%, WI: 70%      -LEFT breast invasive lobular carcinoma; diagnosed 06/2012; staged pT3 pN0 M0     stage IIB, ER: 83%, WI: 0, HER2: 1+, Ki-67: 26%, treated with mastectomy and     SLND 04/2012, adjuvant DD AC and Taxotere, Adjuvant Arimidex then Femara     (11/13/19)            4) Normocytic Anemia      -multifactorial, due at least in part to her history of GI bleeding      -intolerant to oral iron. Treatment with IV iron in the past.       -recent iron studies are normal. No need for IV iron at this time            5) right hydronephrosis:      -Noted on CT scan from 1/21/2021, secondary to soft tissue mass      -right ureteral stent (Dr. Chi)            6) DVT:      -bracheocephalic and subclavian thrombosis March 2021 ([facility])       -on Eliquis            7) Abdominal Pain and Nausea:      -secondary to malignancy      -improved after surgery      -takes hydrocodone 2-3 times daily for breakthrough pain.      -I will refill Zofran and compazine.             8) Genetics:      -patient reports genetic testing after her breast cancer diagnosis but not have     colon cancer or small bowel cancer.       -Since she will likely need expanded testing I will refer her to genetic cou    nseling.            Patient Education      Patient Education Provided:  Yes            Electronically signed by RONI DUTTON  05/31/2021 17:15       Disclaimer: Converted document may  not contain table formatting or lab diagrams. Please see [employer] for the authenticated document.

## 2021-06-08 ENCOUNTER — TELEPHONE (OUTPATIENT)
Dept: SURGERY | Facility: CLINIC | Age: 71
End: 2021-06-08

## 2021-06-08 NOTE — TELEPHONE ENCOUNTER
Patient called for a refill on metoprolol and MD doesn't refill this type of medication, advised to call cardiologist.

## 2021-06-18 NOTE — PROGRESS NOTES
Specialty Pharmacy Note      Name:  Lorraine Dominguez  :  1950  Date:  2021         Past Medical History:   Diagnosis Date   • Anemia    • Arthritis    • Breast cancer (CMS/HCC)    • Colon cancer (CMS/HCC)    • DM (diabetes mellitus), type 2 (CMS/HCC)    • High cholesterol    • Hypertension    • Iron deficiency anemia secondary to blood loss (chronic)     GI BLEED   • Shortness of breath        Past Surgical History:   Procedure Laterality Date   • HYSTERECTOMY     • JOINT REPLACEMENT     • LEFT VENTRICULAR ASSIST DEVICE     • OTHER SURGICAL HISTORY      BIOPSY       Social History     Socioeconomic History   • Marital status:      Spouse name: Not on file   • Number of children: Not on file   • Years of education: Not on file   • Highest education level: Not on file   Tobacco Use   • Smoking status: Never Smoker   Substance and Sexual Activity   • Drug use: Never       Family History   Problem Relation Age of Onset   • Colon cancer Other    • Skin cancer Other        Not on File    Current Outpatient Medications   Medication Sig Dispense Refill   • capecitabine (XELODA) 500 MG chemo tablet Take 3 tablets by mouth 2 (two) times a day for 7 days, then off for 7 days. 42 tablet 11     No current facility-administered medications for this visit.         LABORATORY:    Lab Results   Component Value Date    WBC 11.00 (H) 2021    HGB 8.2 (L) 2021    HCT 26.9 (L) 2021    MCV 92.8 2021    RDW 18.3 (H) 2021    RDW 62.2 2021     (H) 2021    NEUTRORELPCT 71.3 2021    LYMPHORELPCT 34.5 2021    MONORELPCT 5.6 2021    EOSRELPCT 0.4 2021    BASORELPCT 0.5 2021    NEUTROABS 6.09 2021    LYMPHSABS 3.80 2021       Lab Results   Component Value Date     (L) 2021    K 3.5 2021    CO2 27 2021    CL 94 (L) 2021    BUN 26 (H) 2021    CREATININE 1.14 (H) 2021    CALCIUM 9.3  05/28/2021    ALKPHOS 213 (H) 05/28/2021    AST 40 05/28/2021    ALT 19 05/28/2021    BILITOT <0.15 (L) 05/28/2021    ALBUMIN 3.4 (L) 05/28/2021    PROTEINTOT 7.5 05/28/2021    MG 1.04 (L) 05/01/2021    PHOS 2.7 05/01/2021       Lab Results   Component Value Date     12/01/2020        Imaging Results (Last 24 Hours)     ** No results found for the last 24 hours. **          ASSESSMENT/PLAN:    Patient Update Assessment (new medications, allergies, medical history): Patient is currently taking Xeloda. Next cycle to start on Sunday 6/20/2021    Medication(s): Xeloda 1500 mg BID x 7 days, then off 7 days.    Currently Taking Medication(s): Yes    Effectiveness of Medication: Working as expected     Experiencing Side Effects: None reported    Prior Authorization Status: Approved    Financial Assistance Status: None needed    Any Issues Identified: None    Appropriate to Process Prescription(s): Refill due. Pt to start next cycle on 6/20/2021    Counseling Offered: Declined    Next Specialty Pharmacy Visit: ~7 days

## 2021-06-21 ENCOUNTER — SPECIALTY PHARMACY (OUTPATIENT)
Dept: PHARMACY | Facility: HOSPITAL | Age: 71
End: 2021-06-21

## 2021-06-21 DIAGNOSIS — C17.9 ADENOCARCINOMA OF SMALL BOWEL (HCC): Primary | ICD-10-CM

## 2021-06-21 PROBLEM — D64.9 ANEMIA: Status: ACTIVE | Noted: 2021-06-21

## 2021-06-21 PROBLEM — C50.919 BREAST CANCER (HCC): Status: ACTIVE | Noted: 2018-03-05

## 2021-06-21 PROBLEM — N13.30 HYDROURETERONEPHROSIS: Status: ACTIVE | Noted: 2021-06-21

## 2021-06-21 PROBLEM — H26.9 CATARACT: Status: ACTIVE | Noted: 2021-06-21

## 2021-06-21 PROBLEM — C18.9 MALIGNANT NEOPLASM OF COLON: Status: ACTIVE | Noted: 2018-03-05

## 2021-06-21 PROBLEM — C80.1 CANCER (HCC): Status: ACTIVE | Noted: 2021-06-21

## 2021-06-21 PROBLEM — C18.9 COLON CANCER (HCC): Status: ACTIVE | Noted: 2018-03-05

## 2021-06-21 PROBLEM — N28.89 URETERAL MASS: Status: ACTIVE | Noted: 2021-06-21

## 2021-06-21 PROBLEM — M79.89 LIMB SWELLING: Status: ACTIVE | Noted: 2021-06-21

## 2021-06-21 PROBLEM — K44.9 HIATAL HERNIA: Status: ACTIVE | Noted: 2021-06-21

## 2021-06-21 PROBLEM — M16.10 PRIMARY LOCALIZED OSTEOARTHRITIS OF PELVIC REGION AND THIGH: Status: ACTIVE | Noted: 2018-01-04

## 2021-06-21 PROBLEM — M79.606 LEG PAIN: Status: ACTIVE | Noted: 2021-06-21

## 2021-06-21 PROBLEM — J30.9 ALLERGIC RHINITIS: Status: ACTIVE | Noted: 2021-06-21

## 2021-06-21 PROBLEM — E11.9 DIABETES MELLITUS (HCC): Status: ACTIVE | Noted: 2021-06-21

## 2021-06-21 PROBLEM — Z12.11 SPECIAL SCREENING FOR MALIGNANT NEOPLASMS, COLON: Status: ACTIVE | Noted: 2018-07-01

## 2021-06-21 PROBLEM — Z45.2 ENCOUNTER FOR ADJUSTMENT OR MANAGEMENT OF VASCULAR ACCESS DEVICE: Status: ACTIVE | Noted: 2021-06-21

## 2021-06-21 PROBLEM — R25.2 MUSCLE CRAMPS: Status: ACTIVE | Noted: 2021-06-21

## 2021-06-21 PROBLEM — E11.40 DIABETIC NEUROPATHY (HCC): Status: ACTIVE | Noted: 2019-05-13

## 2021-06-21 PROBLEM — F41.9 ANXIETY: Status: ACTIVE | Noted: 2021-06-21

## 2021-06-21 PROBLEM — E11.9 TYPE 2 DIABETES MELLITUS: Status: ACTIVE | Noted: 2019-05-13

## 2021-06-21 PROBLEM — IMO0002 DEGENERATION OF INTERVERTEBRAL DISC: Status: ACTIVE | Noted: 2021-06-21

## 2021-06-21 PROBLEM — G62.9 NEUROPATHY: Status: ACTIVE | Noted: 2021-06-21

## 2021-06-21 PROBLEM — K21.9 ESOPHAGEAL REFLUX: Status: ACTIVE | Noted: 2021-06-21

## 2021-06-21 PROBLEM — R06.02 SHORTNESS OF BREATH: Status: ACTIVE | Noted: 2021-06-21

## 2021-06-21 PROBLEM — F32.A DEPRESSION: Status: ACTIVE | Noted: 2021-06-21

## 2021-06-21 PROBLEM — I10 HIGH BLOOD PRESSURE: Status: ACTIVE | Noted: 2021-06-21

## 2021-06-21 PROBLEM — E78.5 HYPERLIPIDEMIA: Status: ACTIVE | Noted: 2021-06-21

## 2021-06-21 PROBLEM — M19.90 ARTHRITIS: Status: ACTIVE | Noted: 2021-06-21

## 2021-06-21 RX ORDER — CALCIUM CARBONATE 300MG(750)
1 TABLET,CHEWABLE ORAL 2 TIMES DAILY
COMMUNITY
Start: 2021-05-01 | End: 2021-01-01

## 2021-06-21 RX ORDER — SODIUM CHLORIDE 0.9 % (FLUSH) 0.9 %
20 SYRINGE (ML) INJECTION AS NEEDED
Status: CANCELLED | OUTPATIENT
Start: 2021-06-22

## 2021-06-21 RX ORDER — ESCITALOPRAM OXALATE 20 MG/1
20 TABLET ORAL NIGHTLY
COMMUNITY
Start: 2021-04-13

## 2021-06-21 RX ORDER — PREDNISONE 1 MG/1
1 TABLET ORAL
COMMUNITY
End: 2021-01-01

## 2021-06-21 RX ORDER — POTASSIUM CHLORIDE 20 MEQ/1
20 TABLET, EXTENDED RELEASE ORAL DAILY
COMMUNITY
Start: 2021-05-10 | End: 2021-06-22

## 2021-06-21 RX ORDER — LETROZOLE 2.5 MG/1
TABLET, FILM COATED ORAL
COMMUNITY
End: 2021-01-01

## 2021-06-21 RX ORDER — MAGNESIUM 200 MG
TABLET ORAL
COMMUNITY
End: 2021-01-01

## 2021-06-21 RX ORDER — ASPIRIN 325 MG
TABLET ORAL
COMMUNITY
End: 2021-01-01

## 2021-06-21 RX ORDER — FERROUS SULFATE 325(65) MG
TABLET ORAL
COMMUNITY
End: 2021-01-01

## 2021-06-21 RX ORDER — DIGOXIN 125 MCG
125 TABLET ORAL NIGHTLY
COMMUNITY
Start: 2021-04-24

## 2021-06-21 RX ORDER — PREGABALIN 75 MG/1
CAPSULE ORAL
COMMUNITY
End: 2021-01-01

## 2021-06-21 RX ORDER — CITALOPRAM 40 MG/1
40 TABLET ORAL EVERY MORNING
Status: ON HOLD | COMMUNITY
End: 2022-01-01

## 2021-06-21 RX ORDER — DEXAMETHASONE 4 MG/1
TABLET ORAL
Status: ON HOLD | COMMUNITY
End: 2021-01-01

## 2021-06-21 RX ORDER — MELOXICAM 15 MG/1
TABLET ORAL
COMMUNITY
End: 2021-01-01

## 2021-06-21 RX ORDER — PHENAZOPYRIDINE HYDROCHLORIDE 200 MG/1
200 TABLET, FILM COATED ORAL 3 TIMES DAILY PRN
COMMUNITY
Start: 2021-04-30 | End: 2021-01-01

## 2021-06-21 RX ORDER — LOPERAMIDE HYDROCHLORIDE 2 MG/1
2 CAPSULE ORAL 4 TIMES DAILY PRN
COMMUNITY

## 2021-06-21 RX ORDER — ONDANSETRON HYDROCHLORIDE 8 MG/1
TABLET, FILM COATED ORAL
COMMUNITY
End: 2021-06-22 | Stop reason: SDUPTHER

## 2021-06-21 RX ORDER — PREDNISOLONE ACETATE 10 MG/ML
1 SUSPENSION/ DROPS OPHTHALMIC
Status: ON HOLD | COMMUNITY
End: 2021-01-01

## 2021-06-21 RX ORDER — BACILLUS COAGULANS/INULIN 1B-250 MG
CAPSULE ORAL
Status: ON HOLD | COMMUNITY
End: 2022-01-01

## 2021-06-21 RX ORDER — PRAVASTATIN SODIUM 40 MG
40 TABLET ORAL NIGHTLY
COMMUNITY
Start: 2021-04-02

## 2021-06-21 RX ORDER — LANCETS
EACH MISCELLANEOUS
Status: ON HOLD | COMMUNITY
Start: 2021-05-28 | End: 2022-01-01

## 2021-06-21 RX ORDER — METOPROLOL SUCCINATE 25 MG/1
25 TABLET, EXTENDED RELEASE ORAL
COMMUNITY
Start: 2021-04-24 | End: 2021-01-01

## 2021-06-21 RX ORDER — CALCIUM CARBONATE/VITAMIN D3 500 MG-10
TABLET,CHEWABLE ORAL
COMMUNITY
Start: 2021-05-01 | End: 2021-06-22 | Stop reason: SDUPTHER

## 2021-06-21 RX ORDER — POLYETHYLENE GLYCOL 3350 17 G/17G
17 POWDER, FOR SOLUTION ORAL
COMMUNITY
End: 2021-01-01

## 2021-06-21 RX ORDER — RANITIDINE 300 MG/1
TABLET ORAL
COMMUNITY
End: 2021-01-01

## 2021-06-21 RX ORDER — LEVOFLOXACIN 500 MG/1
TABLET, FILM COATED ORAL
COMMUNITY
Start: 2021-04-13 | End: 2021-01-01

## 2021-06-21 RX ORDER — OXYCODONE AND ACETAMINOPHEN 7.5; 325 MG/1; MG/1
TABLET ORAL
COMMUNITY
End: 2021-06-28 | Stop reason: ALTCHOICE

## 2021-06-21 RX ORDER — SENNOSIDES 8.6 MG
1 TABLET ORAL DAILY PRN
COMMUNITY
Start: 2021-03-30

## 2021-06-21 RX ORDER — HEPARIN SODIUM (PORCINE) LOCK FLUSH IV SOLN 100 UNIT/ML 100 UNIT/ML
500 SOLUTION INTRAVENOUS AS NEEDED
Status: CANCELLED | OUTPATIENT
Start: 2021-06-22

## 2021-06-21 RX ORDER — OXYCODONE HYDROCHLORIDE 5 MG/1
TABLET ORAL
COMMUNITY
Start: 2021-03-30 | End: 2021-06-24 | Stop reason: SDUPTHER

## 2021-06-21 RX ORDER — MAGNESIUM GLUCONATE 30 MG(550)
TABLET ORAL
COMMUNITY
End: 2021-01-01 | Stop reason: SDUPTHER

## 2021-06-21 RX ORDER — HYDROCODONE BITARTRATE AND ACETAMINOPHEN 5; 325 MG/1; MG/1
TABLET ORAL
COMMUNITY
Start: 2021-05-05 | End: 2021-06-28 | Stop reason: SDUPTHER

## 2021-06-21 RX ORDER — LORAZEPAM 1 MG/1
1 TABLET ORAL TAKE AS DIRECTED
COMMUNITY
Start: 2021-05-13

## 2021-06-21 RX ORDER — AMOXICILLIN AND CLAVULANATE POTASSIUM 875; 125 MG/1; MG/1
TABLET, FILM COATED ORAL
COMMUNITY
Start: 2021-04-24 | End: 2021-01-01

## 2021-06-21 RX ORDER — PANTOPRAZOLE SODIUM 40 MG/1
40 TABLET, DELAYED RELEASE ORAL 2 TIMES DAILY
COMMUNITY
Start: 2021-05-17 | End: 2022-01-01 | Stop reason: SDUPTHER

## 2021-06-21 RX ORDER — IBUPROFEN 200 MG
TABLET ORAL
COMMUNITY
End: 2021-01-01

## 2021-06-22 ENCOUNTER — OFFICE VISIT (OUTPATIENT)
Dept: ONCOLOGY | Facility: HOSPITAL | Age: 71
End: 2021-06-22

## 2021-06-22 ENCOUNTER — APPOINTMENT (OUTPATIENT)
Dept: ONCOLOGY | Facility: HOSPITAL | Age: 71
End: 2021-06-22

## 2021-06-22 ENCOUNTER — HOSPITAL ENCOUNTER (OUTPATIENT)
Dept: ONCOLOGY | Facility: HOSPITAL | Age: 71
Setting detail: INFUSION SERIES
Discharge: HOME | End: 2021-06-22

## 2021-06-22 VITALS
TEMPERATURE: 98.1 F | DIASTOLIC BLOOD PRESSURE: 58 MMHG | OXYGEN SATURATION: 99 % | SYSTOLIC BLOOD PRESSURE: 121 MMHG | WEIGHT: 154.32 LBS | BODY MASS INDEX: 25.68 KG/M2 | RESPIRATION RATE: 20 BRPM | HEART RATE: 106 BPM

## 2021-06-22 DIAGNOSIS — E83.42 HYPOMAGNESEMIA: ICD-10-CM

## 2021-06-22 DIAGNOSIS — E87.6 HYPOKALEMIA: ICD-10-CM

## 2021-06-22 DIAGNOSIS — C18.9 MALIGNANT NEOPLASM OF COLON, UNSPECIFIED PART OF COLON (HCC): Primary | ICD-10-CM

## 2021-06-22 DIAGNOSIS — E83.51 HYPOCALCEMIA: ICD-10-CM

## 2021-06-22 DIAGNOSIS — C17.9 ADENOCARCINOMA OF SMALL BOWEL (HCC): ICD-10-CM

## 2021-06-22 DIAGNOSIS — Z45.2 ENCOUNTER FOR ADJUSTMENT OR MANAGEMENT OF VASCULAR ACCESS DEVICE: Primary | ICD-10-CM

## 2021-06-22 PROBLEM — Z98.890 HISTORY OF LUMBAR DISCECTOMY: Status: ACTIVE | Noted: 2019-11-22

## 2021-06-22 PROBLEM — Z85.3 HISTORY OF MALIGNANT NEOPLASM OF BREAST: Status: ACTIVE | Noted: 2021-06-22

## 2021-06-22 PROBLEM — E78.5 DYSLIPIDEMIA: Status: ACTIVE | Noted: 2017-04-20

## 2021-06-22 PROBLEM — D50.0 IRON DEFICIENCY ANEMIA SECONDARY TO BLOOD LOSS (CHRONIC): Status: ACTIVE | Noted: 2021-03-04

## 2021-06-22 PROBLEM — F41.9 CHRONIC ANXIETY: Status: ACTIVE | Noted: 2017-04-20

## 2021-06-22 PROBLEM — G44.209 TENSION-TYPE HEADACHE: Status: ACTIVE | Noted: 2017-04-20

## 2021-06-22 PROBLEM — K92.2 GASTROINTESTINAL HEMORRHAGE: Status: ACTIVE | Noted: 2021-03-04

## 2021-06-22 PROBLEM — E53.8 DISORDER OF VITAMIN B12: Status: ACTIVE | Noted: 2020-04-17

## 2021-06-22 PROBLEM — E61.1 IRON DEFICIENCY: Status: ACTIVE | Noted: 2020-08-10

## 2021-06-22 PROBLEM — Z78.9 CENTRAL VENOUS CATHETER IN PLACE: Status: ACTIVE | Noted: 2020-01-23

## 2021-06-22 PROBLEM — IMO0002 ULCERATIVE LESION: Status: ACTIVE | Noted: 2020-10-26

## 2021-06-22 PROBLEM — E03.9 HYPOTHYROIDISM: Status: ACTIVE | Noted: 2021-04-30

## 2021-06-22 PROBLEM — M85.80 OSTEOPENIA: Status: ACTIVE | Noted: 2020-01-28

## 2021-06-22 PROBLEM — I48.91 ATRIAL FIBRILLATION (HCC): Status: ACTIVE | Noted: 2021-04-30

## 2021-06-22 LAB
ALBUMIN SERPL-MCNC: 3.9 G/DL (ref 3.5–5.2)
ALBUMIN/GLOB SERPL: 1.1 G/DL
ALP SERPL-CCNC: 152 U/L (ref 39–117)
ALT SERPL W P-5'-P-CCNC: 10 U/L (ref 1–33)
ANION GAP SERPL CALCULATED.3IONS-SCNC: 11.6 MMOL/L (ref 5–15)
AST SERPL-CCNC: 30 U/L (ref 1–32)
BASOPHILS # BLD AUTO: 0.05 10*3/MM3 (ref 0–0.2)
BASOPHILS NFR BLD AUTO: 0.5 % (ref 0–1.5)
BILIRUB SERPL-MCNC: 0.3 MG/DL (ref 0–1.2)
BUN SERPL-MCNC: 25 MG/DL (ref 8–23)
BUN/CREAT SERPL: 27.2 (ref 7–25)
CALCIUM SPEC-SCNC: 6.9 MG/DL (ref 8.6–10.5)
CHLORIDE SERPL-SCNC: 96 MMOL/L (ref 98–107)
CO2 SERPL-SCNC: 25.4 MMOL/L (ref 22–29)
CREAT SERPL-MCNC: 0.92 MG/DL (ref 0.57–1)
DEPRECATED RDW RBC AUTO: 80.3 FL (ref 37–54)
EOSINOPHIL # BLD AUTO: 0.02 10*3/MM3 (ref 0–0.4)
EOSINOPHIL NFR BLD AUTO: 0.2 % (ref 0.3–6.2)
ERYTHROCYTE [DISTWIDTH] IN BLOOD BY AUTOMATED COUNT: 23.9 % (ref 12.3–15.4)
GFR SERPL CREATININE-BSD FRML MDRD: 60 ML/MIN/1.73
GLOBULIN UR ELPH-MCNC: 3.4 GM/DL
GLUCOSE SERPL-MCNC: 131 MG/DL (ref 65–99)
HCT VFR BLD AUTO: 25.1 % (ref 34–46.6)
HGB BLD-MCNC: 8 G/DL (ref 12–15.9)
IMM GRANULOCYTES # BLD AUTO: 0.06 10*3/MM3 (ref 0–0.05)
IMM GRANULOCYTES NFR BLD AUTO: 0.6 % (ref 0–0.5)
LYMPHOCYTES # BLD AUTO: 3.25 10*3/MM3 (ref 0.7–3.1)
LYMPHOCYTES NFR BLD AUTO: 33.1 % (ref 19.6–45.3)
MAGNESIUM SERPL-MCNC: 0.7 MG/DL (ref 1.6–2.4)
MCH RBC QN AUTO: 30.2 PG (ref 26.6–33)
MCHC RBC AUTO-ENTMCNC: 31.9 G/DL (ref 31.5–35.7)
MCV RBC AUTO: 94.7 FL (ref 79–97)
MONOCYTES # BLD AUTO: 0.69 10*3/MM3 (ref 0.1–0.9)
MONOCYTES NFR BLD AUTO: 7 % (ref 5–12)
NEUTROPHILS NFR BLD AUTO: 5.74 10*3/MM3 (ref 1.7–7)
NEUTROPHILS NFR BLD AUTO: 58.6 % (ref 42.7–76)
PHOSPHATE SERPL-MCNC: 3.6 MG/DL (ref 2.5–4.5)
PLATELET # BLD AUTO: 384 10*3/MM3 (ref 140–450)
PMV BLD AUTO: 8.9 FL (ref 6–12)
POTASSIUM SERPL-SCNC: 2.2 MMOL/L (ref 3.5–5.2)
PROT SERPL-MCNC: 7.3 G/DL (ref 6–8.5)
RBC # BLD AUTO: 2.65 10*6/MM3 (ref 3.77–5.28)
SODIUM SERPL-SCNC: 133 MMOL/L (ref 136–145)
WBC # BLD AUTO: 9.81 10*3/MM3 (ref 3.4–10.8)

## 2021-06-22 PROCEDURE — 84100 ASSAY OF PHOSPHORUS: CPT | Performed by: INTERNAL MEDICINE

## 2021-06-22 PROCEDURE — 99215 OFFICE O/P EST HI 40 MIN: CPT | Performed by: INTERNAL MEDICINE

## 2021-06-22 PROCEDURE — 83735 ASSAY OF MAGNESIUM: CPT | Performed by: INTERNAL MEDICINE

## 2021-06-22 PROCEDURE — G0463 HOSPITAL OUTPT CLINIC VISIT: HCPCS | Performed by: INTERNAL MEDICINE

## 2021-06-22 PROCEDURE — 36591 DRAW BLOOD OFF VENOUS DEVICE: CPT

## 2021-06-22 PROCEDURE — 82378 CARCINOEMBRYONIC ANTIGEN: CPT | Performed by: INTERNAL MEDICINE

## 2021-06-22 PROCEDURE — 80053 COMPREHEN METABOLIC PANEL: CPT | Performed by: INTERNAL MEDICINE

## 2021-06-22 PROCEDURE — 85025 COMPLETE CBC W/AUTO DIFF WBC: CPT | Performed by: INTERNAL MEDICINE

## 2021-06-22 PROCEDURE — 25010000002 HEPARIN LOCK FLUSH PER 10 UNITS: Performed by: INTERNAL MEDICINE

## 2021-06-22 RX ORDER — SODIUM CHLORIDE 9 MG/ML
250 INJECTION, SOLUTION INTRAVENOUS ONCE
Status: CANCELLED | OUTPATIENT
Start: 2021-06-23

## 2021-06-22 RX ORDER — LANCETS
EACH MISCELLANEOUS
COMMUNITY
End: 2021-01-01 | Stop reason: SDUPTHER

## 2021-06-22 RX ORDER — POTASSIUM CHLORIDE 7.45 MG/ML
10 INJECTION INTRAVENOUS
Status: CANCELLED | OUTPATIENT
Start: 2021-06-23

## 2021-06-22 RX ORDER — SODIUM CHLORIDE 9 MG/ML
500 INJECTION, SOLUTION INTRAVENOUS ONCE
Status: CANCELLED
Start: 2021-06-23 | End: 2021-06-23

## 2021-06-22 RX ORDER — CALCIUM CARBONATE/VITAMIN D3 500 MG-10
1 TABLET,CHEWABLE ORAL 2 TIMES DAILY
Qty: 60 TABLET | Refills: 6 | Status: SHIPPED | OUTPATIENT
Start: 2021-06-22

## 2021-06-22 RX ORDER — CEFDINIR 300 MG/1
CAPSULE ORAL
COMMUNITY
End: 2021-01-01

## 2021-06-22 RX ORDER — OLANZAPINE 5 MG/1
5 TABLET ORAL NIGHTLY
Qty: 30 TABLET | Refills: 5 | Status: ON HOLD | OUTPATIENT
Start: 2021-06-22 | End: 2021-01-01

## 2021-06-22 RX ORDER — SODIUM CHLORIDE 0.9 % (FLUSH) 0.9 %
20 SYRINGE (ML) INJECTION AS NEEDED
Status: CANCELLED | OUTPATIENT
Start: 2021-06-22

## 2021-06-22 RX ORDER — HEPARIN SODIUM (PORCINE) LOCK FLUSH IV SOLN 100 UNIT/ML 100 UNIT/ML
500 SOLUTION INTRAVENOUS AS NEEDED
Status: DISCONTINUED | OUTPATIENT
Start: 2021-06-22 | End: 2021-06-23 | Stop reason: HOSPADM

## 2021-06-22 RX ORDER — NALOXONE HYDROCHLORIDE 4 MG/.1ML
SPRAY NASAL
COMMUNITY
End: 2021-01-01

## 2021-06-22 RX ORDER — GABAPENTIN 300 MG/1
300 CAPSULE ORAL 3 TIMES DAILY
COMMUNITY
End: 2021-01-01 | Stop reason: SDUPTHER

## 2021-06-22 RX ORDER — POTASSIUM CHLORIDE 750 MG/1
20 TABLET, EXTENDED RELEASE ORAL 2 TIMES DAILY
Qty: 60 TABLET | Refills: 1 | Status: SHIPPED | OUTPATIENT
Start: 2021-06-22 | End: 2021-01-01

## 2021-06-22 RX ORDER — ONDANSETRON HYDROCHLORIDE 8 MG/1
8 TABLET, FILM COATED ORAL EVERY 8 HOURS PRN
Qty: 60 TABLET | Refills: 4 | Status: SHIPPED | OUTPATIENT
Start: 2021-06-22 | End: 2021-01-01 | Stop reason: SDUPTHER

## 2021-06-22 RX ORDER — BUDESONIDE AND FORMOTEROL FUMARATE DIHYDRATE 160; 4.5 UG/1; UG/1
2 AEROSOL RESPIRATORY (INHALATION) DAILY PRN
Status: ON HOLD | COMMUNITY
End: 2022-01-01

## 2021-06-22 RX ORDER — HEPARIN SODIUM (PORCINE) LOCK FLUSH IV SOLN 100 UNIT/ML 100 UNIT/ML
500 SOLUTION INTRAVENOUS AS NEEDED
Status: CANCELLED | OUTPATIENT
Start: 2021-06-22

## 2021-06-22 RX ORDER — SODIUM CHLORIDE 0.9 % (FLUSH) 0.9 %
20 SYRINGE (ML) INJECTION AS NEEDED
Status: DISCONTINUED | OUTPATIENT
Start: 2021-06-22 | End: 2021-06-23 | Stop reason: HOSPADM

## 2021-06-22 RX ADMIN — HEPARIN SODIUM (PORCINE) LOCK FLUSH IV SOLN 100 UNIT/ML 500 UNITS: 100 SOLUTION at 10:26

## 2021-06-22 RX ADMIN — SODIUM CHLORIDE, PRESERVATIVE FREE 20 ML: 5 INJECTION INTRAVENOUS at 10:25

## 2021-06-23 LAB — CEA SERPL-MCNC: 13.2 NG/ML

## 2021-06-24 RX ORDER — GABAPENTIN 400 MG/1
400 CAPSULE ORAL 3 TIMES DAILY
COMMUNITY
Start: 2021-06-14 | End: 2022-01-01

## 2021-06-28 ENCOUNTER — OFFICE VISIT (OUTPATIENT)
Dept: ONCOLOGY | Facility: HOSPITAL | Age: 71
End: 2021-06-28

## 2021-06-28 ENCOUNTER — HOSPITAL ENCOUNTER (OUTPATIENT)
Dept: ONCOLOGY | Facility: HOSPITAL | Age: 71
Setting detail: INFUSION SERIES
Discharge: HOME | End: 2021-06-28

## 2021-06-28 ENCOUNTER — SPECIALTY PHARMACY (OUTPATIENT)
Dept: PHARMACY | Facility: HOSPITAL | Age: 71
End: 2021-06-28

## 2021-06-28 ENCOUNTER — APPOINTMENT (OUTPATIENT)
Dept: ONCOLOGY | Facility: HOSPITAL | Age: 71
End: 2021-06-28

## 2021-06-28 VITALS — DIASTOLIC BLOOD PRESSURE: 60 MMHG | TEMPERATURE: 98.3 F | HEART RATE: 97 BPM | SYSTOLIC BLOOD PRESSURE: 101 MMHG

## 2021-06-28 VITALS
OXYGEN SATURATION: 100 % | TEMPERATURE: 98.3 F | SYSTOLIC BLOOD PRESSURE: 101 MMHG | HEART RATE: 97 BPM | DIASTOLIC BLOOD PRESSURE: 60 MMHG | RESPIRATION RATE: 18 BRPM

## 2021-06-28 DIAGNOSIS — Z45.2 ENCOUNTER FOR ADJUSTMENT OR MANAGEMENT OF VASCULAR ACCESS DEVICE: ICD-10-CM

## 2021-06-28 DIAGNOSIS — E83.51 HYPOCALCEMIA: ICD-10-CM

## 2021-06-28 DIAGNOSIS — E83.42 HYPOMAGNESEMIA: ICD-10-CM

## 2021-06-28 DIAGNOSIS — E83.39 HYPOPHOSPHATEMIA: ICD-10-CM

## 2021-06-28 DIAGNOSIS — G89.3 CANCER RELATED PAIN: ICD-10-CM

## 2021-06-28 DIAGNOSIS — E87.6 HYPOKALEMIA: Primary | ICD-10-CM

## 2021-06-28 DIAGNOSIS — C18.9 MALIGNANT NEOPLASM OF COLON, UNSPECIFIED PART OF COLON (HCC): ICD-10-CM

## 2021-06-28 DIAGNOSIS — C18.9 MALIGNANT NEOPLASM OF COLON, UNSPECIFIED PART OF COLON (HCC): Primary | ICD-10-CM

## 2021-06-28 PROBLEM — C80.1 CANCER (HCC): Status: RESOLVED | Noted: 2021-06-21 | Resolved: 2021-06-28

## 2021-06-28 LAB
ANION GAP SERPL CALCULATED.3IONS-SCNC: 12.6 MMOL/L (ref 5–15)
BASOPHILS # BLD AUTO: 0.03 10*3/MM3 (ref 0–0.2)
BASOPHILS NFR BLD AUTO: 0.3 % (ref 0–1.5)
BUN SERPL-MCNC: 13 MG/DL (ref 8–23)
BUN/CREAT SERPL: 14.1 (ref 7–25)
CALCIUM SPEC-SCNC: 6.2 MG/DL (ref 8.6–10.5)
CHLORIDE SERPL-SCNC: 98 MMOL/L (ref 98–107)
CO2 SERPL-SCNC: 27.4 MMOL/L (ref 22–29)
CREAT SERPL-MCNC: 0.92 MG/DL (ref 0.57–1)
DEPRECATED RDW RBC AUTO: 84.9 FL (ref 37–54)
EOSINOPHIL # BLD AUTO: 0.05 10*3/MM3 (ref 0–0.4)
EOSINOPHIL NFR BLD AUTO: 0.6 % (ref 0.3–6.2)
ERYTHROCYTE [DISTWIDTH] IN BLOOD BY AUTOMATED COUNT: 25 % (ref 12.3–15.4)
GFR SERPL CREATININE-BSD FRML MDRD: 60 ML/MIN/1.73
GLUCOSE SERPL-MCNC: 108 MG/DL (ref 65–99)
HCT VFR BLD AUTO: 24.5 % (ref 34–46.6)
HGB BLD-MCNC: 7.7 G/DL (ref 12–15.9)
IMM GRANULOCYTES # BLD AUTO: 0.03 10*3/MM3 (ref 0–0.05)
IMM GRANULOCYTES NFR BLD AUTO: 0.3 % (ref 0–0.5)
LYMPHOCYTES # BLD AUTO: 3.27 10*3/MM3 (ref 0.7–3.1)
LYMPHOCYTES NFR BLD AUTO: 37 % (ref 19.6–45.3)
MAGNESIUM SERPL-MCNC: 0.7 MG/DL (ref 1.6–2.4)
MCH RBC QN AUTO: 30.8 PG (ref 26.6–33)
MCHC RBC AUTO-ENTMCNC: 31.4 G/DL (ref 31.5–35.7)
MCV RBC AUTO: 98 FL (ref 79–97)
MONOCYTES # BLD AUTO: 0.71 10*3/MM3 (ref 0.1–0.9)
MONOCYTES NFR BLD AUTO: 8 % (ref 5–12)
NEUTROPHILS NFR BLD AUTO: 4.74 10*3/MM3 (ref 1.7–7)
NEUTROPHILS NFR BLD AUTO: 53.8 % (ref 42.7–76)
PHOSPHATE SERPL-MCNC: 3.8 MG/DL (ref 2.5–4.5)
PLATELET # BLD AUTO: 353 10*3/MM3 (ref 140–450)
PMV BLD AUTO: 9.2 FL (ref 6–12)
POTASSIUM SERPL-SCNC: 2.4 MMOL/L (ref 3.5–5.2)
RBC # BLD AUTO: 2.5 10*6/MM3 (ref 3.77–5.28)
SODIUM SERPL-SCNC: 138 MMOL/L (ref 136–145)
WBC # BLD AUTO: 8.83 10*3/MM3 (ref 3.4–10.8)

## 2021-06-28 PROCEDURE — 99215 OFFICE O/P EST HI 40 MIN: CPT | Performed by: INTERNAL MEDICINE

## 2021-06-28 PROCEDURE — 25010000002 HEPARIN LOCK FLUSH PER 10 UNITS: Performed by: INTERNAL MEDICINE

## 2021-06-28 PROCEDURE — 25010000003 POTASSIUM CHLORIDE 10 MEQ/100ML SOLUTION: Performed by: INTERNAL MEDICINE

## 2021-06-28 PROCEDURE — 96365 THER/PROPH/DIAG IV INF INIT: CPT

## 2021-06-28 PROCEDURE — 25010000002 MAGNESIUM SULFATE IN D5W 1G/100ML (PREMIX) 1-5 GM/100ML-% SOLUTION: Performed by: INTERNAL MEDICINE

## 2021-06-28 PROCEDURE — 36415 COLL VENOUS BLD VENIPUNCTURE: CPT | Performed by: INTERNAL MEDICINE

## 2021-06-28 PROCEDURE — 96368 THER/DIAG CONCURRENT INF: CPT

## 2021-06-28 PROCEDURE — 96366 THER/PROPH/DIAG IV INF ADDON: CPT

## 2021-06-28 PROCEDURE — 83735 ASSAY OF MAGNESIUM: CPT | Performed by: INTERNAL MEDICINE

## 2021-06-28 PROCEDURE — 84100 ASSAY OF PHOSPHORUS: CPT | Performed by: INTERNAL MEDICINE

## 2021-06-28 PROCEDURE — 85025 COMPLETE CBC W/AUTO DIFF WBC: CPT | Performed by: INTERNAL MEDICINE

## 2021-06-28 PROCEDURE — 80048 BASIC METABOLIC PNL TOTAL CA: CPT | Performed by: INTERNAL MEDICINE

## 2021-06-28 RX ORDER — SODIUM CHLORIDE 9 MG/ML
250 INJECTION, SOLUTION INTRAVENOUS ONCE
Status: CANCELLED | OUTPATIENT
Start: 2021-06-30

## 2021-06-28 RX ORDER — SODIUM CHLORIDE 0.9 % (FLUSH) 0.9 %
20 SYRINGE (ML) INJECTION AS NEEDED
Status: DISCONTINUED | OUTPATIENT
Start: 2021-06-28 | End: 2021-06-29 | Stop reason: HOSPADM

## 2021-06-28 RX ORDER — SODIUM CHLORIDE 0.9 % (FLUSH) 0.9 %
20 SYRINGE (ML) INJECTION AS NEEDED
Status: CANCELLED | OUTPATIENT
Start: 2021-06-28

## 2021-06-28 RX ORDER — SODIUM CHLORIDE 9 MG/ML
500 INJECTION, SOLUTION INTRAVENOUS ONCE
Status: COMPLETED | OUTPATIENT
Start: 2021-06-28 | End: 2021-06-28

## 2021-06-28 RX ORDER — MAGNESIUM SULFATE 1 G/100ML
1 INJECTION INTRAVENOUS
Status: COMPLETED | OUTPATIENT
Start: 2021-06-28 | End: 2021-06-28

## 2021-06-28 RX ORDER — POTASSIUM CHLORIDE 7.45 MG/ML
10 INJECTION INTRAVENOUS
Status: COMPLETED | OUTPATIENT
Start: 2021-06-28 | End: 2021-06-28

## 2021-06-28 RX ORDER — SODIUM CHLORIDE 9 MG/ML
250 INJECTION, SOLUTION INTRAVENOUS ONCE
Status: COMPLETED | OUTPATIENT
Start: 2021-06-28 | End: 2021-06-28

## 2021-06-28 RX ORDER — HYDROCODONE BITARTRATE AND ACETAMINOPHEN 5; 325 MG/1; MG/1
1 TABLET ORAL EVERY 6 HOURS PRN
Qty: 120 TABLET | Refills: 0 | Status: SHIPPED | OUTPATIENT
Start: 2021-06-28 | End: 2021-01-01 | Stop reason: SDUPTHER

## 2021-06-28 RX ORDER — HEPARIN SODIUM (PORCINE) LOCK FLUSH IV SOLN 100 UNIT/ML 100 UNIT/ML
500 SOLUTION INTRAVENOUS AS NEEDED
Status: DISCONTINUED | OUTPATIENT
Start: 2021-06-28 | End: 2021-06-29 | Stop reason: HOSPADM

## 2021-06-28 RX ORDER — HEPARIN SODIUM (PORCINE) LOCK FLUSH IV SOLN 100 UNIT/ML 100 UNIT/ML
500 SOLUTION INTRAVENOUS AS NEEDED
Status: CANCELLED | OUTPATIENT
Start: 2021-06-28

## 2021-06-28 RX ADMIN — SODIUM CHLORIDE 250 ML: 9 INJECTION, SOLUTION INTRAVENOUS at 10:52

## 2021-06-28 RX ADMIN — MAGNESIUM SULFATE IN DEXTROSE 1 G: 10 INJECTION, SOLUTION INTRAVENOUS at 13:10

## 2021-06-28 RX ADMIN — POTASSIUM CHLORIDE 10 MEQ: 10 INJECTION, SOLUTION INTRAVENOUS at 12:03

## 2021-06-28 RX ADMIN — MAGNESIUM SULFATE IN DEXTROSE 1 G: 10 INJECTION, SOLUTION INTRAVENOUS at 12:04

## 2021-06-28 RX ADMIN — SODIUM CHLORIDE 500 ML/HR: 9 INJECTION, SOLUTION INTRAVENOUS at 10:52

## 2021-06-28 RX ADMIN — SODIUM CHLORIDE, PRESERVATIVE FREE 20 ML: 5 INJECTION INTRAVENOUS at 14:48

## 2021-06-28 RX ADMIN — MAGNESIUM SULFATE IN DEXTROSE 1 G: 10 INJECTION, SOLUTION INTRAVENOUS at 10:53

## 2021-06-28 RX ADMIN — POTASSIUM CHLORIDE 10 MEQ: 10 INJECTION, SOLUTION INTRAVENOUS at 10:52

## 2021-06-28 RX ADMIN — HEPARIN SODIUM (PORCINE) LOCK FLUSH IV SOLN 100 UNIT/ML 500 UNITS: 100 SOLUTION at 14:48

## 2021-06-28 RX ADMIN — POTASSIUM CHLORIDE 10 MEQ: 10 INJECTION, SOLUTION INTRAVENOUS at 13:09

## 2021-06-28 NOTE — PROGRESS NOTES
Patient  Lorraine Dominguez    Location  [facility] HEMATOLOGY & ONCOLOGY    Chief Complaint  Colon Cancer    Referring Provider: LUIS Echols  PCP: Shayna Lewis APRN    Subjective          Oncology/Hematology History Overview Note   1) Small Bowel Adenocarcinoma  - patient developed small bowel obstruction on 4/18/21 and underwent emergency resection of the Ileum (Dr. Lacey)   - pathology from the ileum was positive for adenocarcinoma that appeared to be small bowel in origin and represent a new primary malignancy, 2.2cm, grade 1, tumor perforates the visceral peritoneum, pT4pN0, with 0/12 lymph nodes involved.  - Post surgical CT Abd/Pelvis 4/24/21:  Mild thickening of the wall of some portion of the small intestine, could be post-surgical changes. Moderate dilation of the right renal collecting system is unchanged with ureteral double-J stent.    - CT CAP on 3/25 prior to surgery showed no evidence of disease in the chest  - Caris report pending    2) Colon Cancer:   -Diagnosed 5/7/15; Low grade; staged pT4a pN0 M0 stage IIB; 0/37 LN; no MMR testing; RIGHT sided  -RIGHT hemicolectomy 5/7/15  -adjuvant Xeloda X 6 months  -Recurrent dx diagnosed via RIGHT ureter mass resection (5/1/18)  -FOLFIRI X 9 (7/31-12/4/18)  -s/p XRT RIGHT pelvis total 5320 cGy (12/14/18-1/24/19)  -CARIS report from specimen dated 5/7/15: BRAF V600E mutation (possible benefit from cetuximab), MSI stable, TMB-low, TAURUS-High (33%), TP53 mutated    *CT CAP 1/21/2021: Interim development of right-sided hydronephrosis and a soft tissue mass along the right iliac chain measuring 2.9 x 1.2 cm.  There is a second nodule identified below the aortic bifurcation in the midline measuring 1.4 cm.  This was consistent with disease recurrence versus her new primary small bowel cancer (see above).     3) Breast Cancer:  -RIGHT breast DCIS diagnosed 3/15/06; low-grade; s/p lumpectomy and adjuvant XRT; ER: 100%, AK:  70%  -LEFT breast invasive lobular carcinoma; diagnosed 06/2012; staged pT3 pN0 M0 stage IIB, ER: 83%, NJ: 0, HER2: 1+, Ki-67: 26%, treated with mastectomy and SLND 04/2012, adjuvant DD AC and Taxotere, Adjuvant Arimidex then Femara (11/13/19)    4) Anemia:  -multifactorial due to her history of GI bleeding from small bowel ulcer/tumor  -intolerant to oral iron. Treatment with IV iron in the past.   -iron studies on 5/11/12 were normal    5) right hydronephrosis:  -Noted on CT scan from 1/21/2021, secondary to soft tissue mass  -Likely secondary to recurrent colon cancer, biopsy pending  -Cystoscopy and right ureteral stent (Dr. Chi) on 1/28/2021    6) DVT:  -bracheocephalic and subclavian thrombosis March 2021 ([facility])     Malignant neoplasm of left breast in female, estrogen receptor positive (CMS/HCC)   3/5/2018 Initial Diagnosis    Breast cancer (CMS/HCC)     Colon cancer (CMS/HCC)   3/5/2018 Initial Diagnosis    Colon cancer (CMS/HCC)     6/28/2021 -  Chemotherapy    OP SUPPORTIVE ELECTROLYTE REPLACEMENT         History of Present Illness  Patient comes in today for follow-up after multiple abnormal electrolyte values last week.  Unfortunately there was a glitch in the system when she was not scheduled for electrolyte replacement as intended.  Patient has not reported any new symptoms.  She continues to have some diarrhea and says that she has loose stool about 3-4 times a day.  She is not taking any antidiarrheal medication at this time.  The volume of her stool is not large and she denies black tarry stool or any blood that is noted with the stool.  She does continue to have some abdominal pain and request a refill of her pain medication.  The abdominal pain is bilateral across her lower abdomen.  We discussed the plan for electrolyte management going forward.  I told her to disregard her concerns of hyperglycemia at this time.  Her recent labs have not indicated very severe hyperglycemia.   I would err on the side of increasing calorie and general nutritional intake.  We also reviewed her medications.  The patient is not taking calcium because the pharmacy would not fill her calcium pill since it is available over-the-counter.  She was unsure which calcium medication to take so she has not taken 1.    Review of Systems   Constitutional: Negative for appetite change, diaphoresis, fatigue, fever, unexpected weight gain and unexpected weight loss.   HENT: Negative for hearing loss, mouth sores, sore throat, swollen glands, trouble swallowing and voice change.    Eyes: Negative for blurred vision.   Respiratory: Negative for cough, shortness of breath and wheezing.    Cardiovascular: Negative for chest pain and palpitations.   Gastrointestinal: Negative for abdominal pain, blood in stool, constipation, diarrhea, nausea and vomiting.   Endocrine: Negative for cold intolerance and heat intolerance.   Genitourinary: Negative for difficulty urinating, dysuria, frequency, hematuria and urinary incontinence.   Musculoskeletal: Negative for arthralgias, back pain and myalgias.   Skin: Negative for rash, skin lesions and bruise.   Neurological: Negative for dizziness, seizures, weakness, numbness and headache.   Hematological: Does not bruise/bleed easily.   Psychiatric/Behavioral: Negative for depressed mood. The patient is not nervous/anxious.        Past Medical History:   Diagnosis Date   • Anemia    • Arthritis    • Breast cancer (CMS/HCC)    • Colon cancer (CMS/HCC)    • DM (diabetes mellitus), type 2 (CMS/HCC)    • High cholesterol    • Hypertension    • Iron deficiency anemia secondary to blood loss (chronic)     GI BLEED   • Shortness of breath      Past Surgical History:   Procedure Laterality Date   • HYSTERECTOMY     • JOINT REPLACEMENT     • LEFT VENTRICULAR ASSIST DEVICE     • OTHER SURGICAL HISTORY      BIOPSY     Social History     Socioeconomic History   • Marital status:      Spouse name:  Not on file   • Number of children: Not on file   • Years of education: Not on file   • Highest education level: Not on file   Tobacco Use   • Smoking status: Never Smoker   Substance and Sexual Activity   • Drug use: Never     Family History   Problem Relation Age of Onset   • Colon cancer Other    • Skin cancer Other        Objective   Physical Exam  General: Alert, cooperative, no acute distress, frail and chronically ill-appearing, thinning of her hair  Eyes: Anicteric sclera, PERRLA  Respiratory: CTAB, normal respiratory effort  Abdomen: Normal active bowel sounds, mild tenderness throughout her abdomen, firm full abdomen, no guarding  Cardiovascular: RRR, no murmur, no lower extremity edema  Skin: Normal tone, no rash, no lesions  Psychiatric: Appropriate affect, intact judgment  Neurologic: No focal sensory or motor deficits, no weakness, numbness, dizziness  Musculoskeletal: Normal muscle strength and tone  Extremities: No clubbing, cyanosis, or deformities      Vitals:    06/28/21 1316   BP: 101/60   Pulse: 97   Resp: 18   Temp: 98.3 °F (36.8 °C)   TempSrc: Temporal   SpO2: 100%  Comment: RM AIR   PainSc:   4   PainLoc: Abdomen               PHQ-9 Total Score:         Result Review :   The following data was reviewed by: Jennifer Schroeder MD PhD on 06/28/2021:  Lab Results   Component Value Date    HGB 7.7 (C) 06/28/2021    HCT 24.5 (L) 06/28/2021    MCV 98.0 (H) 06/28/2021     06/28/2021    WBC 8.83 06/28/2021    NEUTROABS 4.74 06/28/2021    LYMPHSABS 3.27 (H) 06/28/2021    MONOSABS 0.71 06/28/2021    EOSABS 0.05 06/28/2021    BASOSABS 0.03 06/28/2021     Lab Results   Component Value Date    GLUCOSE 108 (H) 06/28/2021    BUN 13 06/28/2021    CREATININE 0.92 06/28/2021     06/28/2021    K 2.4 (C) 06/28/2021    CL 98 06/28/2021    CO2 27.4 06/28/2021    CALCIUM 6.2 (C) 06/28/2021    PROTEINTOT 7.3 06/22/2021    ALBUMIN 3.90 06/22/2021    BILITOT 0.3 06/22/2021    ALKPHOS 152 (H) 06/22/2021     AST 30 06/22/2021    ALT 10 06/22/2021          Assessment and Plan    Diagnoses and all orders for this visit:    1. Hypokalemia (Primary)  -     Comprehensive Metabolic Panel; Future  -     Phosphorus; Future  -     Calcium, Ionized; Future  -     Basic Metabolic Panel; Future  -     Magnesium; Future  -     sodium chloride 0.9 % infusion 250 mL  -     calcium gluconate 2 g in sodium chloride 0.9 % 100 mL IVPB  -     Ambulatory Referral to [facility] For Infusion Treatment    2. Hypophosphatemia  -     Phosphorus  -     Phosphorus; Future    3. Cancer related pain  -     HYDROcodone-acetaminophen (Norco) 5-325 MG per tablet; Take 1 tablet by mouth Every 6 (Six) Hours As Needed for Moderate Pain .  Dispense: 120 tablet; Refill: 0    4. Hypomagnesemia  -     Magnesium; Future    5. Malignant neoplasm of colon, unspecified part of colon (CMS/HCC)  -     CBC & Differential; Future  -     Comprehensive Metabolic Panel; Future  -     Magnesium; Future  -     Phosphorus; Future    6. Hypocalcemia  -     Phosphorus; Future  -     Calcium, Ionized; Future  -     Basic Metabolic Panel; Future  -     Magnesium; Future  -     sodium chloride 0.9 % infusion 250 mL  -     calcium gluconate 2 g in sodium chloride 0.9 % 100 mL IVPB  -     Ambulatory Referral to [facility] For Infusion Treatment      Patient will continue on her same dose of Xeloda every other week.  Today we are replacing her potassium, magnesium, and phosphorus.  I instructed her to take antidiarrhea medication if the diarrhea worsens at all.  I will plan for the patient to go to the hospital tomorrow to get calcium gluconate.  Prior to that I will also check her ionized calcium levels and repeat other electrolytes.  We will continue to replace electrolytes as needed.    Patient was given instructions and counseling regarding her condition or for health maintenance advice. Please see specific information pulled into the AVS if appropriate.     Jennifer Schroeder MD  PhD    6/28/2021

## 2021-06-28 NOTE — PROGRESS NOTES
Outpatient Nutrition Oncology Follow Up    Patient Name: Lorraine Dominguez  YOB: 1950  MRN: [id number]  Assessment Date: 6/28/2021    CLINICAL NUTRITION ASSESSMENT      Type of Cancer Treatment Xeloda        CLINICAL NUTRITION ASSESSMENT      Reason for Assessment  MST score >3     H&P:    Past Medical History:   Diagnosis Date   • Anemia    • Arthritis    • Breast cancer (CMS/HCC)    • Colon cancer (CMS/HCC)    • DM (diabetes mellitus), type 2 (CMS/HCC)    • High cholesterol    • Hypertension    • Iron deficiency anemia secondary to blood loss (chronic)     GI BLEED   • Shortness of breath         Current Problems:   Patient Active Problem List   Diagnosis Code   • Encounter for adjustment or management of vascular access device Z45.2   • Allergic rhinitis J30.9   • Anemia D64.9   • Anxiety F41.9   • Arthritis M19.90   • Malignant neoplasm of left breast in female, estrogen receptor positive (CMS/HCC) C50.912, Z17.0   • Cancer (CMS/HCC) C80.1   • Cataract H26.9   • Degeneration of intervertebral disc [id number]   • Depression F32.9   • Diabetic neuropathy (CMS/HCC) E11.40   • Esophageal reflux K21.9   • Hiatal hernia K44.9   • High blood pressure I10   • Hydroureteronephrosis N13.30   • Hyperlipidemia E78.5   • Leg pain M79.606   • Limb swelling M79.89   • Colon cancer (CMS/HCC) C18.9   • Malignant neoplasm of colon (CMS/HCC) C18.9   • Muscle cramps R25.2   • Neuropathy G62.9   • Primary localized osteoarthritis of pelvic region and thigh M16.10   • Shortness of breath R06.02   • Diabetes mellitus (CMS/HCC) E11.9   • Type 2 diabetes mellitus (CMS/HCC) E11.9   • Ureteral mass N28.89   • Special screening for malignant neoplasms, colon Z12.11   • Atrial fibrillation (CMS/HCC) I48.91   • Central venous catheter in place Z78.9   • Chronic anxiety F41.9   • Disorder of vitamin B12 E53.8   • Dyslipidemia E78.5   • Gastrointestinal hemorrhage K92.2   • History of lumbar discectomy Z98.890   • History of  malignant neoplasm of breast Z85.3   • Hypokalemia E87.6   • Hypothyroidism E03.9   • Iron deficiency E61.1   • Iron deficiency anemia secondary to blood loss (chronic) D50.0   • Osteopenia M85.80   • Tension-type headache G44.209   • Ulcerative lesion [id number]   • Hypomagnesemia E83.42            BMI kg/m2   There is no height or weight on file to calculate BMI.    Weight Hx  Wt Readings from Last 30 Encounters:   06/22/21 1049 70 kg (154 lb 5.2 oz)   05/28/21 1336 69.9 kg (154 lb 1.6 oz)   05/19/21 0000 70.8 kg (156 lb 2 oz)   05/05/21 0000 75.9 kg (167 lb 7 oz)   04/29/21 0000 77.6 kg (171 lb)   03/30/21 1036 79.8 kg (175 lb 14.8 oz)   03/11/21 1138 80.8 kg (178 lb 2.1 oz)   02/23/21 1304 81.8 kg (180 lb 5.4 oz)   01/29/21 1452 84.5 kg (186 lb 4.6 oz)   01/25/21 0000 83.6 kg (184 lb 4 oz)   01/22/21 1442 83.7 kg (184 lb 8.4 oz)   01/05/21 1125 84.9 kg (187 lb 2.7 oz)   11/24/20 1120 84 kg (185 lb 3 oz)   11/04/20 0000 84.9 kg (187 lb 4 oz)   09/22/20 1107 83.1 kg (183 lb 3.2 oz)   08/27/20 0000 82.3 kg (181 lb 8 oz)   08/04/20 1407 82.8 kg (182 lb 8.7 oz)   07/24/20 0000 81.6 kg (180 lb)   07/23/20 1012 82 kg (180 lb 12.4 oz)   07/09/20 0929 81.9 kg (180 lb 8.9 oz)   11/06/19 0000 79.1 kg (174 lb 5 oz)   08/23/19 0000 78.5 kg (173 lb)   07/03/19 0000 77.7 kg (171 lb 4 oz)   06/03/19 0000 74.8 kg (165 lb)   02/21/19 0000 72.1 kg (159 lb)   11/21/18 0000 71.7 kg (158 lb)   08/01/18 0000 81.3 kg (179 lb 4 oz)   07/26/18 0000 80.3 kg (177 lb)   06/28/18 0000 79.9 kg (176 lb 4 oz)   06/25/18 0000 80.3 kg (177 lb)        Labs/Medications        Pertinent Labs Reviewed.   Results from last 7 days   Lab Units 06/28/21  1043 06/22/21  1023   SODIUM mmol/L 138 133*   POTASSIUM mmol/L 2.4* 2.2*   CHLORIDE mmol/L 98 96*   CO2 mmol/L 27.4 25.4   BUN mg/dL 13 25*   CREATININE mg/dL 0.92 0.92   CALCIUM mg/dL 6.2* 6.9*   BILIRUBIN mg/dL  --  0.3   ALK PHOS U/L  --  152*   ALT (SGPT) U/L  --  10   AST (SGOT) U/L  --  30   GLUCOSE  mg/dL 108* 131*     Results from last 7 days   Lab Units 06/28/21  1043 06/22/21  1023   MAGNESIUM mg/dL 0.7* 0.7*   PHOSPHORUS mg/dL  --  3.6   HEMOGLOBIN g/dL 7.7* 8.0*   HEMATOCRIT % 24.5* 25.1*     Coronavirus (COVID-19)   Date Value Ref Range Status   04/17/2021 NOT DETECTED NA Final     Comment:     The SARS-CoV-2 assay is a real-time, RT-PCR test intended  for the qualitative detection of nucleic acid from the  SARS-CoV-2 in respiratory specimens from individuals,  testing performed at [facility].       Lab Results   Component Value Date    HGBA1C 6.0 (H) 09/09/2019         Pertinent Medications Accu-Chek Softclix Lancets, Calcium Carb-Cholecalciferol, HYDROcodone-acetaminophen, LORazepam, Magnesium, Magnesium Gluconate, OLANZapine, Probiotic, Vitamin B-12, amoxicillin-clavulanate, apixaban, aspirin, budesonide-formoterol, capecitabine, cefdinir, citalopram, dexamethasone, digoxin, escitalopram, ferrous sulfate, gabapentin, glucose blood, ibuprofen, letrozole, levoFLOXacin, loperamide, meloxicam, metFORMIN, metoprolol succinate XL, metoprolol tartrate, naloxone, ondansetron, oxyCODONE-acetaminophen, pantoprazole, phenazopyridine, polyethylene glycol, potassium chloride, pravastatin, predniSONE, prednisoLONE acetate, pregabalin, raNITIdine, and senna     Physical Findings            Current Nutrition Orders & Evaluation of Intake       Oral Nutrition     Current PO Diet Carb consistent diet, adequate proteins, adequate fluids   Supplement High protein, low carbohydrate protein supplement BID     Enteral Nutrition     Current Formula    Schedule      Parenteral Nutrition     Current Prescription    Schedule      Nutrition Diagnosis        Nutrition Dx Problem 1 Increased nutrient needs related to increased nutrient needs due to catabolic disease as evidenced by physiological causes increasing nutrient needs.       Nutrition Intervention       RD Action Nutrition follow up     Monitor/Evaluation        Monitor Per oncology nutrition protocol.     Comments:    Pt has been receiving oncology tx for colon Ca since 2018.  No previous oncology RD encounters due to pt on oral chemotherapy/not infusions since 2018.  She has lost a total of 12-13% body wt in 3-6 months.  Today, pt is receiving IV fluids due to recent nausea/diarrhea, hypokalemia and hypomagnesemia.  She reports to keep her BG levels under fairly good control (meds/insulin noted), usually 100-110.  Pt reports eating a lot of fresh vegetables lately, due to these are in season (especially tomatoes).  Discussed a bland/low residue diet to help with nausea episodes & to control loose BMs, which means avoiding fresh fruits/vegetables (except bananas) at this time.  Encouraged half a banana daily with meals to provide soluble fiber and additional dietary potassium.  Encouraged pt to speak to MD further if diarrhea is not under control.  Explained importance of maintaining her weight by eating adequately (calories, proteins) and drinking adequately.  Pt recently started drinking a low carbohydrate, high protein oral nutrition supplement as well, due to ongoing wt loss.  Education materials provided:  Diarrhea (handout).  RD will continue to monitor.    Electronically signed by:  Jennifer Sam RD  06/28/21 12:32 EDT

## 2021-06-30 ENCOUNTER — HOSPITAL ENCOUNTER (OUTPATIENT)
Dept: INFUSION THERAPY | Facility: HOSPITAL | Age: 71
Discharge: HOME | End: 2021-06-30
Admitting: INTERNAL MEDICINE

## 2021-06-30 VITALS
DIASTOLIC BLOOD PRESSURE: 63 MMHG | TEMPERATURE: 99.1 F | SYSTOLIC BLOOD PRESSURE: 121 MMHG | OXYGEN SATURATION: 100 % | HEART RATE: 103 BPM | RESPIRATION RATE: 20 BRPM

## 2021-06-30 DIAGNOSIS — Z45.2 ENCOUNTER FOR ADJUSTMENT OR MANAGEMENT OF VASCULAR ACCESS DEVICE: Primary | ICD-10-CM

## 2021-06-30 LAB
ANION GAP SERPL CALCULATED.3IONS-SCNC: 11.4 MMOL/L (ref 5–15)
BUN SERPL-MCNC: 12 MG/DL (ref 8–23)
BUN/CREAT SERPL: 13.6 (ref 7–25)
CA-I BLDA-SCNC: 1.01 MMOL/L (ref 1.13–1.32)
CALCIUM SPEC-SCNC: 7.8 MG/DL (ref 8.6–10.5)
CHLORIDE SERPL-SCNC: 102 MMOL/L (ref 98–107)
CO2 SERPL-SCNC: 24.6 MMOL/L (ref 22–29)
CREAT SERPL-MCNC: 0.88 MG/DL (ref 0.57–1)
GFR SERPL CREATININE-BSD FRML MDRD: 63 ML/MIN/1.73
GLUCOSE SERPL-MCNC: 126 MG/DL (ref 65–99)
MAGNESIUM SERPL-MCNC: 1.2 MG/DL (ref 1.6–2.4)
PHOSPHATE SERPL-MCNC: 2.2 MG/DL (ref 2.5–4.5)
POTASSIUM SERPL-SCNC: 3.1 MMOL/L (ref 3.5–5.2)
SODIUM SERPL-SCNC: 138 MMOL/L (ref 136–145)

## 2021-06-30 PROCEDURE — 82330 ASSAY OF CALCIUM: CPT | Performed by: INTERNAL MEDICINE

## 2021-06-30 PROCEDURE — 96365 THER/PROPH/DIAG IV INF INIT: CPT

## 2021-06-30 PROCEDURE — 84100 ASSAY OF PHOSPHORUS: CPT | Performed by: INTERNAL MEDICINE

## 2021-06-30 PROCEDURE — 25010000002 HEPARIN LOCK FLUSH PER 10 UNITS: Performed by: INTERNAL MEDICINE

## 2021-06-30 PROCEDURE — 83735 ASSAY OF MAGNESIUM: CPT | Performed by: INTERNAL MEDICINE

## 2021-06-30 PROCEDURE — 80048 BASIC METABOLIC PNL TOTAL CA: CPT | Performed by: INTERNAL MEDICINE

## 2021-06-30 PROCEDURE — 25010000002 CALCIUM GLUCONATE 2-0.675 GM/100ML-% SOLUTION: Performed by: INTERNAL MEDICINE

## 2021-06-30 PROCEDURE — 96523 IRRIG DRUG DELIVERY DEVICE: CPT

## 2021-06-30 RX ORDER — CALCIUM GLUCONATE 20 MG/ML
2 INJECTION, SOLUTION INTRAVENOUS ONCE
Status: COMPLETED | OUTPATIENT
Start: 2021-06-30 | End: 2021-06-30

## 2021-06-30 RX ORDER — SODIUM CHLORIDE 0.9 % (FLUSH) 0.9 %
20 SYRINGE (ML) INJECTION AS NEEDED
Status: CANCELLED | OUTPATIENT
Start: 2021-06-30

## 2021-06-30 RX ORDER — HEPARIN SODIUM (PORCINE) LOCK FLUSH IV SOLN 100 UNIT/ML 100 UNIT/ML
500 SOLUTION INTRAVENOUS AS NEEDED
Status: CANCELLED | OUTPATIENT
Start: 2021-06-30

## 2021-06-30 RX ORDER — HEPARIN SODIUM (PORCINE) LOCK FLUSH IV SOLN 100 UNIT/ML 100 UNIT/ML
500 SOLUTION INTRAVENOUS AS NEEDED
Status: DISCONTINUED | OUTPATIENT
Start: 2021-06-30 | End: 2021-07-02 | Stop reason: HOSPADM

## 2021-06-30 RX ADMIN — HEPARIN SODIUM (PORCINE) LOCK FLUSH IV SOLN 100 UNIT/ML 500 UNITS: 100 SOLUTION at 11:54

## 2021-06-30 RX ADMIN — CALCIUM GLUCONATE 2 G: 20 INJECTION, SOLUTION INTRAVENOUS at 11:24

## 2021-07-07 ENCOUNTER — HOSPITAL ENCOUNTER (OUTPATIENT)
Dept: ONCOLOGY | Facility: HOSPITAL | Age: 71
Setting detail: INFUSION SERIES
Discharge: HOME | End: 2021-07-07

## 2021-07-07 ENCOUNTER — OFFICE VISIT (OUTPATIENT)
Dept: ONCOLOGY | Facility: HOSPITAL | Age: 71
End: 2021-07-07

## 2021-07-07 VITALS
WEIGHT: 159.83 LBS | TEMPERATURE: 98.9 F | HEART RATE: 109 BPM | RESPIRATION RATE: 18 BRPM | DIASTOLIC BLOOD PRESSURE: 59 MMHG | SYSTOLIC BLOOD PRESSURE: 120 MMHG | OXYGEN SATURATION: 97 % | BODY MASS INDEX: 26.6 KG/M2

## 2021-07-07 DIAGNOSIS — C18.9 MALIGNANT NEOPLASM OF COLON, UNSPECIFIED PART OF COLON (HCC): ICD-10-CM

## 2021-07-07 DIAGNOSIS — E83.42 HYPOMAGNESEMIA: Primary | ICD-10-CM

## 2021-07-07 DIAGNOSIS — E87.6 HYPOKALEMIA: ICD-10-CM

## 2021-07-07 DIAGNOSIS — E83.39 HYPOPHOSPHATEMIA: ICD-10-CM

## 2021-07-07 DIAGNOSIS — E83.42 HYPOMAGNESEMIA: ICD-10-CM

## 2021-07-07 LAB
ALBUMIN SERPL-MCNC: 3.56 G/DL (ref 3.5–5.2)
ALBUMIN/GLOB SERPL: 1 G/DL
ALP SERPL-CCNC: 148 U/L (ref 39–117)
ALT SERPL W P-5'-P-CCNC: 11 U/L (ref 1–33)
ANION GAP SERPL CALCULATED.3IONS-SCNC: 10.6 MMOL/L (ref 5–15)
AST SERPL-CCNC: 30 U/L (ref 1–32)
BASOPHILS # BLD AUTO: 0.08 10*3/MM3 (ref 0–0.2)
BASOPHILS NFR BLD AUTO: 0.8 % (ref 0–1.5)
BILIRUB SERPL-MCNC: 0.2 MG/DL (ref 0–1.2)
BUN SERPL-MCNC: 16 MG/DL (ref 8–23)
BUN/CREAT SERPL: 16.7 (ref 7–25)
CALCIUM SPEC-SCNC: 7.4 MG/DL (ref 8.6–10.5)
CHLORIDE SERPL-SCNC: 103 MMOL/L (ref 98–107)
CO2 SERPL-SCNC: 24.4 MMOL/L (ref 22–29)
CREAT SERPL-MCNC: 0.96 MG/DL (ref 0.57–1)
DEPRECATED RDW RBC AUTO: 94.3 FL (ref 37–54)
EOSINOPHIL # BLD AUTO: 0.05 10*3/MM3 (ref 0–0.4)
EOSINOPHIL NFR BLD AUTO: 0.5 % (ref 0.3–6.2)
ERYTHROCYTE [DISTWIDTH] IN BLOOD BY AUTOMATED COUNT: 25.2 % (ref 12.3–15.4)
GFR SERPL CREATININE-BSD FRML MDRD: 57 ML/MIN/1.73
GLOBULIN UR ELPH-MCNC: 3.5 GM/DL
GLUCOSE SERPL-MCNC: 126 MG/DL (ref 65–99)
HCT VFR BLD AUTO: 24.4 % (ref 34–46.6)
HGB BLD-MCNC: 7.7 G/DL (ref 12–15.9)
IMM GRANULOCYTES # BLD AUTO: 0.1 10*3/MM3 (ref 0–0.05)
IMM GRANULOCYTES NFR BLD AUTO: 1 % (ref 0–0.5)
LYMPHOCYTES # BLD AUTO: 3.19 10*3/MM3 (ref 0.7–3.1)
LYMPHOCYTES NFR BLD AUTO: 32.6 % (ref 19.6–45.3)
MAGNESIUM SERPL-MCNC: 0.8 MG/DL (ref 1.6–2.4)
MCH RBC QN AUTO: 32 PG (ref 26.6–33)
MCHC RBC AUTO-ENTMCNC: 31.6 G/DL (ref 31.5–35.7)
MCV RBC AUTO: 101.2 FL (ref 79–97)
MONOCYTES # BLD AUTO: 0.69 10*3/MM3 (ref 0.1–0.9)
MONOCYTES NFR BLD AUTO: 7.1 % (ref 5–12)
NEUTROPHILS NFR BLD AUTO: 5.67 10*3/MM3 (ref 1.7–7)
NEUTROPHILS NFR BLD AUTO: 58 % (ref 42.7–76)
PHOSPHATE SERPL-MCNC: 3.7 MG/DL (ref 2.5–4.5)
PLATELET # BLD AUTO: 401 10*3/MM3 (ref 140–450)
PMV BLD AUTO: 9.2 FL (ref 6–12)
POTASSIUM SERPL-SCNC: 4.1 MMOL/L (ref 3.5–5.2)
PROT SERPL-MCNC: 7.1 G/DL (ref 6–8.5)
RBC # BLD AUTO: 2.41 10*6/MM3 (ref 3.77–5.28)
SODIUM SERPL-SCNC: 138 MMOL/L (ref 136–145)
WBC # BLD AUTO: 9.78 10*3/MM3 (ref 3.4–10.8)

## 2021-07-07 PROCEDURE — 36415 COLL VENOUS BLD VENIPUNCTURE: CPT

## 2021-07-07 PROCEDURE — 85025 COMPLETE CBC W/AUTO DIFF WBC: CPT | Performed by: INTERNAL MEDICINE

## 2021-07-07 PROCEDURE — 96366 THER/PROPH/DIAG IV INF ADDON: CPT

## 2021-07-07 PROCEDURE — 36591 DRAW BLOOD OFF VENOUS DEVICE: CPT

## 2021-07-07 PROCEDURE — 25010000002 MAGNESIUM SULFATE IN D5W 1G/100ML (PREMIX) 1-5 GM/100ML-% SOLUTION: Performed by: INTERNAL MEDICINE

## 2021-07-07 PROCEDURE — 80053 COMPREHEN METABOLIC PANEL: CPT | Performed by: INTERNAL MEDICINE

## 2021-07-07 PROCEDURE — 84100 ASSAY OF PHOSPHORUS: CPT | Performed by: INTERNAL MEDICINE

## 2021-07-07 PROCEDURE — 99213 OFFICE O/P EST LOW 20 MIN: CPT | Performed by: INTERNAL MEDICINE

## 2021-07-07 PROCEDURE — 96365 THER/PROPH/DIAG IV INF INIT: CPT

## 2021-07-07 PROCEDURE — 83735 ASSAY OF MAGNESIUM: CPT | Performed by: INTERNAL MEDICINE

## 2021-07-07 RX ORDER — SODIUM CHLORIDE 9 MG/ML
500 INJECTION, SOLUTION INTRAVENOUS ONCE
Status: CANCELLED | OUTPATIENT
Start: 2021-07-07

## 2021-07-07 RX ORDER — MAGNESIUM SULFATE 1 G/100ML
1 INJECTION INTRAVENOUS
Status: COMPLETED | OUTPATIENT
Start: 2021-07-07 | End: 2021-07-07

## 2021-07-07 RX ORDER — SODIUM CHLORIDE 9 MG/ML
250 INJECTION, SOLUTION INTRAVENOUS ONCE
Status: COMPLETED | OUTPATIENT
Start: 2021-07-07 | End: 2021-07-07

## 2021-07-07 RX ORDER — SODIUM CHLORIDE 9 MG/ML
250 INJECTION, SOLUTION INTRAVENOUS ONCE
Status: CANCELLED | OUTPATIENT
Start: 2021-07-07

## 2021-07-07 RX ADMIN — MAGNESIUM SULFATE IN DEXTROSE 1 G: 10 INJECTION, SOLUTION INTRAVENOUS at 12:40

## 2021-07-07 RX ADMIN — SODIUM CHLORIDE 250 ML: 9 INJECTION, SOLUTION INTRAVENOUS at 11:30

## 2021-07-07 RX ADMIN — MAGNESIUM SULFATE IN DEXTROSE 1 G: 10 INJECTION, SOLUTION INTRAVENOUS at 11:34

## 2021-07-07 NOTE — PROGRESS NOTES
Lorraine Dominguez   : 1950     LOCATION: [facility] HEMATOLOGY & ONCOLOGY     Chief Complaint  Colon Cancer (-fu)    Referring Provider: Jennifer Schroeder MD PhD  PCP: Shayna Lewis APRN    Oncology/Hematology History Overview Note   1) Small Bowel Adenocarcinoma  - patient developed small bowel obstruction on 21 and underwent emergency resection of the Ileum (Dr. Lacey)   - pathology from the ileum was positive for adenocarcinoma that appeared to be small bowel in origin and represent a new primary malignancy, 2.2cm, grade 1, tumor perforates the visceral peritoneum, pT4pN0, with 0/12 lymph nodes involved.  - Post surgical CT Abd/Pelvis 21:  Mild thickening of the wall of some portion of the small intestine, could be post-surgical changes. Moderate dilation of the right renal collecting system is unchanged with ureteral double-J stent.    - CT CAP on 3/25 prior to surgery showed no evidence of disease in the chest  - Caris report pending    2) Colon Cancer:   -Diagnosed 5/7/15; Low grade; staged pT4a pN0 M0 stage IIB; 0/37 LN; no MMR testing; RIGHT sided  -RIGHT hemicolectomy 5/7/15  -adjuvant Xeloda X 6 months  -Recurrent dx diagnosed via RIGHT ureter mass resection (18)  -FOLFIRI X 9 (-18)  -s/p XRT RIGHT pelvis total 5320 cGy (18-19)  -CARIS report from specimen dated 5/7/15: BRAF V600E mutation (possible benefit from cetuximab), MSI stable, TMB-low, TAURUS-High (33%), TP53 mutated    *CT CAP 2021: Interim development of right-sided hydronephrosis and a soft tissue mass along the right iliac chain measuring 2.9 x 1.2 cm.  There is a second nodule identified below the aortic bifurcation in the midline measuring 1.4 cm.  This was consistent with disease recurrence versus her new primary small bowel cancer (see above).     3) Breast Cancer:  -RIGHT breast DCIS diagnosed 3/15/06; low-grade; s/p lumpectomy and adjuvant XRT; ER: 100%, SD:  70%  -LEFT breast invasive lobular carcinoma; diagnosed 06/2012; staged pT3 pN0 M0 stage IIB, ER: 83%, TX: 0, HER2: 1+, Ki-67: 26%, treated with mastectomy and SLND 04/2012, adjuvant DD AC and Taxotere, Adjuvant Arimidex then Femara (11/13/19)    4) Anemia:  -multifactorial due to her history of GI bleeding from small bowel ulcer/tumor  -intolerant to oral iron. Treatment with IV iron in the past.   -iron studies on 5/11/12 were normal    5) right hydronephrosis:  -Noted on CT scan from 1/21/2021, secondary to soft tissue mass  -Likely secondary to recurrent colon cancer, biopsy pending  -Cystoscopy and right ureteral stent (Dr. Chi) on 1/28/2021    6) DVT:  -bracheocephalic and subclavian thrombosis March 2021 ([facility])     Malignant neoplasm of left breast in female, estrogen receptor positive (CMS/HCC)   3/5/2018 Initial Diagnosis    Breast cancer (CMS/HCC)     Colon cancer (CMS/HCC)   3/5/2018 Initial Diagnosis    Colon cancer (CMS/HCC)     6/28/2021 -  Chemotherapy    OP SUPPORTIVE ELECTROLYTE REPLACEMENT             Subjective        History of Present Illness   Patient is currently on oral Xeloda for her small bowel adenocarcinoma  She takes oral Xeloda 7 days on a for 7 days and then repeat for 7 days on for a total of 14 days of therapy   She restarted her seven days on on 7/5/21  She has had persistent diarrhea managed with Imodium but has needed electrolyte and IV hydration frequently  Patient presents today for routine follow-up      Review of Systems   Constitutional: Positive for fatigue (5/10). Negative for appetite change, diaphoresis, fever, unexpected weight gain and unexpected weight loss.   HENT: Negative for hearing loss, mouth sores, sore throat, swollen glands, trouble swallowing and voice change.    Eyes: Negative for blurred vision.   Respiratory: Positive for shortness of breath. Negative for cough and wheezing.    Cardiovascular: Negative for chest pain and palpitations.    Gastrointestinal: Positive for diarrhea and nausea. Negative for abdominal pain, blood in stool, constipation and vomiting.   Endocrine: Negative for cold intolerance and heat intolerance.   Genitourinary: Negative for difficulty urinating, dysuria, frequency, hematuria and urinary incontinence.   Musculoskeletal: Positive for back pain (low ). Negative for arthralgias and myalgias.   Skin: Negative for rash, skin lesions and bruise.   Neurological: Positive for headache. Negative for dizziness, seizures, weakness and numbness.   Hematological: Does not bruise/bleed easily.   Psychiatric/Behavioral: Negative for depressed mood. The patient is nervous/anxious.    All other systems reviewed and are negative.    Current Outpatient Medications on File Prior to Visit   Medication Sig Dispense Refill   • Accu-Chek Softclix Lancets lancets      • Accu-Chek Softclix Lancets lancets Accu-Chek Softclix Lancets   TEST ONCE DAILY     • amoxicillin-clavulanate (AUGMENTIN) 875-125 MG per tablet TAKE 1 TABLE (875 MG) BY MOUTH TWICE DAILY     • apixaban (Eliquis) 5 MG tablet tablet Eliquis 5 mg oral tablet take 1 tablet (5 mg) by oral route 2 times per day   Active     • aspirin 325 MG tablet aspirin 325 mg oral tablet take 1 tablet (325 mg) by oral route once daily   Suspended     • Bacillus Coagulans-Inulin (Probiotic) 1-250 BILLION-MG capsule Probiotic Complex 25 billion cell -100 mg oral capsule take 1 capsule by oral route 2 times a day   Suspended     • budesonide-formoterol (SYMBICORT) 160-4.5 MCG/ACT inhaler Inhale 2 puffs.     • Calcium Carb-Cholecalciferol 500-10 MG-MCG chewable tablet Chew 1 tablet 2 (two) times a day. 60 tablet 6   • capecitabine (XELODA) 500 MG chemo tablet Take 3 tablets by mouth 2 (two) times a day for 7 days, then off for 7 days. 42 tablet 11   • cefdinir (OMNICEF) 300 MG capsule cefdinir 300 mg capsule   TAKE 1 CAPSULE BY MOUTH EVERY 12 HOURS     • citalopram (CeleXA) 40 MG tablet      •  Cyanocobalamin (Vitamin B-12) 1000 MCG sublingual tablet      • dexamethasone (DECADRON) 4 MG tablet dexamethasone 4 mg oral tablet one tablet twice a day on the second and third day of chemo   Suspended     • digoxin (LANOXIN) 125 MCG tablet Take 125 mcg by mouth Daily.     • escitalopram (LEXAPRO) 20 MG tablet      • ferrous sulfate 325 (65 FE) MG tablet ferrous sulfate 325 mg (65 mg iron) oral tablet take 1 tablet (325 mg) by oral route 2 times per day   Suspended     • gabapentin (NEURONTIN) 300 MG capsule Take 300 mg by mouth 3 (Three) Times a Day.     • gabapentin (NEURONTIN) 400 MG capsule      • glucose blood test strip Every 8 (Eight) Hours.     • HYDROcodone-acetaminophen (Norco) 5-325 MG per tablet Take 1 tablet by mouth Every 6 (Six) Hours As Needed for Moderate Pain . 120 tablet 0   • ibuprofen (ADVIL,MOTRIN) 200 MG tablet      • letrozole (Femara) 2.5 MG tablet Femara 2.5 mg oral tablet take 1 tablet (2.5 mg) by oral route once daily   Suspended     • levoFLOXacin (LEVAQUIN) 500 MG tablet      • loperamide (IMODIUM) 2 MG capsule loperamide 2 mg oral capsule take 2 capsules (4 mg) by oral route after 1st loose stool, followed by 1 capsule after each subsequent loose stool not to exceed 16 mg/day for 30 days   Suspended     • LORazepam (ATIVAN) 1 MG tablet TAKE 1 TABLET BY MOUTH ONCE DAILY IN THE MORNING AND 2 ONCE DAILY AT BEDTIME     • Magnesium 400 MG tablet Take 1 tablet by mouth 2 (Two) Times a Day.     • Magnesium Gluconate 550 MG tablet magnesium 30 mg oral tablet take 1 tablet by oral route daily   Active     • meloxicam (MOBIC) 15 MG tablet      • metFORMIN (GLUCOPHAGE) 500 MG tablet      • metoprolol succinate XL (TOPROL-XL) 25 MG 24 hr tablet TAKE 1 TABLE (25 MG) BY MOUTH TWICE DAILY     • metoprolol tartrate (LOPRESSOR) 25 MG tablet metoprolol tartrate 25 mg oral tablet take 1 tablet (25 mg) by oral route once daily   Active     • naloxone (Narcan) 4 MG/0.1ML nasal spray Narcan 4 mg/actuation  nasal spray   Take 1 spray every day by nasal route as needed.     • OLANZapine (zyPREXA) 5 MG tablet Take 1 tablet by mouth Every Night. 30 tablet 5   • ondansetron (ZOFRAN) 8 MG tablet Take 1 tablet by mouth Every 8 (Eight) Hours As Needed for Nausea or Vomiting. 60 tablet 4   • pantoprazole (PROTONIX) 40 MG EC tablet      • phenazopyridine (PYRIDIUM) 200 MG tablet Take 200 mg by mouth 3 (Three) Times a Day As Needed.     • polyethylene glycol (MiraLax) 17 GM/SCOOP powder 17 g.     • potassium chloride (K-DUR,KLOR-CON) 10 MEQ CR tablet Take 2 tablets by mouth 2 (two) times a day. 60 tablet 1   • pravastatin (PRAVACHOL) 40 MG tablet      • prednisoLONE acetate (PRED FORTE) 1 % ophthalmic suspension 1 drop.     • predniSONE (DELTASONE) 5 MG tablet 1 mg/kg.     • pregabalin (Lyrica) 75 MG capsule      • raNITIdine (ZANTAC) 300 MG tablet ranitidine HCl 300 mg oral tablet take 1 tablet (300 mg) by oral route once daily at bedtime   Suspended     • senna 8.6 MG tablet TAKE 1 TABLET BY MOUTH TWICE DAILY AS NEEDED FOR CONSTIPATION       No current facility-administered medications on file prior to visit.       No Known Allergies    Past Medical History:   Diagnosis Date   • Anemia    • Arthritis    • Breast cancer (CMS/HCC)    • Colon cancer (CMS/HCC)    • DM (diabetes mellitus), type 2 (CMS/HCC)    • High cholesterol    • Hypertension    • Iron deficiency anemia secondary to blood loss (chronic)     GI BLEED   • Shortness of breath      Past Surgical History:   Procedure Laterality Date   • HYSTERECTOMY     • JOINT REPLACEMENT     • LEFT VENTRICULAR ASSIST DEVICE     • OTHER SURGICAL HISTORY      BIOPSY     Social History     Socioeconomic History   • Marital status:      Spouse name: Not on file   • Number of children: Not on file   • Years of education: Not on file   • Highest education level: Not on file   Tobacco Use   • Smoking status: Never Smoker   Substance and Sexual Activity   • Drug use: Never      Family History   Problem Relation Age of Onset   • Colon cancer Other    • Skin cancer Other      Immunization History   Administered Date(s) Administered   • COVID-19 (MODERNA) 02/24/2021, 03/17/2021   • Flu Vaccine Quad PF >36MO 01/13/2014, 09/28/2016, 11/01/2019, 10/01/2020   • Fluzone High Dose =>65 Years ([employer] ONLY) 09/28/2016, 09/27/2017, 09/14/2018, 10/16/2019, 10/01/2020   • Influenza, Unspecified 10/05/2020   • Pneumococcal Conjugate 13-Valent (PCV13) 04/20/2017, 10/01/2020   • Pneumococcal Polysaccharide (PPSV23) 11/25/2019   • Tdap 08/28/2015       Objective     Vitals:    07/07/21 0927   BP: 120/59   Pulse: 109   Resp: 18   Temp: 98.9 °F (37.2 °C)   SpO2: 97%   Weight: 72.5 kg (159 lb 13.3 oz)   PainSc:   4   PainLoc: Abdomen     Body mass index is 26.6 kg/m².     Physical Exam  Constitutional:       Appearance: Normal appearance.   HENT:      Head: Normocephalic.      Mouth/Throat:      Mouth: Mucous membranes are moist.   Eyes:      Pupils: Pupils are equal, round, and reactive to light.   Cardiovascular:      Rate and Rhythm: Normal rate.   Pulmonary:      Effort: Pulmonary effort is normal.      Breath sounds: Normal breath sounds.   Musculoskeletal:         General: Normal range of motion.      Cervical back: Normal range of motion.   Skin:     General: Skin is warm and dry.   Neurological:      Mental Status: She is alert.   Psychiatric:         Mood and Affect: Mood normal.               Iron   Date Value Ref Range Status   05/11/2021 62 60 - 170 ug/dL Final   03/30/2021 59 (L) 60 - 170 ug/dL Final   02/23/2021 25 (L) 60 - 170 ug/dL Final     Iron Saturation   Date Value Ref Range Status   05/11/2021 26 20 - 55 % Final   02/23/2021 6 (L) 20 - 55 % Final   12/01/2020 15 (L) 20 - 55 % Final     Transferrin   Date Value Ref Range Status   05/11/2021 169.00 (L) 250.00 - 380.00 mg/dL Final   02/23/2021 305.00 250.00 - 380.00 mg/dL Final   12/01/2020 240.00 (L) 250.00 - 380.00 mg/dL Final      TIBC   Date Value Ref Range Status   05/11/2021 242 (L) 245 - 450 ug/dL Final     Comment:     As of 10/15/03, the chemistry department began utilizing a Transferrin  assay. Data suggests that Transferrin is a better estimate of Total Iron  binding capacity than the TIBC assay. As a result the TIBC will now be a  calculated estimate from the measured Transferrin.     02/23/2021 436 245 - 450 ug/dL Final     Comment:     As of 10/15/03, the chemistry department began utilizing a Transferrin  assay. Data suggests that Transferrin is a better estimate of Total Iron  binding capacity than the TIBC assay. As a result the TIBC will now be a  calculated estimate from the measured Transferrin.     12/01/2020 343 245 - 450 ug/dL Final     Comment:     As of 10/15/03, the chemistry department began utilizing a Transferrin  assay. Data suggests that Transferrin is a better estimate of Total Iron  binding capacity than the TIBC assay. As a result the TIBC will now be a  calculated estimate from the measured Transferrin.       Ferritin   Date Value Ref Range Status   05/11/2021 324 (H) 10 - 200 ng/mL Final     Comment:     <10 ng/ml usually associated with Iron Deficiency Anemia.  Above normal range levels may be due to Hepatic and/or  Chronic Inflammatory Disease.     03/30/2021 764 (H) 10 - 200 ng/mL Final     Comment:     <10 ng/ml usually associated with Iron Deficiency Anemia.  Above normal range levels may be due to Hepatic and/or  Chronic Inflammatory Disease.     02/23/2021 21 10 - 200 ng/mL Final     Comment:     <10 ng/ml usually associated with Iron Deficiency Anemia.  Above normal range levels may be due to Hepatic and/or  Chronic Inflammatory Disease.       Vitamin B-12   Date Value Ref Range Status   12/01/2020 279 211 - 911 pg/mL Final     Folate   Date Value Ref Range Status   12/01/2020 19.50 4.78 - 24.20 ng/mL Final     ECOG score: 2           PHQ-9 Total Score:           Result Review :   The following  data was reviewed by: Umu Vance MD on 07/07/2021:  Lab Results   Component Value Date    HGB 7.7 (C) 07/07/2021    HCT 24.4 (L) 07/07/2021    .2 (H) 07/07/2021     07/07/2021    WBC 9.78 07/07/2021    NEUTROABS 5.67 07/07/2021    LYMPHSABS 3.19 (H) 07/07/2021    MONOSABS 0.69 07/07/2021    EOSABS 0.05 07/07/2021    BASOSABS 0.08 07/07/2021     Lab Results   Component Value Date    GLUCOSE 126 (H) 07/07/2021    BUN 16 07/07/2021    CREATININE 0.96 07/07/2021     07/07/2021    K 4.1 07/07/2021     07/07/2021    CO2 24.4 07/07/2021    CALCIUM 7.4 (L) 07/07/2021    PROTEINTOT 7.1 07/07/2021    ALBUMIN 3.56 07/07/2021    BILITOT 0.2 07/07/2021    ALKPHOS 148 (H) 07/07/2021    AST 30 07/07/2021    ALT 11 07/07/2021       Data reviewed: labs          Assessment and Plan      ASSESSMENT  Small bowel adenocarcinoma  PLAN/RECOMMENDATIONS  Labs and clinical parameters okay to continue treatment  2Anemia noted - will cont to monitor  3Hypomagnesemia noted and patient will be given IV repletion today   follow up 3 weeks    Diagnoses and all orders for this visit:    1. Hypomagnesemia (Primary)  -     ONCBCN INFUSION APPOINTMENT REQUEST 01; Future  -     Cancel: sodium chloride 0.9 % infusion 250 mL  -     Cancel: magnesium sulfate 1 g in dextrose (D5W) 5 % 500 mL (0.002 g/mL) IVPB    2. Malignant neoplasm of colon, unspecified part of colon (CMS/HCC)  -     ONCBCN INFUSION APPOINTMENT REQUEST 01; Future  -     Cancel: sodium chloride 0.9 % infusion 250 mL  -     Cancel: magnesium sulfate 1 g in dextrose (D5W) 5 % 500 mL (0.002 g/mL) IVPB    Other orders  -     magnesium sulfate 2 g in dextrose (D5W) 5 % 500 mL (0.004 g/mL) IVPB  -     sodium chloride 0.9 % infusion        Patient was given instructions and counseling regarding her condition or for health maintenance advice. Please see specific information pulled into the AVS if appropriate.     Umu Vance MD    7/7/2021

## 2021-07-14 NOTE — PROGRESS NOTES
Specialty Pharmacy Note      Name:  Lorraine Dominguez  :  1950  Date:  2021         Past Medical History:   Diagnosis Date   • Anemia    • Arthritis    • Breast cancer (CMS/HCC)    • Colon cancer (CMS/HCC)    • DM (diabetes mellitus), type 2 (CMS/HCC)    • High cholesterol    • Hypertension    • Iron deficiency anemia secondary to blood loss (chronic)     GI BLEED   • Shortness of breath        Past Surgical History:   Procedure Laterality Date   • HYSTERECTOMY     • JOINT REPLACEMENT     • LEFT VENTRICULAR ASSIST DEVICE     • OTHER SURGICAL HISTORY      BIOPSY       Social History     Socioeconomic History   • Marital status:      Spouse name: Not on file   • Number of children: Not on file   • Years of education: Not on file   • Highest education level: Not on file   Tobacco Use   • Smoking status: Never Smoker   Substance and Sexual Activity   • Drug use: Never       Family History   Problem Relation Age of Onset   • Colon cancer Other    • Skin cancer Other        No Known Allergies    Current Outpatient Medications   Medication Sig Dispense Refill   • Accu-Chek Softclix Lancets lancets      • Accu-Chek Softclix Lancets lancets Accu-Chek Softclix Lancets   TEST ONCE DAILY     • amoxicillin-clavulanate (AUGMENTIN) 875-125 MG per tablet TAKE 1 TABLE (875 MG) BY MOUTH TWICE DAILY     • apixaban (Eliquis) 5 MG tablet tablet Eliquis 5 mg oral tablet take 1 tablet (5 mg) by oral route 2 times per day   Active     • aspirin 325 MG tablet aspirin 325 mg oral tablet take 1 tablet (325 mg) by oral route once daily   Suspended     • Bacillus Coagulans-Inulin (Probiotic) 1-250 BILLION-MG capsule Probiotic Complex 25 billion cell -100 mg oral capsule take 1 capsule by oral route 2 times a day   Suspended     • budesonide-formoterol (SYMBICORT) 160-4.5 MCG/ACT inhaler Inhale 2 puffs.     • Calcium Carb-Cholecalciferol 500-10 MG-MCG chewable tablet Chew 1 tablet 2 (two) times a day. 60 tablet 6   •  capecitabine (XELODA) 500 MG chemo tablet Take 3 tablets by mouth 2 (two) times a day for 7 days, then off for 7 days. 42 tablet 11   • cefdinir (OMNICEF) 300 MG capsule cefdinir 300 mg capsule   TAKE 1 CAPSULE BY MOUTH EVERY 12 HOURS     • citalopram (CeleXA) 40 MG tablet      • Cyanocobalamin (Vitamin B-12) 1000 MCG sublingual tablet      • dexamethasone (DECADRON) 4 MG tablet dexamethasone 4 mg oral tablet one tablet twice a day on the second and third day of chemo   Suspended     • digoxin (LANOXIN) 125 MCG tablet Take 125 mcg by mouth Daily.     • escitalopram (LEXAPRO) 20 MG tablet      • ferrous sulfate 325 (65 FE) MG tablet ferrous sulfate 325 mg (65 mg iron) oral tablet take 1 tablet (325 mg) by oral route 2 times per day   Suspended     • gabapentin (NEURONTIN) 300 MG capsule Take 300 mg by mouth 3 (Three) Times a Day.     • gabapentin (NEURONTIN) 400 MG capsule      • glucose blood test strip Every 8 (Eight) Hours.     • HYDROcodone-acetaminophen (Norco) 5-325 MG per tablet Take 1 tablet by mouth Every 6 (Six) Hours As Needed for Moderate Pain . 120 tablet 0   • ibuprofen (ADVIL,MOTRIN) 200 MG tablet      • letrozole (Femara) 2.5 MG tablet Femara 2.5 mg oral tablet take 1 tablet (2.5 mg) by oral route once daily   Suspended     • levoFLOXacin (LEVAQUIN) 500 MG tablet      • loperamide (IMODIUM) 2 MG capsule loperamide 2 mg oral capsule take 2 capsules (4 mg) by oral route after 1st loose stool, followed by 1 capsule after each subsequent loose stool not to exceed 16 mg/day for 30 days   Suspended     • LORazepam (ATIVAN) 1 MG tablet TAKE 1 TABLET BY MOUTH ONCE DAILY IN THE MORNING AND 2 ONCE DAILY AT BEDTIME     • Magnesium 400 MG tablet Take 1 tablet by mouth 2 (Two) Times a Day.     • Magnesium Gluconate 550 MG tablet magnesium 30 mg oral tablet take 1 tablet by oral route daily   Active     • meloxicam (MOBIC) 15 MG tablet      • metFORMIN (GLUCOPHAGE) 500 MG tablet      • metoprolol succinate XL  (TOPROL-XL) 25 MG 24 hr tablet TAKE 1 TABLE (25 MG) BY MOUTH TWICE DAILY     • metoprolol tartrate (LOPRESSOR) 25 MG tablet metoprolol tartrate 25 mg oral tablet take 1 tablet (25 mg) by oral route once daily   Active     • naloxone (Narcan) 4 MG/0.1ML nasal spray Narcan 4 mg/actuation nasal spray   Take 1 spray every day by nasal route as needed.     • OLANZapine (zyPREXA) 5 MG tablet Take 1 tablet by mouth Every Night. 30 tablet 5   • ondansetron (ZOFRAN) 8 MG tablet Take 1 tablet by mouth Every 8 (Eight) Hours As Needed for Nausea or Vomiting. 60 tablet 4   • pantoprazole (PROTONIX) 40 MG EC tablet      • phenazopyridine (PYRIDIUM) 200 MG tablet Take 200 mg by mouth 3 (Three) Times a Day As Needed.     • polyethylene glycol (MiraLax) 17 GM/SCOOP powder 17 g.     • potassium chloride (K-DUR,KLOR-CON) 10 MEQ CR tablet Take 2 tablets by mouth 2 (two) times a day. 60 tablet 1   • pravastatin (PRAVACHOL) 40 MG tablet      • prednisoLONE acetate (PRED FORTE) 1 % ophthalmic suspension 1 drop.     • predniSONE (DELTASONE) 5 MG tablet 1 mg/kg.     • pregabalin (Lyrica) 75 MG capsule      • raNITIdine (ZANTAC) 300 MG tablet ranitidine HCl 300 mg oral tablet take 1 tablet (300 mg) by oral route once daily at bedtime   Suspended     • senna 8.6 MG tablet TAKE 1 TABLET BY MOUTH TWICE DAILY AS NEEDED FOR CONSTIPATION       Current Facility-Administered Medications   Medication Dose Route Frequency Provider Last Rate Last Admin   • magnesium sulfate 2 g in dextrose (D5W) 5 % 500 mL (0.004 g/mL) IVPB  2 g Intravenous Once Umu Vance MD             LABORATORY:    Lab Results   Component Value Date    WBC 9.78 07/07/2021    HGB 7.7 (C) 07/07/2021    HCT 24.4 (L) 07/07/2021    .2 (H) 07/07/2021    RDW 25.2 (H) 07/07/2021     07/07/2021    NEUTRORELPCT 58.0 07/07/2021    LYMPHORELPCT 32.6 07/07/2021    MONORELPCT 7.1 07/07/2021    EOSRELPCT 0.5 07/07/2021    BASORELPCT 0.8 07/07/2021    NEUTROABS 5.67 07/07/2021     LYMPHSABS 3.19 (H) 07/07/2021       Lab Results   Component Value Date     07/07/2021    K 4.1 07/07/2021    CO2 24.4 07/07/2021     07/07/2021    BUN 16 07/07/2021    CREATININE 0.96 07/07/2021    GLUCOSE 126 (H) 07/07/2021    CALCIUM 7.4 (L) 07/07/2021    ALKPHOS 148 (H) 07/07/2021    AST 30 07/07/2021    ALT 11 07/07/2021    BILITOT 0.2 07/07/2021    ALBUMIN 3.56 07/07/2021    PROTEINTOT 7.1 07/07/2021    MG 0.8 (C) 07/07/2021    PHOS 3.7 07/07/2021       Lab Results   Component Value Date     12/01/2020        Imaging Results (Last 24 Hours)     ** No results found for the last 24 hours. **          ASSESSMENT/PLAN:    Patient Update Assessment (new medications, allergies, medical history): No updates reported    Medication(s): Xeloda    Currently Taking Medication(s): As directed    Effectiveness of Medication: Working as expected    Experiencing Side Effects: None reported    Prior Authorization Status: Approved    Financial Assistance Status: PAP approved - $0 co-pay    Any Issues Identified: None    Appropriate to Process Prescription(s): Refill due    Counseling Offered: Offered, Declined    Next Specialty Pharmacy Visit: ~14 days

## 2021-07-26 NOTE — PROGRESS NOTES
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 Lacey)   - pathology from the ileum was positive for adenocarcinoma that appeared to be small bowel in origin and represent a new primary malignancy, 2.2cm, grade 1, tumor perforates the visceral peritoneum, pT4pN0, with 0/12 lymph nodes involved.  - Post surgical CT Abd/Pelvis 4/24/21:  Mild thickening of the wall of some portion of the small intestine, could be post-surgical changes. Moderate dilation of the right renal collecting system is unchanged with ureteral double-J stent.    - CT CAP on 3/25 prior to surgery showed no evidence of disease in the chest  - Caris report showed BRAF V600E mutation.    - started Xeloda on 6/5/21    2) Colon Cancer:   -Diagnosed 5/7/15; Low grade; staged pT4a pN0 M0 stage IIB; 0/37 LN; no MMR testing; RIGHT sided  -RIGHT hemicolectomy 5/7/15  -adjuvant Xeloda X 6 months  -Recurrent dx diagnosed via RIGHT ureter mass resection (5/1/18)  -FOLFIRI X 9 (7/31-12/4/18)  -s/p XRT RIGHT pelvis total 5320 cGy (12/14/18-1/24/19)  -CARIS report from specimen dated 5/7/15: BRAF V600E mutation (possible benefit from cetuximab), MSI stable, TMB-low, TAURUS-High (33%), TP53 mutated    *CT CAP 1/21/2021: Interim development of right-sided hydronephrosis and a soft tissue mass along the right iliac chain measuring 2.9 x 1.2 cm.  There is a second nodule identified below the aortic bifurcation in the midline measuring 1.4 cm.  This was consistent with disease recurrence versus her new primary small bowel cancer (see above).     3) Breast Cancer:  -RIGHT breast DCIS diagnosed 3/15/06;  low-grade; s/p lumpectomy and adjuvant XRT; ER: 100%, MN: 70%  -LEFT breast invasive lobular carcinoma; diagnosed 06/2012; staged pT3 pN0 M0 stage IIB, ER: 83%, MN: 0, HER2: 1+, Ki-67: 26%, treated with mastectomy and SLND 04/2012, adjuvant DD AC and Taxotere, Adjuvant Arimidex then Femara (11/13/19)    4) Anemia:  -multifactorial due to her history of GI bleeding from small bowel ulcer/tumor  -intolerant to oral iron. Treatment with IV iron in the past.   -iron studies on 5/11/12 were normal    5) right hydronephrosis:  -Noted on CT scan from 1/21/2021, secondary to soft tissue mass  -Likely secondary to recurrent colon cancer, biopsy pending  -Cystoscopy and right ureteral stent (Dr. Chi) on 1/28/2021    6) DVT:  -bracheocephalic and subclavian thrombosis March 2021 ([facility])     Malignant neoplasm of left breast in female, estrogen receptor positive (CMS/HCC)   3/5/2018 Initial Diagnosis    Breast cancer (CMS/HCC)     Colon cancer (CMS/HCC)   3/5/2018 Initial Diagnosis    Colon cancer (CMS/HCC)     6/28/2021 -  Chemotherapy    OP SUPPORTIVE ELECTROLYTE REPLACEMENT         History of Present Illness  The patient comes in today for f/u and labs.  She continues take Xeloda and is tolerating it better. He abdominal pain has improved some.  She is eating well and maintaining her weight. She is also getting around better and doing her own ADLs.    Review of Systems   Constitutional: Negative for appetite change, diaphoresis, fatigue, fever, unexpected weight gain and unexpected weight loss.   HENT: Negative for hearing loss, mouth sores, sore throat, swollen glands, trouble swallowing and voice change.    Eyes: Negative for blurred vision.   Respiratory: Negative for cough, shortness of breath and wheezing.    Cardiovascular: Negative for chest pain and palpitations.   Gastrointestinal: Positive for abdominal pain. Negative for blood in stool, constipation, diarrhea, nausea and vomiting.   Endocrine:  Negative for cold intolerance and heat intolerance.   Genitourinary: Negative for difficulty urinating, dysuria, frequency, hematuria and urinary incontinence.   Musculoskeletal: Negative for arthralgias, back pain and myalgias.   Skin: Negative for rash, skin lesions and bruise.   Neurological: Negative for dizziness, seizures, weakness, numbness and headache.   Hematological: Does not bruise/bleed easily.   Psychiatric/Behavioral: Negative for depressed mood. The patient is not nervous/anxious.        Past Medical History:   Diagnosis Date   • Anemia    • Arthritis    • Breast cancer (CMS/HCC)    • Colon cancer (CMS/HCC)    • DM (diabetes mellitus), type 2 (CMS/HCC)    • High cholesterol    • Hypertension    • Iron deficiency anemia secondary to blood loss (chronic)     GI BLEED   • Shortness of breath      Past Surgical History:   Procedure Laterality Date   • HYSTERECTOMY     • JOINT REPLACEMENT     • LEFT VENTRICULAR ASSIST DEVICE     • OTHER SURGICAL HISTORY      BIOPSY     Social History     Socioeconomic History   • Marital status:      Spouse name: Not on file   • Number of children: Not on file   • Years of education: Not on file   • Highest education level: Not on file   Tobacco Use   • Smoking status: Never Smoker   Substance and Sexual Activity   • Drug use: Never     Family History   Problem Relation Age of Onset   • Colon cancer Other    • Skin cancer Other        Objective   Physical Exam  Vitals and nursing note reviewed.   Constitutional:       General: She is not in acute distress.     Appearance: Normal appearance. She is normal weight.   HENT:      Mouth/Throat:      Mouth: Mucous membranes are moist.      Pharynx: Oropharynx is clear.   Eyes:      General: No scleral icterus.     Conjunctiva/sclera: Conjunctivae normal.   Cardiovascular:      Rate and Rhythm: Normal rate and regular rhythm.      Heart sounds: No murmur heard.   No gallop.    Pulmonary:      Effort: Pulmonary effort is  normal.      Breath sounds: Normal breath sounds. No decreased breath sounds, wheezing, rhonchi or rales.   Abdominal:      General: Bowel sounds are normal. There is no distension.      Palpations: Abdomen is soft.      Tenderness: There is abdominal tenderness. There is no guarding.   Musculoskeletal:         General: No swelling or signs of injury.      Right lower leg: No edema.      Left lower leg: No edema.   Skin:     General: Skin is warm and dry.      Coloration: Skin is not jaundiced or pale.      Findings: No lesion or rash.   Neurological:      General: No focal deficit present.      Mental Status: She is alert and oriented to person, place, and time. Mental status is at baseline.      Cranial Nerves: No cranial nerve deficit.      Motor: No weakness.      Gait: Gait normal.      Deep Tendon Reflexes: Reflexes are normal and symmetric.   Psychiatric:         Attention and Perception: Attention normal.         Mood and Affect: Mood normal.         Behavior: Behavior normal. Behavior is cooperative.         Thought Content: Thought content normal.           Vitals:    07/26/21 0947   BP: 105/51  Comment: RT ARM   Pulse: 91   Resp: 18   Temp: 98.2 °F (36.8 °C)   TempSrc: Temporal   SpO2: 97%  Comment: RM AIR   PainSc:   3   PainLoc: Abdomen     ECOG score: 0         PHQ-9 Total Score:         Result Review :   The following data was reviewed by: Jennifer Schroeder MD PhD on 07/26/2021:  Lab Results   Component Value Date    HGB 7.4 (C) 07/26/2021    HCT 24.1 (L) 07/26/2021    .9 (H) 07/26/2021     07/26/2021    WBC 10.16 07/26/2021    NEUTROABS 5.50 07/26/2021    LYMPHSABS 3.62 (H) 07/26/2021    MONOSABS 0.77 07/26/2021    EOSABS 0.07 07/26/2021    BASOSABS 0.04 07/26/2021     Lab Results   Component Value Date    GLUCOSE 161 (H) 07/26/2021    BUN 18 07/26/2021    CREATININE 0.99 07/26/2021     (L) 07/26/2021    K 3.5 07/26/2021     07/26/2021    CO2 22.7 07/26/2021    CALCIUM 8.5  (L) 07/26/2021    PROTEINTOT 7.2 07/26/2021    ALBUMIN 3.86 07/26/2021    BILITOT 0.2 07/26/2021    ALKPHOS 127 (H) 07/26/2021    AST 23 07/26/2021    ALT 5 07/26/2021          Assessment and Plan    Diagnoses and all orders for this visit:    1. Adenocarcinoma of small bowel (CMS/HCC) (Primary)  -     CBC & Differential; Future  -     Comprehensive Metabolic Panel; Future  -     CEA; Future  -     CBC & Differential; Future  -     CEA; Future  -     Comprehensive Metabolic Panel; Future    Patient is currently on oral Xeloda.  I have reviewed her labs for signs of toxicity.  She does have worsening anemia but without symptoms or significant change I will continue to monitor. Her total calcium has improved.  Her alk phos is also improved which could be sign her disease is responding. Her CEA from today is still pending.  I will f/u with her in 1 month for repeat labs.       Patient was given instructions and counseling regarding her condition or for health maintenance advice. Please see specific information pulled into the AVS if appropriate.     Jennifer Schroeder MD PhD    7/26/2021

## 2021-08-02 NOTE — TELEPHONE ENCOUNTER
LMOM for  to call back to schedule cysto/stent exchange (she was on my remind me list to call this week also!). I will be happy to speak with her when she calls back.

## 2021-08-02 NOTE — TELEPHONE ENCOUNTER
Patient has kidney pain, side pain on her right side where she has the stent.  She is asking for appointment.  She said she thinks she is past due for the stent exchange.

## 2021-08-03 NOTE — TELEPHONE ENCOUNTER
Spoke to [facility] and notified her that  was told she does NOT have to stop her Eliquis prior to this procedure.

## 2021-08-03 NOTE — H&P (VIEW-ONLY)
[facility]   Urology HISTORY AND PHYSICAL    Patient Name: Lorraine Dominguez  : 1950  MRN: [id number]  Primary Care Physician:  Shayna Lewis APRN  Date of admission: (Not on file)    Subjective   Subjective     Chief Complaint: Right flank pain    Patient presents for exchange of her right ureteral stent.      Personal History     Past Medical History:   Diagnosis Date   • Anemia    • Arthritis    • Breast cancer (CMS/HCC)    • Colon cancer (CMS/HCC)    • DM (diabetes mellitus), type 2 (CMS/HCC)    • High cholesterol    • Hypertension    • Iron deficiency anemia secondary to blood loss (chronic)     GI BLEED   • Shortness of breath        Past Surgical History:   Procedure Laterality Date   • HYSTERECTOMY     • JOINT REPLACEMENT     • LEFT VENTRICULAR ASSIST DEVICE     • OTHER SURGICAL HISTORY      BIOPSY       Family History: family history includes Colon cancer in an other family member; Skin cancer in an other family member. Otherwise pertinent FHx was reviewed and not pertinent to current issue.    Social History:  reports that she has never smoked. She does not have any smokeless tobacco history on file. She reports that she does not use drugs.    Home Medications:  Accu-Chek Softclix Lancets, Calcium Carb-Cholecalciferol, HYDROcodone-acetaminophen, LORazepam, Magnesium, OLANZapine, Probiotic, Vitamin B-12, amoxicillin-clavulanate, apixaban, aspirin, budesonide-formoterol, capecitabine, cefdinir, citalopram, dexamethasone, digoxin, escitalopram, ferrous sulfate, gabapentin, glucose blood, ibuprofen, letrozole, levoFLOXacin, loperamide, meloxicam, metFORMIN, metoprolol succinate XL, metoprolol tartrate, naloxone, ondansetron, pantoprazole, phenazopyridine, polyethylene glycol, potassium chloride, pravastatin, predniSONE, prednisoLONE acetate, pregabalin, raNITIdine, and senna    Allergies:  No Known Allergies    Objective    Objective     Vitals:        Physical Exam  Constitutional:        Appearance: Normal appearance.   Cardiovascular:      Rate and Rhythm: Normal rate and regular rhythm.   Pulmonary:      Effort: Pulmonary effort is normal.      Breath sounds: Normal breath sounds.   Neurological:      Mental Status: She is alert. Mental status is at baseline.   Psychiatric:         Mood and Affect: Mood and affect normal.         Speech: Speech normal.         Judgment: Judgment normal.         Result Review    Result Review:  I have personally reviewed the results from the time of this admission to 8/3/2021 11:59 EDT and agree with these findings:  []  Laboratory  []  Microbiology  []  Radiology  []  EKG/Telemetry   []  Cardiology/Vascular   []  Pathology  []  Old records  []  Other:      Assessment/Plan   Assessment / Plan       Active Hospital Problems:  There are no active hospital problems to display for this patient.      Plan: Cystoscopy and right ureteral stent exchange.  Risks and benefits discussed with patient and they are agreeable to proceed.    DVT prophylaxis:  No DVT prophylaxis order currently exists.    CODE STATUS:           Electronically signed by Kelli Chi MD, 08/03/21, 11:59 AM EDT.

## 2021-08-03 NOTE — H&P
[facility]   Urology HISTORY AND PHYSICAL    Patient Name: Lorraine Dominguez  : 1950  MRN: [id number]  Primary Care Physician:  Shayna Lewis APRN  Date of admission: (Not on file)    Subjective   Subjective     Chief Complaint: Right flank pain    Patient presents for exchange of her right ureteral stent.      Personal History     Past Medical History:   Diagnosis Date   • Anemia    • Arthritis    • Breast cancer (CMS/HCC)    • Colon cancer (CMS/HCC)    • DM (diabetes mellitus), type 2 (CMS/HCC)    • High cholesterol    • Hypertension    • Iron deficiency anemia secondary to blood loss (chronic)     GI BLEED   • Shortness of breath        Past Surgical History:   Procedure Laterality Date   • HYSTERECTOMY     • JOINT REPLACEMENT     • LEFT VENTRICULAR ASSIST DEVICE     • OTHER SURGICAL HISTORY      BIOPSY       Family History: family history includes Colon cancer in an other family member; Skin cancer in an other family member. Otherwise pertinent FHx was reviewed and not pertinent to current issue.    Social History:  reports that she has never smoked. She does not have any smokeless tobacco history on file. She reports that she does not use drugs.    Home Medications:  Accu-Chek Softclix Lancets, Calcium Carb-Cholecalciferol, HYDROcodone-acetaminophen, LORazepam, Magnesium, OLANZapine, Probiotic, Vitamin B-12, amoxicillin-clavulanate, apixaban, aspirin, budesonide-formoterol, capecitabine, cefdinir, citalopram, dexamethasone, digoxin, escitalopram, ferrous sulfate, gabapentin, glucose blood, ibuprofen, letrozole, levoFLOXacin, loperamide, meloxicam, metFORMIN, metoprolol succinate XL, metoprolol tartrate, naloxone, ondansetron, pantoprazole, phenazopyridine, polyethylene glycol, potassium chloride, pravastatin, predniSONE, prednisoLONE acetate, pregabalin, raNITIdine, and senna    Allergies:  No Known Allergies    Objective    Objective     Vitals:        Physical Exam  Constitutional:        Appearance: Normal appearance.   Cardiovascular:      Rate and Rhythm: Normal rate and regular rhythm.   Pulmonary:      Effort: Pulmonary effort is normal.      Breath sounds: Normal breath sounds.   Neurological:      Mental Status: She is alert. Mental status is at baseline.   Psychiatric:         Mood and Affect: Mood and affect normal.         Speech: Speech normal.         Judgment: Judgment normal.         Result Review    Result Review:  I have personally reviewed the results from the time of this admission to 8/3/2021 11:59 EDT and agree with these findings:  []  Laboratory  []  Microbiology  []  Radiology  []  EKG/Telemetry   []  Cardiology/Vascular   []  Pathology  []  Old records  []  Other:      Assessment/Plan   Assessment / Plan       Active Hospital Problems:  There are no active hospital problems to display for this patient.      Plan: Cystoscopy and right ureteral stent exchange.  Risks and benefits discussed with patient and they are agreeable to proceed.    DVT prophylaxis:  No DVT prophylaxis order currently exists.    CODE STATUS:           Electronically signed by Kelli Chi MD, 08/03/21, 11:59 AM EDT.

## 2021-08-03 NOTE — TELEPHONE ENCOUNTER
Please write surgery orders for  - she will be scheduled for 8/17/21. She does NOT need a COVID test. Thank you

## 2021-08-03 NOTE — TELEPHONE ENCOUNTER
Spoke to -scheduled her for cysto/stent exchange on 8/17/21. Went over all dates/times/instructions with her. She will call back if any questions/concerns prior to that appointment.

## 2021-08-17 NOTE — OP NOTE
CYSTOSCOPY URETERAL CATHETER/STENT INSERTION  Procedure Report    Patient Name:  Lorraine Dominguez  YOB: 1950    Date of Surgery:  8/17/2021     Pre-op Diagnosis:   Malignant neoplasm of colon, unspecified part of colon (CMS/HCC) [C18.9]  Hydroureteronephrosis [N13.30]       Post-Op Diagnosis Codes:     * Malignant neoplasm of colon, unspecified part of colon (CMS/HCC) [C18.9]     * Hydroureteronephrosis [N13.30]    Procedure/CPT® Codes:      Procedure(s):  CYSTOSCOPY, RIGHT URETERAL STENT INSERTION    Staff:  Surgeon(s):  Kelli Chi MD         Anesthesia: Sedation    Estimated Blood Loss: 0 mL    Implants:    Implant Name Type Inv. Item Serial No.  Lot No. LRB No. Used Action   STNT LITHOSTENT 7F 24CM - [id number] Stent STNT LITHOSTENT 7F 24CM  [facility] [id number] Implanted       Specimen:          None        Complications: None    Description of Procedure: After proper consent was obtained, patient was taken to operating room and after induction of MAC anesthesia the patient was placed in the dorsal lithotomy position and prepped and draped in the normal sterile fashion for cystoscopy.      A 22 [place] rigid cystoscope was inserted into the bladder.  The bladder was inspected in a systemic meridian fashion using a 30 degree lens.  Both ureteral orifices were normal in appearance.  There is a stent coming from the right ureteral orifice.  It was grasped and removed.    A glidewire was passed up the right ureter and over this a 7 [place] 24 cm triangulated left ureteral stent was placed.  The stent was seen in good position under fluoroscopy.  The bladder was emptied and the scope was removed      Kelli Chi MD     Date: 8/17/2021  Time: 08:48 EDT

## 2021-08-17 NOTE — ANESTHESIA POSTPROCEDURE EVALUATION
Patient: Lorraine Dominguez    Procedure Summary     Date: 08/17/21 Room / Location: [facility] OR 07 / [facility] MAIN OR    Anesthesia Start: 0803 Anesthesia Stop: 0841    Procedure: CYSTOSCOPY, RIGHT URETERAL STENT INSERTION (Right ) Diagnosis:       Malignant neoplasm of colon, unspecified part of colon (CMS/HCC)      Hydroureteronephrosis      (Malignant neoplasm of colon, unspecified part of colon (CMS/HCC) [C18.9])      (Hydroureteronephrosis [N13.30])    Surgeons: Kelli Chi MD Provider: Lester Almaraz MD    Anesthesia Type: MAC, general ASA Status: 3          Anesthesia Type: MAC, general    Vitals  Vitals Value Taken Time   /58 08/17/21 0854   Temp 36.7 °C (98 °F) 08/17/21 0837   Pulse 81 08/17/21 0858   Resp 20 08/17/21 0852   SpO2 99 % 08/17/21 0858   Vitals shown include unvalidated device data.        Post Anesthesia Care and Evaluation    Patient location during evaluation: bedside  Patient participation: complete - patient participated  Level of consciousness: awake  Pain score: 0  Pain management: adequate  Airway patency: patent  Anesthetic complications: No anesthetic complications  PONV Status: none  Cardiovascular status: acceptable and stable  Respiratory status: acceptable and room air  Hydration status: acceptable    Comments: An Anesthesiologist personally participated in the most demanding procedures (including induction and emergence if applicable) in the anesthesia plan, monitored the course of anesthesia administration at frequent intervals and remained physically present and available for immediate diagnosis and treatment of emergencies.

## 2021-08-17 NOTE — ANESTHESIA PREPROCEDURE EVALUATION
Anesthesia Evaluation     Patient summary reviewed and Nursing notes reviewed   no history of anesthetic complications:  NPO Solid Status: > 8 hours  NPO Liquid Status: > 2 hours           Airway   Mallampati: I  TM distance: >3 FB  Neck ROM: full  No difficulty expected  Dental      Pulmonary - normal exam    breath sounds clear to auscultation  (+) shortness of breath,   Cardiovascular - normal exam  Exercise tolerance: good (4-7 METS)    Rhythm: regular    (+) hypertension, dysrhythmias Atrial Fib, hyperlipidemia,       Neuro/Psych  (+) headaches, psychiatric history,     GI/Hepatic/Renal/Endo    (+)  hiatal hernia, GERD, GI bleeding , renal disease, diabetes mellitus type 2,     Musculoskeletal     Abdominal    Substance History - negative use     OB/GYN negative ob/gyn ROS         Other   arthritis,    history of cancer    ROS/Med Hx Other:  Follows w/[facility]. Hx of paroxysmal afib during proc yrs ago. No blocks/fos. 4/21 echo EF 55, mild AI                Anesthesia Plan    ASA 3     MAC and general   (Patient understands anesthesia not responsible for dental damage.)  intravenous induction     Anesthetic plan, all risks, benefits, and alternatives have been provided, discussed and informed consent has been obtained with: patient.  Use of blood products discussed with patient .   Plan discussed with CRNA.

## 2021-08-24 NOTE — NURSING NOTE
Pt. Advised that blood for infusion will not be ready before tomorrow and that she can go home for the day. Pt. Educated to leave her armbands in place in order to avoid having to go through the process again tomorrow. Pt. Verbalizes understanding and agreement with this plan. Pt. And her  eating lunch at this time. RN will deaccess port when they have finished eating and discharge accordingly.

## 2021-09-01 NOTE — PROGRESS NOTES
Specialty Pharmacy Note      Name:  Lorraine Dominguez  :  1950  Date:  2021         Past Medical History:   Diagnosis Date   • Abdominal pain    • Anemia    • Arthritis    • Breast cancer (CMS/HCC)    • Colon cancer (CMS/HCC)    • Constipation    • Diarrhea    • DM (diabetes mellitus), type 2 (CMS/HCC)    • High cholesterol    • History of blood clots     neck   • Hypertension    • Hypokalemia    • Iron deficiency anemia secondary to blood loss (chronic)     GI BLEED   • Paroxysmal A-fib (CMS/HCC)    • Shortness of breath        Past Surgical History:   Procedure Laterality Date   • BACK SURGERY     • BREAST LUMPECTOMY Right    • COLON SURGERY     • COLONOSCOPY     • CYSTOSCOPY W/ URETERAL STENT PLACEMENT     • CYSTOSCOPY W/ URETERAL STENT PLACEMENT Right 2021    Procedure: CYSTOSCOPY, RIGHT URETERAL STENT INSERTION;  Surgeon: Kelli Chi MD;  Location: [facility];  Service: Urology;  Laterality: Right;   • HYSTERECTOMY      partial    • JOINT REPLACEMENT      hip    • MASTECTOMY Left    • OTHER SURGICAL HISTORY      BIOPSY   • VENOUS ACCESS DEVICE (PORT) INSERTION         Social History     Socioeconomic History   • Marital status:      Spouse name: Not on file   • Number of children: Not on file   • Years of education: Not on file   • Highest education level: Not on file   Tobacco Use   • Smoking status: Never Smoker   • Smokeless tobacco: Never Used   Vaping Use   • Vaping Use: Never used   Substance and Sexual Activity   • Alcohol use: Never   • Drug use: Never   • Sexual activity: Defer       Family History   Problem Relation Age of Onset   • Colon cancer Other    • Skin cancer Other        No Known Allergies    Current Outpatient Medications   Medication Sig Dispense Refill   • Accu-Chek Softclix Lancets lancets      • apixaban (Eliquis) 5 MG tablet tablet Take 5 mg by mouth 2 (two) times a day. Per Sandhya patient does not need to stop eliquis for procedure     •  Bacillus Coagulans-Inulin (Probiotic) 1-250 BILLION-MG capsule Probiotic Complex 25 billion cell -100 mg oral capsule take 1 capsule by oral route 2 times a day   Suspended     • budesonide-formoterol (SYMBICORT) 160-4.5 MCG/ACT inhaler Inhale 2 puffs.     • Calcium Carb-Cholecalciferol 500-10 MG-MCG chewable tablet Chew 1 tablet 2 (two) times a day. (Patient taking differently: Chew 1 tablet Daily.) 60 tablet 6   • calcium carbonate (OS-TERELL) 1250 (500 Ca) MG chewable tablet Calcium 500  500 mg calcium (1,250 mg) chewable tablet   Take 1 tablet every day by oral route.     • capecitabine (XELODA) 500 MG chemo tablet Take 3 tablets by mouth 2 (two) times a day for 7 days, then off for 7 days. 42 tablet 11   • citalopram (CeleXA) 40 MG tablet      • digoxin (LANOXIN) 125 MCG tablet Take 125 mcg by mouth Daily.     • escitalopram (LEXAPRO) 20 MG tablet      • gabapentin (NEURONTIN) 400 MG capsule Take 400 mg by mouth 3 (Three) Times a Day.     • glucose blood test strip Every 8 (Eight) Hours.     • glucose blood test strip Accu-Chek Yelena Plus test strips   TEST BLOOD SUGAR THREE TIMES DAILY     • HYDROcodone-acetaminophen (Norco) 5-325 MG per tablet Take 1 tablet by mouth Every 6 (Six) Hours As Needed for Moderate Pain . (Patient taking differently: Take 1 tablet by mouth Every 6 (Six) Hours As Needed for Moderate Pain .) 120 tablet 0   • loperamide (IMODIUM) 2 MG capsule loperamide 2 mg oral capsule take 2 capsules (4 mg) by oral route after 1st loose stool, followed by 1 capsule after each subsequent loose stool not to exceed 16 mg/day for 30 days   Suspended     • LORazepam (ATIVAN) 1 MG tablet TAKE 1 TABLET BY MOUTH ONCE DAILY IN THE MORNING AND 2 ONCE DAILY AT BEDTIME     • magnesium oxide (MAG-OX) 400 MG tablet magnesium oxide 400 mg (241.3 mg magnesium) tablet   Take 1 tablet(s) every day by oral route.     • metFORMIN (GLUCOPHAGE) 500 MG tablet Take 500 mg by mouth 2 (Two) Times a Day With Meals.     •  ondansetron (ZOFRAN) 8 MG tablet Take 1 tablet by mouth Every 8 (Eight) Hours As Needed for Nausea or Vomiting. (Patient taking differently: Take 8 mg by mouth Every 8 (Eight) Hours As Needed for Nausea or Vomiting.) 60 tablet 4   • pantoprazole (PROTONIX) 40 MG EC tablet      • phenazopyridine (PYRIDIUM) 200 MG tablet Take 200 mg by mouth 3 (Three) Times a Day As Needed.     • potassium chloride (Klor-Con) 20 MEQ packet Every 12 (Twelve) Hours.     • pravastatin (PRAVACHOL) 40 MG tablet      • senna 8.6 MG tablet Take 1 tablet by mouth Daily As Needed.       No current facility-administered medications for this visit.         LABORATORY:    Lab Results   Component Value Date    WBC 11.92 (H) 08/23/2021    HGB 5.8 (C) 08/23/2021    HCT 19.3 (C) 08/23/2021    .6 (H) 08/23/2021    RDW 23.3 (H) 08/23/2021     08/23/2021    NEUTRORELPCT 65.0 08/23/2021    LYMPHORELPCT 24.9 08/23/2021    MONORELPCT 8.1 08/23/2021    EOSRELPCT 0.6 08/23/2021    BASORELPCT 0.5 08/23/2021    NEUTROABS 7.74 (H) 08/23/2021    LYMPHSABS 2.97 08/23/2021       Lab Results   Component Value Date     (L) 08/23/2021    K 3.7 08/23/2021    CO2 21.6 (L) 08/23/2021     08/23/2021    BUN 17 08/23/2021    CREATININE 1.04 (H) 08/23/2021    GLUCOSE 163 (H) 08/23/2021    CALCIUM 8.9 08/23/2021    ALKPHOS 131 (H) 08/23/2021    AST 22 08/23/2021    ALT 8 08/23/2021    BILITOT 0.2 08/23/2021    ALBUMIN 3.67 08/23/2021    PROTEINTOT 7.3 08/23/2021    MG 1.4 (L) 07/26/2021    PHOS 3.3 07/26/2021       Lab Results   Component Value Date     12/01/2020        Imaging Results (Last 24 Hours)     ** No results found for the last 24 hours. **          ASSESSMENT/PLAN:    Patient Update Assessment (new medications, allergies, medical history): No updates reported    Medication(s): Xeloda    Currently Taking Medication(s): As directed    Effectiveness of Medication: Medication is working as expected    Experiencing Side Effects: None  reported    Prior Authorization Status: Approved    Financial Assistance Status: PAP Approved - $0 patient co-pay    Any Issues Identified: None    Appropriate to Process Prescription(s): Refill due    Counseling Offered: Declined    Next Specialty Pharmacy Visit: ~30 days

## 2021-09-07 PROBLEM — Z85.038 HISTORY OF COLON CANCER: Status: ACTIVE | Noted: 2021-01-01

## 2021-09-07 NOTE — PROGRESS NOTES
Chief Complaint:   Colonoscopy (No complaints. Last 6/4/21, HX of colon cancer)    Subjective      Time for colonoscopy         History of Present Illness  Lorraine Dominguez is a 71 y.o. female presents to [facility] GENERAL SURGERY for colonoscopy consultation.     Patient denies any abdominal pain, change in bowel habit, rectal bleeding.    History of personal history of colon cancer.    Admits to family history of colon cancer with her sister.    6/21: Colonoscopy ([place]): sigmoid - adenocarcinoma moderately differentiated.        Objective     Past Medical History:   Diagnosis Date   • Abdominal pain    • Anemia    • Arthritis    • Breast cancer (CMS/HCC)    • Colon cancer (CMS/HCC)    • Constipation    • Diarrhea    • DM (diabetes mellitus), type 2 (CMS/HCC)    • High cholesterol    • History of blood clots     neck   • Hypertension    • Hypokalemia    • Iron deficiency anemia secondary to blood loss (chronic)     GI BLEED   • Paroxysmal A-fib (CMS/HCC)    • Shortness of breath        Past Surgical History:   Procedure Laterality Date   • BACK SURGERY     • BREAST LUMPECTOMY Right    • COLON SURGERY     • COLONOSCOPY     • CYSTOSCOPY W/ URETERAL STENT PLACEMENT     • CYSTOSCOPY W/ URETERAL STENT PLACEMENT Right 8/17/2021    Procedure: CYSTOSCOPY, RIGHT URETERAL STENT INSERTION;  Surgeon: Kelli Chi MD;  Location: [facility] OR;  Service: Urology;  Laterality: Right;   • HYSTERECTOMY      partial    • JOINT REPLACEMENT      hip    • MASTECTOMY Left    • OTHER SURGICAL HISTORY      BIOPSY   • VENOUS ACCESS DEVICE (PORT) INSERTION           Current Outpatient Medications:   •  Accu-Chek Softclix Lancets lancets, , Disp: , Rfl:   •  apixaban (Eliquis) 5 MG tablet tablet, Take 5 mg by mouth 2 (two) times a day. Per Sandhya patient does not need to stop eliquis for procedure, Disp: , Rfl:   •  Bacillus Coagulans-Inulin (Probiotic) 1-250 BILLION-MG capsule, Probiotic Complex 25 billion  cell -100 mg oral capsule take 1 capsule by oral route 2 times a day   Suspended, Disp: , Rfl:   •  budesonide-formoterol (SYMBICORT) 160-4.5 MCG/ACT inhaler, Inhale 2 puffs., Disp: , Rfl:   •  Calcium Carb-Cholecalciferol 500-10 MG-MCG chewable tablet, Chew 1 tablet 2 (two) times a day. (Patient taking differently: Chew 1 tablet Daily.), Disp: 60 tablet, Rfl: 6  •  calcium carbonate (OS-TERELL) 1250 (500 Ca) MG chewable tablet, Calcium 500  500 mg calcium (1,250 mg) chewable tablet  Take 1 tablet every day by oral route., Disp: , Rfl:   •  capecitabine (XELODA) 500 MG chemo tablet, Take 3 tablets by mouth 2 (two) times a day for 7 days, then off for 7 days., Disp: 42 tablet, Rfl: 11  •  citalopram (CeleXA) 40 MG tablet, , Disp: , Rfl:   •  digoxin (LANOXIN) 125 MCG tablet, Take 125 mcg by mouth Daily., Disp: , Rfl:   •  escitalopram (LEXAPRO) 20 MG tablet, , Disp: , Rfl:   •  gabapentin (NEURONTIN) 400 MG capsule, Take 400 mg by mouth 3 (Three) Times a Day., Disp: , Rfl:   •  glucose blood test strip, Every 8 (Eight) Hours., Disp: , Rfl:   •  glucose blood test strip, Accu-Chek Yelena Plus test strips  TEST BLOOD SUGAR THREE TIMES DAILY, Disp: , Rfl:   •  HYDROcodone-acetaminophen (Norco) 5-325 MG per tablet, Take 1 tablet by mouth Every 6 (Six) Hours As Needed for Moderate Pain . (Patient taking differently: Take 1 tablet by mouth Every 6 (Six) Hours As Needed for Moderate Pain .), Disp: 120 tablet, Rfl: 0  •  loperamide (IMODIUM) 2 MG capsule, loperamide 2 mg oral capsule take 2 capsules (4 mg) by oral route after 1st loose stool, followed by 1 capsule after each subsequent loose stool not to exceed 16 mg/day for 30 days   Suspended, Disp: , Rfl:   •  LORazepam (ATIVAN) 1 MG tablet, TAKE 1 TABLET BY MOUTH ONCE DAILY IN THE MORNING AND 2 ONCE DAILY AT BEDTIME, Disp: , Rfl:   •  magnesium oxide (MAG-OX) 400 MG tablet, magnesium oxide 400 mg (241.3 mg magnesium) tablet  Take 1 tablet(s) every day by oral route., Disp: ,  "Rfl:   •  metFORMIN (GLUCOPHAGE) 500 MG tablet, Take 500 mg by mouth 2 (Two) Times a Day With Meals., Disp: , Rfl:   •  ondansetron (ZOFRAN) 8 MG tablet, Take 1 tablet by mouth Every 8 (Eight) Hours As Needed for Nausea or Vomiting. (Patient taking differently: Take 8 mg by mouth Every 8 (Eight) Hours As Needed for Nausea or Vomiting.), Disp: 60 tablet, Rfl: 4  •  pantoprazole (PROTONIX) 40 MG EC tablet, , Disp: , Rfl:   •  phenazopyridine (PYRIDIUM) 200 MG tablet, Take 200 mg by mouth 3 (Three) Times a Day As Needed., Disp: , Rfl:   •  potassium chloride (Klor-Con) 20 MEQ packet, Every 12 (Twelve) Hours., Disp: , Rfl:   •  pravastatin (PRAVACHOL) 40 MG tablet, , Disp: , Rfl:   •  senna 8.6 MG tablet, Take 1 tablet by mouth Daily As Needed., Disp: , Rfl:   •  sodium-potassium-magnesium sulfates (Suprep Bowel Prep Kit) 17.5-3.13-1.6 GM/177ML solution oral solution, Take as directed.  Instructions given in office.  Dispense 2 bottles., Disp: 354 mL, Rfl: 0    No Known Allergies     Family History   Problem Relation Age of Onset   • Skin cancer Other    • Colon cancer Sister        Social History     Socioeconomic History   • Marital status:      Spouse name: Not on file   • Number of children: Not on file   • Years of education: Not on file   • Highest education level: Not on file   Tobacco Use   • Smoking status: Never Smoker   • Smokeless tobacco: Never Used   Vaping Use   • Vaping Use: Never used   Substance and Sexual Activity   • Alcohol use: Never   • Drug use: Never   • Sexual activity: Defer       Review of Systems     Vital Signs:   Ht 165.1 cm (65\")   Wt 71.9 kg (158 lb 9.6 oz)   BMI 26.39 kg/m²      Physical Exam  Constitutional:       Appearance: Normal appearance.   Cardiovascular:      Rate and Rhythm: Normal rate.   Pulmonary:      Effort: Pulmonary effort is normal.   Abdominal:      General: Abdomen is flat.      Palpations: Abdomen is soft.   Skin:     General: Skin is warm and dry. "   Neurological:      General: No focal deficit present.      Mental Status: She is alert and oriented to person, place, and time.   Psychiatric:         Mood and Affect: Mood normal.         Behavior: Behavior normal.          Result Review :              []  Laboratory  []  Radiology  [x]  Pathology  []  Microbiology  []  EKG/Telemetry   []  Cardiology/Vascular   [x]  Old records       Assessment and Plan    Diagnoses and all orders for this visit:    1. Screening for malignant neoplasm of colon (Primary)    2. Personal history of colon cancer    3. Family history of colon cancer    Other orders  -     sodium-potassium-magnesium sulfates (Suprep Bowel Prep Kit) 17.5-3.13-1.6 GM/177ML solution oral solution; Take as directed.  Instructions given in office.  Dispense 2 bottles.  Dispense: 354 mL; Refill: 0        Follow Up   Return for Schedule colonoscopy with Dr. Carballo on 11/12/2021 at [facility].     Hospital call with arrival time.    Patient was given instructions and counseling regarding her condition or for health maintenance advice. Please see specific information pulled into the AVS if appropriate.

## 2021-09-14 NOTE — PROGRESS NOTES
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 Lacey)   - pathology from the ileum was positive for adenocarcinoma that appeared to be small bowel in origin and represent a new primary malignancy, 2.2cm, grade 1, tumor perforates the visceral peritoneum, pT4pN0, with 0/12 lymph nodes involved.  - Post surgical CT Abd/Pelvis 4/24/21:  Mild thickening of the wall of some portion of the small intestine, could be post-surgical changes. Moderate dilation of the right renal collecting system is unchanged with ureteral double-J stent.    - CT CAP on 3/25 prior to surgery showed no evidence of disease in the chest  - [facility] report showed BRAF V600E mutation.    - started Xeloda on 6/5/21    2) Colon Cancer:   -Diagnosed 5/7/15; Low grade; staged pT4a pN0 M0 stage IIB; 0/37 LN; no MMR testing; RIGHT sided  -RIGHT hemicolectomy 5/7/15  -adjuvant Xeloda X 6 months  -Recurrent dx diagnosed via RIGHT ureter mass resection (5/1/18)  -FOLFIRI X 9 (7/31-12/4/18)  -s/p XRT RIGHT pelvis total 5320 cGy (12/14/18-1/24/19)  -[facility] report from specimen dated 5/7/15: BRAF V600E mutation (possible benefit from cetuximab), MSI stable, TMB-low, TAURUS-High (33%), TP53 mutated    *CT CAP 1/21/2021: Interim development of right-sided hydronephrosis and a soft tissue mass along the right iliac chain measuring 2.9 x 1.2 cm.  There is a second nodule identified below the aortic bifurcation in the midline measuring 1.4 cm.  This was consistent with disease recurrence versus her new primary small bowel cancer (see above).     3) Breast Cancer:  -RIGHT breast DCIS diagnosed 3/15/06;  low-grade; s/p lumpectomy and adjuvant XRT; ER: 100%, TN: 70%  -LEFT breast invasive lobular carcinoma; diagnosed 06/2012; staged pT3 pN0 M0 stage IIB, ER: 83%, TN: 0, HER2: 1+, Ki-67: 26%, treated with mastectomy and SLND 04/2012, adjuvant DD AC and Taxotere, Adjuvant Arimidex then Femara (11/13/19)    4) Anemia:  -multifactorial due to her history of GI bleeding from small bowel ulcer/tumor  -intolerant to oral iron. Treatment with IV iron in the past.   -iron studies on 5/11/12 were normal    5) right hydronephrosis:  -Noted on CT scan from 1/21/2021, secondary to soft tissue mass  -Likely secondary to recurrent colon cancer, biopsy pending  -Cystoscopy and right ureteral stent (Dr. Chi) on 1/28/2021    6) DVT:  -bracheocephalic and subclavian thrombosis March 2021 ([facility])     Malignant neoplasm of left breast in female, estrogen receptor positive (HCC)   3/5/2018 Initial Diagnosis    Breast cancer (CMS/HCC)     Colon cancer (HCC)   3/5/2018 Initial Diagnosis    Colon cancer (CMS/HCC)     6/28/2021 -  Chemotherapy    OP SUPPORTIVE ELECTROLYTE REPLACEMENT         History of Present Illness  Patient comes in today for follow-up.  She is feeling a bit better and has more energy than she did when I saw her 3 weeks ago.  She received 2 units of blood and her hemoglobin is now stable at 7.7.  She continued on Xeloda, 1 week on and 1 week off.  Her abdominal pain persists but is mild and not significantly worsened.  She has not noticed any bleeding.    Review of Systems   Constitutional: Positive for fatigue. Negative for appetite change, diaphoresis, fever, unexpected weight gain and unexpected weight loss.   HENT: Negative for hearing loss, mouth sores, sore throat, swollen glands, trouble swallowing and voice change.    Eyes: Negative for blurred vision.   Respiratory: Negative for cough, shortness of breath and wheezing.    Cardiovascular: Negative for chest pain and palpitations.    Gastrointestinal: Positive for abdominal pain. Negative for blood in stool, constipation, diarrhea, nausea and vomiting.   Endocrine: Negative for cold intolerance and heat intolerance.   Genitourinary: Negative for difficulty urinating, dysuria, frequency, hematuria and urinary incontinence.   Musculoskeletal: Negative for arthralgias, back pain and myalgias.   Skin: Negative for rash, skin lesions and bruise.   Neurological: Negative for dizziness, seizures, weakness, numbness and headache.   Hematological: Does not bruise/bleed easily.   Psychiatric/Behavioral: Negative for depressed mood. The patient is not nervous/anxious.        Past Medical History:   Diagnosis Date   • Abdominal pain    • Anemia    • Arthritis    • Breast cancer (CMS/HCC)    • Colon cancer (CMS/HCC)    • Constipation    • Diarrhea    • DM (diabetes mellitus), type 2 (CMS/HCC)    • High cholesterol    • History of blood clots     neck   • Hypertension    • Hypokalemia    • Iron deficiency anemia secondary to blood loss (chronic)     GI BLEED   • Paroxysmal A-fib (CMS/HCC)    • Shortness of breath      Past Surgical History:   Procedure Laterality Date   • BACK SURGERY     • BREAST LUMPECTOMY Right    • COLON SURGERY     • COLONOSCOPY     • CYSTOSCOPY W/ URETERAL STENT PLACEMENT     • CYSTOSCOPY W/ URETERAL STENT PLACEMENT Right 8/17/2021    Procedure: CYSTOSCOPY, RIGHT URETERAL STENT INSERTION;  Surgeon: Kelli Chi MD;  Location: [facility];  Service: Urology;  Laterality: Right;   • HYSTERECTOMY      partial    • JOINT REPLACEMENT      hip    • MASTECTOMY Left    • OTHER SURGICAL HISTORY      BIOPSY   • VENOUS ACCESS DEVICE (PORT) INSERTION       Social History     Socioeconomic History   • Marital status:      Spouse name: Not on file   • Number of children: Not on file   • Years of education: Not on file   • Highest education level: Not on file   Tobacco Use   • Smoking status: Never Smoker   • Smokeless tobacco: Never  Used   Vaping Use   • Vaping Use: Never used   Substance and Sexual Activity   • Alcohol use: Never   • Drug use: Never   • Sexual activity: Defer     Family History   Problem Relation Age of Onset   • Skin cancer Other    • Colon cancer Sister        Objective   Physical Exam  General: Alert, cooperative, no acute distress, clinically appears to have more energy and is more robust  Eyes: Anicteric sclera, PERRLA  Respiratory: normal respiratory effort  Cardiovascular: no lower extremity edema  Skin: Normal tone, no rash, no lesions  Psychiatric: Appropriate affect, intact judgment  Neurologic: No focal sensory or motor deficits, normal cognition   Musculoskeletal: Normal muscle strength and tone  Extremities: No clubbing, cyanosis, or deformities    Vitals:    09/14/21 0842   BP: 117/57  Comment: RT ARM   Pulse: 87   Resp: 18   Temp: 97.5 °F (36.4 °C)   TempSrc: Temporal   SpO2: 100%  Comment: RM AIR   Weight: 72.2 kg (159 lb 2.8 oz)   PainSc:   4   PainLoc: Abdomen               PHQ-9 Total Score:         Result Review :   The following data was reviewed by: Jennifer Schroeder MD PhD on 09/14/2021:  Lab Results   Component Value Date    HGB 7.8 (C) 09/30/2021    HCT 24.9 (L) 09/30/2021    MCV 99.2 (H) 09/30/2021     09/30/2021    WBC 9.29 09/30/2021    NEUTROABS 5.56 09/30/2021    LYMPHSABS 2.96 09/30/2021    MONOSABS 0.60 09/30/2021    EOSABS 0.08 09/30/2021    BASOSABS 0.03 09/30/2021     Lab Results   Component Value Date    GLUCOSE 196 (H) 09/30/2021    BUN 19 09/30/2021    CREATININE 1.14 (H) 09/30/2021     (L) 09/30/2021    K 3.9 09/30/2021    CL 99 09/30/2021    CO2 23.3 09/30/2021    CALCIUM 8.7 09/30/2021    PROTEINTOT 7.4 09/30/2021    ALBUMIN 3.87 09/30/2021    BILITOT 0.3 09/30/2021    ALKPHOS 144 (H) 09/30/2021    AST 25 09/30/2021    ALT 6 09/30/2021          Assessment and Plan    Diagnoses and all orders for this visit:    1. Adenocarcinoma of small bowel (CMS/HCC) (Primary)  -     CT  chest w contrast; Future  -     CT abdomen pelvis w contrast; Future  -     CBC & Differential; Future  -     Comprehensive Metabolic Panel; Future  -     CEA; Future    2. History of malignant neoplasm of breast  -     CT chest w contrast; Future  -     CT abdomen pelvis w contrast; Future          Recurrent colon cancer: Patient is currently on Xeloda and is tolerating it fairly well.  It is difficult to say if her severe anemia was due to blood loss versus chemotherapy toxicity.  Since she has stayed on the chemotherapy since needing a blood transfusion the cause is difficult to determine.  She will remain on the chemotherapy at the current dose and I will follow up with her in 2 weeks for repeat labs.  There is no need for a blood transfusion at this time.  I will also plan for repeat CT CAP with contrast for restaging prior to that appointment.    Patient was given instructions and counseling regarding her condition or for health maintenance advice. Please see specific information pulled into the AVS if appropriate.     Jennifer Schroeder MD PhD    10/6/2021

## 2021-09-17 NOTE — TELEPHONE ENCOUNTER
pt called and c/o swollen abd that feels hard to the touch around the sides of the abd x2 days. pt denies any blood in her stool or fever. pt oral temp is 98.1. when questioned about bowel habits, pt states that she had diarrhea for several days but had no BM yesterday or today. pt is a colon cancer pt and was seen by Dr Schroeder 3 days ago. pt asking if she should come back in and see Dr Schroeder.
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
## 2021-09-20 NOTE — PROGRESS NOTES
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 Lacey)   - pathology from the ileum was positive for adenocarcinoma that appeared to be small bowel in origin and represent a new primary malignancy, 2.2cm, grade 1, tumor perforates the visceral peritoneum, pT4pN0, with 0/12 lymph nodes involved.  - Post surgical CT Abd/Pelvis 4/24/21:  Mild thickening of the wall of some portion of the small intestine, could be post-surgical changes. Moderate dilation of the right renal collecting system is unchanged with ureteral double-J stent.    - CT CAP on 3/25 prior to surgery showed no evidence of disease in the chest  - Caris report showed BRAF V600E mutation.    - started Xeloda on 6/5/21    2) Colon Cancer:   -Diagnosed 5/7/15; Low grade; staged pT4a pN0 M0 stage IIB; 0/37 LN; no MMR testing; RIGHT sided  -RIGHT hemicolectomy 5/7/15  -adjuvant Xeloda X 6 months  -Recurrent dx diagnosed via RIGHT ureter mass resection (5/1/18)  -FOLFIRI X 9 (7/31-12/4/18)  -s/p XRT RIGHT pelvis total 5320 cGy (12/14/18-1/24/19)  -CARIS report from specimen dated 5/7/15: BRAF V600E mutation (possible benefit from cetuximab), MSI stable, TMB-low, TAURUS-High (33%), TP53 mutated    *CT CAP 1/21/2021: Interim development of right-sided hydronephrosis and a soft tissue mass along the right iliac chain measuring 2.9 x 1.2 cm.  There is a second nodule identified below the aortic bifurcation in the midline measuring 1.4 cm.  This was consistent with disease recurrence versus her new primary small bowel cancer (see above).     3) Breast Cancer:  -RIGHT breast DCIS diagnosed  3/15/06; low-grade; s/p lumpectomy and adjuvant XRT; ER: 100%, CA: 70%  -LEFT breast invasive lobular carcinoma; diagnosed 06/2012; staged pT3 pN0 M0 stage IIB, ER: 83%, CA: 0, HER2: 1+, Ki-67: 26%, treated with mastectomy and SLND 04/2012, adjuvant DD AC and Taxotere, Adjuvant Arimidex then Femara (11/13/19)    4) Anemia:  -multifactorial due to her history of GI bleeding from small bowel ulcer/tumor  -intolerant to oral iron. Treatment with IV iron in the past.   -iron studies on 5/11/12 were normal    5) right hydronephrosis:  -Noted on CT scan from 1/21/2021, secondary to soft tissue mass  -Likely secondary to recurrent colon cancer, biopsy pending  -Cystoscopy and right ureteral stent (Dr. Chi) on 1/28/2021    6) DVT:  -bracheocephalic and subclavian thrombosis March 2021 ([facility])     Malignant neoplasm of left breast in female, estrogen receptor positive (CMS/HCC)   3/5/2018 Initial Diagnosis    Breast cancer (CMS/HCC)     Colon cancer (CMS/HCC)   3/5/2018 Initial Diagnosis    Colon cancer (CMS/HCC)     6/28/2021 -  Chemotherapy    OP SUPPORTIVE ELECTROLYTE REPLACEMENT         History of Present Illness  Patient was not scheduled to be seen today but she came in with her  who is also a patient of mine.  I noticed that she did not appear well and has significant bruise under her right eye with a healing scab.  I asked the patient if she had fallen and she admitted she had.  She was walking in her home when she became unsteady on her feet and reached out for a table but then fell and hit the corner with her cheek.  She did not lose consciousness or feel like she was going to blackout.  She has been more weak over the last few days.  Her  is very concerned because her abdomen is distended more firm appearing.  The patient does have chronic abdominal pain which is not significantly worsened.  She tells me that she is still having some normal bowel movements which includes diarrhea  at times.  She could not answer whether she was having some episodes with formed stool.    Review of Systems   Constitutional: Positive for fatigue. Negative for appetite change, diaphoresis, fever, unexpected weight gain and unexpected weight loss.   HENT: Negative for hearing loss, mouth sores, sore throat, swollen glands, trouble swallowing and voice change.    Eyes: Negative for blurred vision.   Respiratory: Negative for cough, shortness of breath and wheezing.    Cardiovascular: Negative for chest pain and palpitations.   Gastrointestinal: Positive for abdominal distention and abdominal pain. Negative for blood in stool, constipation, diarrhea, nausea and vomiting.   Endocrine: Negative for cold intolerance and heat intolerance.   Genitourinary: Negative for difficulty urinating, dysuria, frequency, hematuria and urinary incontinence.   Musculoskeletal: Positive for back pain and gait problem. Negative for arthralgias and myalgias.   Skin: Negative for rash, skin lesions and bruise.   Neurological: Positive for dizziness and weakness. Negative for seizures, numbness and headache.   Hematological: Does not bruise/bleed easily.   Psychiatric/Behavioral: Negative for depressed mood. The patient is not nervous/anxious.        Past Medical History:   Diagnosis Date   • Abdominal pain    • Anemia    • Arthritis    • Breast cancer (CMS/HCC)    • Colon cancer (CMS/HCC)    • Constipation    • Diarrhea    • DM (diabetes mellitus), type 2 (CMS/HCC)    • High cholesterol    • History of blood clots     neck   • Hypertension    • Hypokalemia    • Iron deficiency anemia secondary to blood loss (chronic)     GI BLEED   • Paroxysmal A-fib (CMS/HCC)    • Shortness of breath      Past Surgical History:   Procedure Laterality Date   • BACK SURGERY     • BREAST LUMPECTOMY Right    • COLON SURGERY     • COLONOSCOPY     • CYSTOSCOPY W/ URETERAL STENT PLACEMENT     • CYSTOSCOPY W/ URETERAL STENT PLACEMENT Right 8/17/2021     Procedure: CYSTOSCOPY, RIGHT URETERAL STENT INSERTION;  Surgeon: Kelli Chi MD;  Location: [facility];  Service: Urology;  Laterality: Right;   • HYSTERECTOMY      partial    • JOINT REPLACEMENT      hip    • MASTECTOMY Left    • OTHER SURGICAL HISTORY      BIOPSY   • VENOUS ACCESS DEVICE (PORT) INSERTION       Social History     Socioeconomic History   • Marital status:      Spouse name: Not on file   • Number of children: Not on file   • Years of education: Not on file   • Highest education level: Not on file   Tobacco Use   • Smoking status: Never Smoker   • Smokeless tobacco: Never Used   Vaping Use   • Vaping Use: Never used   Substance and Sexual Activity   • Alcohol use: Never   • Drug use: Never   • Sexual activity: Defer     Family History   Problem Relation Age of Onset   • Skin cancer Other    • Colon cancer Sister        Objective   Physical Exam  General: Alert, cooperative, ill-appearing  Face: Small, thumb size bruise on the right cheekbone just below the eye and beside the nose with a well-healed linear scab  Eyes: Anicteric sclera, PERRLA  Respiratory: normal respiratory effort  Cardiovascular: no lower extremity edema  Abdomen: Distended but remains soft, well-healed surgical incision site in the midline with likely associated hernia  Skin: Normal tone, no rash, no lesions  Psychiatric: Appropriate affect, intact judgment  Neurologic: No focal sensory or motor deficits, normal cognition   Musculoskeletal: Reduced muscle strength and tone, very weak but equal  bilaterally, still able to ambulate on her own  Extremities: No clubbing, cyanosis, or deformities    Vitals:    09/20/21 1152   BP: 109/57   Pulse: 94   Resp: 16   Temp: 97.1 °F (36.2 °C)   SpO2: 100%   Weight: 72 kg (158 lb 11.7 oz)   PainSc:   5   PainLoc: Back  Comment: lower back & stomach     ECOG score: 1         PHQ-9 Total Score:         Result Review :   The following data was reviewed by: Jennifer Schroeder MD  PhD on 09/20/2021:  Lab Results   Component Value Date    HGB 7.4 (C) 09/20/2021    HCT 23.4 (L) 09/20/2021    MCV 99.6 (H) 09/20/2021     09/20/2021    WBC 9.16 09/20/2021    NEUTROABS 5.47 09/20/2021    LYMPHSABS 2.88 09/20/2021    MONOSABS 0.65 09/20/2021    EOSABS 0.06 09/20/2021    BASOSABS 0.03 09/20/2021     Lab Results   Component Value Date    GLUCOSE 230 (H) 09/20/2021    BUN 25 (H) 09/20/2021    CREATININE 1.10 (H) 09/20/2021     (L) 09/20/2021    K 3.9 09/20/2021    CL 97 (L) 09/20/2021    CO2 23.4 09/20/2021    CALCIUM 9.1 09/20/2021    PROTEINTOT 7.7 09/20/2021    ALBUMIN 4.07 09/20/2021    BILITOT 0.2 09/20/2021    ALKPHOS 147 (H) 09/20/2021    AST 27 09/20/2021    ALT 7 09/20/2021          Assessment and Plan    Diagnoses and all orders for this visit:    1. Adenocarcinoma of small bowel (CMS/HCC) (Primary)  -     CBC & Differential; Future  -     Comprehensive Metabolic Panel; Future  -     CEA; Future    2. Acute intractable headache, unspecified headache type  -     CT Head With & Without Contrast; Future    3. History of malignant neoplasm of breast  -     CT Head With & Without Contrast; Future    4. Anemia, unspecified type  -     Type & Screen; Future  -     Ambulatory Referral to [facility] For Infusion Treatment    Other orders  -     Hold Transfusion and Notify Physician; Standing  -     Nursing communication: Transfusion Reaction Management; Standing  -     Verify Informed Consent for Transfusion; Standing  -     Transfuse RBC, 1 Units Infuse Each Unit Over: 2H; Standing  -     sodium chloride 0.9 % infusion 250 mL  -     Prepare RBC, 1 Units; Standing    Recurrent adenocarcinoma of the small bowel and colon: Patient remains on Xeloda and reports tolerating it fairly well.  However she has had significant anemia.  Given that she recently fell I will repeat her CBC, CMP, and plan for type and cross.  I will also plan to repeat her CT CAP with contrast as soon as possible.    Anemia:  Multifactorial, secondary to blood loss as well as oral chemotherapy.  I will plan for blood transfusion given her current symptoms and the fact that her hemoglobin was 7.7 last week.  I anticipate that it will be lower now.    Headaches: On further questioning the patient admitted to frontal headaches over the past several days.  She does not have any associated nausea.  I will add a CT with and without contrast of the head to her other planned CT scans.  I will then plan an MRI of the brain at a later date if needed.    I spent 43 minutes caring for Lorraine on this date of service. This time includes time spent by me in the following activities: reviewing tests, obtaining and/or reviewing a separately obtained history, performing a medically appropriate examination and/or evaluation, counseling and educating the patient/family/caregiver, ordering medications, tests, or procedures and documenting information in the medical record  Patient was given instructions and counseling regarding her condition or for health maintenance advice. Please see specific information pulled into the AVS if appropriate.     Jennifer Schroeder MD PhD    9/20/2021

## 2021-09-28 NOTE — PROGRESS NOTES
Specialty Pharmacy Note      Name:  Lorraine Dominguez  :  1950  Date:  2021         Past Medical History:   Diagnosis Date   • Abdominal pain    • Anemia    • Arthritis    • Breast cancer (CMS/HCC)    • Colon cancer (CMS/HCC)    • Constipation    • Diarrhea    • DM (diabetes mellitus), type 2 (CMS/HCC)    • High cholesterol    • History of blood clots     neck   • Hypertension    • Hypokalemia    • Iron deficiency anemia secondary to blood loss (chronic)     GI BLEED   • Paroxysmal A-fib (CMS/HCC)    • Shortness of breath        Past Surgical History:   Procedure Laterality Date   • BACK SURGERY     • BREAST LUMPECTOMY Right    • COLON SURGERY     • COLONOSCOPY     • CYSTOSCOPY W/ URETERAL STENT PLACEMENT     • CYSTOSCOPY W/ URETERAL STENT PLACEMENT Right 2021    Procedure: CYSTOSCOPY, RIGHT URETERAL STENT INSERTION;  Surgeon: Kelli Chi MD;  Location: [facility];  Service: Urology;  Laterality: Right;   • HYSTERECTOMY      partial    • JOINT REPLACEMENT      hip    • MASTECTOMY Left    • OTHER SURGICAL HISTORY      BIOPSY   • VENOUS ACCESS DEVICE (PORT) INSERTION         Social History     Socioeconomic History   • Marital status:      Spouse name: Not on file   • Number of children: Not on file   • Years of education: Not on file   • Highest education level: Not on file   Tobacco Use   • Smoking status: Never Smoker   • Smokeless tobacco: Never Used   Vaping Use   • Vaping Use: Never used   Substance and Sexual Activity   • Alcohol use: Never   • Drug use: Never   • Sexual activity: Defer       Family History   Problem Relation Age of Onset   • Skin cancer Other    • Colon cancer Sister        No Known Allergies    Current Outpatient Medications   Medication Sig Dispense Refill   • Accu-Chek Softclix Lancets lancets      • apixaban (Eliquis) 5 MG tablet tablet Take 5 mg by mouth 2 (two) times a day. Per Sandhya patient does not need to stop eliquis for procedure     •  Bacillus Coagulans-Inulin (Probiotic) 1-250 BILLION-MG capsule Probiotic Complex 25 billion cell -100 mg oral capsule take 1 capsule by oral route 2 times a day   Suspended     • budesonide-formoterol (SYMBICORT) 160-4.5 MCG/ACT inhaler Inhale 2 puffs.     • Calcium Carb-Cholecalciferol 500-10 MG-MCG chewable tablet Chew 1 tablet 2 (two) times a day. (Patient taking differently: Chew 1 tablet Daily.) 60 tablet 6   • calcium carbonate (OS-TERELL) 1250 (500 Ca) MG chewable tablet Calcium 500  500 mg calcium (1,250 mg) chewable tablet   Take 1 tablet every day by oral route.     • capecitabine (XELODA) 500 MG chemo tablet Take 3 tablets by mouth 2 (two) times a day for 7 days, then off for 7 days. 42 tablet 11   • citalopram (CeleXA) 40 MG tablet      • digoxin (LANOXIN) 125 MCG tablet Take 125 mcg by mouth Daily.     • escitalopram (LEXAPRO) 20 MG tablet      • gabapentin (NEURONTIN) 400 MG capsule Take 400 mg by mouth 3 (Three) Times a Day.     • glucose blood test strip Every 8 (Eight) Hours.     • glucose blood test strip Accu-Chek Yelena Plus test strips   TEST BLOOD SUGAR THREE TIMES DAILY     • HYDROcodone-acetaminophen (Norco) 5-325 MG per tablet Take 1 tablet by mouth Every 6 (Six) Hours As Needed for Moderate Pain . (Patient taking differently: Take 1 tablet by mouth Every 6 (Six) Hours As Needed for Moderate Pain .) 120 tablet 0   • loperamide (IMODIUM) 2 MG capsule loperamide 2 mg oral capsule take 2 capsules (4 mg) by oral route after 1st loose stool, followed by 1 capsule after each subsequent loose stool not to exceed 16 mg/day for 30 days   Suspended     • LORazepam (ATIVAN) 1 MG tablet TAKE 1 TABLET BY MOUTH ONCE DAILY IN THE MORNING AND 2 ONCE DAILY AT BEDTIME     • magnesium oxide (MAG-OX) 400 MG tablet magnesium oxide 400 mg (241.3 mg magnesium) tablet   Take 1 tablet(s) every day by oral route.     • metFORMIN (GLUCOPHAGE) 500 MG tablet Take 500 mg by mouth 2 (Two) Times a Day With Meals.     •  ondansetron (ZOFRAN) 8 MG tablet Take 1 tablet by mouth Every 8 (Eight) Hours As Needed for Nausea or Vomiting. (Patient taking differently: Take 8 mg by mouth Every 8 (Eight) Hours As Needed for Nausea or Vomiting.) 60 tablet 4   • pantoprazole (PROTONIX) 40 MG EC tablet      • phenazopyridine (PYRIDIUM) 200 MG tablet Take 200 mg by mouth 3 (Three) Times a Day As Needed.     • potassium chloride (Klor-Con) 20 MEQ packet Every 12 (Twelve) Hours.     • pravastatin (PRAVACHOL) 40 MG tablet      • senna 8.6 MG tablet Take 1 tablet by mouth Daily As Needed.     • sodium-potassium-magnesium sulfates (Suprep Bowel Prep Kit) 17.5-3.13-1.6 GM/177ML solution oral solution Take as directed.  Instructions given in office.  Dispense 2 bottles. 354 mL 0     No current facility-administered medications for this visit.     Facility-Administered Medications Ordered in Other Visits   Medication Dose Route Frequency Provider Last Rate Last Admin   • heparin injection 500 Units  500 Units Intravenous PRN Jennifer Schroeder MD PhD   500 Units at 09/21/21 1522         LABORATORY:    Lab Results   Component Value Date    WBC 9.16 09/20/2021    HGB 7.4 (C) 09/20/2021    HCT 23.4 (L) 09/20/2021    MCV 99.6 (H) 09/20/2021    RDW 22.0 (H) 09/20/2021     09/20/2021    NEUTRORELPCT 59.7 09/20/2021    LYMPHORELPCT 31.4 09/20/2021    MONORELPCT 7.1 09/20/2021    EOSRELPCT 0.7 09/20/2021    BASORELPCT 0.3 09/20/2021    NEUTROABS 5.47 09/20/2021    LYMPHSABS 2.88 09/20/2021       Lab Results   Component Value Date     (L) 09/20/2021    K 3.9 09/20/2021    CO2 23.4 09/20/2021    CL 97 (L) 09/20/2021    BUN 25 (H) 09/20/2021    CREATININE 1.10 (H) 09/20/2021    GLUCOSE 230 (H) 09/20/2021    CALCIUM 9.1 09/20/2021    ALKPHOS 147 (H) 09/20/2021    AST 27 09/20/2021    ALT 7 09/20/2021    BILITOT 0.2 09/20/2021    ALBUMIN 4.07 09/20/2021    PROTEINTOT 7.7 09/20/2021    MG 1.4 (L) 07/26/2021    PHOS 3.3 07/26/2021       Lab Results    Component Value Date     12/01/2020        Imaging Results (Last 24 Hours)     ** No results found for the last 24 hours. **          ASSESSMENT/PLAN:    Patient Update Assessment (new medications, allergies, medical history): No updates reported    Medication(s): Xeloda 500 mg tablet    Currently Taking Medication(s): Patient states she is currently taking medication as prescribed.    Effectiveness of Medication: Medication seems to be working effectively.    Experiencing Side Effects: Patient reported experiencing hair loss, stomach upset, and diarrhea more than usual. Patient plans to discuss side effects with provider at follow-up appointment on 9/30/2021.    Prior Authorization Status: Approved    Financial Assistance Status: PAP approved for $0 patient co-pay    Any Issues Identified: No issues    Appropriate to Process Prescription(s): Yes, medication refill is due.    Counseling Offered: Declined    Next Specialty Pharmacy Visit: ~30 days

## 2021-10-15 NOTE — TELEPHONE ENCOUNTER
"Called to check on patient. Received a call from Shayna SMITH that patient's hgb to day was 6.2. Patient states she is feeling \"okay\". Does not want to go to the hospital this weekend to get blood transfusion. She is scheduled to see us next week and would like to wait until then. She reports she had a little blood in her urine this week and PCP took a urine specimen. Advised to go to the hospital if she became more SOB or experienced any dizziness, increased weakness. Patient voiced understanding.  "

## 2021-10-15 NOTE — TELEPHONE ENCOUNTER
CALLER : DAVID     RELATIONSHIP: [facility]    BEST CALLBACK NUMBER: [phone]  OPTION 2      REQUESTING RECORDS ON [employer] PT     OFFICE NOTE 9.30 AND IMMAGING CT 9/21    [facility] [place] LINE NUMBER [phone]      AND OFFERED TO WARM TRANSFER TO REQUEST RECORDS      WARM TRANSFERRED WITH AMANDA IN [place] TO FURTHER ASSIST

## 2021-10-18 PROBLEM — F41.1 GENERALIZED ANXIETY DISORDER: Status: ACTIVE | Noted: 2021-01-01

## 2021-10-18 NOTE — PROGRESS NOTES
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 Lacey)   - pathology from the ileum was positive for adenocarcinoma that appeared to be small bowel in origin and represent a new primary malignancy, 2.2cm, grade 1, tumor perforates the visceral peritoneum, pT4pN0, with 0/12 lymph nodes involved.  - Post surgical CT Abd/Pelvis 4/24/21:  Mild thickening of the wall of some portion of the small intestine, could be post-surgical changes. Moderate dilation of the right renal collecting system is unchanged with ureteral double-J stent.    - CT CAP on 3/25 prior to surgery showed no evidence of disease in the chest  - Caris report showed BRAF V600E mutation.    - started Xeloda on 6/5/21    2) Colon Cancer:   -Diagnosed 5/7/15; Low grade; staged pT4a pN0 M0 stage IIB; 0/37 LN; no MMR testing; RIGHT sided  -RIGHT hemicolectomy 5/7/15  -adjuvant Xeloda X 6 months  -Recurrent dx diagnosed via RIGHT ureter mass resection (5/1/18)  -FOLFIRI X 9 (7/31-12/4/18)  -s/p XRT RIGHT pelvis total 5320 cGy (12/14/18-1/24/19)  -CARIS report from specimen dated 5/7/15: BRAF V600E mutation (possible benefit from cetuximab), MSI stable, TMB-low, TAURUS-High (33%), TP53 mutated    *CT CAP 1/21/2021: Interim development of right-sided hydronephrosis and a soft tissue mass along the right iliac chain measuring 2.9 x 1.2 cm.  There is a second nodule identified below the aortic bifurcation in the midline measuring 1.4 cm.  This was consistent with disease recurrence versus her new primary small bowel cancer (see above).     3) Breast Cancer:  -RIGHT breast DCIS diagnosed  3/15/06; low-grade; s/p lumpectomy and adjuvant XRT; ER: 100%, OK: 70%  -LEFT breast invasive lobular carcinoma; diagnosed 06/2012; staged pT3 pN0 M0 stage IIB, ER: 83%, OK: 0, HER2: 1+, Ki-67: 26%, treated with mastectomy and SLND 04/2012, adjuvant DD AC and Taxotere, Adjuvant Arimidex then Femara (11/13/19)    4) Anemia:  -multifactorial due to her history of GI bleeding from small bowel ulcer/tumor  -intolerant to oral iron. Treatment with IV iron in the past.   -iron studies on 5/11/12 were normal    5) right hydronephrosis:  -Noted on CT scan from 1/21/2021, secondary to soft tissue mass  -Likely secondary to recurrent colon cancer, biopsy pending  -Cystoscopy and right ureteral stent (Dr. Chi) on 1/28/2021    6) DVT:  -bracheocephalic and subclavian thrombosis March 2021 ([facility])     Malignant neoplasm of left breast in female, estrogen receptor positive (HCC)   3/5/2018 Initial Diagnosis    Breast cancer (CMS/HCC)     Colon cancer (HCC)   3/5/2018 Initial Diagnosis    Colon cancer (CMS/HCC)     6/28/2021 - 7/7/2021 Chemotherapy    OP SUPPORTIVE ELECTROLYTE REPLACEMENT     10/21/2021 -  Chemotherapy    OP SUPPORTIVE Ferric Carboxymaltose (INJECTAFER)          History of Present Illness  Patient comes in today for follow-up of her most recent labs.  On 9/30/2021 her hemoglobin was 7.8.  I was aware that her hemoglobin at outside hospital had dropped below 7.  The patient wished to wait until her follow-up appointment today to discuss getting a blood transfusion.  She says that she is a little bit tired and weak but overall feels well.  She continues to have significant pain in her abdomen and across her back.    We discussed her history with anemia.  She unfortunately has antibodies which make blood transfusions more complicated.  During visit today her hemoglobin resulted as 5.6.  She tells me she saw her primary care provider who told her her iron levels are very low.  She denies any dark,  black, or sticky stool.  She also denies bright red blood per rectum.    Review of Systems   Constitutional: Positive for fatigue. Negative for appetite change, diaphoresis, fever, unexpected weight gain and unexpected weight loss.   HENT: Negative for hearing loss, sore throat and voice change.    Eyes: Negative for blurred vision, double vision, pain, redness and visual disturbance.   Respiratory: Negative for cough, shortness of breath and wheezing.    Cardiovascular: Negative for chest pain, palpitations and leg swelling.   Endocrine: Negative for cold intolerance, heat intolerance, polydipsia and polyuria.   Genitourinary: Negative for decreased urine volume, difficulty urinating, frequency and urinary incontinence.   Musculoskeletal: Negative for arthralgias, back pain, joint swelling and myalgias.   Skin: Negative for color change, rash, skin lesions and bruise.   Neurological: Negative for dizziness, seizures, numbness and headache.   Hematological: Negative for adenopathy. Does not bruise/bleed easily.   Psychiatric/Behavioral: Negative for depressed mood. The patient is not nervous/anxious.    All other systems reviewed and are negative.      Past Medical History:   Diagnosis Date   • Abdominal pain    • Anemia    • Arthritis    • Breast cancer (HCC)    • Colon cancer (HCC)    • Constipation    • Diarrhea    • DM (diabetes mellitus), type 2 (HCC)    • High cholesterol    • History of blood clots     neck   • Hypertension    • Hypokalemia    • Iron deficiency anemia secondary to blood loss (chronic)     GI BLEED   • Paroxysmal A-fib (HCC)    • Shortness of breath      Past Surgical History:   Procedure Laterality Date   • BACK SURGERY     • BREAST LUMPECTOMY Right    • COLON SURGERY     • COLONOSCOPY     • CYSTOSCOPY W/ URETERAL STENT PLACEMENT     • CYSTOSCOPY W/ URETERAL STENT PLACEMENT Right 8/17/2021    Procedure: CYSTOSCOPY, RIGHT URETERAL STENT INSERTION;  Surgeon: Kelli Chi MD;  Location:   [facility];  Service: Urology;  Laterality: Right;   • HYSTERECTOMY      partial    • JOINT REPLACEMENT      hip    • MASTECTOMY Left    • OTHER SURGICAL HISTORY      BIOPSY   • VENOUS ACCESS DEVICE (PORT) INSERTION       Social History     Socioeconomic History   • Marital status:    Tobacco Use   • Smoking status: Never Smoker   • Smokeless tobacco: Never Used   Vaping Use   • Vaping Use: Never used   Substance and Sexual Activity   • Alcohol use: Never   • Drug use: Never   • Sexual activity: Defer     Family History   Problem Relation Age of Onset   • Skin cancer Other    • Colon cancer Sister        Objective   Physical Exam  General: Alert, cooperative, no acute distress, more energetic appearing than in previous visits  Eyes: Anicteric sclera, PERRLA  Respiratory: normal respiratory effort  Cardiovascular: no lower extremity edema  Skin: Normal tone, no rash, no lesions  Psychiatric: Appropriate affect, intact judgment  Neurologic: No focal sensory or motor deficits, normal cognition   Musculoskeletal: Normal muscle strength and tone, able ambulate on her own  Extremities: No clubbing, cyanosis, or deformities    Vitals:    10/18/21 1055   BP: 131/63   Pulse: 83   Resp: 18   Temp: 98.1 °F (36.7 °C)   SpO2: 98%   Weight: 74.3 kg (163 lb 12.8 oz)   PainSc: 0-No pain               PHQ-9 Total Score:         Result Review :   The following data was reviewed by: Jennifer Schroeder MD PhD on 10/18/2021:  Lab Results   Component Value Date    HGB 8.1 (L) 10/25/2021    HCT 26.3 (L) 10/25/2021    MCV 95.6 10/25/2021     10/25/2021    WBC 10.00 10/25/2021    NEUTROABS 6.40 10/25/2021    LYMPHSABS 2.74 10/25/2021    MONOSABS 0.70 10/25/2021    EOSABS 0.04 10/25/2021    BASOSABS 0.03 10/25/2021     Lab Results   Component Value Date    GLUCOSE 128 (H) 10/25/2021    BUN 12 10/25/2021    CREATININE 0.89 10/25/2021     (L) 10/25/2021    K 2.7 (L) 10/25/2021     10/25/2021    CO2 25.3 10/25/2021     CALCIUM 8.1 (L) 10/25/2021    PROTEINTOT 7.4 10/25/2021    ALBUMIN 3.84 10/25/2021    BILITOT 0.2 10/25/2021    ALKPHOS 115 10/25/2021    AST 30 10/25/2021    ALT 6 10/25/2021          Assessment and Plan    Diagnoses and all orders for this visit:    1. Anemia, unspecified type (Primary)  -     Ambulatory Referral to [facility] For Infusion Treatment  -     Iron Profile; Future  -     Ferritin; Future  -     Type and screen; Future  -     Flow Cytometry, Blood; Future  -     Magnesium; Future    2. Malignant neoplasm of colon, unspecified part of colon (HCC)  -     Ambulatory Referral to Radiation Oncology         Metastatic adenocarcinoma of the small bowel and colon: Patient has been off Xeloda since 9/30/21.She has been tolerating it fairly well from the standpoint of nausea and other GI complaints.  She has had significant anemia which may be due to other issues such as iron deficiency as her anemia has worsening while off the medication for 3 weeks.  I will f/u with the patient next week and discuss restarting at a lower dose. She has been on 1500mg BID.    Metastatic Bone disease: pt reports significant pain in her abdomen and low back in the lower right lumbar region.  I will refer her to radiation oncology for evaluation and treatment of her pain. We briefly discussed starting zometa but will discuss again at the next appt.      Anemia: Multifactorial, secondary to blood loss, iron deficiency as well as oral chemotherapy.  I will plan for blood transfusion with 2u of pRBC as soon as possible.  She has anti-bodies which make blood transfusions more difficult and time consuming.  Her iron studies from today show that is she iron deficient. I will set her up for IV iron supplementation as well.    Low magnesium: magnesium level remains low at 1.1. I recommend the pt continue twice daily magnesium oxide supplement.      Headaches: CT head with and without contrast on 9/21/21 negative for acute findings.  I will plan  an MRI of the brain if headaches persist.    Ureteral obstruction: right stent replaced on 8/17/21 by Dr. Chi.    I spent 50 minutes caring for Lorraine on this date of service. This time includes time spent by me in the following activities: preparing for the visit, reviewing tests, obtaining and/or reviewing a separately obtained history, performing a medically appropriate examination and/or evaluation, counseling and educating the patient/family/caregiver, ordering medications, tests, or procedures and documenting information in the medical record    Patient was given instructions and counseling regarding her condition or for health maintenance advice. Please see specific information pulled into the AVS if appropriate.     Jennifer Schroeder MD PhD    10/26/2021

## 2021-10-19 PROBLEM — K90.9 MALABSORPTION OF IRON: Status: ACTIVE | Noted: 2021-01-01

## 2021-10-19 NOTE — TELEPHONE ENCOUNTER
Called patient to inform her that her magnesium was still low and that  wants her to continue to take the magnsium pills twice a day. And that her iron levels were still low, so she order IV iron for her , and  Once we received approval from her insurance company someone would call her to get those scheduled. Patient verbalized understanding.

## 2021-10-22 NOTE — PROGRESS NOTES
New Patient Visit       Patient: Lorraine Dominguez   YOB: 1950   Medical Record Number: [id number]   Date of Visit  October 22, 2021   Primary Diagnosis: Malignant neoplasm of ascending colon (HCC) [C18.2]                                                   History of Present Illness: Lorraine Dominguez is a 71-year-old female with metastatic colorectal cancer who is seen in consultation regarding radiotherapy as a component of palliative management.  Ms. Dominguez has a long history of malignancy with first being diagnosed with left breast invasive lobular carcinoma in June 2012 at which time she was treated for mastectomy, sentinel lymph node biopsy and adjuvant chemotherapy.  She was diagnosed with colon cancer in May 2015 at which time she underwent right hemicolectomy with pathology revealing pT4a pN0 disease.  She subsequently received adjuvant Xeloda for 6 months and underwent repeat resection for a right ureteral mass in May 2018.  After that she received FOLFOX followed by external beam radiotherapy to the pelvis.  She developed small bowel obstruction in April 2021 at which time she underwent emergency resection of the ileum with pathology revealing adenocarcinoma measuring 2.2 cm and invading the visceral peritoneum.  CT scan of the abdomen and pelvis on 4/24/2021 revealed mild thickening of the wall of some portion of the small intestine.  Ms. Dominguez has developed mild to moderate anterior abdominal pain since that time.  She additionally reports low back pain that is been present for years.  She denies focal weakness, paresthesias or imbalance.  CT scan of the abdomen and pelvis performed on 9/21/2021 revealed widespread osseous metastatic disease with no evidence of vertebral body collapse.  Additionally, there was abnormal soft tissue density inferiorly in the right pelvis along the peritoneal reflection suspicious for peritoneal metastatic disease.      This is a new problem to me, and one  that is potentially life-threatening.  (Old medical records were requested, reviewed, and summarized in the HPI)    Review of Systems   Constitutional: Positive for fatigue. Negative for appetite change and unexpected weight change.   HENT: Negative for sore throat, tinnitus and trouble swallowing.    Eyes: Positive for visual disturbance (wears glasses).   Respiratory: Positive for cough and shortness of breath (with exertion).    Cardiovascular: Negative for chest pain and palpitations.        Afib   Gastrointestinal: Positive for abdominal pain, constipation and diarrhea (intermittently). Negative for nausea.   Genitourinary: Positive for frequency. Negative for dysuria and urgency.   Musculoskeletal: Positive for arthralgias and back pain (lower back).   Skin: Negative for color change.   Neurological: Positive for dizziness and headaches (everyday -mild).   Psychiatric/Behavioral: Negative for agitation, sleep disturbance and suicidal ideas.      All other systems reviewed and are negative.      Past Medical History:   Diagnosis Date   • Abdominal pain    • Anemia    • Arthritis    • Breast cancer (HCC)    • Colon cancer (HCC)    • Constipation    • Diarrhea    • DM (diabetes mellitus), type 2 (HCC)    • High cholesterol    • History of blood clots     neck   • Hypertension    • Hypokalemia    • Iron deficiency anemia secondary to blood loss (chronic)     GI BLEED   • Paroxysmal A-fib (HCC)    • Shortness of breath         Past Surgical History:   Procedure Laterality Date   • BACK SURGERY     • BREAST LUMPECTOMY Right    • COLON SURGERY     • COLONOSCOPY     • CYSTOSCOPY W/ URETERAL STENT PLACEMENT     • CYSTOSCOPY W/ URETERAL STENT PLACEMENT Right 8/17/2021    Procedure: CYSTOSCOPY, RIGHT URETERAL STENT INSERTION;  Surgeon: Kelli Chi MD;  Location: [facility];  Service: Urology;  Laterality: Right;   • HYSTERECTOMY      partial    • JOINT REPLACEMENT      hip    • MASTECTOMY Left    • OTHER  SURGICAL HISTORY      BIOPSY   • VENOUS ACCESS DEVICE (PORT) INSERTION        Family History   Problem Relation Age of Onset   • Skin cancer Other    • Colon cancer Sister         Social History     Socioeconomic History   • Marital status:    Tobacco Use   • Smoking status: Never Smoker   • Smokeless tobacco: Never Used   Vaping Use   • Vaping Use: Never used   Substance and Sexual Activity   • Alcohol use: Never   • Drug use: Never   • Sexual activity: Defer          Prior chemotherapy: as above in the HPI  Prior radiotherapy: as above in the HPI  Implantable cardiac devices: none    Allergies: Patient has no known allergies.   Prior to Admission medications    Medication Sig Start Date End Date Taking? Authorizing Provider   Accu-Chek Softclix Lancets lancets  5/28/21  Yes Stephane Monryo MD   apixaban (Eliquis) 5 MG tablet tablet Take 5 mg by mouth 2 (two) times a day. Per Sandhya patient does not need to stop eliquis for procedure   Yes Stephane Monroy MD   Bacillus Coagulans-Inulin (Probiotic) 1-250 BILLION-MG capsule Probiotic Complex 25 billion cell -100 mg oral capsule take 1 capsule by oral route 2 times a day   Suspended   Yes Stephane Monroy MD   budesonide-formoterol (SYMBICORT) 160-4.5 MCG/ACT inhaler Inhale 2 puffs.   Yes Stephane Monroy MD   citalopram (CeleXA) 40 MG tablet    Yes Stephane Monroy MD   digoxin (LANOXIN) 125 MCG tablet Take 125 mcg by mouth Daily. 4/24/21  Yes Stephane Monroy MD   escitalopram (LEXAPRO) 20 MG tablet  4/13/21  Yes Stephane Monroy MD   gabapentin (NEURONTIN) 400 MG capsule Take 400 mg by mouth 3 (Three) Times a Day. 6/14/21  Yes Stephane Monroy MD   glucose blood test strip Every 8 (Eight) Hours.   Yes Stephane Monroy MD   glucose blood test strip Accu-Chek Yelena Plus test strips   TEST BLOOD SUGAR THREE TIMES DAILY   Yes Stephane Monroy MD   HYDROcodone-acetaminophen (Norco) 5-325 MG per tablet Take 1  tablet by mouth Every 6 (Six) Hours As Needed for Moderate Pain . 9/30/21  Yes Jennifer Schroeder MD PhD   loperamide (IMODIUM) 2 MG capsule loperamide 2 mg oral capsule take 2 capsules (4 mg) by oral route after 1st loose stool, followed by 1 capsule after each subsequent loose stool not to exceed 16 mg/day for 30 days   Suspended   Yes Stephane Monroy MD   LORazepam (ATIVAN) 1 MG tablet TAKE 1 TABLET BY MOUTH ONCE DAILY IN THE MORNING AND 2 ONCE DAILY AT BEDTIME 5/13/21  Yes Stephane Monroy MD   magnesium oxide (MAG-OX) 400 MG tablet magnesium oxide 400 mg (241.3 mg magnesium) tablet   Take 1 tablet(s) every day by oral route.   Yes Stephane Monroy MD   metFORMIN (GLUCOPHAGE) 500 MG tablet Take 500 mg by mouth 2 (Two) Times a Day With Meals. 4/5/21  Yes Stephane Monroy MD   ondansetron (ZOFRAN) 8 MG tablet Take 1 tablet by mouth Every 8 (Eight) Hours As Needed for Nausea or Vomiting.  Patient taking differently: Take 8 mg by mouth Every 8 (Eight) Hours As Needed for Nausea or Vomiting. 6/22/21  Yes Jennifer Schroeder MD PhD   pantoprazole (PROTONIX) 40 MG EC tablet  5/17/21  Yes Stephane Monroy MD   pravastatin (PRAVACHOL) 40 MG tablet  4/2/21  Yes Stephane Monroy MD   senna 8.6 MG tablet Take 1 tablet by mouth Daily As Needed. 3/30/21  Yes Stephane Monroy MD   Calcium Carb-Cholecalciferol 500-10 MG-MCG chewable tablet Chew 1 tablet 2 (two) times a day.  Patient taking differently: Chew 1 tablet Daily. 6/22/21   Jennifer Schroeder MD PhD   calcium carbonate (OS-TERELL) 1250 (500 Ca) MG chewable tablet Calcium 500  500 mg calcium (1,250 mg) chewable tablet   Take 1 tablet every day by oral route.    Stephane Monroy MD   capecitabine (XELODA) 500 MG chemo tablet Take 3 tablets by mouth 2 (two) times a day for 7 days, then off for 7 days. 4/9/21      metoprolol tartrate (LOPRESSOR) 25 MG tablet Every 12 (Twelve) Hours.    Setphane Monroy MD   phenazopyridine  "(PYRIDIUM) 200 MG tablet Take 200 mg by mouth 3 (Three) Times a Day As Needed. 4/30/21 10/22/21  Provider, MD Stephane   potassium chloride (Klor-Con) 20 MEQ packet Every 12 (Twelve) Hours.  10/22/21  Provider, MD Stephane   sodium-potassium-magnesium sulfates (Suprep Bowel Prep Kit) 17.5-3.13-1.6 GM/177ML solution oral solution Take as directed.  Instructions given in office.  Dispense 2 bottles. 9/7/21 10/22/21  Josh, April, APRN      Pain: (on a scale of 0-10)   Pain Score    10/22/21 1006   PainSc:   4       Quality of Life:  90 - Limited Activity  Advanced Care Plan: N     Physical Examination:  Vitals:     Vitals:    10/22/21 1006   BP: 119/61   Pulse: 72   Resp: 16   Temp: 98.5 °F (36.9 °C)   SpO2: 96%       Height: 165.1 cm (65\")  Weight: Weight: 75.1 kg (165 lb 9.1 oz)   Body mass index is 27.55 kg/m².      Constitutional: The patient is a well-developed, well-nourished  female in no acute distress.  Alert and oriented ×3.  HEENT: Atraumatic. Normocephalic. No abnormalities noted.  Lymphatics: No cervical, supraclavicular lymphadenopathy  CV: Regular rate and rhythm.  No murmurs, rubs, or gallops are appreciated.  Respiratory: Lungs clear to auscultation.  Breath sounds equal bilaterally.  GI: Abdomen soft, nondistended; mild per-umbilical abdominal tenderness  Extremities: No clubbing, cyanosis, or edema.  Neurologic: Cranial nerves II through XII are grossly intact, with no focal neurological deficits noted on exam.  Psychiatric: Alert and oriented x3. Normal affect, with no anxiety or depression noted.    Radiographs: I personally reviewed the CT scan of the abdomen and pelvis performed on 9/21/2021.  I compared this to prior CT scans of the abdomen and pelvis including 1 from 4/24/2021.  The pertinent findings are as above in HPI.    Pathology: I personally reviewed the pathology report for the procedure performed on 4/18/2021.  The pertinent findings are as above in HPI.  Labs:   WBC   Date " Value Ref Range Status   10/18/2021 8.68 3.40 - 10.80 10*3/mm3 Final   05/28/2021 11.00 (H) 4.80 - 10.80 10*3/uL Final     Hemoglobin   Date Value Ref Range Status   10/18/2021 5.6 (C) 12.0 - 15.9 g/dL Final     Comment:     Result repeated and verified     Hematocrit   Date Value Ref Range Status   10/18/2021 18.8 (C) 34.0 - 46.6 % Final     Comment:     Result repeated and verified     Platelets   Date Value Ref Range Status   10/18/2021 382 140 - 450 10*3/mm3 Final    No results found for: PSA   CEA   Date Value Ref Range Status   10/18/2021 12.50 ng/mL Final   09/20/2021 13.80 ng/mL Final   08/23/2021 14.30 ng/mL Final   07/26/2021 13.20 ng/mL Final            ASSESSMENT/PLAN: Lorraine Dominguez is a 71-year-old female with history of invasive lobular carcinoma status postmastectomy, small bowel adenocarcinoma status post resection in colon cancer status post right hemicolectomy.  She now has diffuse metastatic disease.  ECOG 1    I discussed the clinical, radiographic and pathologic findings to date with Ms. Dominguez.  I explained that her anterior abdominal discomfort is likely related to peritoneal implants or adhesions given the findings of the recent CT scan of the abdomen and pelvis.  I explained that I favor her low back pain to be related to degenerative disc disease as it is chronic in nature in the radiographic appearance of her lumbar spine metastases are no different than the remaining metastases within the thoracic spine or cervical spine.  Additionally, there is no evidence of vertebral body height loss.  I did not recommend external beam radiotherapy directed to the abdomen given the risk of toxicity associated with this.  I recommended continued evaluation by Dr. Schroeder.  I will see Ms. Dominguez in follow-up in 6 months.  She was encouraged to contact me in the interim with any questions or concerns regarding her care.                This document has been signed by Prabhjot Alvarez MD on October 22,  2021 13:41 EDT

## 2021-10-25 NOTE — PROGRESS NOTES
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 Lacey)   - pathology from the ileum was positive for adenocarcinoma that appeared to be small bowel in origin and represent a new primary malignancy, 2.2cm, grade 1, tumor perforates the visceral peritoneum, pT4pN0, with 0/12 lymph nodes involved.  - Post surgical CT Abd/Pelvis 4/24/21:  Mild thickening of the wall of some portion of the small intestine, could be post-surgical changes. Moderate dilation of the right renal collecting system is unchanged with ureteral double-J stent.    - CT CAP on 3/25 prior to surgery showed no evidence of disease in the chest  - Caris report showed BRAF V600E mutation.    - started Xeloda on 6/5/21    2) Colon Cancer:   -Diagnosed 5/7/15; Low grade; staged pT4a pN0 M0 stage IIB; 0/37 LN; no MMR testing; RIGHT sided  -RIGHT hemicolectomy 5/7/15  -adjuvant Xeloda X 6 months  -Recurrent dx diagnosed via RIGHT ureter mass resection (5/1/18)  -FOLFIRI X 9 (7/31-12/4/18)  -s/p XRT RIGHT pelvis total 5320 cGy (12/14/18-1/24/19)  -CARIS report from specimen dated 5/7/15: BRAF V600E mutation (possible benefit from cetuximab), MSI stable, TMB-low, TAURUS-High (33%), TP53 mutated    *CT CAP 1/21/2021: Interim development of right-sided hydronephrosis and a soft tissue mass along the right iliac chain measuring 2.9 x 1.2 cm.  There is a second nodule identified below the aortic bifurcation in the midline measuring 1.4 cm.  This was consistent with disease recurrence versus her new primary small bowel cancer (see above).     3) Breast Cancer:  -RIGHT breast DCIS diagnosed  3/15/06; low-grade; s/p lumpectomy and adjuvant XRT; ER: 100%, SD: 70%  -LEFT breast invasive lobular carcinoma; diagnosed 06/2012; staged pT3 pN0 M0 stage IIB, ER: 83%, SD: 0, HER2: 1+, Ki-67: 26%, treated with mastectomy and SLND 04/2012, adjuvant DD AC and Taxotere, Adjuvant Arimidex then Femara (11/13/19)    4) Anemia:  -multifactorial due to her history of GI bleeding from small bowel ulcer/tumor  -intolerant to oral iron. Treatment with IV iron in the past.   -iron studies on 5/11/12 were normal    5) right hydronephrosis:  -Noted on CT scan from 1/21/2021, secondary to soft tissue mass  -Likely secondary to recurrent colon cancer, biopsy pending  -Cystoscopy and right ureteral stent (Dr. Chi) on 1/28/2021    6) DVT:  -bracheocephalic and subclavian thrombosis March 2021 (Psychiatric)     Malignant neoplasm of left breast in female, estrogen receptor positive (HCC)   3/5/2018 Initial Diagnosis    Breast cancer (CMS/HCC)     Colon cancer (HCC)   3/5/2018 Initial Diagnosis    Colon cancer (CMS/HCC)     6/28/2021 - 7/7/2021 Chemotherapy    OP SUPPORTIVE ELECTROLYTE REPLACEMENT     10/21/2021 -  Chemotherapy    OP SUPPORTIVE Ferric Carboxymaltose (INJECTAFER)          History of Present Illness  The pt comes in for follow up labs.  She got IV iron on 10/21 and also got blood last week.  She is feeling better.  She has been off Xeloda.  Fortunately there CEA has been stable.  She has endoscopy planned for Nov 12th if she is cleared by cardiology. She is supposed to have a stress test prior.      Review of Systems   Constitutional: Positive for fatigue. Negative for appetite change, diaphoresis, fever, unexpected weight gain and unexpected weight loss.   HENT: Negative for hearing loss, sore throat and voice change.    Eyes: Negative for blurred vision, double vision, pain, redness and visual disturbance.   Respiratory: Negative for cough, shortness of breath and wheezing.    Cardiovascular: Negative  for chest pain, palpitations and leg swelling.   Gastrointestinal: Positive for abdominal distention, constipation and diarrhea.   Endocrine: Negative for cold intolerance, heat intolerance, polydipsia and polyuria.   Genitourinary: Negative for decreased urine volume, difficulty urinating, frequency and urinary incontinence.   Musculoskeletal: Positive for gait problem. Negative for arthralgias, back pain, joint swelling and myalgias.   Skin: Negative for color change, rash, skin lesions and bruise.   Neurological: Positive for weakness. Negative for dizziness, seizures, numbness and headache.   Hematological: Negative for adenopathy. Does not bruise/bleed easily.   Psychiatric/Behavioral: Negative for depressed mood. The patient is not nervous/anxious.        Past Medical History:   Diagnosis Date   • Abdominal pain    • Anemia    • Arthritis    • Breast cancer (HCC)    • Colon cancer (HCC)    • Constipation    • Diarrhea    • DM (diabetes mellitus), type 2 (HCC)    • High cholesterol    • History of blood clots     neck   • Hypertension    • Hypokalemia    • Iron deficiency anemia secondary to blood loss (chronic)     GI BLEED   • Paroxysmal A-fib (HCC)    • Shortness of breath      Past Surgical History:   Procedure Laterality Date   • BACK SURGERY     • BREAST LUMPECTOMY Right    • COLON SURGERY     • COLONOSCOPY     • CYSTOSCOPY W/ URETERAL STENT PLACEMENT     • CYSTOSCOPY W/ URETERAL STENT PLACEMENT Right 8/17/2021    Procedure: CYSTOSCOPY, RIGHT URETERAL STENT INSERTION;  Surgeon: Kelli Chi MD;  Location: [facility];  Service: Urology;  Laterality: Right;   • HYSTERECTOMY      partial    • JOINT REPLACEMENT      hip    • MASTECTOMY Left    • OTHER SURGICAL HISTORY      BIOPSY   • VENOUS ACCESS DEVICE (PORT) INSERTION       Social History     Socioeconomic History   • Marital status:    Tobacco Use   • Smoking status: Never Smoker   • Smokeless tobacco: Never Used   Vaping Use   • Vaping  Use: Never used   Substance and Sexual Activity   • Alcohol use: Never   • Drug use: Never   • Sexual activity: Defer     Family History   Problem Relation Age of Onset   • Skin cancer Other    • Colon cancer Sister        Objective   Physical Exam  General: Alert, cooperative, no acute distress  Eyes: Anicteric sclera, PERRLA  Respiratory: normal respiratory effort  Cardiovascular: no lower extremity edema  Abdomen: firm and tender to palpation  Skin: Normal tone, no rash, no lesions  Psychiatric: Appropriate affect, intact judgment  Neurologic: No focal sensory or motor deficits, normal cognition   Musculoskeletal: Normal muscle strength and tone  Extremities: No clubbing, cyanosis, or deformities    Vitals:    10/25/21 0843   BP: 126/59   Pulse: 94   Resp: 16   Temp: 97 °F (36.1 °C)   SpO2: 97%   Weight: 74.7 kg (164 lb 10.9 oz)   PainSc:   3   PainLoc: Abdomen     ECOG score: 1         PHQ-9 Total Score:         Result Review :   The following data was reviewed by: Jennifer Schroeder MD PhD on 10/25/2021:  Lab Results   Component Value Date    HGB 8.1 (L) 10/25/2021    HCT 26.3 (L) 10/25/2021    MCV 95.6 10/25/2021     10/25/2021    WBC 10.00 10/25/2021    NEUTROABS 6.40 10/25/2021    LYMPHSABS 2.74 10/25/2021    MONOSABS 0.70 10/25/2021    EOSABS 0.04 10/25/2021    BASOSABS 0.03 10/25/2021     Lab Results   Component Value Date    GLUCOSE 128 (H) 10/25/2021    BUN 12 10/25/2021    CREATININE 0.89 10/25/2021     (L) 10/25/2021    K 2.7 (L) 10/25/2021     10/25/2021    CO2 25.3 10/25/2021    CALCIUM 8.1 (L) 10/25/2021    PROTEINTOT 7.4 10/25/2021    ALBUMIN 3.84 10/25/2021    BILITOT 0.2 10/25/2021    ALKPHOS 115 10/25/2021    AST 30 10/25/2021    ALT 6 10/25/2021          Assessment and Plan    Diagnoses and all orders for this visit:    1. Malignant neoplasm of colon, unspecified part of colon (HCC) (Primary)  -     CBC & Differential; Standing  -     Comprehensive Metabolic Panel; Standing  -      CEA; Future    I reviewed CBC and CMP. The pt has severe anemia that is improved after blood transfusion and iron infusion.  She will resume oral Xeloda.  I will f/u with her in 1 month with repeat CBC, CMP, and CEA.     Anemia: given the patient's recurrent iron deficiency I suspect she has a continued GI bleed. She is scheduled for an EGD and colonoscopy soon.               Patient was given instructions and counseling regarding her condition or for health maintenance advice. Please see specific information pulled into the AVS if appropriate.     Jennifer Schroeder MD PhD    10/25/2021

## 2021-11-08 NOTE — ADDENDUM NOTE
Encounter addended by: Jay Morales RN on: 11/8/2021 10:52 AM   Actions taken: Order list changed, Pharmacy for encounter modified, Diagnosis association updated, Child order released for a procedure order

## 2021-11-09 NOTE — PROGRESS NOTES
Specialty Pharmacy Note      Name:  Lorraine Dominguez  :  1950  Date:  2021         Past Medical History:   Diagnosis Date   • Abdominal pain    • Anemia    • Arthritis    • Breast cancer (HCC)    • Colon cancer (HCC)    • Constipation    • Diarrhea    • DM (diabetes mellitus), type 2 (HCC)    • High cholesterol    • History of blood clots     neck   • Hypertension    • Hypokalemia    • Iron deficiency anemia secondary to blood loss (chronic)     GI BLEED   • Paroxysmal A-fib (HCC)    • Shortness of breath        Past Surgical History:   Procedure Laterality Date   • BACK SURGERY     • BREAST LUMPECTOMY Right    • COLON SURGERY     • COLONOSCOPY     • CYSTOSCOPY W/ URETERAL STENT PLACEMENT     • CYSTOSCOPY W/ URETERAL STENT PLACEMENT Right 2021    Procedure: CYSTOSCOPY, RIGHT URETERAL STENT INSERTION;  Surgeon: Kelli Chi MD;  Location: [facility];  Service: Urology;  Laterality: Right;   • HYSTERECTOMY      partial    • JOINT REPLACEMENT      hip    • MASTECTOMY Left    • OTHER SURGICAL HISTORY      BIOPSY   • VENOUS ACCESS DEVICE (PORT) INSERTION         Social History     Socioeconomic History   • Marital status:    Tobacco Use   • Smoking status: Never Smoker   • Smokeless tobacco: Never Used   Vaping Use   • Vaping Use: Never used   Substance and Sexual Activity   • Alcohol use: Never   • Drug use: Never   • Sexual activity: Defer       Family History   Problem Relation Age of Onset   • Skin cancer Other    • Colon cancer Sister        No Known Allergies    Current Outpatient Medications   Medication Sig Dispense Refill   • Accu-Chek Softclix Lancets lancets      • apixaban (Eliquis) 5 MG tablet tablet Take 5 mg by mouth 2 (two) times a day. Per Sandhya patient does not need to stop eliquis for procedure     • Bacillus Coagulans-Inulin (Probiotic) 1-250 BILLION-MG capsule Probiotic Complex 25 billion cell -100 mg oral capsule take 1 capsule by oral route 2 times a  day   Suspended     • budesonide-formoterol (SYMBICORT) 160-4.5 MCG/ACT inhaler Inhale 2 puffs.     • Calcium Carb-Cholecalciferol 500-10 MG-MCG chewable tablet Chew 1 tablet 2 (two) times a day. (Patient taking differently: Chew 1 tablet Daily.) 60 tablet 6   • calcium carbonate (OS-TERELL) 1250 (500 Ca) MG chewable tablet Calcium 500  500 mg calcium (1,250 mg) chewable tablet   Take 1 tablet every day by oral route.     • capecitabine (Xeloda) 500 MG chemo tablet Take 3 tablets by mouth 2 (two) times a day for 7 days, then off for 7 days. 42 tablet 11   • citalopram (CeleXA) 40 MG tablet      • digoxin (LANOXIN) 125 MCG tablet Take 125 mcg by mouth Daily.     • escitalopram (LEXAPRO) 20 MG tablet      • gabapentin (NEURONTIN) 400 MG capsule Take 400 mg by mouth 3 (Three) Times a Day.     • glucose blood test strip Every 8 (Eight) Hours.     • glucose blood test strip Accu-Chek Yelena Plus test strips   TEST BLOOD SUGAR THREE TIMES DAILY     • HYDROcodone-acetaminophen (Norco) 5-325 MG per tablet Take 1 tablet by mouth Every 6 (Six) Hours As Needed for Moderate Pain . 120 tablet 0   • loperamide (IMODIUM) 2 MG capsule loperamide 2 mg oral capsule take 2 capsules (4 mg) by oral route after 1st loose stool, followed by 1 capsule after each subsequent loose stool not to exceed 16 mg/day for 30 days   Suspended     • LORazepam (ATIVAN) 1 MG tablet TAKE 1 TABLET BY MOUTH ONCE DAILY IN THE MORNING AND 2 ONCE DAILY AT BEDTIME     • magnesium oxide (MAG-OX) 400 MG tablet magnesium oxide 400 mg (241.3 mg magnesium) tablet   Take 1 tablet(s) every day by oral route.     • metFORMIN (GLUCOPHAGE) 500 MG tablet Take 500 mg by mouth 2 (Two) Times a Day With Meals.     • metoprolol tartrate (LOPRESSOR) 25 MG tablet Every 12 (Twelve) Hours.     • ondansetron (ZOFRAN) 8 MG tablet Take 1 tablet by mouth Every 8 (Eight) Hours As Needed for Nausea or Vomiting. (Patient taking differently: Take 8 mg by mouth Every 8 (Eight) Hours As Needed  for Nausea or Vomiting.) 60 tablet 4   • pantoprazole (PROTONIX) 40 MG EC tablet      • pravastatin (PRAVACHOL) 40 MG tablet      • senna 8.6 MG tablet Take 1 tablet by mouth Daily As Needed.       No current facility-administered medications for this visit.     Facility-Administered Medications Ordered in Other Visits   Medication Dose Route Frequency Provider Last Rate Last Admin   • heparin injection 500 Units  500 Units Intravenous Jennifer Valencia MD PhD   500 Units at 09/21/21 1522         LABORATORY:    Lab Results   Component Value Date    WBC 9.36 11/08/2021    HGB 8.6 (L) 11/08/2021    HCT 27.3 (L) 11/08/2021    MCV 99.6 (H) 11/08/2021    RDW 22.5 (H) 11/08/2021     (H) 11/08/2021    NEUTRORELPCT 61.8 11/08/2021    LYMPHORELPCT 28.6 11/08/2021    MONORELPCT 7.5 11/08/2021    EOSRELPCT 0.4 11/08/2021    BASORELPCT 0.4 11/08/2021    NEUTROABS 5.78 11/08/2021    LYMPHSABS 2.68 11/08/2021       Lab Results   Component Value Date     (L) 11/08/2021    K 3.2 (L) 11/08/2021    CO2 24.3 11/08/2021    CL 97 (L) 11/08/2021    BUN 19 11/08/2021    CREATININE 1.05 (H) 11/08/2021    GLUCOSE 280 (H) 11/08/2021    CALCIUM 8.4 (L) 11/08/2021    ALKPHOS 124 (H) 11/08/2021    AST 29 11/08/2021    ALT 6 11/08/2021    BILITOT 0.2 11/08/2021    ALBUMIN 3.70 11/08/2021    PROTEINTOT 7.3 11/08/2021    MG 1.3 (L) 10/25/2021    PHOS 3.3 07/26/2021       Lab Results   Component Value Date     12/01/2020        Imaging Results (Last 24 Hours)     ** No results found for the last 24 hours. **          ASSESSMENT/PLAN:    Patient Update Assessment (new medications, allergies, medical history): No updates reported at this time.    Medication(s): capecitabine 500 mg chemo tablet    Currently Taking Medication(s): Patient is taking medication as prescribed.    Effectiveness of Medication: Effective    Experiencing Side Effects: No side effects reported at this time.    Prior Authorization Status:  Approved    Financial Assistance Status: None needed at this time.    Any Issues Identified: Patient is complaining of stomach pain that started last week.  Patient was advised to call PCP to schedule an appointment.  Patient reported she has colon scope scheduled for further investigation.    Appropriate to Process Prescription(s): Yes medication refill is due this week.    Counseling Offered: Declined    Next Specialty Pharmacy Visit: ~28 days                Specialty Pharmacy Refill Coordination Note     Lorraine is a 71 y.o. female contacted today regarding refills of  one specialty medication(s).    Reviewed and verified with patient: Allergies  Meds  Problems       Specialty medication(s) and dose(s) confirmed: yes    Refill Questions      Most Recent Value   Changes to allergies? No   Changes to medications? No   New conditions since last clinic visit No   Unplanned office visit, urgent care, ED, or hospital admission in the last 4 weeks  No   How does patient/caregiver feel medication is working? Very good   Financial problems or insurance changes  No          Delivery Questions      Most Recent Value   Delivery method [employer]   Delivery address correct? Yes   Preferred delivery time? Anytime   Number of medications in delivery 1   Is there any medication that is due not being filled? No   Supplies needed? No supplies needed   Cooler needed? No   Do any medications need mixed or dated? No   Questions or concerns for the pharmacist? No   Are any medications first time fills? No            Medication Adherence    Any gaps in refill history greater than 2 weeks in the last 3 months: no  Demonstrates understanding of importance of adherence: yes  Informant: patient  Reliability of informant: reliable  Provider-estimated medication adherence level: %  Reasons for non-adherence: no problems identified          Follow-up: 28 day(s)     Clarence Whipple, Pharmacy Technician  Specialty Pharmacy Technician

## 2021-11-09 NOTE — TELEPHONE ENCOUNTER
Please call patient regarding her colonoscopy and clearance. She said Dr. Guerra said that he has to clear her before she can have the colonoscopy.  Her stress test was rescheduled to 12/10.  She thinks she needs the colonoscopy sooner, as her stomach has been hurting a lot.

## 2021-11-10 NOTE — DISCHARGE INSTRUCTIONS
Please review the CAT scan performed today with your oncologist.  We see your doctor tomorrow for serial reexamination the abdomen.  Please continue your current pain medicine for pain.  Please return to emergency for intractable pain, vomiting, chest pain, shortness of breath, near passing out, passing out or any new symptoms that you are concerned with

## 2021-11-10 NOTE — ED PROVIDER NOTES
"Time: 7:01 PM EST  Arrived by: Private car  Chief Complaint: Abdominal pain  History provided by: Patient and records  History is limited by: N/A     History of Present Illness:    Lorraine Dominguez is a 71 y.o. female who presents to the emergency department today with complaints of moderate abdominal pain. The patient reports that a few weeks ago, she developed intermittent pains to her central abdomen with some radiation \"to the sides.\" She notes that this discomfort generally resolves with her usual pain medications at home.     However, over the past week, the patient states that her pain has worsened and now does not resolve with medication. She has also been having episodes of loose stools today and has been feeling nauseated. Additionally, the patient notes increased urinary frequency without urgency or dysuria.    The patient presently denies any vomiting, constipation, fever, melena, or hematochezia. She notes that she has a history of cancer, but is unsure what kind of cancer. She later specifies that it was \"between the kidneys and bladder.\" Per her records, the patient has a history of breast cancer and colon cancer in particular.The patient also has a medical history of diabetes mellitus, hypertension, anemia, and atrial fibrillation. The patient denies smoking cigarettes, drinking alcohol, or illicit drug use. There are no other acute complaints at this time.      History provided by:  Patient and medical records   used: No    Abdominal Pain  Pain location: Central/midline.  Pain quality comment:  \"pain\"  Pain radiation: \"To the sides\"  Pain severity:  Moderate  Duration:  1 week (Worse over past week but presently mildly for past few weeks)  Timing:  Intermittent  Progression:  Worsening  Chronicity:  Recurrent  Context comment:  Patient has a history of cancer. She states she has had intermittent central abdominal pains for the past few weeks which worsened over the past " week.  Relieved by:  Nothing  Worsened by:  Nothing  Ineffective treatments: Home pain medications.  Associated symptoms: diarrhea and nausea    Associated symptoms: no chest pain, no chills, no constipation, no cough, no dysuria, no fever, no shortness of breath and no vomiting    Risk factors: being elderly    Risk factors: no alcohol abuse        Similar Symptoms Previously: Yes, but milder.  Recently seen: Patient was seen for an infusion on 10/28/2021.      Patient Care Team  Primary Care Provider: Shayna Lewis APRN    Past Medical History:     No Known Allergies  Past Medical History:   Diagnosis Date   • Abdominal pain    • Anemia    • Arthritis    • Breast cancer (HCC)    • Colon cancer (HCC)    • Constipation    • Diarrhea    • DM (diabetes mellitus), type 2 (HCC)    • High cholesterol    • History of blood clots     neck   • Hypertension    • Hypokalemia    • Iron deficiency anemia secondary to blood loss (chronic)     GI BLEED   • Paroxysmal A-fib (HCC)    • Shortness of breath      Past Surgical History:   Procedure Laterality Date   • BACK SURGERY     • BREAST LUMPECTOMY Right    • COLON SURGERY     • COLONOSCOPY     • CYSTOSCOPY W/ URETERAL STENT PLACEMENT     • CYSTOSCOPY W/ URETERAL STENT PLACEMENT Right 8/17/2021    Procedure: CYSTOSCOPY, RIGHT URETERAL STENT INSERTION;  Surgeon: Kelli Chi MD;  Location: [facility]  Service: Urology;  Laterality: Right;   • HYSTERECTOMY      partial    • JOINT REPLACEMENT      hip    • MASTECTOMY Left    • OTHER SURGICAL HISTORY      BIOPSY   • VENOUS ACCESS DEVICE (PORT) INSERTION       Family History   Problem Relation Age of Onset   • Skin cancer Other    • Colon cancer Sister        Home Medications:  Prior to Admission medications    Medication Sig Start Date End Date Taking? Authorizing Provider   Accu-Chek Softclix Lancets lancets  5/28/21   Provider, MD Stephane   apixaban (Eliquis) 5 MG tablet tablet Take 5 mg by mouth 2 (two) times  a day. Per Sandhya patient does not need to stop eliquis for procedure    Stephane Monroy MD   Bacillus Coagulans-Inulin (Probiotic) 1-250 BILLION-MG capsule Probiotic Complex 25 billion cell -100 mg oral capsule take 1 capsule by oral route 2 times a day   Suspended    Stephane Monroy MD   budesonide-formoterol (SYMBICORT) 160-4.5 MCG/ACT inhaler Inhale 2 puffs.    Stephane Monroy MD   Calcium Carb-Cholecalciferol 500-10 MG-MCG chewable tablet Chew 1 tablet 2 (two) times a day.  Patient taking differently: Chew 1 tablet Daily. 6/22/21   Jennifer Schroeder MD PhD   calcium carbonate (OS-TERELL) 1250 (500 Ca) MG chewable tablet Calcium 500  500 mg calcium (1,250 mg) chewable tablet   Take 1 tablet every day by oral route.    Stephane Monroy MD   capecitabine (Xeloda) 500 MG chemo tablet Take 3 tablets by mouth 2 (two) times a day for 7 days, then off for 7 days. 11/8/21   Jennifer Schroeder MD PhD   citalopram (CeleXA) 40 MG tablet     Stephane Monroy MD   digoxin (LANOXIN) 125 MCG tablet Take 125 mcg by mouth Daily. 4/24/21   Stephane Monroy MD   escitalopram (LEXAPRO) 20 MG tablet  4/13/21   Stephane Monroy MD   gabapentin (NEURONTIN) 400 MG capsule Take 400 mg by mouth 3 (Three) Times a Day. 6/14/21   Stephane Monroy MD   glucose blood test strip Every 8 (Eight) Hours.    Stephane Monroy MD   glucose blood test strip Accu-Chek Yelena Plus test strips   TEST BLOOD SUGAR THREE TIMES DAILY    Stephane Monroy MD   HYDROcodone-acetaminophen (Norco) 5-325 MG per tablet Take 1 tablet by mouth Every 6 (Six) Hours As Needed for Moderate Pain . 9/30/21   Jennifer Schroeder MD PhD   loperamide (IMODIUM) 2 MG capsule loperamide 2 mg oral capsule take 2 capsules (4 mg) by oral route after 1st loose stool, followed by 1 capsule after each subsequent loose stool not to exceed 16 mg/day for 30 days   Suspended    Stephane Monroy MD   LORazepam (ATIVAN) 1 MG tablet  TAKE 1 TABLET BY MOUTH ONCE DAILY IN THE MORNING AND 2 ONCE DAILY AT BEDTIME 5/13/21   Stephane Monroy MD   magnesium oxide (MAG-OX) 400 MG tablet magnesium oxide 400 mg (241.3 mg magnesium) tablet   Take 1 tablet(s) every day by oral route.    Stephane Monroy MD   metFORMIN (GLUCOPHAGE) 500 MG tablet Take 500 mg by mouth 2 (Two) Times a Day With Meals. 4/5/21   Stephane Monroy MD   metoprolol tartrate (LOPRESSOR) 25 MG tablet Every 12 (Twelve) Hours.    Stephane Monroy MD   ondansetron (ZOFRAN) 8 MG tablet Take 1 tablet by mouth Every 8 (Eight) Hours As Needed for Nausea or Vomiting.  Patient taking differently: Take 8 mg by mouth Every 8 (Eight) Hours As Needed for Nausea or Vomiting. 6/22/21   Jennifer Schroeder MD PhD   pantoprazole (PROTONIX) 40 MG EC tablet  5/17/21   Stephane Monroy MD   pravastatin (PRAVACHOL) 40 MG tablet  4/2/21   Stephane Monroy MD   senna 8.6 MG tablet Take 1 tablet by mouth Daily As Needed. 3/30/21   Stephane Monroy MD        Social History:   Social History     Tobacco Use   • Smoking status: Never Smoker   • Smokeless tobacco: Never Used   Vaping Use   • Vaping Use: Never used   Substance Use Topics   • Alcohol use: Never   • Drug use: Never         Review of Systems:  Review of Systems   Constitutional: Negative for chills and fever.   HENT: Negative for nosebleeds.    Eyes: Negative for redness.   Respiratory: Negative for cough and shortness of breath.    Cardiovascular: Negative for chest pain.   Gastrointestinal: Positive for abdominal pain (central/midline), diarrhea and nausea. Negative for anal bleeding, blood in stool, constipation and vomiting.   Genitourinary: Positive for frequency. Negative for dysuria and urgency.   Musculoskeletal: Negative for back pain and neck pain.   Skin: Negative for rash.   Neurological: Negative for seizures and syncope.        Physical Exam:  /88 (BP Location: Right arm, Patient Position: Sitting)  "  Pulse 90   Temp 98.8 °F (37.1 °C)   Resp 14   Ht 165.1 cm (65\")   Wt 72 kg (158 lb 11.7 oz)   SpO2 99%   BMI 26.41 kg/m²     Physical Exam  Vitals and nursing note reviewed.   Constitutional:       General: She is not in acute distress.     Appearance: Normal appearance.   HENT:      Head: Normocephalic and atraumatic.      Nose: Nose normal.      Mouth/Throat:      Mouth: Mucous membranes are moist.      Pharynx: Oropharynx is clear.   Eyes:      General: No scleral icterus.     Conjunctiva/sclera: Conjunctivae normal.   Cardiovascular:      Rate and Rhythm: Normal rate and regular rhythm.      Pulses: Normal pulses.      Heart sounds: Normal heart sounds. No murmur heard.      Pulmonary:      Effort: No respiratory distress.      Breath sounds: Normal breath sounds. No wheezing, rhonchi or rales.      Comments: Patient's lungs are diminished.  Chest:      Chest wall: No tenderness.   Abdominal:      Palpations: Abdomen is soft.      Tenderness: There is abdominal tenderness. There is no right CVA tenderness, left CVA tenderness, guarding or rebound.      Hernia: A hernia is present.      Comments: There is an old surgical incision at the midline. Large incisional hernia is present that is reducible. No erythema. Diffuse midline tenderness.   Musculoskeletal:         General: No tenderness. Normal range of motion.      Cervical back: Normal range of motion and neck supple.      Right lower leg: No edema.      Left lower leg: No edema.   Skin:     General: Skin is warm and dry.   Neurological:      Mental Status: She is alert. Mental status is at baseline.   Psychiatric:         Mood and Affect: Mood normal.         Behavior: Behavior normal.                Medications in the Emergency Department:  Medications   sodium chloride 0.9 % flush 10 mL (has no administration in time range)   sodium chloride 0.9 % bolus 1,000 mL (1,000 mL Intravenous New Bag 11/9/21 1943)   ondansetron (ZOFRAN) injection 4 mg (4 mg " Intravenous Given 11/9/21 1943)   famotidine (PEPCID) injection 20 mg (20 mg Intravenous Given 11/9/21 1943)   morphine injection 4 mg (4 mg Intravenous Given 11/9/21 1943)   iopamidol (ISOVUE-370) 76 % injection 100 mL (100 mL Intravenous Given 11/9/21 2020)        Labs  Lab Results (last 24 hours)     Procedure Component Value Units Date/Time    CBC & Differential [[id number]]  (Abnormal) Collected: 11/09/21 1850    Specimen: Blood from Arm, Right Updated: 11/09/21 1948    Narrative:      The following orders were created for panel order CBC & Differential.  Procedure                               Abnormality         Status                     ---------                               -----------         ------                     CBC Auto Differential[[id number]]        Abnormal            Final result               Scan Slide[[id number]]                                       Final result                 Please view results for these tests on the individual orders.    Comprehensive Metabolic Panel [[id number]]  (Abnormal) Collected: 11/09/21 1850    Specimen: Blood from Arm, Right Updated: 11/09/21 1940     Glucose 206 mg/dL      BUN 19 mg/dL      Creatinine 0.87 mg/dL      Sodium 137 mmol/L      Potassium 3.5 mmol/L      Chloride 102 mmol/L      CO2 22.3 mmol/L      Calcium 9.1 mg/dL      Total Protein 7.9 g/dL      Albumin 3.90 g/dL      ALT (SGPT) 8 U/L      AST (SGOT) 31 U/L      Alkaline Phosphatase 132 U/L      Total Bilirubin <0.2 mg/dL      eGFR Non African Amer 64 mL/min/1.73      Globulin 4.0 gm/dL      A/G Ratio 1.0 g/dL      BUN/Creatinine Ratio 21.8     Anion Gap 12.7 mmol/L     Narrative:      GFR Normal >60  Chronic Kidney Disease <60  Kidney Failure <15      Lipase [[id number]]  (Normal) Collected: 11/09/21 1850    Specimen: Blood from Arm, Right Updated: 11/09/21 1940     Lipase 21 U/L     Digoxin Level [[id number]]  (Normal) Collected: 11/09/21 1850    Specimen: Blood from Arm, Right Updated: 11/09/21  1940     Digoxin 0.71 ng/mL     CBC Auto Differential [[id number]]  (Abnormal) Collected: 11/09/21 1850    Specimen: Blood from Arm, Right Updated: 11/09/21 1948     WBC 9.50 10*3/mm3      RBC 2.84 10*6/mm3      Hemoglobin 8.8 g/dL      Hematocrit 27.8 %      MCV 97.9 fL      MCH 31.0 pg      MCHC 31.7 g/dL      RDW 22.9 %      RDW-SD 77.8 fl      MPV 9.3 fL      Platelets 459 10*3/mm3      Neutrophil % 61.8 %      Lymphocyte % 28.7 %      Monocyte % 7.1 %      Eosinophil % 0.4 %      Basophil % 0.4 %      Immature Grans % 1.6 %      Neutrophils, Absolute 5.87 10*3/mm3      Lymphocytes, Absolute 2.73 10*3/mm3      Monocytes, Absolute 0.67 10*3/mm3      Eosinophils, Absolute 0.04 10*3/mm3      Basophils, Absolute 0.04 10*3/mm3      Immature Grans, Absolute 0.15 10*3/mm3      nRBC 0.0 /100 WBC     Scan Slide [[id number]] Collected: 11/09/21 1850    Specimen: Blood from Arm, Right Updated: 11/09/21 1948     Anisocytosis Mod/2+     Dacrocytes Slight/1+     Microcytes Slight/1+     Macrocytes Mod/2+     Ovalocytes Slight/1+     Poikilocytes Slight/1+     Polychromasia Slight/1+     WBC Morphology Normal     Platelet Estimate Increased     Large Platelets Slight/1+    Lactic Acid, Plasma [[id number]]  (Normal) Collected: 11/09/21 1943    Specimen: Blood Updated: 11/09/21 2025     Lactate 1.2 mmol/L     Urinalysis With Microscopic If Indicated (No Culture) - Urine, Clean Catch [[id number]]  (Abnormal) Collected: 11/09/21 2057    Specimen: Urine, Clean Catch Updated: 11/09/21 2112     Color, UA Yellow     Appearance, UA Cloudy     pH, UA 5.5     Specific Gravity, UA 1.012     Glucose, UA Negative     Ketones, UA Negative     Bilirubin, UA Negative     Blood, UA Small (1+)     Protein, UA 30 mg/dL (1+)     Leuk Esterase, UA Moderate (2+)     Nitrite, UA Negative     Urobilinogen, UA 0.2 E.U./dL    Urinalysis, Microscopic Only - Urine, Clean Catch [[id number]]  (Abnormal) Collected: 11/09/21 2057    Specimen: Urine, Clean Catch  Updated: 11/09/21 2112     RBC, UA 6-12 /HPF      WBC, UA Too Numerous to Count /HPF      Bacteria, UA None Seen /HPF      Squamous Epithelial Cells, UA None Seen /HPF      Hyaline Casts, UA None Seen /LPF      Methodology Automated Microscopy           Imaging:  CT Abdomen Pelvis With Contrast    Result Date: 11/9/2021  PROCEDURE: CT ABDOMEN PELVIS W CONTRAST  COMPARISONS: [facility], CT, CT CHEST W CONTRAST, 9/21/2021, 16:00.   [facility], CT, CT ABD-PELVIS W CONTRAST, 9/21/2021, 16:00.   [facility], CT, CT CHEST WO CONTRAST, 10/26/2021, 11:48.  INDICATIONS: mid and lower abdominal pain; diarrhea; history of breast carcinoma  TECHNIQUE: After obtaining the patient's consent, 648 CT images were created with non-ionic intravenous contrast material.   PROTOCOL:   Standard imaging protocol performed    RADIATION:   DLP: 755.3 mGy*cm   Automated exposure control was utilized to minimize radiation dose. CONTRAST: 100 cc Isovue 370 I.V.  FINDINGS: Again, there is diffuse osseous metastatic disease.  No definite pathologic vertebral compression fracture is identified.  No definite pathologic fractures are appreciated elsewhere.  There are degenerative changes throughout the imaged spine.  There is a total right hip prosthesis in place with associated streak artifact, especially obscuring the lower pelvis.  Probably no significant interval change in the right ureteral stent location.  The right kidney is atrophic with mild to moderate chronic hydronephrosis and hydroureter.  There is diffuse right renal cortical scarring.  No hydronephrosis is seen on the left.  No obstructing ureteral calculus is seen on the left.  There are bilateral renal cysts, which measure about 4.3 cm in greatest axial diameter, seen previously.  There is mild cardiomegaly.  A small hiatal hernia is seen.  There are probable coronary artery calcifications.  The patient has undergone left mastectomy.  There  is diffuse hepatic steatosis with hepatomegaly.  There is low attenuation involving the hepatic hilum, probably within the medial segment left lobe of the liver, as on image 28 of series 201, unchanged since 9/21/2021.  It may represent focally greater fatty infiltration.  It is thought less likely to represent metastatic disease.  No definite gallstones or acute cholecystitis.  No acute pancreatitis.  Probably no biliary ductal dilatation or choledocholithiasis by CT.  No adrenal mass.  Chronic calcified granulomatous disease involves the spleen.  There is distension of the urinary bladder.  There is diastasis of the rectus sheath with protrusion of bowel and fat through the diastatic defect.  There may have been prior ventral hernia repair with recurrence of the ventral hernia.  Increased attenuation is seen throughout the peritoneum with inflammatory stranding.  This finding may represent peritoneal carcinomatosis, seen previously.  A focal 1.2 cm soft tissue nodule is seen along the left aspect of the diastatic defect in the anterior peritoneum, as seen on image 66 of series 201.  It may represent an enlarged lymph node.  Intraperitoneal seeding of tumor is possible.  There is diffuse colonic diverticulosis.  Probably no acute colitis or diverticulitis.  The appendix is not clearly identified.  It may be surgically absent.  There are postoperative changes of the right bowel.  No mechanical bowel obstruction.  The uterus is not clearly visualized.  It may be surgically absent.  There is ascites present, especially in the lower pelvis.  The small amount ascites has a CT number measuring 11 Hounsfield units or less.  Fibrotic changes involve the lung bases.  Subpleural nodules are seen in the inferior right middle lobe, seen previously.  They are nonspecific.  CONCLUSION:  1. Probable worsening progression of the intraperitoneal dissemination of tumor as with peritoneal carcinomatosis, which was seen on the  9/21/2021 study.  2. Diffuse osseous metastatic disease is present, as before.  No definite pathologic fractures are appreciated.  3. There is a small amount of ascites.  4. There is diffuse hepatic steatosis with hepatomegaly.  Probably no hepatic metastases.  5. No mechanical bowel obstruction is suspected.  No pneumoperitoneum.  No definite acute colitis or diverticulitis is suspected.  6. No change in the position of the right ureteral stent.  7. The right kidney is diffusely atrophic with cortical scarring and chronic mild to moderate hydronephrosis.  8. Please see above comments for further detail.   1.   MELVIN RODRIGUEZ JR, MD       Electronically Signed and Approved By: MELVIN RODRIGUEZ JR, MD on 11/09/2021 at 21:28               Procedures:  Procedures    Progress                            Medical Decision Making:  MDM  Number of Diagnoses or Management Options  Chronic abdominal pain  Peritoneal carcinomatosis (HCC)  Diagnosis management comments:       The patient is resting comfortably and feels better, is alert and in no distress.  Repeat examination is unremarkable and benign; in particular, there is no discomfort at McBurney's point and there is no pulsatile mass.  The history, exam, diagnostic testing and current condition does not suggest acute appendicitis, bowel obstruction, acute cholecystitis bowel perforation, major gastrointestinal bleeding, severe diverticulitis, abdominal aortic aneurysm, mesenteric ischemia, volvulus, sepsis or other significant pathology that warrants further testing, continued ED treatment, admission for surgical evaluation at this point.  The vital signs have been stable.  The patient does not have uncontrolled pain intractable vomiting or other significant symptoms.  The patient's condition is stable and appropriate for discharge from the emergency department.  The patient will follow up with her primary care physician for serial reexamination of the abdomen tomorrow.   The patient was instructed to return should they have worsening pain, intractable vomiting, fever or new symptoms       Amount and/or Complexity of Data Reviewed  Clinical lab tests: reviewed  Tests in the radiology section of CPT®: reviewed  Decide to obtain previous medical records or to obtain history from someone other than the patient: yes                 Final diagnoses:   Chronic abdominal pain   Peritoneal carcinomatosis (HCC)        Disposition:  ED Disposition     ED Disposition Condition Comment    Discharge Stable           This medical record created using voice recognition software and may contain unintended errors.    Documentation assistance provided by Nikole Nelson acting as scribe for Rodolfo Aguilar DO. Information recorded by the scribe was done at my direction and has been verified and validated by me.        Nikole Nelson  11/09/21 1913       Nikole Nelson  11/09/21 1914       Nikole Nelson  11/09/21 1914       Nikole Nelson  11/09/21 1932       Rodolfo Aguilar DO  11/11/21 0203

## 2021-11-12 NOTE — TELEPHONE ENCOUNTER
Deep De Luna spoke with Pt yesterdays. I am sending this back to you so you can take care of this.

## 2021-11-15 NOTE — PROGRESS NOTES
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:   - Diagnosed 5/7/15; Low grade; staged pT4a pN0 M0 stage IIB; 0/37 LN; no MMR testing; RIGHT sided  - RIGHT hemicolectomy 5/7/15  - adjuvant Xeloda X 6 months  - Recurrent dx diagnosed via RIGHT ureter mass resection (5/1/18)  - FOLFIRI X 9 (7/31-12/4/18)  - s/p XRT RIGHT pelvis total 5320 cGy (12/14/18-1/24/19)  - CARIS report from specimen dated 5/7/15: BRAF V600E mutation (possible benefit from cetuximab), MSI stable, TMB-low, TAURUS-High (33%), TP53 mutated  - CT CAP 1/21/2021: Interim development of right-sided hydronephrosis and a soft tissue mass along the right iliac chain measuring 2.9 x 1.2 cm.  There is a second nodule identified below the aortic bifurcation in the midline measuring 1.4 cm.  This was consistent with disease recurrence versus her new primary small bowel cancer.    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 Lacey). Pathology from the ileum was positive for adenocarcinoma that appeared to be small bowel in origin and represent a new primary malignancy, 2.2cm, grade 1, tumor perforates the visceral peritoneum, pT4pN0, with 0/12 lymph nodes involved.  - Post surgical CT Abd/Pelvis 4/24/21:  Mild thickening of the wall of some portion of the small intestine, could be post-surgical changes. Moderate dilation of the right renal collecting system is unchanged with ureteral double-J stent.    - CT CAP on 3/25 prior to surgery showed no evidence of disease in the chest  - Caris report also showed BRAF V600E mutation consistent with pts prior colon cancer.     - started Xeloda on 6/5/21 and continued through November 2021  - CT A/P on 11/9/21: probably worsening of peritoneal carcinomatosis    3) Breast Cancer:  -RIGHT breast DCIS diagnosed 3/15/06; low-grade; s/p lumpectomy and adjuvant XRT; ER: 100%, NJ: 70%  -LEFT breast invasive lobular carcinoma; diagnosed 06/2012; staged pT3 pN0 M0 stage IIB, ER: 83%, NJ: 0, HER2: 1+, Ki-67: 26%, treated with mastectomy and SLND 04/2012, adjuvant DD AC and Taxotere, Adjuvant Arimidex then Femara (11/13/19)    4) Anemia:  -multifactorial due to her history of GI bleeding from small bowel ulcer/tumor  -intolerant to oral iron. Treatment with IV iron in the past.   -iron studies on 5/11/12 were normal    5) right hydronephrosis:  -Noted on CT scan from 1/21/2021, secondary to soft tissue mass  -Likely secondary to recurrent colon cancer, biopsy pending  -Cystoscopy and right ureteral stent (Dr. Chi) on 1/28/2021    6) DVT:  -bracheocephalic and subclavian thrombosis March 2021 ([facility])     Malignant neoplasm of left breast in female, estrogen receptor positive (HCC)   3/5/2018 Initial Diagnosis    Breast cancer (CMS/HCC)     Colon cancer (HCC)   3/5/2018 Initial Diagnosis    Colon cancer (CMS/HCC)     6/28/2021 - 7/7/2021 Chemotherapy    OP SUPPORTIVE ELECTROLYTE REPLACEMENT     10/21/2021 -  Chemotherapy    OP SUPPORTIVE Ferric Carboxymaltose (INJECTAFER)      11/18/2021 -  Chemotherapy    OP COLORECTAL Cetuximab / Encorafenib      Malignant neoplasm of colon (HCC)   3/5/2018 Initial Diagnosis    Malignant neoplasm of colon (HCC)         History of Present Illness  Patient comes in today for follow-up after an emergency room visit on 11/9/2021 for abdominal pain.  Patient denied any evidence of hematochezia or melena.  CT scan at the time showed likely progression of disease in her peritoneum.  Patient was treated with pain medication with a great deal of relief.  She is currently taking oxycodone twice a day which  is working better than the hydrocodone she was recently prescribed.    CT Abdomen Pelvis With Contrast    Result Date: 11/9/2021  PROCEDURE: CT ABDOMEN PELVIS W CONTRAST  COMPARISONS: [facility], CT, CT CHEST W CONTRAST, 9/21/2021, 16:00.   [facility], CT, CT ABD-PELVIS W CONTRAST, 9/21/2021, 16:00.   [facility], CT, CT CHEST WO CONTRAST, 10/26/2021, 11:48.  INDICATIONS: mid and lower abdominal pain; diarrhea; history of breast carcinoma  TECHNIQUE: After obtaining the patient's consent, 648 CT images were created with non-ionic intravenous contrast material.   PROTOCOL:   Standard imaging protocol performed    RADIATION:   DLP: 755.3 mGy*cm   Automated exposure control was utilized to minimize radiation dose. CONTRAST: 100 cc Isovue 370 I.V.  FINDINGS: Again, there is diffuse osseous metastatic disease.  No definite pathologic vertebral compression fracture is identified.  No definite pathologic fractures are appreciated elsewhere.  There are degenerative changes throughout the imaged spine.  There is a total right hip prosthesis in place with associated streak artifact, especially obscuring the lower pelvis.  Probably no significant interval change in the right ureteral stent location.  The right kidney is atrophic with mild to moderate chronic hydronephrosis and hydroureter.  There is diffuse right renal cortical scarring.  No hydronephrosis is seen on the left.  No obstructing ureteral calculus is seen on the left.  There are bilateral renal cysts, which measure about 4.3 cm in greatest axial diameter, seen previously.  There is mild cardiomegaly.  A small hiatal hernia is seen.  There are probable coronary artery calcifications.  The patient has undergone left mastectomy.  There is diffuse hepatic steatosis with hepatomegaly.  There is low attenuation involving the hepatic hilum, probably within the medial segment left lobe of the liver, as on image 28 of series 201,  unchanged since 9/21/2021.  It may represent focally greater fatty infiltration.  It is thought less likely to represent metastatic disease.  No definite gallstones or acute cholecystitis.  No acute pancreatitis.  Probably no biliary ductal dilatation or choledocholithiasis by CT.  No adrenal mass.  Chronic calcified granulomatous disease involves the spleen.  There is distension of the urinary bladder.  There is diastasis of the rectus sheath with protrusion of bowel and fat through the diastatic defect.  There may have been prior ventral hernia repair with recurrence of the ventral hernia.  Increased attenuation is seen throughout the peritoneum with inflammatory stranding.  This finding may represent peritoneal carcinomatosis, seen previously.  A focal 1.2 cm soft tissue nodule is seen along the left aspect of the diastatic defect in the anterior peritoneum, as seen on image 66 of series 201.  It may represent an enlarged lymph node.  Intraperitoneal seeding of tumor is possible.  There is diffuse colonic diverticulosis.  Probably no acute colitis or diverticulitis.  The appendix is not clearly identified.  It may be surgically absent.  There are postoperative changes of the right bowel.  No mechanical bowel obstruction.  The uterus is not clearly visualized.  It may be surgically absent.  There is ascites present, especially in the lower pelvis.  The small amount ascites has a CT number measuring 11 Hounsfield units or less.  Fibrotic changes involve the lung bases.  Subpleural nodules are seen in the inferior right middle lobe, seen previously.  They are nonspecific.  CONCLUSION:  1. Probable worsening progression of the intraperitoneal dissemination of tumor as with peritoneal carcinomatosis, which was seen on the 9/21/2021 study.  2. Diffuse osseous metastatic disease is present, as before.  No definite pathologic fractures are appreciated.  3. There is a small amount of ascites.  4. There is diffuse hepatic  steatosis with hepatomegaly.  Probably no hepatic metastases.  5. No mechanical bowel obstruction is suspected.  No pneumoperitoneum.  No definite acute colitis or diverticulitis is suspected.  6. No change in the position of the right ureteral stent.  7. The right kidney is diffusely atrophic with cortical scarring and chronic mild to moderate hydronephrosis.  8. Please see above comments for further detail.   1.   MELVIN RODRIGUEZ JR, MD       Electronically Signed and Approved By: MELVIN RODRIGUEZ JR, MD on 11/09/2021 at 21:28                 Review of Systems   Constitutional: Positive for fatigue. Negative for appetite change, diaphoresis, fever, unexpected weight gain and unexpected weight loss.   HENT: Negative for hearing loss, sore throat and voice change.    Eyes: Negative for blurred vision, double vision, pain, redness and visual disturbance.   Respiratory: Negative for cough, shortness of breath and wheezing.    Cardiovascular: Negative for chest pain, palpitations and leg swelling.   Gastrointestinal: Positive for abdominal pain (Told in the ER that Cancer in stomach is spreading which is causing pain ) and nausea.   Endocrine: Negative for cold intolerance, heat intolerance, polydipsia and polyuria.   Genitourinary: Negative for decreased urine volume, difficulty urinating, frequency and urinary incontinence.   Musculoskeletal: Positive for back pain (lower back pain ). Negative for arthralgias, joint swelling and myalgias.   Skin: Negative for color change, rash, skin lesions and bruise.   Neurological: Positive for headache. Negative for dizziness, seizures and numbness.   Hematological: Negative for adenopathy. Does not bruise/bleed easily.   Psychiatric/Behavioral: Negative for depressed mood. The patient is not nervous/anxious.    All other systems reviewed and are negative.      Past Medical History:   Diagnosis Date   • Abdominal pain    • Anemia    • Arthritis    • Breast cancer (HCC)    • Colon  cancer (HCC)    • Constipation    • Diarrhea    • DM (diabetes mellitus), type 2 (HCC)    • High cholesterol    • History of blood clots     neck   • Hypertension    • Hypokalemia    • Iron deficiency anemia secondary to blood loss (chronic)     GI BLEED   • Paroxysmal A-fib (HCC)    • Shortness of breath      Past Surgical History:   Procedure Laterality Date   • BACK SURGERY     • BREAST LUMPECTOMY Right    • COLON SURGERY     • COLONOSCOPY     • CYSTOSCOPY W/ URETERAL STENT PLACEMENT     • CYSTOSCOPY W/ URETERAL STENT PLACEMENT Right 8/17/2021    Procedure: CYSTOSCOPY, RIGHT URETERAL STENT INSERTION;  Surgeon: Kelli Chi MD;  [facility] OR;  Service: Urology;  Laterality: Right;   • HYSTERECTOMY      partial    • JOINT REPLACEMENT      hip    • MASTECTOMY Left    • OTHER SURGICAL HISTORY      BIOPSY   • VENOUS ACCESS DEVICE (PORT) INSERTION       Social History     Socioeconomic History   • Marital status:    Tobacco Use   • Smoking status: Never Smoker   • Smokeless tobacco: Never Used   Vaping Use   • Vaping Use: Never used   Substance and Sexual Activity   • Alcohol use: Never   • Drug use: Never   • Sexual activity: Defer     Family History   Problem Relation Age of Onset   • Skin cancer Other    • Colon cancer Sister        Objective   Physical Exam  General: Alert, cooperative, no acute distress, appears to have stable weight  Eyes: Anicteric sclera, PERRLA  Respiratory: normal respiratory effort  Cardiovascular: no lower extremity edema  Abdomen: Significant tenderness even to light palpation across the entire abdomen with what appears to be a central hernia or abdominal defect  Skin: Normal tone, no rash, no lesions  Psychiatric: Appropriate affect, intact judgment  Neurologic: No focal sensory or motor deficits, normal cognition   Musculoskeletal: Normal muscle strength and tone  Extremities: No clubbing, cyanosis, or deformities    Vitals:    11/15/21 0940   BP: 122/64   Pulse:  94   Resp: 18   Temp: 97.6 °F (36.4 °C)   SpO2: 96%   Weight: 72 kg (158 lb 11.7 oz)   PainSc:   4     ECOG score: 1         PHQ-9 Total Score: 3       Result Review :   The following data was reviewed by: Jennifer Schroeder MD PhD on 11/15/2021:  Lab Results   Component Value Date    HGB 8.8 (L) 11/09/2021    HCT 27.8 (L) 11/09/2021    MCV 97.9 (H) 11/09/2021     (H) 11/09/2021    WBC 9.50 11/09/2021    NEUTROABS 5.87 11/09/2021    LYMPHSABS 2.73 11/09/2021    MONOSABS 0.67 11/09/2021    EOSABS 0.04 11/09/2021    BASOSABS 0.04 11/09/2021     Lab Results   Component Value Date    GLUCOSE 206 (H) 11/09/2021    BUN 19 11/09/2021    CREATININE 0.87 11/09/2021     11/09/2021    K 3.5 11/09/2021     11/09/2021    CO2 22.3 11/09/2021    CALCIUM 9.1 11/09/2021    PROTEINTOT 7.9 11/09/2021    ALBUMIN 3.90 11/09/2021    BILITOT <0.2 11/09/2021    ALKPHOS 132 (H) 11/09/2021    AST 31 11/09/2021    ALT 8 11/09/2021          Assessment and Plan    Diagnoses and all orders for this visit:    1. Malignant neoplasm of colon, unspecified part of colon (HCC) (Primary)  -     oxyCODONE (OXY-IR) 5 MG capsule; Take 1 capsule by mouth Every 4 (Four) Hours As Needed for Moderate Pain .  Dispense: 90 capsule; Refill: 0    2. Overlapping malignant neoplasm of colon (HCC)  -     ECG 12 Lead; Future  -     Discontinue: encorafenib (BRAFTOVI) 75 MG capsule; Take 4 capsules by mouth Daily.  Dispense: 120 capsule; Refill: 3  -     encorafenib (BRAFTOVI) 75 MG capsule; Take 4 capsules by mouth Daily.  Dispense: 120 capsule; Refill: 3      BRAF V600E metastatic/recurrent colon cancer vs small bowel cancer: The patient's CEA is stable at 12.5 but she continues to struggle with abdominal pain and this appears to be increasing. Imaging of her abd/pelvis suggests progression of peritoneal disease which is consistent with the clinical picture. I discussed the option of switching treatment plans to Braftovi (encorafinib) + cetuximab.   This is high risk due to the increased risk for hemorrhage and bleeding but is the pts best change for a response to treatment.  She is agreeable. I will  Plan to start cetuximab as soon as possible while she is still taking Xeloda.  Then, when encorafinib arrives the pt can transition to this mediation in combination with cetuximab. I will plan to start cetuximab at full dose and encorafinib at half dose for the first month.  On this combination regimen she is at extremely high risk of a hemorrhagic event.  The package insert suggests 19% of patients have some form of bleeding.  0.5% have a life-threatening hemorrhagic event.  Patient will require close observation.  However, I believe this treatment regimen is necessary given the life-threatening nature of her disease.    I reviewed the patient's labs from her emergency room visit on 11/9/2021.  Her hemoglobin is stable at the time at 8.8.  Otherwise there were no significant abnormalities on her CBC to suggest further toxicity from Xeloda.  Her CMP from the same day was also normal with exception of moderate hyperglycemia with a glucose of 206.  I will recheck labs for the purposes of monitoring toxicity when she returns to clinic for her first dose of cetuximab later this week.    Patient was given instructions and counseling regarding her condition or for health maintenance advice. Please see specific information pulled into the AVS if appropriate.     Jennifer Schroeder MD PhD    11/15/2021

## 2021-11-18 NOTE — TELEPHONE ENCOUNTER
BRAFTOVI HAS BEEN APPROVED. CASE # [id number] COVERAGE STARTS ON 1/1/2021 AND ENDS ON 5/17/2022; UPDATING PHARMACY AND STAFF AT THE OFFICE.

## 2021-11-19 NOTE — TELEPHONE ENCOUNTER
Caller: ETTA    Relationship: CALLING FROM [facility]    Best call back number: [phone]      Additional notes: ETTA STATED SHE FAXED OVER  ORDER TO SIGN FOR LABS AND NEW DX. STILL HASNT RECEIVED BACK SIGNED BY  ADVISED SHE WOULD FAX TO US AGAIN. PLEASE CALL IF ANY QUESTIONS    FAX# [phone]

## 2021-11-23 PROBLEM — R39.15 URGENCY OF URINATION: Status: ACTIVE | Noted: 2021-01-01

## 2021-11-23 NOTE — PROGRESS NOTES
Outpatient Nutrition Oncology Assessment    Patient Name: Lorraine Dominguez  YOB: 1950  MRN: [id number]  Assessment Date: 11/23/2021    CLINICAL NUTRITION ASSESSMENT      Type of Cancer Treatment  Cetuximab        CLINICAL NUTRITION ASSESSMENT      Reason for Assessment  Assessment     H&P:    Past Medical History:   Diagnosis Date   • Abdominal pain    • Anemia    • Arthritis    • Breast cancer (HCC)    • Colon cancer (HCC)    • Constipation    • Diarrhea    • DM (diabetes mellitus), type 2 (HCC)    • High cholesterol    • History of blood clots     neck   • Hypertension    • Hypokalemia    • Iron deficiency anemia secondary to blood loss (chronic)     GI BLEED   • Paroxysmal A-fib (HCC)    • Shortness of breath         Current Problems:   Patient Active Problem List   Diagnosis Code   • Encounter for adjustment or management of vascular access device Z45.2   • Allergic rhinitis J30.9   • Anemia D64.9   • Anxiety F41.9   • Arthritis M19.90   • Malignant neoplasm of left breast in female, estrogen receptor positive (HCC) C50.912, Z17.0   • Cataract H26.9   • Degeneration of intervertebral disc [id number]   • Depression F32.A   • Diabetic neuropathy (HCC) E11.40   • Esophageal reflux K21.9   • Hiatal hernia K44.9   • High blood pressure I10   • Hydroureteronephrosis N13.30   • Hyperlipidemia E78.5   • Leg pain M79.606   • Limb swelling M79.89   • Colon cancer (HCC) C18.9   • Malignant neoplasm of colon (HCC) C18.9   • Muscle cramps R25.2   • Neuropathy G62.9   • Primary localized osteoarthritis of pelvic region and thigh M16.10   • Shortness of breath R06.02   • Diabetes mellitus (HCC) E11.9   • Type 2 diabetes mellitus (HCC) E11.9   • Ureteral mass N28.89   • Special screening for malignant neoplasms, colon Z12.11   • Atrial fibrillation (HCC) I48.91   • Central venous catheter in place Z78.9   • Chronic anxiety F41.9   • Disorder of vitamin B12 E53.8   • Dyslipidemia E78.5   • Gastrointestinal  "hemorrhage K92.2   • History of lumbar discectomy Z98.890   • History of malignant neoplasm of breast Z85.3   • Hypokalemia E87.6   • Hypothyroidism E03.9   • Iron deficiency E61.1   • Iron deficiency anemia secondary to blood loss (chronic) D50.0   • Osteopenia M85.80   • Tension-type headache G44.209   • Ulcerative lesion [id number]   • Hypomagnesemia E83.42   • Hypocalcemia E83.51   • History of colon cancer Z85.038   • Fall against object W18.00XA   • Generalized anxiety disorder F41.1   • Malabsorption of iron K90.9   • Urgency of urination R39.15         Anthropometrics     Row Name 11/23/21 0835          Anthropometrics    Height 165.1 cm (65\")     Weight 71 kg (156 lb 8.4 oz)            Ideal Body Weight (IBW)    Ideal Body Weight (IBW) (kg) 57.29     % Ideal Body Weight 123.93            Body Mass Index (BMI)    BMI (kg/m2) 26.1                 BMI kg/m2   Body mass index is 26.05 kg/m².    Weight Hx  Wt Readings from Last 30 Encounters:   11/23/21 0837 71 kg (156 lb 8.4 oz)   11/23/21 0835 71 kg (156 lb 8.4 oz)   11/15/21 0940 72 kg (158 lb 11.7 oz)   11/09/21 1844 72 kg (158 lb 11.7 oz)   10/28/21 0952 76 kg (167 lb 8.8 oz)   10/25/21 0843 74.7 kg (164 lb 10.9 oz)   10/22/21 1006 75.1 kg (165 lb 9.1 oz)   10/18/21 1055 74.3 kg (163 lb 12.8 oz)   09/30/21 1117 72.1 kg (158 lb 15.2 oz)   09/21/21 1234 72.1 kg (159 lb)   09/20/21 1152 72 kg (158 lb 11.7 oz)   09/14/21 0842 72.2 kg (159 lb 2.8 oz)   09/07/21 0923 71.9 kg (158 lb 9.6 oz)   08/24/21 0909 72.6 kg (160 lb)   08/23/21 1038 73 kg (160 lb 15 oz)   08/17/21 0604 73 kg (160 lb 15 oz)   08/03/21 1454 69.9 kg (154 lb)   07/07/21 0927 72.5 kg (159 lb 13.3 oz)   06/22/21 1049 70 kg (154 lb 5.2 oz)   05/28/21 1336 69.9 kg (154 lb 1.6 oz)   05/19/21 0000 70.8 kg (156 lb 2 oz)   05/05/21 0000 75.9 kg (167 lb 7 oz)   04/29/21 0000 77.6 kg (171 lb)   03/30/21 1036 79.8 kg (175 lb 14.8 oz)   03/11/21 1138 80.8 kg (178 lb 2.1 oz)   02/23/21 1304 81.8 kg (180 lb 5.4 " "oz)   01/29/21 1452 84.5 kg (186 lb 4.6 oz)   01/25/21 0000 83.6 kg (184 lb 4 oz)   01/22/21 1442 83.7 kg (184 lb 8.4 oz)   01/05/21 1125 84.9 kg (187 lb 2.7 oz)   11/24/20 1120 84 kg (185 lb 3 oz)         Estimated/Assessed Needs     Row Name 11/23/21 1109 11/23/21 0835       Calculation Measurements    Weight Used For Calculations 71 kg (156 lb 8.4 oz) --    Height -- 165.1 cm (65\")       Estimated/Assessed Needs    Additional Documentation KCAL/KG (Group); Protein Requirements (Group); Fluid Requirements (Group) --       KCAL/KG    KCAL/KG 30 Kcal/Kg (kcal); 35 Kcal/Kg (kcal) --    30 Kcal/Kg (kcal) 2130 --    35 Kcal/Kg (kcal) 2485 --       Protein Requirements    Weight Used For Protein Calculations 71 kg (156 lb 8.4 oz) --    Est Protein Requirement Amount (gms/kg) 1.4 gm protein --    Estimated Protein Requirements (gms/day) 99.4 --       Fluid Requirements    Fluid Requirements (mL/day) 2200 --    RDA Method (mL) 2200 --                 Labs/Medications        Pertinent Labs Reviewed.   Results from last 7 days   Lab Units 11/23/21  0836   SODIUM mmol/L 131*   POTASSIUM mmol/L 4.7   CHLORIDE mmol/L 97*   CO2 mmol/L 20.0*   BUN mg/dL 17   CREATININE mg/dL 0.98   CALCIUM mg/dL 9.4   BILIRUBIN mg/dL 0.2   ALK PHOS U/L 140*   ALT (SGPT) U/L 8   AST (SGOT) U/L 36*   GLUCOSE mg/dL 154*     Results from last 7 days   Lab Units 11/23/21  0845 11/23/21  0844   MAGNESIUM mg/dL 1.5*  --    HEMOGLOBIN g/dL  --  8.4*   HEMATOCRIT %  --  26.3*     Coronavirus (COVID-19)   Date Value Ref Range Status   04/17/2021 NOT DETECTED NA Final     Comment:     The SARS-CoV-2 assay is a real-time, RT-PCR test intended  for the qualitative detection of nucleic acid from the  SARS-CoV-2 in respiratory specimens from individuals,  testing performed at [facility].       Lab Results   Component Value Date    HGBA1C 6.0 (H) 09/09/2019         Pertinent Medications Accu-Chek Softclix Lancets, Calcium Carb-Cholecalciferol, " HYDROcodone-acetaminophen, LORazepam, Probiotic, apixaban, budesonide-formoterol, calcium carbonate, capecitabine, citalopram, cromolyn, digoxin, encorafenib, escitalopram, gabapentin, glucose blood, loperamide, magnesium oxide, metFORMIN, metoprolol tartrate, ondansetron, oxyCODONE, pantoprazole, potassium chloride, pravastatin, and senna     Physical Findings        Malnutrition Severity Assessment     Row Name 11/23/21 1110          Malnutrition Severity Assessment    Malnutrition Type Chronic Disease - Related Malnutrition            Muscle Loss    Loss of Muscle Mass Findings Mild     [place] Moderate - slight depression            Fat Loss    Subcutaneous Fat Loss Findings Mild     Or[place] Region  Moderate -  somewhat hollowness, slightly dark circles                  Current Nutrition Orders & Evaluation of Intake       Oral Nutrition     Current PO Diet 3 meals/day, occ. Snacks, good protein, good fluid   Supplement DM oral nutrition supplement 1 every other day      Enteral Nutrition     Current Formula    Schedule      Parenteral Nutrition     Current Prescription    Schedule      Nutrition Diagnosis        Nutrition Dx Problem 1 Increased nutrient needs related to increased nutrient needs due to catabolic disease as evidenced by physiological causes increasing nutrient needs.       Nutrition Intervention       RD Action Nutritional Counseling  Written Materials Provided (Nausea and Vomiting, Diarrhea, Constipation, High Calorie / High Protein Diet, Oral Care)  Nutrition F/U      Monitor/Evaluation       Monitor Per oncology nutrition protocol.     Comments:  Pt starting new tx today. Per EMR, pt with weight fluctuations 72kg (11/15/21), 76kg (10/28/21), 72.1kg (9/30/21). No significant weight loss since previous RD assessment. Pt reports good appetite, denies any nutrition related concerns. She says she eats about 3 meals a day with good protein sources (fish, eggs, cheese, occasionally milk with  cornbread). She says she doesn't like much meat but will occasionally eat it. Discussed with pt role of oncology RD during tx and importance of adequate nutrient intake, including protein and overall calories. Pt verbalized understanding. She also consume a DM friendly oral nutrition supplement every other day. She is unsure the name of this. Discussed possible nutrition related side effects with tx and appropriate MNT (N/V/D/C, stomatitis, anorexia). Pt verbalized understanding. Encouraged pt to consume high calorie, high protein diet as tolerated with good variety and adequate fluids. Pt agreed to try. Will f/u per protocol.     Electronically signed by:  Valentina Washburn RD  11/23/21 11:10 EST

## 2021-11-30 PROBLEM — R11.0 MODERATE NAUSEA: Status: ACTIVE | Noted: 2021-01-01

## 2021-11-30 NOTE — PROGRESS NOTES
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 Colon Cancer:   - Diagnosed 5/7/15; Low grade; staged pT4a pN0 M0 stage IIB; 0/37 LN; no MMR testing; RIGHT sided  - RIGHT hemicolectomy 5/7/15  - adjuvant Xeloda X 6 months  - Recurrent dx diagnosed via RIGHT ureter mass resection (5/1/18)  - FOLFIRI X 9 (7/31-12/4/18)  - s/p XRT RIGHT pelvis total 5320 cGy (12/14/18-1/24/19)  - CARIS report from specimen dated 5/7/15: BRAF V600E mutation (possible benefit from cetuximab), MSI stable, TMB-low, TAURUS-High (33%), TP53 mutated  - CT CAP 1/21/2021: Interim development of right-sided hydronephrosis and a soft tissue mass along the right iliac chain measuring 2.9 x 1.2 cm.  There is a second nodule identified below the aortic bifurcation in the midline measuring 1.4 cm.  This was consistent with disease recurrence versus her new primary small bowel cancer.    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 Lacey). Pathology from the ileum was positive for adenocarcinoma that appeared to be small bowel in origin and represent a new primary malignancy, 2.2cm, grade 1, tumor perforates the visceral peritoneum, pT4pN0, with 0/12 lymph nodes involved.  - Post surgical CT Abd/Pelvis 4/24/21:  Mild thickening of the wall of some portion of the small intestine, could be post-surgical changes. Moderate dilation of the right renal collecting system is unchanged with ureteral double-J stent.    - CT CAP on 3/25 prior to surgery showed no evidence of disease in the chest  - [facility] report also showed BRAF V600E mutation consistent with pts prior colon cancer.     - started Xeloda on 6/5/21 and continued through November 2021  - CT A/P on 11/9/21: probably worsening of peritoneal carcinomatosis    3) Breast Cancer:  -RIGHT breast DCIS diagnosed 3/15/06; low-grade; s/p lumpectomy and adjuvant XRT; ER: 100%, OR: 70%  -LEFT breast invasive lobular carcinoma; diagnosed 06/2012; staged pT3 pN0 M0 stage IIB, ER: 83%, OR: 0, HER2: 1+, Ki-67: 26%, treated with mastectomy and SLND 04/2012, adjuvant DD AC and Taxotere, Adjuvant Arimidex then Femara (11/13/19)    4) Anemia:  -multifactorial due to her history of GI bleeding from small bowel ulcer/tumor  -intolerant to oral iron. Treatment with IV iron in the past.   -iron studies on 5/11/12 were normal    5) right hydronephrosis:  -Noted on CT scan from 1/21/2021, secondary to soft tissue mass  -Likely secondary to recurrent colon cancer, biopsy pending  -Cystoscopy and right ureteral stent (Dr. Chi) on 1/28/2021    6) DVT:  -bracheocephalic and subclavian thrombosis March 2021 ([facility])     Malignant neoplasm of left breast in female, estrogen receptor positive (HCC)   3/5/2018 Initial Diagnosis    Breast cancer (CMS/HCC)     Colon cancer (HCC)   3/5/2018 Initial Diagnosis    Colon cancer (CMS/HCC)     6/28/2021 - 7/7/2021 Chemotherapy    OP SUPPORTIVE ELECTROLYTE REPLACEMENT     10/21/2021 -  Chemotherapy    OP SUPPORTIVE Ferric Carboxymaltose (INJECTAFER)      11/23/2021 -  Chemotherapy    OP COLORECTAL Cetuximab / Encorafenib      Malignant neoplasm of colon (HCC)   3/5/2018 Initial Diagnosis    Malignant neoplasm of colon (HCC)         History of Present Illness  Patient comes in today for follow-up after starting cetuximab.  She has not yet received the other medication but should do so tomorrow.  She is fatigued and continues to have abdominal pain as before but has no new symptoms or concerns.  She denies any rash at this time.  We discussed the plan of care going forward.  She would like a refill on her  nausea medication since she understands that that is a high risk with his treatment regimen.    Review of Systems   Constitutional: Positive for fatigue. Negative for appetite change, diaphoresis, fever, unexpected weight gain and unexpected weight loss.   HENT: Negative for hearing loss, sore throat and voice change.    Eyes: Negative for blurred vision, double vision, pain, redness and visual disturbance.   Respiratory: Negative for cough, shortness of breath and wheezing.    Cardiovascular: Negative for chest pain, palpitations and leg swelling.   Gastrointestinal: Positive for abdominal pain (Abdominal pain that leads to back ).   Endocrine: Negative for cold intolerance, heat intolerance, polydipsia and polyuria.   Genitourinary: Negative for decreased urine volume, difficulty urinating, frequency and urinary incontinence.   Musculoskeletal: Positive for back pain (Right lower back ) and joint swelling (Right arm in pain ). Negative for arthralgias and myalgias.   Skin: Negative for color change, rash, skin lesions and bruise.   Neurological: Negative for dizziness, seizures, numbness and headache.   Hematological: Negative for adenopathy. Does not bruise/bleed easily.   Psychiatric/Behavioral: Negative for depressed mood. The patient is not nervous/anxious.    All other systems reviewed and are negative.      Past Medical History:   Diagnosis Date   • Abdominal pain    • Anemia    • Arthritis    • Breast cancer (HCC)    • Colon cancer (HCC)    • Constipation    • Diarrhea    • DM (diabetes mellitus), type 2 (HCC)    • High cholesterol    • History of blood clots     neck   • Hypertension    • Hypokalemia    • Iron deficiency anemia secondary to blood loss (chronic)     GI BLEED   • Paroxysmal A-fib (HCC)    • Shortness of breath      Past Surgical History:   Procedure Laterality Date   • BACK SURGERY     • BREAST LUMPECTOMY Right    • COLON SURGERY     • COLONOSCOPY     • CYSTOSCOPY W/ URETERAL STENT PLACEMENT      • CYSTOSCOPY W/ URETERAL STENT PLACEMENT Right 8/17/2021    Procedure: CYSTOSCOPY, RIGHT URETERAL STENT INSERTION;  Surgeon: Kelli Chi MD;  Location: [facility];  Service: Urology;  Laterality: Right;   • HYSTERECTOMY      partial    • JOINT REPLACEMENT      hip    • MASTECTOMY Left    • OTHER SURGICAL HISTORY      BIOPSY   • VENOUS ACCESS DEVICE (PORT) INSERTION       Social History     Socioeconomic History   • Marital status:    Tobacco Use   • Smoking status: Never Smoker   • Smokeless tobacco: Never Used   Vaping Use   • Vaping Use: Never used   Substance and Sexual Activity   • Alcohol use: Never   • Drug use: Never   • Sexual activity: Defer     Family History   Problem Relation Age of Onset   • Skin cancer Other    • Colon cancer Sister        Objective   Physical Exam  General: Alert, cooperative, no acute distress, frail appearing  Eyes: Anicteric sclera, PERRLA  Respiratory: normal respiratory effort  Cardiovascular: no lower extremity edema  Skin: Normal tone, no rash, no lesions  Psychiatric: Appropriate affect, intact judgment  Neurologic: No focal sensory or motor deficits, normal cognition   Musculoskeletal: Reduced muscle strength and tone, needs some assistance with ambulation  Extremities: No clubbing, cyanosis, or deformities      Vitals:    11/30/21 0837   BP: 114/55   Pulse: 82   Resp: 18   Temp: 97.9 °F (36.6 °C)   SpO2: 99%   Weight: 69.5 kg (153 lb 3.5 oz)   PainSc: 0-No pain     ECOG score: 2         PHQ-9 Total Score:         Result Review :   The following data was reviewed by: Jennifer Schroeder MD PhD on 11/30/2021:  Lab Results   Component Value Date    HGB 8.3 (L) 11/30/2021    HCT 25.9 (L) 11/30/2021    .4 (H) 11/30/2021     11/30/2021    WBC 9.18 11/30/2021    NEUTROABS 6.27 11/30/2021    LYMPHSABS 2.01 11/30/2021    MONOSABS 0.64 11/30/2021    EOSABS 0.06 11/30/2021    BASOSABS 0.07 11/30/2021     Lab Results   Component Value Date    GLUCOSE  191 (H) 11/30/2021    BUN 16 11/30/2021    CREATININE 0.97 11/30/2021     (L) 11/30/2021    K 4.7 11/30/2021     11/30/2021    CO2 21.2 (L) 11/30/2021    CALCIUM 8.8 11/30/2021    PROTEINTOT 8.1 11/30/2021    ALBUMIN 3.60 11/30/2021    BILITOT 0.3 11/30/2021    ALKPHOS 222 (H) 11/30/2021    AST 44 (H) 11/30/2021    ALT 20 11/30/2021          Assessment and Plan    Diagnoses and all orders for this visit:    1. Malignant neoplasm of ascending colon (HCC) (Primary)    2. Moderate nausea    Other orders  -     ondansetron (ZOFRAN) 8 MG tablet; Take 1 tablet by mouth Every 8 (Eight) Hours As Needed for Nausea or Vomiting.  Dispense: 60 tablet; Refill: 4      BRAF V600E mutated Recurrent/metastatic colon cancer: Patient is here today for cycle 1 day 8 of cetuximab.  She does not yet have any complications.  Repeat CBC and CMP did not show any significant changes.  She will follow up with me in 2 weeks for repeat toxicity check and get labs weekly with each dose.  The oral medication encorafenib will be mailed to her house tomorrow.  The patient and I made tentative plans to start this medication in about 2 weeks when she has had some time on cetuximab.  Then we will likely start the medication at a lower dose than recommended to give her time to adjust.    Moderate nausea: Most associated with the patient's underlying disease as it involves a significant amount of her GI tract and peritoneum.  I will send a refill for Zofran and as her nausea may increase with the current treatment regimen.    Patient was given instructions and counseling regarding her condition or for health maintenance advice. Please see specific information pulled into the AVS if appropriate.     Jennifer Schroeder MD PhD    11/30/2021

## 2021-12-07 NOTE — PROGRESS NOTES
Patient  Lorraine Dominguez    Location  [facility] HEMATOLOGY & ONCOLOGY    Chief Complaint  Colon Cancer (-F1)    Referring Provider: Jennifer Schroeder MD PhD  PCP: Shayna Lewis APRN    Subjective          Oncology/Hematology History Overview Note     1) BRAF V600E mutated Colon Cancer:   - Diagnosed 5/7/15; Low grade; staged pT4a pN0 M0 stage IIB; 0/37 LN; no MMR testing; RIGHT sided  - RIGHT hemicolectomy 5/7/15  - adjuvant Xeloda X 6 months  - Recurrent dx diagnosed via RIGHT ureter mass resection (5/1/18)  - FOLFIRI X 9 (7/31-12/4/18)  - s/p XRT RIGHT pelvis total 5320 cGy (12/14/18-1/24/19)  - CARIS report from specimen dated 5/7/15: BRAF V600E mutation (possible benefit from cetuximab), MSI stable, TMB-low, TAURUS-High (33%), TP53 mutated  - CT CAP 1/21/2021: Interim development of right-sided hydronephrosis and a soft tissue mass along the right iliac chain measuring 2.9 x 1.2 cm.  There is a second nodule identified below the aortic bifurcation in the midline measuring 1.4 cm.  This was consistent with disease recurrence versus her new primary small bowel cancer.    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 Lacey). Pathology from the ileum was positive for adenocarcinoma that appeared to be small bowel in origin and represent a new primary malignancy, 2.2cm, grade 1, tumor perforates the visceral peritoneum, pT4pN0, with 0/12 lymph nodes involved.  - Post surgical CT Abd/Pelvis 4/24/21:  Mild thickening of the wall of some portion of the small intestine, could be post-surgical changes. Moderate dilation of the right renal collecting system is unchanged with ureteral double-J stent.    - CT CAP on 3/25 prior to surgery showed no evidence of disease in the chest  - Caris report also showed BRAF V600E mutation consistent with pts prior colon cancer.     - started Xeloda on 6/5/21 and continued through November 2021  - CT A/P on 11/9/21: probably worsening of peritoneal carcinomatosis    3) Breast Cancer:  -RIGHT breast DCIS diagnosed 3/15/06; low-grade; s/p lumpectomy and adjuvant XRT; ER: 100%, CO: 70%  -LEFT breast invasive lobular carcinoma; diagnosed 06/2012; staged pT3 pN0 M0 stage IIB, ER: 83%, CO: 0, HER2: 1+, Ki-67: 26%, treated with mastectomy and SLND 04/2012, adjuvant DD AC and Taxotere, Adjuvant Arimidex then Femara (11/13/19)    4) Anemia:  -multifactorial due to her history of GI bleeding from small bowel ulcer/tumor  -intolerant to oral iron. Treatment with IV iron in the past.   -iron studies on 5/11/12 were normal    5) right hydronephrosis:  -Noted on CT scan from 1/21/2021, secondary to soft tissue mass  -Likely secondary to recurrent colon cancer, biopsy pending  -Cystoscopy and right ureteral stent (Dr. Chi) on 1/28/2021    6) DVT:  -bracheocephalic and subclavian thrombosis March 2021 ([facility])     Malignant neoplasm of left breast in female, estrogen receptor positive (HCC)   3/5/2018 Initial Diagnosis    Breast cancer (CMS/HCC)     Colon cancer (HCC)   3/5/2018 Initial Diagnosis    Colon cancer (CMS/HCC)     6/28/2021 - 7/7/2021 Chemotherapy    OP SUPPORTIVE ELECTROLYTE REPLACEMENT     10/21/2021 -  Chemotherapy    OP SUPPORTIVE Ferric Carboxymaltose (INJECTAFER)      11/23/2021 -  Chemotherapy    OP COLORECTAL Cetuximab / Encorafenib      Malignant neoplasm of colon (HCC)   3/5/2018 Initial Diagnosis    Malignant neoplasm of colon (HCC)         History of Present Illness  Patient comes in today for her next dose of cetuximab.  She denies rash or mouth sores.  She does still have significant pain in her right upper extremity.  She says she would like to have an x-ray or some imaging.  She cannot point to a specific place that hurts.  At times it is around her biceps muscle at times it is closer to her wrist.  Sensation  feels like a severe ache.    She is also having issues with urinary incontinence.  This is not new but has been ongoing for several months.  With any activity or change in position she has some urinary leakage.        Review of Systems   Constitutional: Positive for fatigue. Negative for appetite change, diaphoresis, fever, unexpected weight gain and unexpected weight loss.   HENT: Negative for hearing loss, sore throat and voice change.    Eyes: Positive for pain and redness. Negative for blurred vision, double vision and visual disturbance.   Respiratory: Negative for cough, shortness of breath and wheezing.    Cardiovascular: Positive for chest pain. Negative for palpitations and leg swelling.   Gastrointestinal: Positive for abdominal pain.   Endocrine: Negative for cold intolerance, heat intolerance, polydipsia and polyuria.   Genitourinary: Negative for decreased urine volume, difficulty urinating, frequency and urinary incontinence.   Musculoskeletal: Positive for joint swelling (Right arm sharp pain to where patient cannot pick anything up ). Negative for arthralgias, back pain and myalgias.   Skin: Negative for color change, rash, skin lesions and bruise.   Neurological: Negative for dizziness, seizures, numbness and headache.   Hematological: Negative for adenopathy. Does not bruise/bleed easily.   Psychiatric/Behavioral: Negative for depressed mood. The patient is not nervous/anxious.    All other systems reviewed and are negative.      Past Medical History:   Diagnosis Date   • Abdominal pain    • Anemia    • Arthritis    • Breast cancer (HCC)    • Colon cancer (HCC)    • Constipation    • Diarrhea    • DM (diabetes mellitus), type 2 (HCC)    • High cholesterol    • History of blood clots     neck   • Hypertension    • Hypokalemia    • Iron deficiency anemia secondary to blood loss (chronic)     GI BLEED   • Paroxysmal A-fib (HCC)    • Shortness of breath      Past Surgical History:   Procedure  Laterality Date   • BACK SURGERY     • BREAST LUMPECTOMY Right    • COLON SURGERY     • COLONOSCOPY     • CYSTOSCOPY W/ URETERAL STENT PLACEMENT     • CYSTOSCOPY W/ URETERAL STENT PLACEMENT Right 8/17/2021    Procedure: CYSTOSCOPY, RIGHT URETERAL STENT INSERTION;  Surgeon: Kelli Chi MD;  Location: [facility];  Service: Urology;  Laterality: Right;   • HYSTERECTOMY      partial    • JOINT REPLACEMENT      hip    • MASTECTOMY Left    • OTHER SURGICAL HISTORY      BIOPSY   • VENOUS ACCESS DEVICE (PORT) INSERTION       Social History     Socioeconomic History   • Marital status:    Tobacco Use   • Smoking status: Never Smoker   • Smokeless tobacco: Never Used   Vaping Use   • Vaping Use: Never used   Substance and Sexual Activity   • Alcohol use: Never   • Drug use: Never   • Sexual activity: Defer     Family History   Problem Relation Age of Onset   • Skin cancer Other    • Colon cancer Sister        Objective   Physical Exam  General: Alert, cooperative, no acute distress but appears to not feel well  Eyes: Anicteric sclera, PERRLA  Respiratory: normal respiratory effort  Cardiovascular: no lower extremity edema  Abdomen: Significant tenderness to palpation across the abdomen, especially on the left, some from distention throughout  Skin: Normal tone, no rash, no lesions  Psychiatric: Appropriate affect, intact judgment  Neurologic: No focal sensory or motor deficits, normal cognition   Musculoskeletal: Reduced muscle strength and tone, no specific abnormality noted in the right arm except for some bruising  Extremities: No clubbing, cyanosis, or deformities    Vitals:    12/07/21 0839   BP: 122/54   Pulse: 92   Resp: 18   Temp: 98.8 °F (37.1 °C)   SpO2: 97%   Weight: 70 kg (154 lb 5.2 oz)   PainSc:   4     ECOG score: 2         PHQ-9 Total Score:         Result Review :   The following data was reviewed by: Jennifer Schroeder MD PhD on 12/07/2021:  Lab Results   Component Value Date    HGB 7.4 (C)  12/07/2021    HCT 23.3 (L) 12/07/2021    MCV 98.3 (H) 12/07/2021     12/07/2021    WBC 8.52 12/07/2021    NEUTROABS 6.01 12/07/2021    LYMPHSABS 1.79 12/07/2021    MONOSABS 0.46 12/07/2021    EOSABS 0.05 12/07/2021    BASOSABS 0.07 12/07/2021     Lab Results   Component Value Date    GLUCOSE 254 (H) 12/07/2021    BUN 20 12/07/2021    CREATININE 0.97 12/07/2021     (L) 12/07/2021    K 3.9 12/07/2021    CL 98 12/07/2021    CO2 22.4 12/07/2021    CALCIUM 8.5 (L) 12/07/2021    PROTEINTOT 7.1 12/07/2021    ALBUMIN 3.45 (L) 12/07/2021    BILITOT 0.2 12/07/2021    ALKPHOS 172 (H) 12/07/2021    AST 27 12/07/2021    ALT 10 12/07/2021          Assessment and Plan    Diagnoses and all orders for this visit:    1. Overlapping malignant neoplasm of colon (HCC) (Primary)    2. Acute pain of right wrist  -     XR wrist 2 vw right; Future      Recurrent metastatic colon cancer: Patient is here today for her next dose of cetuximab.  She has not yet gotten encorafenib in the mail.  I reviewed her CBC and CMP and there is no evidence of toxicity.  Patient will get treatment today and return to clinic in 1 week for next dose.  She can start the oral medication whenever it arrives but should start with 2 pills a day (half dose).  We will plan to increase to the full dose at a later time.  I will follow up with the patient in 2 weeks.    Urinary incontinence: The patient is already followed by urology.  I asked her to discuss this issue with her urologist as well.    Right arm pain: I will send the patient for two-view x-ray of the right upper extremity.    Patient was given instructions and counseling regarding her condition or for health maintenance advice. Please see specific information pulled into the AVS if appropriate.     Jennifer Schroeder MD PhD    12/8/2021

## 2021-12-13 PROBLEM — R73.9 HYPERGLYCEMIA: Status: ACTIVE | Noted: 2021-01-01

## 2021-12-13 PROBLEM — E86.0 DEHYDRATION: Status: ACTIVE | Noted: 2021-01-01

## 2021-12-13 PROBLEM — C79.51 METASTASIS TO BONE (HCC): Status: ACTIVE | Noted: 2021-01-01

## 2021-12-13 NOTE — PROGRESS NOTES
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 mutated Colon Cancer:   - Diagnosed 5/7/15; Low grade; staged pT4a pN0 M0 stage IIB; 0/37 LN; no MMR testing; RIGHT sided  - RIGHT hemicolectomy 5/7/15  - adjuvant Xeloda X 6 months  - Recurrent dx diagnosed via RIGHT ureter mass resection (5/1/18)  - FOLFIRI X 9 (7/31-12/4/18)  - s/p XRT RIGHT pelvis total 5320 cGy (12/14/18-1/24/19)  - CARIS report from specimen dated 5/7/15: BRAF V600E mutation (possible benefit from cetuximab), MSI stable, TMB-low, TAURUS-High (33%), TP53 mutated  - CT CAP 1/21/2021: Interim development of right-sided hydronephrosis and a soft tissue mass along the right iliac chain measuring 2.9 x 1.2 cm.  There is a second nodule identified below the aortic bifurcation in the midline measuring 1.4 cm.  This was consistent with disease recurrence versus her new primary small bowel cancer.    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 Lacey). Pathology from the ileum was positive for adenocarcinoma that appeared to be small bowel in origin and represent a new primary malignancy, 2.2cm, grade 1, tumor perforates the visceral peritoneum, pT4pN0, with 0/12 lymph nodes involved.  - Post surgical CT Abd/Pelvis 4/24/21:  Mild thickening of the wall of some portion of the small intestine, could be post-surgical changes. Moderate dilation of the right renal collecting system is unchanged with ureteral double-J stent.    - CT CAP on 3/25 prior to surgery showed no evidence of disease in the chest  - Caris report also showed BRAF V600E mutation consistent with pts prior colon  cancer.    - started Xeloda on 6/5/21 and continued through November 2021  - CT A/P on 11/9/21: probably worsening of peritoneal carcinomatosis  - started Cetuximab in November 2021.  Added Encorafenib in December 2021.     3) Breast Cancer:  -RIGHT breast DCIS diagnosed 3/15/06; low-grade; s/p lumpectomy and adjuvant XRT; ER: 100%, CT: 70%  -LEFT breast invasive lobular carcinoma; diagnosed 06/2012; staged pT3 pN0 M0 stage IIB, ER: 83%, CT: 0, HER2: 1+, Ki-67: 26%, treated with mastectomy and SLND 04/2012, adjuvant DD AC and Taxotere, Adjuvant Arimidex then Femara (11/13/19)    4) Anemia:  -multifactorial due to her history of GI bleeding from small bowel ulcer/tumor  -intolerant to oral iron. Treatment with IV iron in the past.   -iron studies on 5/11/12 were normal    5) right hydronephrosis:  -Noted on CT scan from 1/21/2021, secondary to soft tissue mass  -Likely secondary to recurrent colon cancer, biopsy pending  -Cystoscopy and right ureteral stent (Dr. Chi) on 1/28/2021    6) DVT:  -bracheocephalic and subclavian thrombosis March 2021 ([facility])     Malignant neoplasm of left breast in female, estrogen receptor positive (HCC)   3/5/2018 Initial Diagnosis    Breast cancer (CMS/HCC)     Colon cancer (HCC)   3/5/2018 Initial Diagnosis    Colon cancer (CMS/HCC)     6/28/2021 - 7/7/2021 Chemotherapy    OP SUPPORTIVE ELECTROLYTE REPLACEMENT     10/21/2021 -  Chemotherapy    OP SUPPORTIVE Ferric Carboxymaltose (INJECTAFER)      11/23/2021 -  Chemotherapy    OP COLORECTAL Cetuximab / Encorafenib      Malignant neoplasm of colon (HCC)   3/5/2018 Initial Diagnosis    Malignant neoplasm of colon (HCC)     Metastasis to bone (HCC)   12/13/2021 Initial Diagnosis    Metastasis to bone (HCC)         History of Present Illness  Patient came in today for labs prior to her next dose of cetuximab tomorrow.  She told the nurse that her blood sugar was 600 this morning.  I registered the patient for her visit  today to check her labs.  The patient reports some difficulty with clear thinking in the past couple of days.  Her blood sugar has run in the 400s to 500s several times since starting cetuximab with steroids.    She continues to have significant abdominal pain that may be slightly worse but is always very bothersome.  She also reports falling in the bathroom this past week.  Her  was able to help get her up.  This time she fell on her left arm but did not hit her head or sustain other injuries.      Review of Systems   Constitutional: Positive for fatigue. Negative for appetite change, diaphoresis, fever, unexpected weight gain and unexpected weight loss.   HENT: Negative for hearing loss, mouth sores, sore throat, swollen glands, trouble swallowing and voice change.    Eyes: Negative for blurred vision.   Respiratory: Negative for cough, shortness of breath and wheezing.    Cardiovascular: Negative for chest pain and palpitations.   Gastrointestinal: Negative for abdominal pain, blood in stool, constipation, diarrhea, nausea and vomiting.   Endocrine: Negative for cold intolerance and heat intolerance.   Genitourinary: Negative for difficulty urinating, dysuria, frequency, hematuria and urinary incontinence.   Musculoskeletal: Negative for arthralgias, back pain and myalgias.   Skin: Negative for rash, skin lesions and bruise.   Neurological: Positive for weakness. Negative for dizziness, seizures, numbness and headache.   Hematological: Does not bruise/bleed easily.   Psychiatric/Behavioral: Negative for depressed mood. The patient is not nervous/anxious.        Past Medical History:   Diagnosis Date   • Abdominal pain    • Anemia    • Arthritis    • Breast cancer (HCC)    • Colon cancer (HCC)    • Constipation    • Diarrhea    • DM (diabetes mellitus), type 2 (HCC)    • High cholesterol    • History of blood clots     neck   • Hypertension    • Hypokalemia    • Iron deficiency anemia secondary to blood  loss (chronic)     GI BLEED   • Paroxysmal A-fib (HCC)    • Shortness of breath      Past Surgical History:   Procedure Laterality Date   • BACK SURGERY     • BREAST LUMPECTOMY Right    • COLON SURGERY     • COLONOSCOPY     • CYSTOSCOPY W/ URETERAL STENT PLACEMENT     • CYSTOSCOPY W/ URETERAL STENT PLACEMENT Right 8/17/2021    Procedure: CYSTOSCOPY, RIGHT URETERAL STENT INSERTION;  Surgeon: Kelli Chi MD;  Location: [facility];  Service: Urology;  Laterality: Right;   • HYSTERECTOMY      partial    • JOINT REPLACEMENT      hip    • MASTECTOMY Left    • OTHER SURGICAL HISTORY      BIOPSY   • VENOUS ACCESS DEVICE (PORT) INSERTION       Social History     Socioeconomic History   • Marital status:    Tobacco Use   • Smoking status: Never Smoker   • Smokeless tobacco: Never Used   Vaping Use   • Vaping Use: Never used   Substance and Sexual Activity   • Alcohol use: Never   • Drug use: Never   • Sexual activity: Defer     Family History   Problem Relation Age of Onset   • Skin cancer Other    • Colon cancer Sister        Objective   Physical Exam  General: Alert, cooperative, no acute distress, but chronically ill-appearing  Eyes: Anicteric sclera, PERRLA  Respiratory: normal respiratory effort  Cardiovascular: no lower extremity edema  Skin: Normal tone, no rash, no lesions  Psychiatric: Appropriate affect, intact judgment  Neurologic: No focal sensory or motor deficits, normal cognition   Musculoskeletal: Reduced muscle strength and tone, in a wheelchair  Extremities: No clubbing, cyanosis, or deformities    There were no vitals filed for this visit.  ECOG score: 3         PHQ-9 Total Score:         Result Review :   The following data was reviewed by: Jennifer Schroeder MD PhD on 12/13/2021:  Lab Results   Component Value Date    HGB 7.3 (C) 12/13/2021    HCT 23.4 (L) 12/13/2021    MCV 96.3 12/13/2021     (H) 12/13/2021    WBC 8.63 12/13/2021    NEUTROABS 6.16 12/13/2021    LYMPHSABS 1.43  12/13/2021    MONOSABS 0.77 12/13/2021    EOSABS 0.07 12/13/2021    BASOSABS 0.05 12/13/2021     Lab Results   Component Value Date    GLUCOSE 495 (C) 12/13/2021    BUN 17 12/13/2021    CREATININE 1.10 (H) 12/13/2021     (L) 12/13/2021    K 3.2 (L) 12/13/2021    CL 96 (L) 12/13/2021    CO2 24.9 12/13/2021    CALCIUM 8.3 (L) 12/13/2021    PROTEINTOT 6.9 12/13/2021    ALBUMIN 3.23 (L) 12/13/2021    BILITOT 0.2 12/13/2021    ALKPHOS 141 (H) 12/13/2021    AST 15 12/13/2021    ALT 5 12/13/2021          Assessment and Plan    Diagnoses and all orders for this visit:    1. Hyperglycemia (Primary)  -     ONCBCN INFUSION APPOINTMENT REQUEST 01; Future    2. Malignant neoplasm of ascending colon (HCC)    3. Dehydration  -     ONCBCN INFUSION APPOINTMENT REQUEST 01; Future    4. Overlapping malignant neoplasm of colon (HCC)    5. Metastasis to bone (HCC)      Severe hyperglycemia: With the patient's glucose being over 600 this morning at home and nearly 500 today.  I was initially very concerned about hyperosmolar hyperglycemia.  The patient also reported some confusion.  When talking with her she did not seem significantly altered in any way.  I did discuss going to the emergency room but we decided to treat her with IV fluids instead.  She will return to clinic tomorrow for repeat glucose check.  I will look into decreasing the dose of steroids with her further treatments.  She should also talk again with her primary care provider about the possibility of starting insulin.    Metastatic colon cancer: Patient is currently on weekly cetuximab and started encorafenib 2 weeks ago at half dose.  FDA has approved for cetuximab to be given every 2 weeks at a dose of 500 mg/m².  This is twice her current weekly dose.  I will hold her treatment tomorrow and may plan initially for cetuximab to be given once every 2 weeks at the current dose of 250 mg/m² until the patient is doing better with glucose management.  She will  continue the half dose of encorafenib for now and likely increase that dose at the next appointment.    Metastatic bone disease: This is presumed to be secondary to the colon cancer which has recently progressed.  However she does have a history of breast cancer as well.    Dehydration with DENG: Patient's baseline creatinine is around 0.8-0.9.  Her creatinine today is 1.1.  Given that her glucose is nearly 500 I will give her 1 L of IV fluids and bring her back tomorrow for additional fluids as well.  At that time we will also discussed replacing her calcium and possibly potassium.    I spent 40 minutes caring for Lorraine on this date of service. This time includes time spent by me in the following activities: preparing for the visit, reviewing tests, obtaining and/or reviewing a separately obtained history, performing a medically appropriate examination and/or evaluation, counseling and educating the patient/family/caregiver, ordering medications, tests, or procedures, referring and communicating with other health care professionals and documenting information in the medical record    Patient was given instructions and counseling regarding her condition or for health maintenance advice. Please see specific information pulled into the AVS if appropriate.     Jennifer Schroeder MD PhD    12/13/2021

## 2021-12-14 NOTE — TELEPHONE ENCOUNTER
Called placed to Dr. Schroeder to clarify patient treatment for today. Orders given for hydration today, and to recheck FSBS, patient will not receive cextuximab today she also does not need to see patient. Orders entered

## 2021-12-20 NOTE — ED PROVIDER NOTES
Subjective   Patient presents with a 1 week history of progressively worsening generalized abdominal pain.  She states been dealing with abdominal pain for a year.  She states pain is worse with movement.  She denies associated fever vomiting.  He states she has had some diarrhea and denies recent antibiotic use.  She denies recent respiratory symptoms or hematuria or dysuria.          Review of Systems   Constitutional: Negative for chills and fever.   HENT: Negative for congestion, ear pain and sore throat.    Eyes: Negative for pain.   Respiratory: Negative for cough, chest tightness and shortness of breath.    Cardiovascular: Negative for chest pain.   Gastrointestinal: Positive for abdominal pain. Negative for diarrhea, nausea and vomiting.   Genitourinary: Negative for flank pain and hematuria.   Musculoskeletal: Negative for joint swelling.   Skin: Negative for pallor.   Neurological: Negative for seizures and headaches.   All other systems reviewed and are negative.      Past Medical History:   Diagnosis Date   • Abdominal pain    • Anemia    • Arthritis    • Breast cancer (HCC)    • Colon cancer (HCC)    • Constipation    • Diarrhea    • DM (diabetes mellitus), type 2 (HCC)    • High cholesterol    • History of blood clots     neck   • Hypertension    • Hypokalemia    • Iron deficiency anemia secondary to blood loss (chronic)     GI BLEED   • Paroxysmal A-fib (HCC)    • Shortness of breath        No Known Allergies    Past Surgical History:   Procedure Laterality Date   • BACK SURGERY     • BREAST LUMPECTOMY Right    • COLON SURGERY     • COLONOSCOPY     • CYSTOSCOPY W/ URETERAL STENT PLACEMENT     • CYSTOSCOPY W/ URETERAL STENT PLACEMENT Right 8/17/2021    Procedure: CYSTOSCOPY, RIGHT URETERAL STENT INSERTION;  Surgeon: Kelli Chi MD;  Location: [facility];  Service: Urology;  Laterality: Right;   • HYSTERECTOMY      partial    • JOINT REPLACEMENT      hip    • MASTECTOMY Left    • OTHER  SURGICAL HISTORY      BIOPSY   • VENOUS ACCESS DEVICE (PORT) INSERTION         Family History   Problem Relation Age of Onset   • Skin cancer Other    • Colon cancer Sister        Social History     Socioeconomic History   • Marital status:    Tobacco Use   • Smoking status: Never Smoker   • Smokeless tobacco: Never Used   Vaping Use   • Vaping Use: Never used   Substance and Sexual Activity   • Alcohol use: Never   • Drug use: Never   • Sexual activity: Defer           Objective   Physical Exam  Constitutional:       Appearance: Normal appearance.   HENT:      Head: Normocephalic and atraumatic.      Nose: Nose normal.      Mouth/Throat:      Mouth: Mucous membranes are moist.   Eyes:      Extraocular Movements: Extraocular movements intact.      Conjunctiva/sclera: Conjunctivae normal.      Pupils: Pupils are equal, round, and reactive to light.   Cardiovascular:      Rate and Rhythm: Normal rate and regular rhythm.      Pulses: Normal pulses.      Heart sounds: Normal heart sounds.   Pulmonary:      Effort: Pulmonary effort is normal.      Breath sounds: Normal breath sounds. No wheezing.   Abdominal:      Palpations: Abdomen is soft.      Tenderness: There is generalized abdominal tenderness.   Musculoskeletal:         General: Normal range of motion.      Cervical back: Normal range of motion and neck supple.      Right lower leg: No edema.      Left lower leg: No edema.   Skin:     General: Skin is warm and dry.      Capillary Refill: Capillary refill takes less than 2 seconds.      Findings: No rash.   Neurological:      General: No focal deficit present.      Mental Status: She is alert and oriented to person, place, and time. Mental status is at baseline.      Cranial Nerves: No cranial nerve deficit.      Sensory: No sensory deficit.      Motor: No weakness.   Psychiatric:         Mood and Affect: Mood normal.         Behavior: Behavior normal.         Procedures           ED Course                                                  MDM  Number of Diagnoses or Management Options  Generalized abdominal pain  UTI (urinary tract infection) with pyuria  Diagnosis management comments: Patient presents with abdominal pain.  Old records reviewed and she has had prior visits for oncological related issues.  Differential diagnostic considerations include but are not limited to bowel obstruction versus perforation versus UTI.  Glucose is elevated at 432 potassium 3.0.  Hemoglobin 7.8 and stable.  Magnesium 1.2.  Urinalysis positive for bacteria possibly as a source for her abdominal discomfort.  CT scan of the abdomen pelvis does not demonstrate acute bowel obstruction or perforation.  There is some increase in right hydroureter.  There is no signs of abscess as a source of the patient's symptoms.  She received pain meds with symptomatic improvement was stable at the time of discharge with antibiotics and supplement potassium and insulin administered while in the ED. outpatient follow-up with her PCP is recommended antibiotics are prescribed she has analgesics at home reportedly.       Amount and/or Complexity of Data Reviewed  Clinical lab tests: reviewed  Tests in the radiology section of CPT®: reviewed  Decide to obtain previous medical records or to obtain history from someone other than the patient: yes    Risk of Complications, Morbidity, and/or Mortality  Presenting problems: high  Management options: low    Patient Progress  Patient progress: improved      Final diagnoses:   Generalized abdominal pain   UTI (urinary tract infection) with pyuria       ED Disposition  ED Disposition     ED Disposition Condition Comment    Discharge Stable           Shayna Lewis, APRN  [address]   [facility] [address] [phone]    Schedule an appointment as soon as possible for a visit            Medication List      New Prescriptions    amoxicillin-clavulanate 875-125 MG per tablet  Commonly known as: AUGMENTIN  Take  1 tablet by mouth 2 (Two) Times a Day.        Changed    Calcium Carb-Cholecalciferol 500-10 MG-MCG chewable tablet  Chew 1 tablet 2 (two) times a day.  What changed: when to take this           Where to Get Your Medications      These medications were sent to [facility] [address] [place] [address] - [phone]  - [phone] [address] [address]    Phone: [phone]   · amoxicillin-clavulanate 875-125 MG per tablet          Raphael Wright MD  12/20/21 6292

## 2021-12-21 NOTE — ADDENDUM NOTE
Encounter addended by: Reanna Johnson RN on: 12/21/2021 4:25 PM   Actions taken: MAR administration accepted

## 2021-12-21 NOTE — PROGRESS NOTES
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 mutated Colon Cancer:   - Diagnosed 5/7/15; Low grade; staged pT4a pN0 M0 stage IIB; 0/37 LN; no MMR testing; RIGHT sided  - RIGHT hemicolectomy 5/7/15  - adjuvant Xeloda X 6 months  - Recurrent dx diagnosed via RIGHT ureter mass resection (5/1/18)  - FOLFIRI X 9 (7/31-12/4/18)  - s/p XRT RIGHT pelvis total 5320 cGy (12/14/18-1/24/19)  - CARIS report from specimen dated 5/7/15: BRAF V600E mutation (possible benefit from cetuximab), MSI stable, TMB-low, TAURUS-High (33%), TP53 mutated  - CT CAP 1/21/2021: Interim development of right-sided hydronephrosis and a soft tissue mass along the right iliac chain measuring 2.9 x 1.2 cm.  There is a second nodule identified below the aortic bifurcation in the midline measuring 1.4 cm.  This was consistent with disease recurrence versus her new primary small bowel cancer.    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 Lacey). Pathology from the ileum was positive for adenocarcinoma that appeared to be small bowel in origin and represent a new primary malignancy, 2.2cm, grade 1, tumor perforates the visceral peritoneum, pT4pN0, with 0/12 lymph nodes involved.  - Post surgical CT Abd/Pelvis 4/24/21:  Mild thickening of the wall of some portion of the small intestine, could be post-surgical changes. Moderate dilation of the right renal collecting system is unchanged with ureteral double-J stent.    - CT CAP on 3/25 prior to surgery showed no evidence of disease in the chest  - [facility]is report also showed BRAF V600E mutation consistent with pts prior colon cancer.     - started Xeloda on 6/5/21 and continued through November 2021  - CT A/P on 11/9/21: probably worsening of peritoneal carcinomatosis  - started Cetuximab in November 2021.  Added Encorafenib in December 2021.     3) Breast Cancer:  -RIGHT breast DCIS diagnosed 3/15/06; low-grade; s/p lumpectomy and adjuvant XRT; ER: 100%, CO: 70%  -LEFT breast invasive lobular carcinoma; diagnosed 06/2012; staged pT3 pN0 M0 stage IIB, ER: 83%, CO: 0, HER2: 1+, Ki-67: 26%, treated with mastectomy and SLND 04/2012, adjuvant DD AC and Taxotere, Adjuvant Arimidex then Femara (11/13/19)    4) Anemia:  -multifactorial due to her history of GI bleeding from small bowel ulcer/tumor  -intolerant to oral iron. Treatment with IV iron in the past.   -iron studies on 5/11/12 were normal    5) right hydronephrosis:  -Noted on CT scan from 1/21/2021, secondary to soft tissue mass  -Likely secondary to recurrent colon cancer, biopsy pending  -Cystoscopy and right ureteral stent (Dr. Chi) on 1/28/2021    6) DVT:  -bracheocephalic and subclavian thrombosis March 2021 ([facility])     Malignant neoplasm of left breast in female, estrogen receptor positive (HCC)   3/5/2018 Initial Diagnosis    Breast cancer (CMS/HCC)     Colon cancer (HCC)   3/5/2018 Initial Diagnosis    Colon cancer (CMS/HCC)     6/28/2021 - 7/7/2021 Chemotherapy    OP SUPPORTIVE ELECTROLYTE REPLACEMENT     10/21/2021 -  Chemotherapy    OP SUPPORTIVE Ferric Carboxymaltose (INJECTAFER)      11/23/2021 -  Chemotherapy    OP COLORECTAL Cetuximab / Encorafenib      Malignant neoplasm of colon (HCC)   3/5/2018 Initial Diagnosis    Malignant neoplasm of colon (HCC)     Metastasis to bone (HCC)   12/13/2021 Initial Diagnosis    Metastasis to bone (HCC)         History of Present Illness  Patient comes in today to follow-up after a trip to the emergency room for abdominal pain yesterday.  She was sent by her PCP for pancreatitis.  She was diagnosed with a urinary tract  infection but was thought to be stable enough for discharge home with oral antibiotics. I reviewed the records and labs from her ER visit which stated the above.  Her labs continue to show very elevated glucose of 432.  Her lipase was elevated at 145 confirming pancreatitis.    I reviewed the results of her CT abdomen/pelvis which was done in the ER as well.  This does show slight improvement in the peritoneal masses.    I explained to the patient that on review of the possible side effects of her oral medication, pancreatitis is listed as occurring around 1 to 10% of the time.  Hyperglycemia can also occur around 30% of the time but it is unclear if this is severe hyperglycemia and most cases.  Regardless, I believe it is reasonable to assume that encorafenib is causing most of the side effects.  Her glucose remains very elevated after stopping the oral steroids with cetuximab so this is unlikely the cause.    Review of Systems   Constitutional: Positive for fatigue. Negative for appetite change, diaphoresis, fever, unexpected weight gain and unexpected weight loss.   HENT: Negative for hearing loss, sore throat and voice change.    Eyes: Negative for blurred vision, double vision, pain, redness and visual disturbance.   Respiratory: Negative for cough, shortness of breath and wheezing.    Cardiovascular: Negative for chest pain, palpitations and leg swelling.   Gastrointestinal: Positive for abdominal pain.   Endocrine: Negative for cold intolerance, heat intolerance, polydipsia and polyuria.   Genitourinary: Negative for decreased urine volume, difficulty urinating, frequency and urinary incontinence.   Musculoskeletal: Positive for back pain, joint swelling (all joints are in pain ) and neck pain. Negative for arthralgias and myalgias.   Skin: Negative for color change, rash, skin lesions and bruise.   Neurological: Positive for headache. Negative for dizziness, seizures and numbness.   Hematological: Negative  for adenopathy. Does not bruise/bleed easily.   Psychiatric/Behavioral: Negative for depressed mood. The patient is not nervous/anxious.        Past Medical History:   Diagnosis Date   • Abdominal pain    • Anemia    • Arthritis    • Breast cancer (HCC)    • Colon cancer (HCC)    • Constipation    • Diarrhea    • DM (diabetes mellitus), type 2 (HCC)    • High cholesterol    • History of blood clots     neck   • Hypertension    • Hypokalemia    • Iron deficiency anemia secondary to blood loss (chronic)     GI BLEED   • Paroxysmal A-fib (HCC)    • Shortness of breath      Past Surgical History:   Procedure Laterality Date   • BACK SURGERY     • BREAST LUMPECTOMY Right    • COLON SURGERY     • COLONOSCOPY     • CYSTOSCOPY W/ URETERAL STENT PLACEMENT     • CYSTOSCOPY W/ URETERAL STENT PLACEMENT Right 8/17/2021    Procedure: CYSTOSCOPY, RIGHT URETERAL STENT INSERTION;  Surgeon: Kelli Chi MD;  Location: [facility];  Service: Urology;  Laterality: Right;   • HYSTERECTOMY      partial    • JOINT REPLACEMENT      hip    • MASTECTOMY Left    • OTHER SURGICAL HISTORY      BIOPSY   • VENOUS ACCESS DEVICE (PORT) INSERTION       Social History     Socioeconomic History   • Marital status:    Tobacco Use   • Smoking status: Never Smoker   • Smokeless tobacco: Never Used   Vaping Use   • Vaping Use: Never used   Substance and Sexual Activity   • Alcohol use: Never   • Drug use: Never   • Sexual activity: Defer     Family History   Problem Relation Age of Onset   • Skin cancer Other    • Colon cancer Sister        Objective   Physical Exam  General: Alert, cooperative, no acute distress, chronically ill-appearing  Eyes: Anicteric sclera, PERRLA  Respiratory: normal respiratory effort  Cardiovascular: no lower extremity edema  Abdomen: Distended, diffusely tender, no significant change  Skin: Normal tone, no rash, no lesions  Psychiatric: Appropriate affect, intact judgment  Neurologic: No focal sensory or motor  deficits, normal cognition   Musculoskeletal: Reduced muscle strength and tone, in a wheelchair  Extremities: No clubbing, cyanosis, or deformities    Vitals:    12/21/21 1032   BP: 117/54   Pulse: 81   Resp: 20   Temp: 98.2 °F (36.8 °C)   SpO2: 95%   Weight: 69 kg (152 lb 1.9 oz)   PainSc:   7   PainLoc: Abdomen     ECOG score: 2         PHQ-9 Total Score:         Result Review :   The following data was reviewed by: Jennifer Schroeder MD PhD on 12/21/2021:  Lab Results   Component Value Date    HGB 6.9 (C) 12/21/2021    HCT 22.5 (L) 12/21/2021    MCV 97.4 (H) 12/21/2021     (H) 12/21/2021    WBC 7.87 12/21/2021    NEUTROABS 4.46 12/21/2021    LYMPHSABS 2.65 12/21/2021    MONOSABS 0.50 12/21/2021    EOSABS 0.08 12/21/2021    BASOSABS 0.06 12/21/2021     Lab Results   Component Value Date    GLUCOSE 282 (H) 12/21/2021    BUN 11 12/21/2021    CREATININE 0.97 12/21/2021     (L) 12/21/2021    K 3.2 (L) 12/21/2021     12/21/2021    CO2 25.7 12/21/2021    CALCIUM 7.6 (L) 12/21/2021    PROTEINTOT 6.5 12/21/2021    ALBUMIN 2.96 (L) 12/21/2021    BILITOT <0.2 12/21/2021    ALKPHOS 118 (H) 12/21/2021    AST 20 12/21/2021    ALT 4 12/21/2021          Assessment and Plan    Diagnoses and all orders for this visit:    1. Overlapping malignant neoplasm of colon (HCC) (Primary)  -     Comprehensive Metabolic Panel; Future  -     Magnesium; Future  -     potassium chloride (K-DUR,KLOR-CON) 20 MEQ CR tablet; Take 1 tablet by mouth Daily for 14 days.  Dispense: 14 tablet; Refill: 0  -     CBC & Differential; Future  -     Comprehensive Metabolic Panel; Future  -     Phosphorus; Future  -     Magnesium; Future      Recurrent/metastatic colon cancer: While the patient's disease burden may have slightly improved on treatment, she is experiencing significant toxicity.  I have advised her to stop the oral medication as well as the IV medication she is on.  Overall, the burden of disease as well as treatment has caused  her continued health complications and overall decline in health.  At this time her prognosis is very poor.  I will follow up with the patient next week to discuss the next steps in her care which will likely involve only palliation and possibly hospice as there is no further disease treatment that is likely to provide benefit.    Hypokalemia and hypomagnesemia: We will continue to replace as needed.  I will send in a prescription for oral potassium replacement.  I will repeat CBC, CMP, phosphorus, and magnesium at the next appointment next week.        Patient was given instructions and counseling regarding her condition or for health maintenance advice. Please see specific information pulled into the AVS if appropriate.     Jennifer Schroeder MD PhD    12/26/2021

## 2021-12-28 PROBLEM — R53.81 PHYSICAL DECONDITIONING: Status: ACTIVE | Noted: 2021-01-01

## 2021-12-28 PROBLEM — R10.84 GENERALIZED ABDOMINAL PAIN: Status: ACTIVE | Noted: 2021-01-01

## 2021-12-28 NOTE — PROGRESS NOTES
Patient  Lorraine Dominguez    Location  [facility] HEMATOLOGY & ONCOLOGY    Chief Complaint  Colon Cancer and Chemotherapy    Referring Provider: Jennifer Schroeder MD PhD  PCP: Shayna Lewis APRN    Subjective          Oncology/Hematology History Overview Note     1) BRAF V600E mutated Colon Cancer:   - Diagnosed 5/7/15; Low grade; staged pT4a pN0 M0 stage IIB; 0/37 LN; no MMR testing; RIGHT sided  - RIGHT hemicolectomy 5/7/15  - adjuvant Xeloda X 6 months  - Recurrent dx diagnosed via RIGHT ureter mass resection (5/1/18)  - FOLFIRI X 9 (7/31-12/4/18)  - s/p XRT RIGHT pelvis total 5320 cGy (12/14/18-1/24/19)  - CARIS report from specimen dated 5/7/15: BRAF V600E mutation (possible benefit from cetuximab), MSI stable, TMB-low, TAURUS-High (33%), TP53 mutated  - CT CAP 1/21/2021: Interim development of right-sided hydronephrosis and a soft tissue mass along the right iliac chain measuring 2.9 x 1.2 cm.  There is a second nodule identified below the aortic bifurcation in the midline measuring 1.4 cm.  This was consistent with disease recurrence versus her new primary small bowel cancer.    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 Lacey). Pathology from the ileum was positive for adenocarcinoma that appeared to be small bowel in origin and represent a new primary malignancy, 2.2cm, grade 1, tumor perforates the visceral peritoneum, pT4pN0, with 0/12 lymph nodes involved.  - Post surgical CT Abd/Pelvis 4/24/21:  Mild thickening of the wall of some portion of the small intestine, could be post-surgical changes. Moderate dilation of the right renal collecting system is unchanged with ureteral double-J stent.    - CT CAP on 3/25 prior to surgery showed no evidence of disease in the chest  - Caris report also showed BRAF V600E mutation consistent with pts prior  colon cancer.    - started Xeloda on 6/5/21 and continued through November 2021  - CT A/P on 11/9/21: probably worsening of peritoneal carcinomatosis  - started Cetuximab in November 2021.  Added Encorafenib in December 2021.     3) Breast Cancer:  -RIGHT breast DCIS diagnosed 3/15/06; low-grade; s/p lumpectomy and adjuvant XRT; ER: 100%, GA: 70%  -LEFT breast invasive lobular carcinoma; diagnosed 06/2012; staged pT3 pN0 M0 stage IIB, ER: 83%, GA: 0, HER2: 1+, Ki-67: 26%, treated with mastectomy and SLND 04/2012, adjuvant DD AC and Taxotere, Adjuvant Arimidex then Femara (11/13/19)    4) Anemia:  -multifactorial due to her history of GI bleeding from small bowel ulcer/tumor  -intolerant to oral iron. Treatment with IV iron in the past.   -iron studies on 5/11/12 were normal    5) right hydronephrosis:  -Noted on CT scan from 1/21/2021, secondary to soft tissue mass  -Likely secondary to recurrent colon cancer, biopsy pending  -Cystoscopy and right ureteral stent (Dr. Chi) on 1/28/2021    6) DVT:  -bracheocephalic and subclavian thrombosis March 2021 ([facility])     Malignant neoplasm of left breast in female, estrogen receptor positive (HCC)   3/5/2018 Initial Diagnosis    Breast cancer (CMS/HCC)     Colon cancer (HCC)   3/5/2018 Initial Diagnosis    Colon cancer (CMS/HCC)     6/28/2021 - 7/7/2021 Chemotherapy    OP SUPPORTIVE ELECTROLYTE REPLACEMENT     10/21/2021 -  Chemotherapy    OP SUPPORTIVE Ferric Carboxymaltose (INJECTAFER)      11/23/2021 -  Chemotherapy    OP COLORECTAL Cetuximab / Encorafenib      Malignant neoplasm of colon (HCC)   3/5/2018 Initial Diagnosis    Malignant neoplasm of colon (HCC)     Metastasis to bone (HCC)   12/13/2021 Initial Diagnosis    Metastasis to bone (HCC)         History of Present Illness  Patient comes in today for follow-up.  She brings her daughter and her .  She was on the schedule for her next dose of cetuximab.  Encorafenib is currently on hold due  to significant toxicity.  I reviewed the CBC and CMP with the patient.  Her hemoglobin is 7.6 but this is not significantly different from her prior hemoglobins.  Her glucose is improved today and is only in the 300s.  The patient confirms that it has been better at home since stopping the oral chemotherapy.    I discussed options with the patient including stopping all further treatment and focusing on palliation given the fact that the last efforts and chemotherapy have all resulted in further decline in her overall health.  It is unclear at this time if she is clinically benefiting from this medication.  I do admit that there is some improvement on the recent CT scan.  The patient made it clear that she wished to keep on fighting.  She said she does not see a point in stopping treatment at this time because her only option is to get the disease under control.    Review of Systems   Constitutional: Positive for fatigue. Negative for appetite change, diaphoresis, fever, unexpected weight gain and unexpected weight loss.   HENT: Negative for hearing loss, mouth sores, sore throat, swollen glands, trouble swallowing and voice change.    Eyes: Negative for blurred vision.   Respiratory: Negative for cough, shortness of breath and wheezing.    Cardiovascular: Negative for chest pain and palpitations.   Gastrointestinal: Positive for abdominal pain and nausea. Negative for blood in stool, constipation, diarrhea and vomiting.   Endocrine: Negative for cold intolerance and heat intolerance.   Genitourinary: Negative for difficulty urinating, dysuria, frequency, hematuria and urinary incontinence.   Musculoskeletal: Positive for back pain. Negative for arthralgias and myalgias.   Skin: Negative for rash, skin lesions and bruise.   Neurological: Positive for weakness. Negative for dizziness, seizures, numbness and headache.   Hematological: Does not bruise/bleed easily.   Psychiatric/Behavioral: Negative for depressed mood.  The patient is not nervous/anxious.        Past Medical History:   Diagnosis Date   • Abdominal pain    • Anemia    • Arthritis    • Breast cancer (HCC)    • Colon cancer (HCC)    • Constipation    • Diarrhea    • DM (diabetes mellitus), type 2 (HCC)    • High cholesterol    • History of blood clots     neck   • Hypertension    • Hypokalemia    • Iron deficiency anemia secondary to blood loss (chronic)     GI BLEED   • Paroxysmal A-fib (HCC)    • Shortness of breath      Past Surgical History:   Procedure Laterality Date   • BACK SURGERY     • BREAST LUMPECTOMY Right    • COLON SURGERY     • COLONOSCOPY     • CYSTOSCOPY W/ URETERAL STENT PLACEMENT     • CYSTOSCOPY W/ URETERAL STENT PLACEMENT Right 8/17/2021    Procedure: CYSTOSCOPY, RIGHT URETERAL STENT INSERTION;  Surgeon: Kelli Chi MD;  Location: [facility];  Service: Urology;  Laterality: Right;   • HYSTERECTOMY      partial    • JOINT REPLACEMENT      hip    • MASTECTOMY Left    • OTHER SURGICAL HISTORY      BIOPSY   • VENOUS ACCESS DEVICE (PORT) INSERTION       Social History     Socioeconomic History   • Marital status:    Tobacco Use   • Smoking status: Never Smoker   • Smokeless tobacco: Never Used   Vaping Use   • Vaping Use: Never used   Substance and Sexual Activity   • Alcohol use: Never   • Drug use: Never   • Sexual activity: Defer     Family History   Problem Relation Age of Onset   • Skin cancer Other    • Colon cancer Sister        Objective   Physical Exam  General: Alert, cooperative, no acute distress  Eyes: Anicteric sclera, PERRLA  Respiratory: normal respiratory effort  Cardiovascular: no lower extremity edema  Skin: Normal tone, no rash, no lesions  Psychiatric: Appropriate affect, intact judgment  Neurologic: No focal sensory or motor deficits, normal cognition   Musculoskeletal: Reduced muscle strength and tone, in wheelchair  Extremities: No clubbing, cyanosis, or deformities    There were no vitals filed for this  visit.  ECOG score: 2         PHQ-9 Total Score:         Result Review :   The following data was reviewed by: Jennifer Schroeder MD PhD on 12/28/2021:  Lab Results   Component Value Date    HGB 7.7 (C) 12/28/2021    HCT 25.0 (L) 12/28/2021    MCV 98.8 (H) 12/28/2021     12/28/2021    WBC 10.24 12/28/2021    NEUTROABS 5.50 12/28/2021    LYMPHSABS 4.07 (H) 12/28/2021    MONOSABS 0.47 12/28/2021    EOSABS 0.04 12/28/2021    BASOSABS 0.07 12/28/2021     Lab Results   Component Value Date    GLUCOSE 337 (H) 12/28/2021    BUN 10 12/28/2021    CREATININE 0.92 12/28/2021     (L) 12/28/2021    K 4.1 12/28/2021    CL 96 (L) 12/28/2021    CO2 23.2 12/28/2021    CALCIUM 8.2 (L) 12/28/2021    PROTEINTOT 6.7 12/28/2021    ALBUMIN 3.32 (L) 12/28/2021    BILITOT 0.2 12/28/2021    ALKPHOS 134 (H) 12/28/2021    AST 25 12/28/2021    ALT 4 12/28/2021          Assessment and Plan    Diagnoses and all orders for this visit:    1. Overlapping malignant neoplasm of colon (HCC) (Primary)  -     Lipase; Future    2. Metastasis to bone (HCC)    3. Hyperglycemia  -     Lipase; Future    4. Physical deconditioning    5. Generalized abdominal pain      BRAF mutated metastatic colon cancer: Patient experienced severe toxicity with the previous treatment regimen.  She was only on 150 mg (half dose) of the encorafenib.  She will continue to hold this for 1 week and plan to restart the medication at 75 mg a day next week.  Today she will get her next dose of cetuximab.  When she returns to clinic next week we will consider giving her the twice monthly dose instead so that she can come in for fewer appointments.  I discussed the alternative of stopping all medications and focusing on palliative car/hospice but the patient declined.  We will continue to have this ongoing discussion if her health declines further.    Severe anemia: Secondary to patient's underlying disease and her history of chemotherapy.  No need for blood transfusion at  this time as the patient's symptoms have not changed.  We will continue to monitor.    Bone metastasis: Presumed secondary to recurrent colon cancer.    Hyperglycemia: Secondary to underlying diabetes as well as encorafenib chemotherapy.  We will continue to hold this oral medication for 1 week and restart at a lower dose.    Physical deconditioning: Secondary to her long history of multiple cancers.  I did offer hospice and palliative care as an alternative to ongoing treatment given the patient's decline in overall health.  However she declined this offer and wished to continue treatment.    Genetics: The patient has a history of 4 previous malignancies.  I will review chart to see if she had prior genetic testing.  If not we will plan to send genetic testing at the next appointment for the patient's peace of mind regarding her daughter's health.    Patient was given instructions and counseling regarding her condition or for health maintenance advice. Please see specific information pulled into the AVS if appropriate.     Jennifer Schroeder MD PhD    12/28/2021

## 2022-01-01 ENCOUNTER — OFFICE VISIT (OUTPATIENT)
Dept: ONCOLOGY | Facility: HOSPITAL | Age: 72
End: 2022-01-01

## 2022-01-01 ENCOUNTER — HOSPITAL ENCOUNTER (OUTPATIENT)
Dept: RADIATION ONCOLOGY | Facility: HOSPITAL | Age: 72
Discharge: HOME | End: 2022-05-10

## 2022-01-01 ENCOUNTER — HOSPITAL ENCOUNTER (OUTPATIENT)
Dept: NUCLEAR MEDICINE | Facility: HOSPITAL | Age: 72
Discharge: HOME | End: 2022-03-14

## 2022-01-01 ENCOUNTER — APPOINTMENT (OUTPATIENT)
Dept: GENERAL RADIOLOGY | Facility: HOSPITAL | Age: 72
End: 2022-01-01

## 2022-01-01 ENCOUNTER — HOSPITAL ENCOUNTER (OUTPATIENT)
Dept: INFUSION THERAPY | Facility: HOSPITAL | Age: 72
Discharge: HOME | End: 2022-05-04
Attending: INTERNAL MEDICINE | Admitting: INTERNAL MEDICINE

## 2022-01-01 ENCOUNTER — HOSPITAL ENCOUNTER (OUTPATIENT)
Dept: ONCOLOGY | Facility: HOSPITAL | Age: 72
Setting detail: INFUSION SERIES
Discharge: HOME | End: 2022-01-04

## 2022-01-01 ENCOUNTER — TELEPHONE (OUTPATIENT)
Dept: ONCOLOGY | Facility: HOSPITAL | Age: 72
End: 2022-01-01

## 2022-01-01 ENCOUNTER — TELEPHONE (OUTPATIENT)
Dept: UROLOGY | Facility: CLINIC | Age: 72
End: 2022-01-01

## 2022-01-01 ENCOUNTER — HOSPITAL ENCOUNTER (OUTPATIENT)
Dept: ONCOLOGY | Facility: HOSPITAL | Age: 72
Setting detail: INFUSION SERIES
Discharge: HOME | End: 2022-05-17

## 2022-01-01 ENCOUNTER — HOSPITAL ENCOUNTER (OUTPATIENT)
Dept: ONCOLOGY | Facility: HOSPITAL | Age: 72
Setting detail: INFUSION SERIES
Discharge: HOME | End: 2022-06-28

## 2022-01-01 ENCOUNTER — DOCUMENTATION (OUTPATIENT)
Dept: RADIATION ONCOLOGY | Facility: HOSPITAL | Age: 72
End: 2022-01-01

## 2022-01-01 ENCOUNTER — DOCUMENTATION (OUTPATIENT)
Dept: ONCOLOGY | Facility: HOSPITAL | Age: 72
End: 2022-01-01

## 2022-01-01 ENCOUNTER — HOSPITAL ENCOUNTER (OUTPATIENT)
Dept: RADIATION ONCOLOGY | Facility: HOSPITAL | Age: 72
Discharge: HOME | End: 2022-05-09

## 2022-01-01 ENCOUNTER — APPOINTMENT (OUTPATIENT)
Dept: CT IMAGING | Facility: HOSPITAL | Age: 72
End: 2022-01-01

## 2022-01-01 ENCOUNTER — TELEPHONE (OUTPATIENT)
Dept: RADIATION ONCOLOGY | Facility: HOSPITAL | Age: 72
End: 2022-01-01

## 2022-01-01 ENCOUNTER — APPOINTMENT (OUTPATIENT)
Dept: INFUSION THERAPY | Facility: HOSPITAL | Age: 72
End: 2022-01-01

## 2022-01-01 ENCOUNTER — APPOINTMENT (OUTPATIENT)
Dept: ONCOLOGY | Facility: HOSPITAL | Age: 72
End: 2022-01-01

## 2022-01-01 ENCOUNTER — HOSPITAL ENCOUNTER (OUTPATIENT)
Dept: ONCOLOGY | Facility: HOSPITAL | Age: 72
Setting detail: INFUSION SERIES
Discharge: HOME | End: 2022-06-01

## 2022-01-01 ENCOUNTER — OFFICE VISIT (OUTPATIENT)
Dept: SURGERY | Facility: CLINIC | Age: 72
End: 2022-01-01

## 2022-01-01 ENCOUNTER — HOSPITAL ENCOUNTER (OUTPATIENT)
Dept: INFUSION THERAPY | Facility: HOSPITAL | Age: 72
Discharge: HOME | End: 2022-03-30
Attending: INTERNAL MEDICINE | Admitting: NURSE PRACTITIONER

## 2022-01-01 ENCOUNTER — HOSPITAL ENCOUNTER (OUTPATIENT)
Dept: ONCOLOGY | Facility: HOSPITAL | Age: 72
Setting detail: INFUSION SERIES
Discharge: HOME | End: 2022-06-07

## 2022-01-01 ENCOUNTER — HOSPITAL ENCOUNTER (OUTPATIENT)
Dept: RADIATION ONCOLOGY | Facility: HOSPITAL | Age: 72
Discharge: HOME | End: 2022-05-11

## 2022-01-01 ENCOUNTER — PATIENT ROUNDING (BHMG ONLY) (OUTPATIENT)
Dept: RADIATION ONCOLOGY | Facility: HOSPITAL | Age: 72
End: 2022-01-01

## 2022-01-01 ENCOUNTER — HOSPITAL ENCOUNTER (OUTPATIENT)
Dept: ONCOLOGY | Facility: HOSPITAL | Age: 72
Setting detail: INFUSION SERIES
Discharge: HOME | End: 2022-04-05

## 2022-01-01 ENCOUNTER — HOSPITAL ENCOUNTER (OUTPATIENT)
Dept: INFUSION THERAPY | Facility: HOSPITAL | Age: 72
Discharge: HOME | End: 2022-06-02
Attending: INTERNAL MEDICINE | Admitting: INTERNAL MEDICINE

## 2022-01-01 ENCOUNTER — OFFICE VISIT (OUTPATIENT)
Dept: RADIATION ONCOLOGY | Facility: HOSPITAL | Age: 72
End: 2022-01-01

## 2022-01-01 ENCOUNTER — APPOINTMENT (OUTPATIENT)
Dept: NUCLEAR MEDICINE | Facility: HOSPITAL | Age: 72
End: 2022-01-01

## 2022-01-01 ENCOUNTER — DOCUMENTATION (OUTPATIENT)
Dept: GENETICS | Facility: HOSPITAL | Age: 72
End: 2022-01-01

## 2022-01-01 ENCOUNTER — HOSPITAL ENCOUNTER (OUTPATIENT)
Dept: ONCOLOGY | Facility: HOSPITAL | Age: 72
Setting detail: INFUSION SERIES
Discharge: HOME | End: 2022-05-24

## 2022-01-01 ENCOUNTER — HOSPITAL ENCOUNTER (OUTPATIENT)
Dept: INFUSION THERAPY | Facility: HOSPITAL | Age: 72
Discharge: HOME | End: 2022-03-16
Attending: INTERNAL MEDICINE | Admitting: INTERNAL MEDICINE

## 2022-01-01 ENCOUNTER — HOSPITAL ENCOUNTER (OUTPATIENT)
Facility: HOSPITAL | Age: 72
Setting detail: HOSPITAL OUTPATIENT SURGERY
Discharge: HOME | End: 2022-02-17
Attending: UROLOGY | Admitting: UROLOGY

## 2022-01-01 ENCOUNTER — HOSPITAL ENCOUNTER (OUTPATIENT)
Dept: ONCOLOGY | Facility: HOSPITAL | Age: 72
Setting detail: INFUSION SERIES
Discharge: HOME | End: 2022-04-19

## 2022-01-01 ENCOUNTER — HOSPITAL ENCOUNTER (OUTPATIENT)
Dept: RADIATION ONCOLOGY | Facility: HOSPITAL | Age: 72
Discharge: HOME | End: 2022-05-12

## 2022-01-01 ENCOUNTER — HOSPITAL ENCOUNTER (OUTPATIENT)
Dept: INFUSION THERAPY | Facility: HOSPITAL | Age: 72
Discharge: HOME | End: 2022-06-24
Attending: INTERNAL MEDICINE | Admitting: INTERNAL MEDICINE

## 2022-01-01 ENCOUNTER — HOSPITAL ENCOUNTER (OUTPATIENT)
Dept: ONCOLOGY | Facility: HOSPITAL | Age: 72
Setting detail: INFUSION SERIES
Discharge: HOME | End: 2022-02-15

## 2022-01-01 ENCOUNTER — TELEPHONE (OUTPATIENT)
Dept: ONCOLOGY | Facility: OTHER | Age: 72
End: 2022-01-01

## 2022-01-01 ENCOUNTER — PREP FOR SURGERY (OUTPATIENT)
Dept: OTHER | Facility: HOSPITAL | Age: 72
End: 2022-01-01

## 2022-01-01 ENCOUNTER — HOSPITAL ENCOUNTER (OUTPATIENT)
Dept: INFUSION THERAPY | Facility: HOSPITAL | Age: 72
Discharge: HOME | End: 2022-03-02
Attending: INTERNAL MEDICINE | Admitting: INTERNAL MEDICINE

## 2022-01-01 ENCOUNTER — ANESTHESIA (OUTPATIENT)
Dept: PERIOP | Facility: HOSPITAL | Age: 72
End: 2022-01-01

## 2022-01-01 ENCOUNTER — HOSPITAL ENCOUNTER (OUTPATIENT)
Dept: ONCOLOGY | Facility: HOSPITAL | Age: 72
Discharge: HOME | End: 2022-07-05

## 2022-01-01 ENCOUNTER — HOSPITAL ENCOUNTER (OUTPATIENT)
Dept: ONCOLOGY | Facility: HOSPITAL | Age: 72
Setting detail: INFUSION SERIES
Discharge: HOME | End: 2022-03-29

## 2022-01-01 ENCOUNTER — HOSPITAL ENCOUNTER (OUTPATIENT)
Dept: CT IMAGING | Facility: HOSPITAL | Age: 72
Discharge: HOME | End: 2022-05-09
Admitting: INTERNAL MEDICINE

## 2022-01-01 ENCOUNTER — LAB (OUTPATIENT)
Dept: LAB | Facility: HOSPITAL | Age: 72
End: 2022-01-01

## 2022-01-01 ENCOUNTER — HOSPITAL ENCOUNTER (INPATIENT)
Facility: HOSPITAL | Age: 72
LOS: 1 days | Discharge: HOME HEALTH | End: 2022-05-19
Attending: EMERGENCY MEDICINE | Admitting: INTERNAL MEDICINE

## 2022-01-01 ENCOUNTER — HOSPITAL ENCOUNTER (EMERGENCY)
Facility: HOSPITAL | Age: 72
Discharge: HOME | End: 2022-06-28
Attending: EMERGENCY MEDICINE | Admitting: EMERGENCY MEDICINE

## 2022-01-01 ENCOUNTER — HOSPITAL ENCOUNTER (OUTPATIENT)
Dept: ONCOLOGY | Facility: HOSPITAL | Age: 72
Setting detail: INFUSION SERIES
Discharge: HOME | End: 2022-02-01

## 2022-01-01 ENCOUNTER — READMISSION MANAGEMENT (OUTPATIENT)
Dept: CALL CENTER | Facility: HOSPITAL | Age: 72
End: 2022-01-01

## 2022-01-01 ENCOUNTER — APPOINTMENT (OUTPATIENT)
Dept: LAB | Facility: HOSPITAL | Age: 72
End: 2022-01-01

## 2022-01-01 ENCOUNTER — APPOINTMENT (OUTPATIENT)
Dept: RADIATION ONCOLOGY | Facility: HOSPITAL | Age: 72
End: 2022-01-01

## 2022-01-01 ENCOUNTER — TELEPHONE (OUTPATIENT)
Dept: SURGERY | Facility: CLINIC | Age: 72
End: 2022-01-01

## 2022-01-01 ENCOUNTER — APPOINTMENT (OUTPATIENT)
Dept: CARDIOLOGY | Facility: HOSPITAL | Age: 72
End: 2022-01-01

## 2022-01-01 ENCOUNTER — HOSPITAL ENCOUNTER (OUTPATIENT)
Dept: CT IMAGING | Facility: HOSPITAL | Age: 72
Discharge: HOME | End: 2022-03-14
Admitting: INTERNAL MEDICINE

## 2022-01-01 ENCOUNTER — HOSPITAL ENCOUNTER (OUTPATIENT)
Dept: ONCOLOGY | Facility: HOSPITAL | Age: 72
Setting detail: INFUSION SERIES
Discharge: HOME | End: 2022-05-13

## 2022-01-01 ENCOUNTER — CLINICAL SUPPORT (OUTPATIENT)
Dept: GENETICS | Facility: HOSPITAL | Age: 72
End: 2022-01-01

## 2022-01-01 ENCOUNTER — HOSPITAL ENCOUNTER (OUTPATIENT)
Dept: INFUSION THERAPY | Facility: HOSPITAL | Age: 72
Discharge: HOME | End: 2022-04-06
Attending: INTERNAL MEDICINE | Admitting: INTERNAL MEDICINE

## 2022-01-01 ENCOUNTER — HOSPITAL ENCOUNTER (OUTPATIENT)
Dept: ONCOLOGY | Facility: HOSPITAL | Age: 72
Setting detail: INFUSION SERIES
Discharge: HOME | End: 2022-06-14

## 2022-01-01 ENCOUNTER — HOSPITAL ENCOUNTER (OUTPATIENT)
Dept: ONCOLOGY | Facility: HOSPITAL | Age: 72
Setting detail: INFUSION SERIES
Discharge: HOME | End: 2022-03-01

## 2022-01-01 ENCOUNTER — HOSPITAL ENCOUNTER (OUTPATIENT)
Dept: RADIATION ONCOLOGY | Facility: HOSPITAL | Age: 72
Discharge: HOME | End: 2022-04-29

## 2022-01-01 ENCOUNTER — HOSPITAL ENCOUNTER (OUTPATIENT)
Dept: ONCOLOGY | Facility: HOSPITAL | Age: 72
Setting detail: INFUSION SERIES
Discharge: HOME | End: 2022-02-08

## 2022-01-01 ENCOUNTER — HOSPITAL ENCOUNTER (OUTPATIENT)
Dept: INFUSION THERAPY | Facility: HOSPITAL | Age: 72
Discharge: HOME | End: 2022-04-20
Attending: INTERNAL MEDICINE | Admitting: INTERNAL MEDICINE

## 2022-01-01 ENCOUNTER — HOSPITAL ENCOUNTER (OUTPATIENT)
Dept: RADIATION ONCOLOGY | Facility: HOSPITAL | Age: 72
Setting detail: RADIATION/ONCOLOGY SERIES
End: 2022-01-01

## 2022-01-01 ENCOUNTER — HOSPITAL ENCOUNTER (OUTPATIENT)
Dept: ONCOLOGY | Facility: HOSPITAL | Age: 72
Setting detail: INFUSION SERIES
Discharge: HOME | End: 2022-01-18

## 2022-01-01 ENCOUNTER — HOSPITAL ENCOUNTER (OUTPATIENT)
Dept: INFUSION THERAPY | Facility: HOSPITAL | Age: 72
Discharge: HOME | End: 2022-02-10
Attending: INTERNAL MEDICINE | Admitting: INTERNAL MEDICINE

## 2022-01-01 ENCOUNTER — ANESTHESIA EVENT (OUTPATIENT)
Dept: PERIOP | Facility: HOSPITAL | Age: 72
End: 2022-01-01

## 2022-01-01 ENCOUNTER — OFFICE VISIT (OUTPATIENT)
Dept: UROLOGY | Facility: CLINIC | Age: 72
End: 2022-01-01

## 2022-01-01 ENCOUNTER — HOSPITAL ENCOUNTER (OUTPATIENT)
Dept: ONCOLOGY | Facility: HOSPITAL | Age: 72
Setting detail: INFUSION SERIES
Discharge: HOME | End: 2022-05-10

## 2022-01-01 ENCOUNTER — HOSPITAL ENCOUNTER (OUTPATIENT)
Dept: INFUSION THERAPY | Facility: HOSPITAL | Age: 72
Discharge: HOME | End: 2022-06-15
Attending: INTERNAL MEDICINE | Admitting: INTERNAL MEDICINE

## 2022-01-01 ENCOUNTER — SPECIALTY PHARMACY (OUTPATIENT)
Dept: PHARMACY | Facility: TELEHEALTH | Age: 72
End: 2022-01-01

## 2022-01-01 ENCOUNTER — HOSPITAL ENCOUNTER (OUTPATIENT)
Dept: ONCOLOGY | Facility: HOSPITAL | Age: 72
Setting detail: INFUSION SERIES
Discharge: HOME | End: 2022-06-23

## 2022-01-01 ENCOUNTER — HOSPITAL ENCOUNTER (OUTPATIENT)
Dept: ONCOLOGY | Facility: HOSPITAL | Age: 72
Setting detail: INFUSION SERIES
Discharge: HOME | End: 2022-02-09

## 2022-01-01 ENCOUNTER — HOSPITAL ENCOUNTER (OUTPATIENT)
Dept: ONCOLOGY | Facility: HOSPITAL | Age: 72
Setting detail: INFUSION SERIES
Discharge: HOME | End: 2022-03-15

## 2022-01-01 ENCOUNTER — HOSPITAL ENCOUNTER (OUTPATIENT)
Dept: RADIATION ONCOLOGY | Facility: HOSPITAL | Age: 72
Discharge: HOME | End: 2022-05-13

## 2022-01-01 ENCOUNTER — HOSPITAL ENCOUNTER (OUTPATIENT)
Dept: ONCOLOGY | Facility: HOSPITAL | Age: 72
Setting detail: INFUSION SERIES
Discharge: HOME | End: 2022-04-12

## 2022-01-01 ENCOUNTER — HOSPITAL ENCOUNTER (OUTPATIENT)
Dept: ONCOLOGY | Facility: HOSPITAL | Age: 72
Setting detail: INFUSION SERIES
Discharge: HOME | End: 2022-06-29

## 2022-01-01 ENCOUNTER — HOSPITAL ENCOUNTER (OUTPATIENT)
Dept: ONCOLOGY | Facility: HOSPITAL | Age: 72
Setting detail: INFUSION SERIES
Discharge: HOME | End: 2022-07-05

## 2022-01-01 ENCOUNTER — HOSPITAL ENCOUNTER (OUTPATIENT)
Dept: ONCOLOGY | Facility: HOSPITAL | Age: 72
Setting detail: INFUSION SERIES
Discharge: HOME | End: 2022-05-03

## 2022-01-01 VITALS
WEIGHT: 141.31 LBS | BODY MASS INDEX: 24.24 KG/M2 | HEART RATE: 103 BPM | DIASTOLIC BLOOD PRESSURE: 68 MMHG | SYSTOLIC BLOOD PRESSURE: 142 MMHG | RESPIRATION RATE: 18 BRPM | OXYGEN SATURATION: 98 % | TEMPERATURE: 97 F

## 2022-01-01 VITALS
OXYGEN SATURATION: 100 % | HEART RATE: 107 BPM | RESPIRATION RATE: 18 BRPM | DIASTOLIC BLOOD PRESSURE: 56 MMHG | WEIGHT: 148.81 LBS | TEMPERATURE: 97.5 F | BODY MASS INDEX: 25.41 KG/M2 | SYSTOLIC BLOOD PRESSURE: 121 MMHG | HEIGHT: 64 IN

## 2022-01-01 VITALS
OXYGEN SATURATION: 98 % | DIASTOLIC BLOOD PRESSURE: 52 MMHG | OXYGEN SATURATION: 100 % | WEIGHT: 146.39 LBS | TEMPERATURE: 99.4 F | RESPIRATION RATE: 18 BRPM | SYSTOLIC BLOOD PRESSURE: 122 MMHG | WEIGHT: 136.47 LBS | SYSTOLIC BLOOD PRESSURE: 99 MMHG | BODY MASS INDEX: 25.21 KG/M2 | SYSTOLIC BLOOD PRESSURE: 116 MMHG | BODY MASS INDEX: 24.4 KG/M2 | OXYGEN SATURATION: 97 % | TEMPERATURE: 99 F | DIASTOLIC BLOOD PRESSURE: 48 MMHG | HEART RATE: 101 BPM | WEIGHT: 142.2 LBS | HEART RATE: 104 BPM | RESPIRATION RATE: 20 BRPM | TEMPERATURE: 99.6 F | BODY MASS INDEX: 23.42 KG/M2 | HEART RATE: 94 BPM | RESPIRATION RATE: 18 BRPM | DIASTOLIC BLOOD PRESSURE: 54 MMHG

## 2022-01-01 VITALS
OXYGEN SATURATION: 97 % | DIASTOLIC BLOOD PRESSURE: 67 MMHG | TEMPERATURE: 98 F | BODY MASS INDEX: 24.26 KG/M2 | RESPIRATION RATE: 24 BRPM | SYSTOLIC BLOOD PRESSURE: 130 MMHG | HEART RATE: 102 BPM | HEIGHT: 64 IN

## 2022-01-01 VITALS
SYSTOLIC BLOOD PRESSURE: 131 MMHG | DIASTOLIC BLOOD PRESSURE: 62 MMHG | HEART RATE: 97 BPM | BODY MASS INDEX: 24.87 KG/M2 | TEMPERATURE: 98.4 F | RESPIRATION RATE: 18 BRPM | WEIGHT: 149.47 LBS | OXYGEN SATURATION: 98 %

## 2022-01-01 VITALS
WEIGHT: 149.47 LBS | DIASTOLIC BLOOD PRESSURE: 62 MMHG | OXYGEN SATURATION: 98 % | TEMPERATURE: 98.4 F | BODY MASS INDEX: 24.87 KG/M2 | SYSTOLIC BLOOD PRESSURE: 131 MMHG | HEART RATE: 97 BPM | RESPIRATION RATE: 18 BRPM

## 2022-01-01 VITALS
OXYGEN SATURATION: 98 % | BODY MASS INDEX: 24.13 KG/M2 | SYSTOLIC BLOOD PRESSURE: 142 MMHG | DIASTOLIC BLOOD PRESSURE: 68 MMHG | HEART RATE: 103 BPM | WEIGHT: 141.31 LBS | HEIGHT: 64 IN | TEMPERATURE: 97 F | RESPIRATION RATE: 18 BRPM

## 2022-01-01 VITALS
TEMPERATURE: 98.3 F | OXYGEN SATURATION: 100 % | SYSTOLIC BLOOD PRESSURE: 122 MMHG | WEIGHT: 145.06 LBS | HEART RATE: 87 BPM | BODY MASS INDEX: 24.14 KG/M2 | DIASTOLIC BLOOD PRESSURE: 68 MMHG | RESPIRATION RATE: 18 BRPM

## 2022-01-01 VITALS
BODY MASS INDEX: 24.69 KG/M2 | SYSTOLIC BLOOD PRESSURE: 129 MMHG | HEIGHT: 64 IN | OXYGEN SATURATION: 99 % | DIASTOLIC BLOOD PRESSURE: 70 MMHG | HEART RATE: 98 BPM | WEIGHT: 144.62 LBS | TEMPERATURE: 97.3 F | RESPIRATION RATE: 16 BRPM

## 2022-01-01 VITALS
SYSTOLIC BLOOD PRESSURE: 121 MMHG | HEART RATE: 107 BPM | RESPIRATION RATE: 18 BRPM | TEMPERATURE: 97.5 F | DIASTOLIC BLOOD PRESSURE: 56 MMHG | BODY MASS INDEX: 25.53 KG/M2 | WEIGHT: 148.81 LBS | OXYGEN SATURATION: 100 %

## 2022-01-01 VITALS
DIASTOLIC BLOOD PRESSURE: 49 MMHG | TEMPERATURE: 98.8 F | WEIGHT: 148.37 LBS | SYSTOLIC BLOOD PRESSURE: 106 MMHG | OXYGEN SATURATION: 97 % | BODY MASS INDEX: 24.69 KG/M2 | RESPIRATION RATE: 17 BRPM | HEART RATE: 94 BPM

## 2022-01-01 VITALS
WEIGHT: 147.49 LBS | HEIGHT: 64 IN | DIASTOLIC BLOOD PRESSURE: 48 MMHG | RESPIRATION RATE: 20 BRPM | OXYGEN SATURATION: 100 % | TEMPERATURE: 99.7 F | TEMPERATURE: 98.3 F | DIASTOLIC BLOOD PRESSURE: 57 MMHG | OXYGEN SATURATION: 98 % | HEART RATE: 92 BPM | WEIGHT: 146.61 LBS | SYSTOLIC BLOOD PRESSURE: 108 MMHG | SYSTOLIC BLOOD PRESSURE: 119 MMHG | HEART RATE: 101 BPM | RESPIRATION RATE: 16 BRPM | BODY MASS INDEX: 25.16 KG/M2 | BODY MASS INDEX: 25.18 KG/M2

## 2022-01-01 VITALS
RESPIRATION RATE: 18 BRPM | TEMPERATURE: 98.3 F | HEIGHT: 64 IN | SYSTOLIC BLOOD PRESSURE: 101 MMHG | BODY MASS INDEX: 24.95 KG/M2 | HEART RATE: 95 BPM | OXYGEN SATURATION: 97 % | DIASTOLIC BLOOD PRESSURE: 48 MMHG | WEIGHT: 146.16 LBS

## 2022-01-01 VITALS
RESPIRATION RATE: 20 BRPM | DIASTOLIC BLOOD PRESSURE: 66 MMHG | SYSTOLIC BLOOD PRESSURE: 136 MMHG | TEMPERATURE: 98.3 F | WEIGHT: 147.49 LBS | OXYGEN SATURATION: 99 % | BODY MASS INDEX: 24.54 KG/M2 | HEART RATE: 96 BPM

## 2022-01-01 VITALS
TEMPERATURE: 98 F | RESPIRATION RATE: 18 BRPM | DIASTOLIC BLOOD PRESSURE: 78 MMHG | SYSTOLIC BLOOD PRESSURE: 124 MMHG | OXYGEN SATURATION: 100 % | HEART RATE: 91 BPM

## 2022-01-01 VITALS
SYSTOLIC BLOOD PRESSURE: 122 MMHG | BODY MASS INDEX: 24.28 KG/M2 | RESPIRATION RATE: 18 BRPM | OXYGEN SATURATION: 97 % | DIASTOLIC BLOOD PRESSURE: 54 MMHG | TEMPERATURE: 99.4 F | HEART RATE: 104 BPM | HEIGHT: 64 IN | WEIGHT: 142.2 LBS

## 2022-01-01 VITALS
OXYGEN SATURATION: 96 % | WEIGHT: 140.6 LBS | BODY MASS INDEX: 24.01 KG/M2 | HEIGHT: 64 IN | SYSTOLIC BLOOD PRESSURE: 104 MMHG | DIASTOLIC BLOOD PRESSURE: 41 MMHG | HEART RATE: 101 BPM

## 2022-01-01 VITALS
SYSTOLIC BLOOD PRESSURE: 97 MMHG | WEIGHT: 141.31 LBS | BODY MASS INDEX: 24.26 KG/M2 | DIASTOLIC BLOOD PRESSURE: 48 MMHG | RESPIRATION RATE: 16 BRPM | TEMPERATURE: 97.6 F | HEART RATE: 105 BPM | OXYGEN SATURATION: 90 %

## 2022-01-01 VITALS — HEIGHT: 64 IN | WEIGHT: 147 LBS | BODY MASS INDEX: 25.1 KG/M2 | RESPIRATION RATE: 16 BRPM

## 2022-01-01 VITALS
OXYGEN SATURATION: 98 % | RESPIRATION RATE: 20 BRPM | DIASTOLIC BLOOD PRESSURE: 57 MMHG | TEMPERATURE: 99.2 F | SYSTOLIC BLOOD PRESSURE: 110 MMHG | HEART RATE: 87 BPM

## 2022-01-01 VITALS
OXYGEN SATURATION: 100 % | TEMPERATURE: 98.5 F | HEART RATE: 92 BPM | BODY MASS INDEX: 23.22 KG/M2 | DIASTOLIC BLOOD PRESSURE: 59 MMHG | WEIGHT: 136 LBS | RESPIRATION RATE: 16 BRPM | HEIGHT: 64 IN | SYSTOLIC BLOOD PRESSURE: 120 MMHG

## 2022-01-01 VITALS — HEIGHT: 64 IN | RESPIRATION RATE: 14 BRPM | WEIGHT: 143.2 LBS | BODY MASS INDEX: 24.45 KG/M2

## 2022-01-01 VITALS
TEMPERATURE: 99.6 F | DIASTOLIC BLOOD PRESSURE: 63 MMHG | SYSTOLIC BLOOD PRESSURE: 133 MMHG | OXYGEN SATURATION: 97 % | RESPIRATION RATE: 16 BRPM | HEART RATE: 104 BPM

## 2022-01-01 VITALS
RESPIRATION RATE: 16 BRPM | BODY MASS INDEX: 23.49 KG/M2 | WEIGHT: 141 LBS | HEART RATE: 91 BPM | OXYGEN SATURATION: 98 % | SYSTOLIC BLOOD PRESSURE: 130 MMHG | HEIGHT: 65 IN | TEMPERATURE: 99.5 F | DIASTOLIC BLOOD PRESSURE: 67 MMHG

## 2022-01-01 VITALS
BODY MASS INDEX: 24.09 KG/M2 | TEMPERATURE: 100.2 F | HEART RATE: 78 BPM | DIASTOLIC BLOOD PRESSURE: 73 MMHG | WEIGHT: 141.09 LBS | OXYGEN SATURATION: 96 % | RESPIRATION RATE: 14 BRPM | HEIGHT: 64 IN | SYSTOLIC BLOOD PRESSURE: 140 MMHG

## 2022-01-01 VITALS
HEART RATE: 120 BPM | DIASTOLIC BLOOD PRESSURE: 98 MMHG | OXYGEN SATURATION: 91 % | RESPIRATION RATE: 18 BRPM | TEMPERATURE: 100 F | SYSTOLIC BLOOD PRESSURE: 180 MMHG

## 2022-01-01 VITALS
DIASTOLIC BLOOD PRESSURE: 63 MMHG | SYSTOLIC BLOOD PRESSURE: 128 MMHG | OXYGEN SATURATION: 97 % | HEART RATE: 98 BPM | RESPIRATION RATE: 20 BRPM | TEMPERATURE: 98.8 F

## 2022-01-01 VITALS
WEIGHT: 145.28 LBS | TEMPERATURE: 99 F | SYSTOLIC BLOOD PRESSURE: 106 MMHG | HEART RATE: 95 BPM | OXYGEN SATURATION: 93 % | DIASTOLIC BLOOD PRESSURE: 57 MMHG | RESPIRATION RATE: 20 BRPM | BODY MASS INDEX: 24.93 KG/M2

## 2022-01-01 VITALS
WEIGHT: 145.72 LBS | DIASTOLIC BLOOD PRESSURE: 54 MMHG | RESPIRATION RATE: 16 BRPM | OXYGEN SATURATION: 94 % | TEMPERATURE: 97.5 F | SYSTOLIC BLOOD PRESSURE: 104 MMHG | BODY MASS INDEX: 25.01 KG/M2 | HEART RATE: 88 BPM

## 2022-01-01 VITALS
SYSTOLIC BLOOD PRESSURE: 106 MMHG | TEMPERATURE: 99 F | BODY MASS INDEX: 24.8 KG/M2 | OXYGEN SATURATION: 93 % | DIASTOLIC BLOOD PRESSURE: 57 MMHG | RESPIRATION RATE: 20 BRPM | HEIGHT: 64 IN | WEIGHT: 145.28 LBS | HEART RATE: 95 BPM

## 2022-01-01 VITALS
DIASTOLIC BLOOD PRESSURE: 53 MMHG | BODY MASS INDEX: 24.66 KG/M2 | OXYGEN SATURATION: 96 % | RESPIRATION RATE: 20 BRPM | TEMPERATURE: 99.4 F | HEART RATE: 107 BPM | WEIGHT: 143.74 LBS | SYSTOLIC BLOOD PRESSURE: 108 MMHG

## 2022-01-01 VITALS
RESPIRATION RATE: 18 BRPM | DIASTOLIC BLOOD PRESSURE: 69 MMHG | TEMPERATURE: 98.9 F | OXYGEN SATURATION: 98 % | HEART RATE: 100 BPM | SYSTOLIC BLOOD PRESSURE: 122 MMHG

## 2022-01-01 VITALS
RESPIRATION RATE: 20 BRPM | HEART RATE: 107 BPM | OXYGEN SATURATION: 96 % | WEIGHT: 143.74 LBS | TEMPERATURE: 99.4 F | DIASTOLIC BLOOD PRESSURE: 53 MMHG | HEIGHT: 64 IN | SYSTOLIC BLOOD PRESSURE: 108 MMHG | BODY MASS INDEX: 24.54 KG/M2

## 2022-01-01 VITALS
OXYGEN SATURATION: 99 % | WEIGHT: 138.45 LBS | RESPIRATION RATE: 18 BRPM | SYSTOLIC BLOOD PRESSURE: 117 MMHG | DIASTOLIC BLOOD PRESSURE: 47 MMHG | BODY MASS INDEX: 23.64 KG/M2 | HEART RATE: 91 BPM | TEMPERATURE: 99 F | HEIGHT: 64 IN

## 2022-01-01 VITALS
SYSTOLIC BLOOD PRESSURE: 119 MMHG | DIASTOLIC BLOOD PRESSURE: 66 MMHG | TEMPERATURE: 98.8 F | OXYGEN SATURATION: 100 % | HEART RATE: 100 BPM | RESPIRATION RATE: 18 BRPM

## 2022-01-01 VITALS
SYSTOLIC BLOOD PRESSURE: 122 MMHG | WEIGHT: 145.06 LBS | HEART RATE: 87 BPM | DIASTOLIC BLOOD PRESSURE: 68 MMHG | OXYGEN SATURATION: 100 % | RESPIRATION RATE: 18 BRPM | BODY MASS INDEX: 24.14 KG/M2

## 2022-01-01 VITALS
HEART RATE: 98 BPM | DIASTOLIC BLOOD PRESSURE: 60 MMHG | RESPIRATION RATE: 18 BRPM | SYSTOLIC BLOOD PRESSURE: 128 MMHG | TEMPERATURE: 99 F | OXYGEN SATURATION: 100 %

## 2022-01-01 VITALS
WEIGHT: 147.49 LBS | BODY MASS INDEX: 24.54 KG/M2 | SYSTOLIC BLOOD PRESSURE: 136 MMHG | OXYGEN SATURATION: 99 % | DIASTOLIC BLOOD PRESSURE: 66 MMHG | RESPIRATION RATE: 20 BRPM | TEMPERATURE: 98.3 F | HEART RATE: 96 BPM

## 2022-01-01 VITALS
DIASTOLIC BLOOD PRESSURE: 63 MMHG | OXYGEN SATURATION: 98 % | TEMPERATURE: 98.8 F | HEART RATE: 103 BPM | SYSTOLIC BLOOD PRESSURE: 132 MMHG | RESPIRATION RATE: 18 BRPM

## 2022-01-01 VITALS
RESPIRATION RATE: 17 BRPM | DIASTOLIC BLOOD PRESSURE: 49 MMHG | OXYGEN SATURATION: 97 % | SYSTOLIC BLOOD PRESSURE: 106 MMHG | HEART RATE: 94 BPM | TEMPERATURE: 98.8 F | WEIGHT: 148.37 LBS | BODY MASS INDEX: 24.69 KG/M2

## 2022-01-01 VITALS
OXYGEN SATURATION: 99 % | RESPIRATION RATE: 18 BRPM | SYSTOLIC BLOOD PRESSURE: 117 MMHG | TEMPERATURE: 99 F | BODY MASS INDEX: 23.75 KG/M2 | DIASTOLIC BLOOD PRESSURE: 47 MMHG | WEIGHT: 138.45 LBS | HEART RATE: 91 BPM

## 2022-01-01 VITALS
HEART RATE: 101 BPM | WEIGHT: 146.39 LBS | OXYGEN SATURATION: 100 % | DIASTOLIC BLOOD PRESSURE: 52 MMHG | RESPIRATION RATE: 18 BRPM | BODY MASS INDEX: 24.99 KG/M2 | HEIGHT: 64 IN | SYSTOLIC BLOOD PRESSURE: 116 MMHG | TEMPERATURE: 99 F

## 2022-01-01 DIAGNOSIS — Z78.9 DECREASED ACTIVITIES OF DAILY LIVING (ADL): ICD-10-CM

## 2022-01-01 DIAGNOSIS — Z17.0 MALIGNANT NEOPLASM OF LEFT BREAST IN FEMALE, ESTROGEN RECEPTOR POSITIVE, UNSPECIFIED SITE OF BREAST: ICD-10-CM

## 2022-01-01 DIAGNOSIS — D50.0 IRON DEFICIENCY ANEMIA SECONDARY TO BLOOD LOSS (CHRONIC): ICD-10-CM

## 2022-01-01 DIAGNOSIS — K92.1 GASTROINTESTINAL HEMORRHAGE WITH MELENA: ICD-10-CM

## 2022-01-01 DIAGNOSIS — Z17.0 MALIGNANT NEOPLASM OF BREAST IN FEMALE, ESTROGEN RECEPTOR POSITIVE, UNSPECIFIED LATERALITY, UNSPECIFIED SITE OF BREAST: Primary | ICD-10-CM

## 2022-01-01 DIAGNOSIS — C18.8 OVERLAPPING MALIGNANT NEOPLASM OF COLON: ICD-10-CM

## 2022-01-01 DIAGNOSIS — C50.912 MALIGNANT NEOPLASM OF LEFT BREAST IN FEMALE, ESTROGEN RECEPTOR POSITIVE, UNSPECIFIED SITE OF BREAST: Primary | ICD-10-CM

## 2022-01-01 DIAGNOSIS — D50.0 IRON DEFICIENCY ANEMIA SECONDARY TO BLOOD LOSS (CHRONIC): Primary | ICD-10-CM

## 2022-01-01 DIAGNOSIS — Z17.0 MALIGNANT NEOPLASM OF BREAST IN FEMALE, ESTROGEN RECEPTOR POSITIVE, UNSPECIFIED LATERALITY, UNSPECIFIED SITE OF BREAST: ICD-10-CM

## 2022-01-01 DIAGNOSIS — C18.8 OVERLAPPING MALIGNANT NEOPLASM OF COLON: Primary | ICD-10-CM

## 2022-01-01 DIAGNOSIS — Z45.2 ENCOUNTER FOR ADJUSTMENT OR MANAGEMENT OF VASCULAR ACCESS DEVICE: Primary | ICD-10-CM

## 2022-01-01 DIAGNOSIS — C17.9 ADENOCARCINOMA OF SMALL BOWEL: ICD-10-CM

## 2022-01-01 DIAGNOSIS — D64.9 ANEMIA, UNSPECIFIED TYPE: ICD-10-CM

## 2022-01-01 DIAGNOSIS — C18.2 MALIGNANT NEOPLASM OF ASCENDING COLON: Primary | ICD-10-CM

## 2022-01-01 DIAGNOSIS — Z45.2 ENCOUNTER FOR ADJUSTMENT OR MANAGEMENT OF VASCULAR ACCESS DEVICE: ICD-10-CM

## 2022-01-01 DIAGNOSIS — K90.9 MALABSORPTION OF IRON: ICD-10-CM

## 2022-01-01 DIAGNOSIS — J18.9 PNEUMONIA OF BOTH LUNGS DUE TO INFECTIOUS ORGANISM, UNSPECIFIED PART OF LUNG: ICD-10-CM

## 2022-01-01 DIAGNOSIS — C18.9 MALIGNANT NEOPLASM OF COLON, UNSPECIFIED PART OF COLON: Primary | ICD-10-CM

## 2022-01-01 DIAGNOSIS — D49.9 NEOPLASM: Primary | ICD-10-CM

## 2022-01-01 DIAGNOSIS — C79.51 METASTASIS TO BONE: ICD-10-CM

## 2022-01-01 DIAGNOSIS — R35.0 URINARY FREQUENCY: Primary | ICD-10-CM

## 2022-01-01 DIAGNOSIS — D64.9 ANEMIA, UNSPECIFIED TYPE: Primary | ICD-10-CM

## 2022-01-01 DIAGNOSIS — R39.9 UTI SYMPTOMS: ICD-10-CM

## 2022-01-01 DIAGNOSIS — C50.919 MALIGNANT NEOPLASM OF BREAST IN FEMALE, ESTROGEN RECEPTOR POSITIVE, UNSPECIFIED LATERALITY, UNSPECIFIED SITE OF BREAST: Primary | ICD-10-CM

## 2022-01-01 DIAGNOSIS — Z78.9 CENTRAL VENOUS CATHETER IN PLACE: Primary | ICD-10-CM

## 2022-01-01 DIAGNOSIS — C79.51 SECONDARY MALIGNANT NEOPLASM OF BONE: Primary | ICD-10-CM

## 2022-01-01 DIAGNOSIS — Z13.79 GENETIC TESTING: Primary | ICD-10-CM

## 2022-01-01 DIAGNOSIS — Z85.038 HISTORY OF COLON CANCER: ICD-10-CM

## 2022-01-01 DIAGNOSIS — E86.0 DEHYDRATION: ICD-10-CM

## 2022-01-01 DIAGNOSIS — C50.912 MALIGNANT NEOPLASM OF LEFT BREAST IN FEMALE, ESTROGEN RECEPTOR POSITIVE, UNSPECIFIED SITE OF BREAST: ICD-10-CM

## 2022-01-01 DIAGNOSIS — Z17.0 MALIGNANT NEOPLASM OF LEFT BREAST IN FEMALE, ESTROGEN RECEPTOR POSITIVE, UNSPECIFIED SITE OF BREAST: Primary | ICD-10-CM

## 2022-01-01 DIAGNOSIS — G89.3 CANCER RELATED PAIN: ICD-10-CM

## 2022-01-01 DIAGNOSIS — K92.2 GASTROINTESTINAL HEMORRHAGE, UNSPECIFIED GASTROINTESTINAL HEMORRHAGE TYPE: Primary | ICD-10-CM

## 2022-01-01 DIAGNOSIS — C79.2 METASTASIS TO SKIN: ICD-10-CM

## 2022-01-01 DIAGNOSIS — E87.6 HYPOKALEMIA: ICD-10-CM

## 2022-01-01 DIAGNOSIS — Z85.3 HISTORY OF BREAST CANCER: ICD-10-CM

## 2022-01-01 DIAGNOSIS — C50.919 MALIGNANT NEOPLASM OF BREAST IN FEMALE, ESTROGEN RECEPTOR POSITIVE, UNSPECIFIED LATERALITY, UNSPECIFIED SITE OF BREAST: ICD-10-CM

## 2022-01-01 DIAGNOSIS — R76.8 RED BLOOD CELL ANTIBODY POSITIVE WITH COMPATIBLE PRBC DIFFICULT TO OBTAIN: ICD-10-CM

## 2022-01-01 DIAGNOSIS — C79.51 SECONDARY MALIGNANT NEOPLASM OF BONE: ICD-10-CM

## 2022-01-01 DIAGNOSIS — E86.0 DEHYDRATION: Primary | ICD-10-CM

## 2022-01-01 DIAGNOSIS — R30.0 BURNING WITH URINATION: ICD-10-CM

## 2022-01-01 DIAGNOSIS — R47.81 SLURRED SPEECH: ICD-10-CM

## 2022-01-01 DIAGNOSIS — R73.9 HYPERGLYCEMIA: ICD-10-CM

## 2022-01-01 DIAGNOSIS — Z80.3 FAMILY HISTORY OF MALIGNANT NEOPLASM OF BREAST: ICD-10-CM

## 2022-01-01 DIAGNOSIS — N13.5 OBSTRUCTION OF BOTH URETERS: ICD-10-CM

## 2022-01-01 DIAGNOSIS — N13.30 HYDROURETERONEPHROSIS: ICD-10-CM

## 2022-01-01 DIAGNOSIS — C18.2 MALIGNANT NEOPLASM OF ASCENDING COLON: ICD-10-CM

## 2022-01-01 DIAGNOSIS — R62.7 FAILURE TO THRIVE IN ADULT: ICD-10-CM

## 2022-01-01 DIAGNOSIS — J96.01 ACUTE RESPIRATORY FAILURE WITH HYPOXIA: Primary | ICD-10-CM

## 2022-01-01 DIAGNOSIS — Z85.038 HISTORY OF COLON CANCER: Primary | ICD-10-CM

## 2022-01-01 DIAGNOSIS — C18.8 MALIGNANT NEOPLASM OF OVERLAPPING SITES OF COLON: Primary | ICD-10-CM

## 2022-01-01 DIAGNOSIS — E61.1 IRON DEFICIENCY: ICD-10-CM

## 2022-01-01 DIAGNOSIS — R22.9 SKIN NODULE: ICD-10-CM

## 2022-01-01 DIAGNOSIS — R26.2 DIFFICULTY WALKING: ICD-10-CM

## 2022-01-01 DIAGNOSIS — N13.5 OBSTRUCTION OF BOTH URETERS: Primary | ICD-10-CM

## 2022-01-01 DIAGNOSIS — C79.51 METASTASIS TO BONE: Primary | ICD-10-CM

## 2022-01-01 DIAGNOSIS — R53.81 PHYSICAL DECONDITIONING: ICD-10-CM

## 2022-01-01 DIAGNOSIS — N28.89 URETERAL MASS: ICD-10-CM

## 2022-01-01 LAB
ABO GROUP BLD: NORMAL
ACANTHOCYTES BLD QL SMEAR: NORMAL
ALBUMIN SERPL-MCNC: 2.2 G/DL (ref 3.5–5.2)
ALBUMIN SERPL-MCNC: 2.49 G/DL (ref 3.5–5.2)
ALBUMIN SERPL-MCNC: 2.5 G/DL (ref 3.5–5.2)
ALBUMIN SERPL-MCNC: 2.55 G/DL (ref 3.5–5.2)
ALBUMIN SERPL-MCNC: 2.6 G/DL (ref 3.5–5.2)
ALBUMIN SERPL-MCNC: 2.62 G/DL (ref 3.5–5.2)
ALBUMIN SERPL-MCNC: 2.68 G/DL (ref 3.5–5.2)
ALBUMIN SERPL-MCNC: 2.82 G/DL (ref 3.5–5.2)
ALBUMIN SERPL-MCNC: 2.84 G/DL (ref 3.5–5.2)
ALBUMIN SERPL-MCNC: 2.92 G/DL (ref 3.5–5.2)
ALBUMIN SERPL-MCNC: 3.22 G/DL (ref 3.5–5.2)
ALBUMIN SERPL-MCNC: 3.24 G/DL (ref 3.5–5.2)
ALBUMIN SERPL-MCNC: 3.3 G/DL (ref 3.5–5.2)
ALBUMIN SERPL-MCNC: 3.32 G/DL (ref 3.5–5.2)
ALBUMIN SERPL-MCNC: 3.45 G/DL (ref 3.5–5.2)
ALBUMIN SERPL-MCNC: 3.53 G/DL (ref 3.5–5.2)
ALBUMIN SERPL-MCNC: 3.53 G/DL (ref 3.5–5.2)
ALBUMIN SERPL-MCNC: 3.6 G/DL (ref 3.5–5.2)
ALBUMIN SERPL-MCNC: 3.65 G/DL (ref 3.5–5.2)
ALBUMIN SERPL-MCNC: 3.7 G/DL (ref 3.5–5.2)
ALBUMIN SERPL-MCNC: 3.74 G/DL (ref 3.5–5.2)
ALBUMIN/GLOB SERPL: 0.5 G/DL
ALBUMIN/GLOB SERPL: 0.5 G/DL
ALBUMIN/GLOB SERPL: 0.6 G/DL
ALBUMIN/GLOB SERPL: 0.7 G/DL
ALBUMIN/GLOB SERPL: 0.9 G/DL
ALBUMIN/GLOB SERPL: 1 G/DL
ALBUMIN/GLOB SERPL: 1 G/DL
ALBUMIN/GLOB SERPL: 1.1 G/DL
ALP SERPL-CCNC: 119 U/L (ref 39–117)
ALP SERPL-CCNC: 123 U/L (ref 39–117)
ALP SERPL-CCNC: 128 U/L (ref 39–117)
ALP SERPL-CCNC: 130 U/L (ref 39–117)
ALP SERPL-CCNC: 130 U/L (ref 39–117)
ALP SERPL-CCNC: 131 U/L (ref 39–117)
ALP SERPL-CCNC: 133 U/L (ref 39–117)
ALP SERPL-CCNC: 139 U/L (ref 39–117)
ALP SERPL-CCNC: 143 U/L (ref 39–117)
ALP SERPL-CCNC: 155 U/L (ref 39–117)
ALP SERPL-CCNC: 155 U/L (ref 39–117)
ALP SERPL-CCNC: 178 U/L (ref 39–117)
ALP SERPL-CCNC: 190 U/L (ref 39–117)
ALP SERPL-CCNC: 191 U/L (ref 39–117)
ALP SERPL-CCNC: 193 U/L (ref 39–117)
ALP SERPL-CCNC: 208 U/L (ref 39–117)
ALP SERPL-CCNC: 218 U/L (ref 39–117)
ALP SERPL-CCNC: 219 U/L (ref 39–117)
ALP SERPL-CCNC: 229 U/L (ref 39–117)
ALP SERPL-CCNC: 232 U/L (ref 39–117)
ALP SERPL-CCNC: 234 U/L (ref 39–117)
ALT SERPL W P-5'-P-CCNC: 11 U/L (ref 1–33)
ALT SERPL W P-5'-P-CCNC: 11 U/L (ref 1–33)
ALT SERPL W P-5'-P-CCNC: 12 U/L (ref 1–33)
ALT SERPL W P-5'-P-CCNC: 4 U/L (ref 1–33)
ALT SERPL W P-5'-P-CCNC: 5 U/L (ref 1–33)
ALT SERPL W P-5'-P-CCNC: 6 U/L (ref 1–33)
ALT SERPL W P-5'-P-CCNC: 7 U/L (ref 1–33)
ALT SERPL W P-5'-P-CCNC: 7 U/L (ref 1–33)
ALT SERPL W P-5'-P-CCNC: 8 U/L (ref 1–33)
ALT SERPL W P-5'-P-CCNC: 9 U/L (ref 1–33)
ANION GAP SERPL CALCULATED.3IONS-SCNC: 10.3 MMOL/L (ref 5–15)
ANION GAP SERPL CALCULATED.3IONS-SCNC: 10.6 MMOL/L (ref 5–15)
ANION GAP SERPL CALCULATED.3IONS-SCNC: 10.7 MMOL/L (ref 5–15)
ANION GAP SERPL CALCULATED.3IONS-SCNC: 10.7 MMOL/L (ref 5–15)
ANION GAP SERPL CALCULATED.3IONS-SCNC: 10.9 MMOL/L (ref 5–15)
ANION GAP SERPL CALCULATED.3IONS-SCNC: 10.9 MMOL/L (ref 5–15)
ANION GAP SERPL CALCULATED.3IONS-SCNC: 11 MMOL/L (ref 5–15)
ANION GAP SERPL CALCULATED.3IONS-SCNC: 11.4 MMOL/L (ref 5–15)
ANION GAP SERPL CALCULATED.3IONS-SCNC: 11.8 MMOL/L (ref 5–15)
ANION GAP SERPL CALCULATED.3IONS-SCNC: 11.9 MMOL/L (ref 5–15)
ANION GAP SERPL CALCULATED.3IONS-SCNC: 12.4 MMOL/L (ref 5–15)
ANION GAP SERPL CALCULATED.3IONS-SCNC: 12.6 MMOL/L (ref 5–15)
ANION GAP SERPL CALCULATED.3IONS-SCNC: 12.6 MMOL/L (ref 5–15)
ANION GAP SERPL CALCULATED.3IONS-SCNC: 12.7 MMOL/L (ref 5–15)
ANION GAP SERPL CALCULATED.3IONS-SCNC: 13.3 MMOL/L (ref 5–15)
ANION GAP SERPL CALCULATED.3IONS-SCNC: 13.8 MMOL/L (ref 5–15)
ANION GAP SERPL CALCULATED.3IONS-SCNC: 14.1 MMOL/L (ref 5–15)
ANION GAP SERPL CALCULATED.3IONS-SCNC: 15.9 MMOL/L (ref 5–15)
ANION GAP SERPL CALCULATED.3IONS-SCNC: 8.7 MMOL/L (ref 5–15)
ANION GAP SERPL CALCULATED.3IONS-SCNC: 8.8 MMOL/L (ref 5–15)
ANION GAP SERPL CALCULATED.3IONS-SCNC: 8.9 MMOL/L (ref 5–15)
ANION GAP SERPL CALCULATED.3IONS-SCNC: 9 MMOL/L (ref 5–15)
ANION GAP SERPL CALCULATED.3IONS-SCNC: 9.1 MMOL/L (ref 5–15)
ANISOCYTOSIS BLD QL: ABNORMAL
ANISOCYTOSIS BLD QL: NORMAL
ANTI-C: NORMAL
ANTI-E: NORMAL
ANTI-K: NORMAL
ANTI-S: NORMAL
ANTIBODY TO LOW FREQUENCY ANTIGEN: NORMAL
ANTIBODY TO LOW FREQUENCY ANTIGEN: NORMAL
ASCENDING AORTA: 4.4 CM
AST SERPL-CCNC: 18 U/L (ref 1–32)
AST SERPL-CCNC: 18 U/L (ref 1–32)
AST SERPL-CCNC: 19 U/L (ref 1–32)
AST SERPL-CCNC: 21 U/L (ref 1–32)
AST SERPL-CCNC: 22 U/L (ref 1–32)
AST SERPL-CCNC: 22 U/L (ref 1–32)
AST SERPL-CCNC: 23 U/L (ref 1–32)
AST SERPL-CCNC: 23 U/L (ref 1–32)
AST SERPL-CCNC: 24 U/L (ref 1–32)
AST SERPL-CCNC: 35 U/L (ref 1–32)
AST SERPL-CCNC: 39 U/L (ref 1–32)
AST SERPL-CCNC: 42 U/L (ref 1–32)
AST SERPL-CCNC: 54 U/L (ref 1–32)
AST SERPL-CCNC: 56 U/L (ref 1–32)
AST SERPL-CCNC: 57 U/L (ref 1–32)
AST SERPL-CCNC: 58 U/L (ref 1–32)
AST SERPL-CCNC: 58 U/L (ref 1–32)
AST SERPL-CCNC: 59 U/L (ref 1–32)
AST SERPL-CCNC: 60 U/L (ref 1–32)
AST SERPL-CCNC: 62 U/L (ref 1–32)
AST SERPL-CCNC: 74 U/L (ref 1–32)
BACTERIA SPEC AEROBE CULT: NORMAL
BACTERIA UR QL AUTO: ABNORMAL /HPF
BASOPHILS # BLD AUTO: 0.05 10*3/MM3 (ref 0–0.2)
BASOPHILS # BLD AUTO: 0.05 10*3/MM3 (ref 0–0.2)
BASOPHILS # BLD AUTO: 0.06 10*3/MM3 (ref 0–0.2)
BASOPHILS # BLD AUTO: 0.07 10*3/MM3 (ref 0–0.2)
BASOPHILS # BLD AUTO: 0.08 10*3/MM3 (ref 0–0.2)
BASOPHILS # BLD AUTO: 0.09 10*3/MM3 (ref 0–0.2)
BASOPHILS # BLD AUTO: 0.1 10*3/MM3 (ref 0–0.2)
BASOPHILS # BLD AUTO: 0.11 10*3/MM3 (ref 0–0.2)
BASOPHILS # BLD AUTO: 0.13 10*3/MM3 (ref 0–0.2)
BASOPHILS # BLD AUTO: 0.14 10*3/MM3 (ref 0–0.2)
BASOPHILS # BLD AUTO: 0.14 10*3/MM3 (ref 0–0.2)
BASOPHILS # BLD AUTO: 0.16 10*3/MM3 (ref 0–0.2)
BASOPHILS # BLD MANUAL: 0.13 10*3/MM3 (ref 0–0.2)
BASOPHILS NFR BLD AUTO: 0.3 % (ref 0–1.5)
BASOPHILS NFR BLD AUTO: 0.3 % (ref 0–1.5)
BASOPHILS NFR BLD AUTO: 0.5 % (ref 0–1.5)
BASOPHILS NFR BLD AUTO: 0.6 % (ref 0–1.5)
BASOPHILS NFR BLD AUTO: 0.7 % (ref 0–1.5)
BASOPHILS NFR BLD AUTO: 0.8 % (ref 0–1.5)
BASOPHILS NFR BLD AUTO: 1.2 % (ref 0–1.5)
BASOPHILS NFR BLD AUTO: 1.2 % (ref 0–1.5)
BASOPHILS NFR BLD AUTO: 1.3 % (ref 0–1.5)
BASOPHILS NFR BLD AUTO: 1.3 % (ref 0–1.5)
BASOPHILS NFR BLD MANUAL: 1 % (ref 0–1.5)
BB REFERENCE LAB SENDOUT: NORMAL
BH BB BLOOD EXPIRATION DATE: NORMAL
BH BB BLOOD TYPE BARCODE: 9500
BH BB BLOOD TYPE BARCODE: NORMAL
BH BB DISPENSE STATUS: NORMAL
BH BB PRODUCT CODE: NORMAL
BH BB UNIT NUMBER: NORMAL
BH CV ECHO MEAS - AO ROOT DIAM: 3.6 CM
BH CV ECHO MEAS - EDV(MOD-SP2): 80 ML
BH CV ECHO MEAS - EDV(MOD-SP4): 107 ML
BH CV ECHO MEAS - EF(MOD-BP): 56 %
BH CV ECHO MEAS - ESV(MOD-SP2): 38 ML
BH CV ECHO MEAS - ESV(MOD-SP4): 48 ML
BH CV ECHO MEAS - IVSD: 1.3 CM
BH CV ECHO MEAS - LA DIMENSION(2D): 3.7 CM
BH CV ECHO MEAS - LAT PEAK E' VEL: 7 CM/SEC
BH CV ECHO MEAS - LVIDD: 4.6 CM
BH CV ECHO MEAS - LVIDS: 3.2 CM
BH CV ECHO MEAS - LVOT DIAM: 2 CM
BH CV ECHO MEAS - LVPWD: 1.3 CM
BH CV ECHO MEAS - MED PEAK E' VEL: 4.13 CM/SEC
BH CV ECHO MEAS - MV A MAX VEL: 111 CM/SEC
BH CV ECHO MEAS - MV DEC TIME: 169 MSEC
BH CV ECHO MEAS - MV E MAX VEL: 101 CM/SEC
BH CV ECHO MEAS - MV E/A: 0.9
BH CV ECHO MEAS - RAP SYSTOLE: 3 MMHG
BH CV ECHO MEAS - RVDD: 2.4 CM
BH CV ECHO MEAS - RVSP: 28 MMHG
BH CV ECHO MEAS - TR MAX PG: 25 MMHG
BH CV ECHO MEAS - TR MAX VEL: 250 CM/SEC
BH CV ECHO MEASUREMENTS AVERAGE E/E' RATIO: 18.15
BILIRUB BLD-MCNC: NEGATIVE MG/DL
BILIRUB SERPL-MCNC: 0.2 MG/DL (ref 0–1.2)
BILIRUB SERPL-MCNC: 0.3 MG/DL (ref 0–1.2)
BILIRUB SERPL-MCNC: 0.4 MG/DL (ref 0–1.2)
BILIRUB SERPL-MCNC: 0.6 MG/DL (ref 0–1.2)
BILIRUB SERPL-MCNC: <0.2 MG/DL (ref 0–1.2)
BILIRUB SERPL-MCNC: <0.2 MG/DL (ref 0–1.2)
BILIRUB UR QL STRIP: NEGATIVE
BLD GP AB SCN SERPL QL: POSITIVE
BUN SERPL-MCNC: 10 MG/DL (ref 8–23)
BUN SERPL-MCNC: 12 MG/DL (ref 8–23)
BUN SERPL-MCNC: 12 MG/DL (ref 8–23)
BUN SERPL-MCNC: 13 MG/DL (ref 8–23)
BUN SERPL-MCNC: 13 MG/DL (ref 8–23)
BUN SERPL-MCNC: 14 MG/DL (ref 8–23)
BUN SERPL-MCNC: 14 MG/DL (ref 8–23)
BUN SERPL-MCNC: 15 MG/DL (ref 8–23)
BUN SERPL-MCNC: 15 MG/DL (ref 8–23)
BUN SERPL-MCNC: 17 MG/DL (ref 8–23)
BUN SERPL-MCNC: 17 MG/DL (ref 8–23)
BUN SERPL-MCNC: 18 MG/DL (ref 8–23)
BUN SERPL-MCNC: 19 MG/DL (ref 8–23)
BUN SERPL-MCNC: 20 MG/DL (ref 8–23)
BUN SERPL-MCNC: 20 MG/DL (ref 8–23)
BUN SERPL-MCNC: 22 MG/DL (ref 8–23)
BUN SERPL-MCNC: 22 MG/DL (ref 8–23)
BUN SERPL-MCNC: 23 MG/DL (ref 8–23)
BUN SERPL-MCNC: 24 MG/DL (ref 8–23)
BUN SERPL-MCNC: 25 MG/DL (ref 8–23)
BUN SERPL-MCNC: 27 MG/DL (ref 8–23)
BUN/CREAT SERPL: 10.6 (ref 7–25)
BUN/CREAT SERPL: 12.5 (ref 7–25)
BUN/CREAT SERPL: 12.9 (ref 7–25)
BUN/CREAT SERPL: 13.3 (ref 7–25)
BUN/CREAT SERPL: 14.6 (ref 7–25)
BUN/CREAT SERPL: 16.3 (ref 7–25)
BUN/CREAT SERPL: 16.7 (ref 7–25)
BUN/CREAT SERPL: 16.8 (ref 7–25)
BUN/CREAT SERPL: 17 (ref 7–25)
BUN/CREAT SERPL: 17.1 (ref 7–25)
BUN/CREAT SERPL: 17.3 (ref 7–25)
BUN/CREAT SERPL: 17.9 (ref 7–25)
BUN/CREAT SERPL: 20.2 (ref 7–25)
BUN/CREAT SERPL: 20.9 (ref 7–25)
BUN/CREAT SERPL: 21.2 (ref 7–25)
BUN/CREAT SERPL: 21.8 (ref 7–25)
BUN/CREAT SERPL: 23.4 (ref 7–25)
BUN/CREAT SERPL: 23.7 (ref 7–25)
BUN/CREAT SERPL: 23.8 (ref 7–25)
BUN/CREAT SERPL: 24.2 (ref 7–25)
BUN/CREAT SERPL: 25 (ref 7–25)
BUN/CREAT SERPL: 25.8 (ref 7–25)
BUN/CREAT SERPL: 29.7 (ref 7–25)
BURR CELLS BLD QL SMEAR: NORMAL
CALCIUM SPEC-SCNC: 7.4 MG/DL (ref 8.6–10.5)
CALCIUM SPEC-SCNC: 7.6 MG/DL (ref 8.6–10.5)
CALCIUM SPEC-SCNC: 7.7 MG/DL (ref 8.6–10.5)
CALCIUM SPEC-SCNC: 7.8 MG/DL (ref 8.6–10.5)
CALCIUM SPEC-SCNC: 7.8 MG/DL (ref 8.6–10.5)
CALCIUM SPEC-SCNC: 7.9 MG/DL (ref 8.6–10.5)
CALCIUM SPEC-SCNC: 8 MG/DL (ref 8.6–10.5)
CALCIUM SPEC-SCNC: 8 MG/DL (ref 8.6–10.5)
CALCIUM SPEC-SCNC: 8.1 MG/DL (ref 8.6–10.5)
CALCIUM SPEC-SCNC: 8.3 MG/DL (ref 8.6–10.5)
CALCIUM SPEC-SCNC: 8.4 MG/DL (ref 8.6–10.5)
CALCIUM SPEC-SCNC: 8.4 MG/DL (ref 8.6–10.5)
CALCIUM SPEC-SCNC: 8.5 MG/DL (ref 8.6–10.5)
CALCIUM SPEC-SCNC: 8.5 MG/DL (ref 8.6–10.5)
CALCIUM SPEC-SCNC: 8.6 MG/DL (ref 8.6–10.5)
CALCIUM SPEC-SCNC: 8.7 MG/DL (ref 8.6–10.5)
CALCIUM SPEC-SCNC: 8.8 MG/DL (ref 8.6–10.5)
CALCIUM SPEC-SCNC: 9 MG/DL (ref 8.6–10.5)
CEA SERPL-MCNC: 12.6 NG/ML
CEA SERPL-MCNC: 15.1 NG/ML
CEA SERPL-MCNC: 16.9 NG/ML
CEA SERPL-MCNC: 23 NG/ML
CEA SERPL-MCNC: 26.7 NG/ML
CHLORIDE SERPL-SCNC: 100 MMOL/L (ref 98–107)
CHLORIDE SERPL-SCNC: 100 MMOL/L (ref 98–107)
CHLORIDE SERPL-SCNC: 102 MMOL/L (ref 98–107)
CHLORIDE SERPL-SCNC: 103 MMOL/L (ref 98–107)
CHLORIDE SERPL-SCNC: 104 MMOL/L (ref 98–107)
CHLORIDE SERPL-SCNC: 105 MMOL/L (ref 98–107)
CHLORIDE SERPL-SCNC: 105 MMOL/L (ref 98–107)
CHLORIDE SERPL-SCNC: 106 MMOL/L (ref 98–107)
CHLORIDE SERPL-SCNC: 106 MMOL/L (ref 98–107)
CHLORIDE SERPL-SCNC: 110 MMOL/L (ref 98–107)
CHLORIDE SERPL-SCNC: 111 MMOL/L (ref 98–107)
CHLORIDE SERPL-SCNC: 112 MMOL/L (ref 98–107)
CHLORIDE SERPL-SCNC: 112 MMOL/L (ref 98–107)
CHLORIDE SERPL-SCNC: 94 MMOL/L (ref 98–107)
CHLORIDE SERPL-SCNC: 95 MMOL/L (ref 98–107)
CHLORIDE SERPL-SCNC: 95 MMOL/L (ref 98–107)
CHLORIDE SERPL-SCNC: 98 MMOL/L (ref 98–107)
CHLORIDE SERPL-SCNC: 99 MMOL/L (ref 98–107)
CLARITY UR: ABNORMAL
CLARITY, POC: ABNORMAL
CO2 SERPL-SCNC: 14.1 MMOL/L (ref 22–29)
CO2 SERPL-SCNC: 14.2 MMOL/L (ref 22–29)
CO2 SERPL-SCNC: 14.6 MMOL/L (ref 22–29)
CO2 SERPL-SCNC: 15.4 MMOL/L (ref 22–29)
CO2 SERPL-SCNC: 16.7 MMOL/L (ref 22–29)
CO2 SERPL-SCNC: 18.1 MMOL/L (ref 22–29)
CO2 SERPL-SCNC: 19 MMOL/L (ref 22–29)
CO2 SERPL-SCNC: 19 MMOL/L (ref 22–29)
CO2 SERPL-SCNC: 19.2 MMOL/L (ref 22–29)
CO2 SERPL-SCNC: 19.3 MMOL/L (ref 22–29)
CO2 SERPL-SCNC: 19.6 MMOL/L (ref 22–29)
CO2 SERPL-SCNC: 20.1 MMOL/L (ref 22–29)
CO2 SERPL-SCNC: 20.1 MMOL/L (ref 22–29)
CO2 SERPL-SCNC: 20.2 MMOL/L (ref 22–29)
CO2 SERPL-SCNC: 20.3 MMOL/L (ref 22–29)
CO2 SERPL-SCNC: 20.7 MMOL/L (ref 22–29)
CO2 SERPL-SCNC: 21.1 MMOL/L (ref 22–29)
CO2 SERPL-SCNC: 21.9 MMOL/L (ref 22–29)
CO2 SERPL-SCNC: 22.3 MMOL/L (ref 22–29)
CO2 SERPL-SCNC: 22.4 MMOL/L (ref 22–29)
CO2 SERPL-SCNC: 22.4 MMOL/L (ref 22–29)
CO2 SERPL-SCNC: 23.3 MMOL/L (ref 22–29)
CO2 SERPL-SCNC: 23.9 MMOL/L (ref 22–29)
COLD AUTO ANTIBODY: NORMAL
COLOR UR: YELLOW
COLOR UR: YELLOW
CREAT BLDA-MCNC: 1 MG/DL
CREAT SERPL-MCNC: 0.78 MG/DL (ref 0.57–1)
CREAT SERPL-MCNC: 0.8 MG/DL (ref 0.57–1)
CREAT SERPL-MCNC: 0.82 MG/DL (ref 0.57–1)
CREAT SERPL-MCNC: 0.84 MG/DL (ref 0.57–1)
CREAT SERPL-MCNC: 0.85 MG/DL (ref 0.57–1)
CREAT SERPL-MCNC: 0.9 MG/DL (ref 0.57–1)
CREAT SERPL-MCNC: 0.9 MG/DL (ref 0.57–1)
CREAT SERPL-MCNC: 0.91 MG/DL (ref 0.57–1)
CREAT SERPL-MCNC: 0.91 MG/DL (ref 0.57–1)
CREAT SERPL-MCNC: 0.94 MG/DL (ref 0.57–1)
CREAT SERPL-MCNC: 0.96 MG/DL (ref 0.57–1)
CREAT SERPL-MCNC: 0.97 MG/DL (ref 0.57–1)
CREAT SERPL-MCNC: 0.97 MG/DL (ref 0.57–1)
CREAT SERPL-MCNC: 0.99 MG/DL (ref 0.57–1)
CREAT SERPL-MCNC: 0.99 MG/DL (ref 0.57–1)
CREAT SERPL-MCNC: 1 MG/DL (ref 0.57–1)
CREAT SERPL-MCNC: 1 MG/DL (ref 0.57–1)
CREAT SERPL-MCNC: 1.01 MG/DL (ref 0.57–1)
CREAT SERPL-MCNC: 1.03 MG/DL (ref 0.57–1)
CREAT SERPL-MCNC: 1.07 MG/DL (ref 0.57–1)
CREAT SERPL-MCNC: 1.27 MG/DL (ref 0.57–1)
CROSSMATCH INTERPRETATION: NORMAL
CYTO UR: NORMAL
D-LACTATE SERPL-SCNC: 1.3 MMOL/L (ref 0.5–2)
D-LACTATE SERPL-SCNC: 1.7 MMOL/L (ref 0.5–2)
DEPRECATED RDW RBC AUTO: 62.6 FL (ref 37–54)
DEPRECATED RDW RBC AUTO: 63.8 FL (ref 37–54)
DEPRECATED RDW RBC AUTO: 64.5 FL (ref 37–54)
DEPRECATED RDW RBC AUTO: 64.6 FL (ref 37–54)
DEPRECATED RDW RBC AUTO: 65.6 FL (ref 37–54)
DEPRECATED RDW RBC AUTO: 66.1 FL (ref 37–54)
DEPRECATED RDW RBC AUTO: 66.3 FL (ref 37–54)
DEPRECATED RDW RBC AUTO: 66.5 FL (ref 37–54)
DEPRECATED RDW RBC AUTO: 67.1 FL (ref 37–54)
DEPRECATED RDW RBC AUTO: 67.8 FL (ref 37–54)
DEPRECATED RDW RBC AUTO: 68 FL (ref 37–54)
DEPRECATED RDW RBC AUTO: 68.8 FL (ref 37–54)
DEPRECATED RDW RBC AUTO: 70.4 FL (ref 37–54)
DEPRECATED RDW RBC AUTO: 71.2 FL (ref 37–54)
DEPRECATED RDW RBC AUTO: 71.8 FL (ref 37–54)
DEPRECATED RDW RBC AUTO: 72.5 FL (ref 37–54)
DEPRECATED RDW RBC AUTO: 72.8 FL (ref 37–54)
DEPRECATED RDW RBC AUTO: 73.3 FL (ref 37–54)
DEPRECATED RDW RBC AUTO: 76.9 FL (ref 37–54)
DEPRECATED RDW RBC AUTO: 77.4 FL (ref 37–54)
DEPRECATED RDW RBC AUTO: 78 FL (ref 37–54)
DEPRECATED RDW RBC AUTO: 82.2 FL (ref 37–54)
DEPRECATED RDW RBC AUTO: 82.6 FL (ref 37–54)
DEPRECATED RDW RBC AUTO: 86.8 FL (ref 37–54)
DIGOXIN SERPL-MCNC: <0.3 NG/ML (ref 0.6–1.2)
DIGOXIN SERPL-MCNC: <0.3 NG/ML (ref 0.6–1.2)
EGFRCR SERPLBLD CKD-EPI 2021: 44.7 ML/MIN/1.73
EGFRCR SERPLBLD CKD-EPI 2021: 55 ML/MIN/1.73
EGFRCR SERPLBLD CKD-EPI 2021: 59.3 ML/MIN/1.73
EGFRCR SERPLBLD CKD-EPI 2021: 59.6 ML/MIN/1.73
EGFRCR SERPLBLD CKD-EPI 2021: 59.6 ML/MIN/1.73
EGFRCR SERPLBLD CKD-EPI 2021: 60 ML/MIN/1.73
EGFRCR SERPLBLD CKD-EPI 2021: 60.7 ML/MIN/1.73
EGFRCR SERPLBLD CKD-EPI 2021: 61.8 ML/MIN/1.73
EGFRCR SERPLBLD CKD-EPI 2021: 61.8 ML/MIN/1.73
EGFRCR SERPLBLD CKD-EPI 2021: 62.6 ML/MIN/1.73
EGFRCR SERPLBLD CKD-EPI 2021: 64.2 ML/MIN/1.73
EGFRCR SERPLBLD CKD-EPI 2021: 66.8 ML/MIN/1.73
EGFRCR SERPLBLD CKD-EPI 2021: 67.6 ML/MIN/1.73
EGFRCR SERPLBLD CKD-EPI 2021: 72.4 ML/MIN/1.73
EGFRCR SERPLBLD CKD-EPI 2021: 73.5 ML/MIN/1.73
EGFRCR SERPLBLD CKD-EPI 2021: 75.6 ML/MIN/1.73
EGFRCR SERPLBLD CKD-EPI 2021: 77.9 ML/MIN/1.73
EGFRCR SERPLBLD CKD-EPI 2021: 80.3 ML/MIN/1.73
EOSINOPHIL # BLD AUTO: 0.01 10*3/MM3 (ref 0–0.4)
EOSINOPHIL # BLD AUTO: 0.02 10*3/MM3 (ref 0–0.4)
EOSINOPHIL # BLD AUTO: 0.03 10*3/MM3 (ref 0–0.4)
EOSINOPHIL # BLD AUTO: 0.04 10*3/MM3 (ref 0–0.4)
EOSINOPHIL # BLD AUTO: 0.05 10*3/MM3 (ref 0–0.4)
EOSINOPHIL # BLD AUTO: 0.06 10*3/MM3 (ref 0–0.4)
EOSINOPHIL # BLD AUTO: 0.07 10*3/MM3 (ref 0–0.4)
EOSINOPHIL # BLD AUTO: 0.08 10*3/MM3 (ref 0–0.4)
EOSINOPHIL # BLD AUTO: 0.09 10*3/MM3 (ref 0–0.4)
EOSINOPHIL # BLD AUTO: 0.1 10*3/MM3 (ref 0–0.4)
EOSINOPHIL # BLD AUTO: 0.1 10*3/MM3 (ref 0–0.4)
EOSINOPHIL # BLD AUTO: 0.11 10*3/MM3 (ref 0–0.4)
EOSINOPHIL # BLD AUTO: 0.12 10*3/MM3 (ref 0–0.4)
EOSINOPHIL # BLD AUTO: 0.19 10*3/MM3 (ref 0–0.4)
EOSINOPHIL # BLD MANUAL: 0.39 10*3/MM3 (ref 0–0.4)
EOSINOPHIL NFR BLD AUTO: 0.1 % (ref 0.3–6.2)
EOSINOPHIL NFR BLD AUTO: 0.2 % (ref 0.3–6.2)
EOSINOPHIL NFR BLD AUTO: 0.3 % (ref 0.3–6.2)
EOSINOPHIL NFR BLD AUTO: 0.4 % (ref 0.3–6.2)
EOSINOPHIL NFR BLD AUTO: 0.4 % (ref 0.3–6.2)
EOSINOPHIL NFR BLD AUTO: 0.5 % (ref 0.3–6.2)
EOSINOPHIL NFR BLD AUTO: 0.5 % (ref 0.3–6.2)
EOSINOPHIL NFR BLD AUTO: 0.6 % (ref 0.3–6.2)
EOSINOPHIL NFR BLD AUTO: 0.6 % (ref 0.3–6.2)
EOSINOPHIL NFR BLD AUTO: 0.7 % (ref 0.3–6.2)
EOSINOPHIL NFR BLD AUTO: 0.8 % (ref 0.3–6.2)
EOSINOPHIL NFR BLD AUTO: 0.8 % (ref 0.3–6.2)
EOSINOPHIL NFR BLD AUTO: 0.9 % (ref 0.3–6.2)
EOSINOPHIL NFR BLD AUTO: 1 % (ref 0.3–6.2)
EOSINOPHIL NFR BLD AUTO: 1.1 % (ref 0.3–6.2)
EOSINOPHIL NFR BLD AUTO: 1.7 % (ref 0.3–6.2)
EOSINOPHIL NFR BLD MANUAL: 3 % (ref 0.3–6.2)
ERYTHROCYTE [DISTWIDTH] IN BLOOD BY AUTOMATED COUNT: 18.1 % (ref 12.3–15.4)
ERYTHROCYTE [DISTWIDTH] IN BLOOD BY AUTOMATED COUNT: 18.4 % (ref 12.3–15.4)
ERYTHROCYTE [DISTWIDTH] IN BLOOD BY AUTOMATED COUNT: 18.8 % (ref 12.3–15.4)
ERYTHROCYTE [DISTWIDTH] IN BLOOD BY AUTOMATED COUNT: 19 % (ref 12.3–15.4)
ERYTHROCYTE [DISTWIDTH] IN BLOOD BY AUTOMATED COUNT: 19.3 % (ref 12.3–15.4)
ERYTHROCYTE [DISTWIDTH] IN BLOOD BY AUTOMATED COUNT: 19.4 % (ref 12.3–15.4)
ERYTHROCYTE [DISTWIDTH] IN BLOOD BY AUTOMATED COUNT: 19.5 % (ref 12.3–15.4)
ERYTHROCYTE [DISTWIDTH] IN BLOOD BY AUTOMATED COUNT: 19.5 % (ref 12.3–15.4)
ERYTHROCYTE [DISTWIDTH] IN BLOOD BY AUTOMATED COUNT: 19.6 % (ref 12.3–15.4)
ERYTHROCYTE [DISTWIDTH] IN BLOOD BY AUTOMATED COUNT: 19.6 % (ref 12.3–15.4)
ERYTHROCYTE [DISTWIDTH] IN BLOOD BY AUTOMATED COUNT: 20 % (ref 12.3–15.4)
ERYTHROCYTE [DISTWIDTH] IN BLOOD BY AUTOMATED COUNT: 20 % (ref 12.3–15.4)
ERYTHROCYTE [DISTWIDTH] IN BLOOD BY AUTOMATED COUNT: 20.2 % (ref 12.3–15.4)
ERYTHROCYTE [DISTWIDTH] IN BLOOD BY AUTOMATED COUNT: 20.3 % (ref 12.3–15.4)
ERYTHROCYTE [DISTWIDTH] IN BLOOD BY AUTOMATED COUNT: 20.8 % (ref 12.3–15.4)
ERYTHROCYTE [DISTWIDTH] IN BLOOD BY AUTOMATED COUNT: 20.9 % (ref 12.3–15.4)
ERYTHROCYTE [DISTWIDTH] IN BLOOD BY AUTOMATED COUNT: 20.9 % (ref 12.3–15.4)
ERYTHROCYTE [DISTWIDTH] IN BLOOD BY AUTOMATED COUNT: 21.7 % (ref 12.3–15.4)
ERYTHROCYTE [DISTWIDTH] IN BLOOD BY AUTOMATED COUNT: 22.8 % (ref 12.3–15.4)
ERYTHROCYTE [DISTWIDTH] IN BLOOD BY AUTOMATED COUNT: 23 % (ref 12.3–15.4)
ERYTHROCYTE [DISTWIDTH] IN BLOOD BY AUTOMATED COUNT: 23 % (ref 12.3–15.4)
ERYTHROCYTE [DISTWIDTH] IN BLOOD BY AUTOMATED COUNT: 23.9 % (ref 12.3–15.4)
ERYTHROCYTE [DISTWIDTH] IN BLOOD BY AUTOMATED COUNT: 24 % (ref 12.3–15.4)
ERYTHROCYTE [DISTWIDTH] IN BLOOD BY AUTOMATED COUNT: 24.6 % (ref 12.3–15.4)
EXPIRATION DATE: ABNORMAL
FERRITIN SERPL-MCNC: 188 NG/ML (ref 13–150)
FERRITIN SERPL-MCNC: 440.3 NG/ML (ref 13–150)
FERRITIN SERPL-MCNC: 568.7 NG/ML (ref 13–150)
FOLATE SERPL-MCNC: 6.02 NG/ML (ref 4.78–24.2)
GFR SERPL CREATININE-BSD FRML MDRD: 53 ML/MIN/1.73
GFR SERPL CREATININE-BSD FRML MDRD: 54 ML/MIN/1.73
GFR SERPL CREATININE-BSD FRML MDRD: 54 ML/MIN/1.73
GFR SERPL CREATININE-BSD FRML MDRD: 55 ML/MIN/1.73
GFR SERPL CREATININE-BSD FRML MDRD: 61 ML/MIN/1.73
GFR SERPL CREATININE-BSD FRML MDRD: 62 ML/MIN/1.73
GIANT PLATELETS: NORMAL
GLOBULIN UR ELPH-MCNC: 3.1 GM/DL
GLOBULIN UR ELPH-MCNC: 3.2 GM/DL
GLOBULIN UR ELPH-MCNC: 3.2 GM/DL
GLOBULIN UR ELPH-MCNC: 3.3 GM/DL
GLOBULIN UR ELPH-MCNC: 3.6 GM/DL
GLOBULIN UR ELPH-MCNC: 3.7 GM/DL
GLOBULIN UR ELPH-MCNC: 3.7 GM/DL
GLOBULIN UR ELPH-MCNC: 3.8 GM/DL
GLOBULIN UR ELPH-MCNC: 3.9 GM/DL
GLOBULIN UR ELPH-MCNC: 4 GM/DL
GLOBULIN UR ELPH-MCNC: 4.2 GM/DL
GLOBULIN UR ELPH-MCNC: 4.3 GM/DL
GLOBULIN UR ELPH-MCNC: 4.3 GM/DL
GLOBULIN UR ELPH-MCNC: 4.4 GM/DL
GLOBULIN UR ELPH-MCNC: 4.4 GM/DL
GLOBULIN UR ELPH-MCNC: 4.5 GM/DL
GLOBULIN UR ELPH-MCNC: 4.9 GM/DL
GLUCOSE BLDC GLUCOMTR-MCNC: 126 MG/DL (ref 70–99)
GLUCOSE BLDC GLUCOMTR-MCNC: 144 MG/DL (ref 70–99)
GLUCOSE BLDC GLUCOMTR-MCNC: 166 MG/DL (ref 70–99)
GLUCOSE BLDC GLUCOMTR-MCNC: 336 MG/DL (ref 70–99)
GLUCOSE SERPL-MCNC: 105 MG/DL (ref 65–99)
GLUCOSE SERPL-MCNC: 107 MG/DL (ref 65–99)
GLUCOSE SERPL-MCNC: 122 MG/DL (ref 65–99)
GLUCOSE SERPL-MCNC: 124 MG/DL (ref 65–99)
GLUCOSE SERPL-MCNC: 134 MG/DL (ref 65–99)
GLUCOSE SERPL-MCNC: 137 MG/DL (ref 65–99)
GLUCOSE SERPL-MCNC: 139 MG/DL (ref 65–99)
GLUCOSE SERPL-MCNC: 158 MG/DL (ref 65–99)
GLUCOSE SERPL-MCNC: 158 MG/DL (ref 65–99)
GLUCOSE SERPL-MCNC: 160 MG/DL (ref 65–99)
GLUCOSE SERPL-MCNC: 162 MG/DL (ref 65–99)
GLUCOSE SERPL-MCNC: 164 MG/DL (ref 65–99)
GLUCOSE SERPL-MCNC: 222 MG/DL (ref 65–99)
GLUCOSE SERPL-MCNC: 235 MG/DL (ref 65–99)
GLUCOSE SERPL-MCNC: 299 MG/DL (ref 65–99)
GLUCOSE SERPL-MCNC: 302 MG/DL (ref 65–99)
GLUCOSE SERPL-MCNC: 344 MG/DL (ref 65–99)
GLUCOSE SERPL-MCNC: 352 MG/DL (ref 65–99)
GLUCOSE SERPL-MCNC: 355 MG/DL (ref 65–99)
GLUCOSE SERPL-MCNC: 367 MG/DL (ref 65–99)
GLUCOSE SERPL-MCNC: 63 MG/DL (ref 65–99)
GLUCOSE SERPL-MCNC: 98 MG/DL (ref 65–99)
GLUCOSE SERPL-MCNC: 99 MG/DL (ref 65–99)
GLUCOSE UR STRIP-MCNC: ABNORMAL MG/DL
GLUCOSE UR STRIP-MCNC: ABNORMAL MG/DL
HCT VFR BLD AUTO: 17.7 % (ref 34–46.6)
HCT VFR BLD AUTO: 17.7 % (ref 34–46.6)
HCT VFR BLD AUTO: 18 % (ref 34–46.6)
HCT VFR BLD AUTO: 18.4 % (ref 34–46.6)
HCT VFR BLD AUTO: 18.4 % (ref 34–46.6)
HCT VFR BLD AUTO: 18.5 % (ref 34–46.6)
HCT VFR BLD AUTO: 18.9 % (ref 34–46.6)
HCT VFR BLD AUTO: 19.7 % (ref 34–46.6)
HCT VFR BLD AUTO: 19.9 % (ref 34–46.6)
HCT VFR BLD AUTO: 21.2 % (ref 34–46.6)
HCT VFR BLD AUTO: 21.2 % (ref 34–46.6)
HCT VFR BLD AUTO: 21.3 % (ref 34–46.6)
HCT VFR BLD AUTO: 22.1 % (ref 34–46.6)
HCT VFR BLD AUTO: 22.3 % (ref 34–46.6)
HCT VFR BLD AUTO: 22.4 % (ref 34–46.6)
HCT VFR BLD AUTO: 22.8 % (ref 34–46.6)
HCT VFR BLD AUTO: 23 % (ref 34–46.6)
HCT VFR BLD AUTO: 24.1 % (ref 34–46.6)
HCT VFR BLD AUTO: 24.2 % (ref 34–46.6)
HCT VFR BLD AUTO: 24.4 % (ref 34–46.6)
HCT VFR BLD AUTO: 25.1 % (ref 34–46.6)
HCT VFR BLD AUTO: 25.3 % (ref 34–46.6)
HGB BLD-MCNC: 5.3 G/DL (ref 12–15.9)
HGB BLD-MCNC: 5.4 G/DL (ref 12–15.9)
HGB BLD-MCNC: 5.4 G/DL (ref 12–15.9)
HGB BLD-MCNC: 5.5 G/DL (ref 12–15.9)
HGB BLD-MCNC: 5.6 G/DL (ref 12–15.9)
HGB BLD-MCNC: 5.6 G/DL (ref 12–15.9)
HGB BLD-MCNC: 5.7 G/DL (ref 12–15.9)
HGB BLD-MCNC: 6 G/DL (ref 12–15.9)
HGB BLD-MCNC: 6.2 G/DL (ref 12–15.9)
HGB BLD-MCNC: 6.5 G/DL (ref 12–15.9)
HGB BLD-MCNC: 6.5 G/DL (ref 12–15.9)
HGB BLD-MCNC: 6.6 G/DL (ref 12–15.9)
HGB BLD-MCNC: 6.7 G/DL (ref 12–15.9)
HGB BLD-MCNC: 6.8 G/DL (ref 12–15.9)
HGB BLD-MCNC: 6.9 G/DL (ref 12–15.9)
HGB BLD-MCNC: 7 G/DL (ref 12–15.9)
HGB BLD-MCNC: 7.3 G/DL (ref 12–15.9)
HGB BLD-MCNC: 7.3 G/DL (ref 12–15.9)
HGB BLD-MCNC: 7.4 G/DL (ref 12–15.9)
HGB BLD-MCNC: 7.4 G/DL (ref 12–15.9)
HGB BLD-MCNC: 7.5 G/DL (ref 12–15.9)
HGB BLD-MCNC: 7.5 G/DL (ref 12–15.9)
HGB BLD-MCNC: 7.7 G/DL (ref 12–15.9)
HGB BLD-MCNC: 7.9 G/DL (ref 12–15.9)
HGB UR QL STRIP.AUTO: ABNORMAL
HIGH TITER LOW AVIDITY ANTIBODY: NORMAL
HIGH TITER LOW AVIDITY ANTIBODY: NORMAL
HOLD SPECIMEN: NORMAL
HYALINE CASTS UR QL AUTO: ABNORMAL /LPF
HYPOCHROMIA BLD QL: NORMAL
IMM GRANULOCYTES # BLD AUTO: 0.05 10*3/MM3 (ref 0–0.05)
IMM GRANULOCYTES # BLD AUTO: 0.05 10*3/MM3 (ref 0–0.05)
IMM GRANULOCYTES # BLD AUTO: 0.06 10*3/MM3 (ref 0–0.05)
IMM GRANULOCYTES # BLD AUTO: 0.07 10*3/MM3 (ref 0–0.05)
IMM GRANULOCYTES # BLD AUTO: 0.08 10*3/MM3 (ref 0–0.05)
IMM GRANULOCYTES # BLD AUTO: 0.08 10*3/MM3 (ref 0–0.05)
IMM GRANULOCYTES # BLD AUTO: 0.1 10*3/MM3 (ref 0–0.05)
IMM GRANULOCYTES # BLD AUTO: 0.1 10*3/MM3 (ref 0–0.05)
IMM GRANULOCYTES # BLD AUTO: 0.11 10*3/MM3 (ref 0–0.05)
IMM GRANULOCYTES # BLD AUTO: 0.12 10*3/MM3 (ref 0–0.05)
IMM GRANULOCYTES # BLD AUTO: 0.13 10*3/MM3 (ref 0–0.05)
IMM GRANULOCYTES # BLD AUTO: 0.13 10*3/MM3 (ref 0–0.05)
IMM GRANULOCYTES # BLD AUTO: 0.15 10*3/MM3 (ref 0–0.05)
IMM GRANULOCYTES # BLD AUTO: 0.16 10*3/MM3 (ref 0–0.05)
IMM GRANULOCYTES # BLD AUTO: 0.16 10*3/MM3 (ref 0–0.05)
IMM GRANULOCYTES # BLD AUTO: 0.21 10*3/MM3 (ref 0–0.05)
IMM GRANULOCYTES # BLD AUTO: 0.21 10*3/MM3 (ref 0–0.05)
IMM GRANULOCYTES # BLD AUTO: 0.24 10*3/MM3 (ref 0–0.05)
IMM GRANULOCYTES # BLD AUTO: 0.26 10*3/MM3 (ref 0–0.05)
IMM GRANULOCYTES NFR BLD AUTO: 0.4 % (ref 0–0.5)
IMM GRANULOCYTES NFR BLD AUTO: 0.5 % (ref 0–0.5)
IMM GRANULOCYTES NFR BLD AUTO: 0.6 % (ref 0–0.5)
IMM GRANULOCYTES NFR BLD AUTO: 0.7 % (ref 0–0.5)
IMM GRANULOCYTES NFR BLD AUTO: 0.7 % (ref 0–0.5)
IMM GRANULOCYTES NFR BLD AUTO: 0.8 % (ref 0–0.5)
IMM GRANULOCYTES NFR BLD AUTO: 0.9 % (ref 0–0.5)
IMM GRANULOCYTES NFR BLD AUTO: 0.9 % (ref 0–0.5)
IMM GRANULOCYTES NFR BLD AUTO: 1 % (ref 0–0.5)
IMM GRANULOCYTES NFR BLD AUTO: 1.1 % (ref 0–0.5)
IMM GRANULOCYTES NFR BLD AUTO: 1.2 % (ref 0–0.5)
IMM GRANULOCYTES NFR BLD AUTO: 1.3 % (ref 0–0.5)
IMM GRANULOCYTES NFR BLD AUTO: 1.4 % (ref 0–0.5)
IMM GRANULOCYTES NFR BLD AUTO: 2.4 % (ref 0–0.5)
IRON 24H UR-MRATE: 16 MCG/DL (ref 37–145)
IRON 24H UR-MRATE: 41 MCG/DL (ref 37–145)
IRON 24H UR-MRATE: 76 MCG/DL (ref 37–145)
IRON SATN MFR SERPL: 14 % (ref 20–50)
IRON SATN MFR SERPL: 22 % (ref 20–50)
IRON SATN MFR SERPL: 6 % (ref 20–50)
IVRT: 87 MSEC
KETONES UR QL STRIP: NEGATIVE
KETONES UR QL: NEGATIVE
LAB AP CASE REPORT: NORMAL
LAB AP CLINICAL INFORMATION: NORMAL
LAB AP SPECIAL STAINS: NORMAL
LARGE PLATELETS: NORMAL
LEFT ATRIUM VOLUME INDEX: 37.1 ML/M2
LEUKOCYTE EST, POC: ABNORMAL
LEUKOCYTE ESTERASE UR QL STRIP.AUTO: ABNORMAL
LYMPHOCYTES # BLD AUTO: 2.9 10*3/MM3 (ref 0.7–3.1)
LYMPHOCYTES # BLD AUTO: 2.97 10*3/MM3 (ref 0.7–3.1)
LYMPHOCYTES # BLD AUTO: 3.4 10*3/MM3 (ref 0.7–3.1)
LYMPHOCYTES # BLD AUTO: 3.51 10*3/MM3 (ref 0.7–3.1)
LYMPHOCYTES # BLD AUTO: 3.96 10*3/MM3 (ref 0.7–3.1)
LYMPHOCYTES # BLD AUTO: 3.97 10*3/MM3 (ref 0.7–3.1)
LYMPHOCYTES # BLD AUTO: 3.98 10*3/MM3 (ref 0.7–3.1)
LYMPHOCYTES # BLD AUTO: 3.98 10*3/MM3 (ref 0.7–3.1)
LYMPHOCYTES # BLD AUTO: 4.19 10*3/MM3 (ref 0.7–3.1)
LYMPHOCYTES # BLD AUTO: 4.22 10*3/MM3 (ref 0.7–3.1)
LYMPHOCYTES # BLD AUTO: 4.42 10*3/MM3 (ref 0.7–3.1)
LYMPHOCYTES # BLD AUTO: 4.46 10*3/MM3 (ref 0.7–3.1)
LYMPHOCYTES # BLD AUTO: 4.57 10*3/MM3 (ref 0.7–3.1)
LYMPHOCYTES # BLD AUTO: 4.85 10*3/MM3 (ref 0.7–3.1)
LYMPHOCYTES # BLD AUTO: 4.88 10*3/MM3 (ref 0.7–3.1)
LYMPHOCYTES # BLD AUTO: 4.89 10*3/MM3 (ref 0.7–3.1)
LYMPHOCYTES # BLD AUTO: 5.39 10*3/MM3 (ref 0.7–3.1)
LYMPHOCYTES # BLD AUTO: 5.48 10*3/MM3 (ref 0.7–3.1)
LYMPHOCYTES # BLD AUTO: 5.51 10*3/MM3 (ref 0.7–3.1)
LYMPHOCYTES # BLD AUTO: 5.66 10*3/MM3 (ref 0.7–3.1)
LYMPHOCYTES # BLD AUTO: 5.8 10*3/MM3 (ref 0.7–3.1)
LYMPHOCYTES # BLD AUTO: 6.11 10*3/MM3 (ref 0.7–3.1)
LYMPHOCYTES # BLD AUTO: 7.04 10*3/MM3 (ref 0.7–3.1)
LYMPHOCYTES # BLD AUTO: 7.54 10*3/MM3 (ref 0.7–3.1)
LYMPHOCYTES # BLD MANUAL: 5.62 10*3/MM3 (ref 0.7–3.1)
LYMPHOCYTES NFR BLD AUTO: 15.8 % (ref 19.6–45.3)
LYMPHOCYTES NFR BLD AUTO: 25.2 % (ref 19.6–45.3)
LYMPHOCYTES NFR BLD AUTO: 28.7 % (ref 19.6–45.3)
LYMPHOCYTES NFR BLD AUTO: 30.9 % (ref 19.6–45.3)
LYMPHOCYTES NFR BLD AUTO: 32.8 % (ref 19.6–45.3)
LYMPHOCYTES NFR BLD AUTO: 33.4 % (ref 19.6–45.3)
LYMPHOCYTES NFR BLD AUTO: 33.7 % (ref 19.6–45.3)
LYMPHOCYTES NFR BLD AUTO: 36.5 % (ref 19.6–45.3)
LYMPHOCYTES NFR BLD AUTO: 36.6 % (ref 19.6–45.3)
LYMPHOCYTES NFR BLD AUTO: 37 % (ref 19.6–45.3)
LYMPHOCYTES NFR BLD AUTO: 37.5 % (ref 19.6–45.3)
LYMPHOCYTES NFR BLD AUTO: 37.8 % (ref 19.6–45.3)
LYMPHOCYTES NFR BLD AUTO: 38.2 % (ref 19.6–45.3)
LYMPHOCYTES NFR BLD AUTO: 39.3 % (ref 19.6–45.3)
LYMPHOCYTES NFR BLD AUTO: 40.7 % (ref 19.6–45.3)
LYMPHOCYTES NFR BLD AUTO: 41.7 % (ref 19.6–45.3)
LYMPHOCYTES NFR BLD AUTO: 41.9 % (ref 19.6–45.3)
LYMPHOCYTES NFR BLD AUTO: 42.4 % (ref 19.6–45.3)
LYMPHOCYTES NFR BLD AUTO: 43.6 % (ref 19.6–45.3)
LYMPHOCYTES NFR BLD AUTO: 44.2 % (ref 19.6–45.3)
LYMPHOCYTES NFR BLD AUTO: 45 % (ref 19.6–45.3)
LYMPHOCYTES NFR BLD AUTO: 46.1 % (ref 19.6–45.3)
LYMPHOCYTES NFR BLD AUTO: 46.1 % (ref 19.6–45.3)
LYMPHOCYTES NFR BLD AUTO: 46.2 % (ref 19.6–45.3)
LYMPHOCYTES NFR BLD MANUAL: 14 % (ref 5–12)
Lab: ABNORMAL
MACROCYTES BLD QL SMEAR: NORMAL
MAGNESIUM SERPL-MCNC: 1.1 MG/DL (ref 1.6–2.4)
MAGNESIUM SERPL-MCNC: 1.2 MG/DL (ref 1.6–2.4)
MAGNESIUM SERPL-MCNC: 1.4 MG/DL (ref 1.6–2.4)
MAGNESIUM SERPL-MCNC: 1.5 MG/DL (ref 1.6–2.4)
MAGNESIUM SERPL-MCNC: 1.6 MG/DL (ref 1.6–2.4)
MAGNESIUM SERPL-MCNC: 1.7 MG/DL (ref 1.6–2.4)
MAGNESIUM SERPL-MCNC: 1.9 MG/DL (ref 1.6–2.4)
MAXIMAL PREDICTED HEART RATE: 147 BPM
MCH RBC QN AUTO: 27.3 PG (ref 26.6–33)
MCH RBC QN AUTO: 27.4 PG (ref 26.6–33)
MCH RBC QN AUTO: 27.8 PG (ref 26.6–33)
MCH RBC QN AUTO: 28 PG (ref 26.6–33)
MCH RBC QN AUTO: 28 PG (ref 26.6–33)
MCH RBC QN AUTO: 28.2 PG (ref 26.6–33)
MCH RBC QN AUTO: 28.4 PG (ref 26.6–33)
MCH RBC QN AUTO: 28.8 PG (ref 26.6–33)
MCH RBC QN AUTO: 28.9 PG (ref 26.6–33)
MCH RBC QN AUTO: 29.2 PG (ref 26.6–33)
MCH RBC QN AUTO: 29.2 PG (ref 26.6–33)
MCH RBC QN AUTO: 29.3 PG (ref 26.6–33)
MCH RBC QN AUTO: 29.6 PG (ref 26.6–33)
MCH RBC QN AUTO: 29.6 PG (ref 26.6–33)
MCH RBC QN AUTO: 29.8 PG (ref 26.6–33)
MCH RBC QN AUTO: 29.9 PG (ref 26.6–33)
MCH RBC QN AUTO: 30 PG (ref 26.6–33)
MCH RBC QN AUTO: 30.3 PG (ref 26.6–33)
MCH RBC QN AUTO: 30.4 PG (ref 26.6–33)
MCH RBC QN AUTO: 30.4 PG (ref 26.6–33)
MCH RBC QN AUTO: 30.7 PG (ref 26.6–33)
MCH RBC QN AUTO: 31.2 PG (ref 26.6–33)
MCH RBC QN AUTO: 31.3 PG (ref 26.6–33)
MCH RBC QN AUTO: 31.4 PG (ref 26.6–33)
MCHC RBC AUTO-ENTMCNC: 29.9 G/DL (ref 31.5–35.7)
MCHC RBC AUTO-ENTMCNC: 30 G/DL (ref 31.5–35.7)
MCHC RBC AUTO-ENTMCNC: 30.2 G/DL (ref 31.5–35.7)
MCHC RBC AUTO-ENTMCNC: 30.3 G/DL (ref 31.5–35.7)
MCHC RBC AUTO-ENTMCNC: 30.4 G/DL (ref 31.5–35.7)
MCHC RBC AUTO-ENTMCNC: 30.4 G/DL (ref 31.5–35.7)
MCHC RBC AUTO-ENTMCNC: 30.5 G/DL (ref 31.5–35.7)
MCHC RBC AUTO-ENTMCNC: 30.7 G/DL (ref 31.5–35.7)
MCHC RBC AUTO-ENTMCNC: 31 G/DL (ref 31.5–35.7)
MCHC RBC AUTO-ENTMCNC: 31.1 G/DL (ref 31.5–35.7)
MCHC RBC AUTO-ENTMCNC: 31.1 G/DL (ref 31.5–35.7)
MCHC RBC AUTO-ENTMCNC: 31.2 G/DL (ref 31.5–35.7)
MCHC RBC AUTO-ENTMCNC: 32 G/DL (ref 31.5–35.7)
MCV RBC AUTO: 100 FL (ref 79–97)
MCV RBC AUTO: 103.1 FL (ref 79–97)
MCV RBC AUTO: 90 FL (ref 79–97)
MCV RBC AUTO: 90.6 FL (ref 79–97)
MCV RBC AUTO: 91 FL (ref 79–97)
MCV RBC AUTO: 91.3 FL (ref 79–97)
MCV RBC AUTO: 93.6 FL (ref 79–97)
MCV RBC AUTO: 93.7 FL (ref 79–97)
MCV RBC AUTO: 94.1 FL (ref 79–97)
MCV RBC AUTO: 94.7 FL (ref 79–97)
MCV RBC AUTO: 94.8 FL (ref 79–97)
MCV RBC AUTO: 95.7 FL (ref 79–97)
MCV RBC AUTO: 96.4 FL (ref 79–97)
MCV RBC AUTO: 96.4 FL (ref 79–97)
MCV RBC AUTO: 97.2 FL (ref 79–97)
MCV RBC AUTO: 97.9 FL (ref 79–97)
MCV RBC AUTO: 99 FL (ref 79–97)
MCV RBC AUTO: 99.1 FL (ref 79–97)
MICROCYTES BLD QL: NORMAL
MONOCYTES # BLD AUTO: 0.36 10*3/MM3 (ref 0.1–0.9)
MONOCYTES # BLD AUTO: 0.45 10*3/MM3 (ref 0.1–0.9)
MONOCYTES # BLD AUTO: 0.46 10*3/MM3 (ref 0.1–0.9)
MONOCYTES # BLD AUTO: 0.59 10*3/MM3 (ref 0.1–0.9)
MONOCYTES # BLD AUTO: 0.64 10*3/MM3 (ref 0.1–0.9)
MONOCYTES # BLD AUTO: 0.65 10*3/MM3 (ref 0.1–0.9)
MONOCYTES # BLD AUTO: 0.67 10*3/MM3 (ref 0.1–0.9)
MONOCYTES # BLD AUTO: 0.67 10*3/MM3 (ref 0.1–0.9)
MONOCYTES # BLD AUTO: 0.68 10*3/MM3 (ref 0.1–0.9)
MONOCYTES # BLD AUTO: 0.7 10*3/MM3 (ref 0.1–0.9)
MONOCYTES # BLD AUTO: 0.7 10*3/MM3 (ref 0.1–0.9)
MONOCYTES # BLD AUTO: 0.71 10*3/MM3 (ref 0.1–0.9)
MONOCYTES # BLD AUTO: 0.72 10*3/MM3 (ref 0.1–0.9)
MONOCYTES # BLD AUTO: 0.79 10*3/MM3 (ref 0.1–0.9)
MONOCYTES # BLD AUTO: 0.81 10*3/MM3 (ref 0.1–0.9)
MONOCYTES # BLD AUTO: 0.82 10*3/MM3 (ref 0.1–0.9)
MONOCYTES # BLD AUTO: 0.9 10*3/MM3 (ref 0.1–0.9)
MONOCYTES # BLD AUTO: 0.91 10*3/MM3 (ref 0.1–0.9)
MONOCYTES # BLD AUTO: 0.94 10*3/MM3 (ref 0.1–0.9)
MONOCYTES # BLD AUTO: 0.95 10*3/MM3 (ref 0.1–0.9)
MONOCYTES # BLD AUTO: 1.04 10*3/MM3 (ref 0.1–0.9)
MONOCYTES # BLD AUTO: 1.37 10*3/MM3 (ref 0.1–0.9)
MONOCYTES # BLD: 1.83 10*3/MM3 (ref 0.1–0.9)
MONOCYTES NFR BLD AUTO: 3.8 % (ref 5–12)
MONOCYTES NFR BLD AUTO: 4 % (ref 5–12)
MONOCYTES NFR BLD AUTO: 4.2 % (ref 5–12)
MONOCYTES NFR BLD AUTO: 4.5 % (ref 5–12)
MONOCYTES NFR BLD AUTO: 4.9 % (ref 5–12)
MONOCYTES NFR BLD AUTO: 5 % (ref 5–12)
MONOCYTES NFR BLD AUTO: 5 % (ref 5–12)
MONOCYTES NFR BLD AUTO: 5.1 % (ref 5–12)
MONOCYTES NFR BLD AUTO: 5.4 % (ref 5–12)
MONOCYTES NFR BLD AUTO: 5.4 % (ref 5–12)
MONOCYTES NFR BLD AUTO: 5.5 % (ref 5–12)
MONOCYTES NFR BLD AUTO: 5.6 % (ref 5–12)
MONOCYTES NFR BLD AUTO: 5.9 % (ref 5–12)
MONOCYTES NFR BLD AUTO: 6.1 % (ref 5–12)
MONOCYTES NFR BLD AUTO: 6.2 % (ref 5–12)
MONOCYTES NFR BLD AUTO: 6.3 % (ref 5–12)
MONOCYTES NFR BLD AUTO: 6.4 % (ref 5–12)
MONOCYTES NFR BLD AUTO: 6.7 % (ref 5–12)
MONOCYTES NFR BLD AUTO: 6.8 % (ref 5–12)
MONOCYTES NFR BLD AUTO: 7.1 % (ref 5–12)
MONOCYTES NFR BLD AUTO: 7.3 % (ref 5–12)
MONOCYTES NFR BLD AUTO: 7.5 % (ref 5–12)
MONOCYTES NFR BLD AUTO: 7.7 % (ref 5–12)
MONOCYTES NFR BLD AUTO: 8.5 % (ref 5–12)
MRSA DNA SPEC QL NAA+PROBE: ABNORMAL
NEUTROPHILS # BLD AUTO: 5.1 10*3/MM3 (ref 1.7–7)
NEUTROPHILS NFR BLD AUTO: 10.87 10*3/MM3 (ref 1.7–7)
NEUTROPHILS NFR BLD AUTO: 14.42 10*3/MM3 (ref 1.7–7)
NEUTROPHILS NFR BLD AUTO: 4.24 10*3/MM3 (ref 1.7–7)
NEUTROPHILS NFR BLD AUTO: 4.89 10*3/MM3 (ref 1.7–7)
NEUTROPHILS NFR BLD AUTO: 44.7 % (ref 42.7–76)
NEUTROPHILS NFR BLD AUTO: 44.7 % (ref 42.7–76)
NEUTROPHILS NFR BLD AUTO: 46 % (ref 42.7–76)
NEUTROPHILS NFR BLD AUTO: 46 % (ref 42.7–76)
NEUTROPHILS NFR BLD AUTO: 47.8 % (ref 42.7–76)
NEUTROPHILS NFR BLD AUTO: 47.9 % (ref 42.7–76)
NEUTROPHILS NFR BLD AUTO: 48.1 % (ref 42.7–76)
NEUTROPHILS NFR BLD AUTO: 5.34 10*3/MM3 (ref 1.7–7)
NEUTROPHILS NFR BLD AUTO: 5.34 10*3/MM3 (ref 1.7–7)
NEUTROPHILS NFR BLD AUTO: 5.58 10*3/MM3 (ref 1.7–7)
NEUTROPHILS NFR BLD AUTO: 5.63 10*3/MM3 (ref 1.7–7)
NEUTROPHILS NFR BLD AUTO: 5.64 10*3/MM3 (ref 1.7–7)
NEUTROPHILS NFR BLD AUTO: 5.7 10*3/MM3 (ref 1.7–7)
NEUTROPHILS NFR BLD AUTO: 5.81 10*3/MM3 (ref 1.7–7)
NEUTROPHILS NFR BLD AUTO: 5.91 10*3/MM3 (ref 1.7–7)
NEUTROPHILS NFR BLD AUTO: 50.4 % (ref 42.7–76)
NEUTROPHILS NFR BLD AUTO: 50.7 % (ref 42.7–76)
NEUTROPHILS NFR BLD AUTO: 51.1 % (ref 42.7–76)
NEUTROPHILS NFR BLD AUTO: 53 % (ref 42.7–76)
NEUTROPHILS NFR BLD AUTO: 53.6 % (ref 42.7–76)
NEUTROPHILS NFR BLD AUTO: 54.4 % (ref 42.7–76)
NEUTROPHILS NFR BLD AUTO: 54.8 % (ref 42.7–76)
NEUTROPHILS NFR BLD AUTO: 54.9 % (ref 42.7–76)
NEUTROPHILS NFR BLD AUTO: 54.9 % (ref 42.7–76)
NEUTROPHILS NFR BLD AUTO: 56.1 % (ref 42.7–76)
NEUTROPHILS NFR BLD AUTO: 58.2 % (ref 42.7–76)
NEUTROPHILS NFR BLD AUTO: 58.4 % (ref 42.7–76)
NEUTROPHILS NFR BLD AUTO: 59.3 % (ref 42.7–76)
NEUTROPHILS NFR BLD AUTO: 59.4 % (ref 42.7–76)
NEUTROPHILS NFR BLD AUTO: 6.05 10*3/MM3 (ref 1.7–7)
NEUTROPHILS NFR BLD AUTO: 6.58 10*3/MM3 (ref 1.7–7)
NEUTROPHILS NFR BLD AUTO: 6.62 10*3/MM3 (ref 1.7–7)
NEUTROPHILS NFR BLD AUTO: 6.67 10*3/MM3 (ref 1.7–7)
NEUTROPHILS NFR BLD AUTO: 62.7 % (ref 42.7–76)
NEUTROPHILS NFR BLD AUTO: 69.1 % (ref 42.7–76)
NEUTROPHILS NFR BLD AUTO: 7 10*3/MM3 (ref 1.7–7)
NEUTROPHILS NFR BLD AUTO: 7.14 10*3/MM3 (ref 1.7–7)
NEUTROPHILS NFR BLD AUTO: 7.18 10*3/MM3 (ref 1.7–7)
NEUTROPHILS NFR BLD AUTO: 7.44 10*3/MM3 (ref 1.7–7)
NEUTROPHILS NFR BLD AUTO: 7.84 10*3/MM3 (ref 1.7–7)
NEUTROPHILS NFR BLD AUTO: 78.6 % (ref 42.7–76)
NEUTROPHILS NFR BLD AUTO: 8.23 10*3/MM3 (ref 1.7–7)
NEUTROPHILS NFR BLD AUTO: 8.54 10*3/MM3 (ref 1.7–7)
NEUTROPHILS NFR BLD AUTO: 9.75 10*3/MM3 (ref 1.7–7)
NEUTROPHILS NFR BLD MANUAL: 37 % (ref 42.7–76)
NEUTS BAND NFR BLD MANUAL: 2 % (ref 0–5)
NITRITE UR QL STRIP: NEGATIVE
NITRITE UR-MCNC: NEGATIVE MG/ML
NRBC BLD AUTO-RTO: 0.2 /100 WBC (ref 0–0.2)
NRBC BLD AUTO-RTO: 0.6 /100 WBC (ref 0–0.2)
NRBC BLD AUTO-RTO: 0.6 /100 WBC (ref 0–0.2)
NRBC BLD AUTO-RTO: 0.7 /100 WBC (ref 0–0.2)
NRBC BLD AUTO-RTO: 0.8 /100 WBC (ref 0–0.2)
NRBC BLD AUTO-RTO: 1.2 /100 WBC (ref 0–0.2)
NRBC SPEC MANUAL: 8 /100 WBC (ref 0–0.2)
NT-PROBNP SERPL-MCNC: ABNORMAL PG/ML (ref 0–900)
PANAGGLUTINATION: NORMAL
PAPPENHEIMER BOD BLD QL SMEAR: PRESENT
PATH REPORT.FINAL DX SPEC: NORMAL
PATH REPORT.GROSS SPEC: NORMAL
PH UR STRIP.AUTO: <=5 [PH] (ref 5–8)
PH UR: 5.5 [PH] (ref 5–8)
PLATELET # BLD AUTO: 286 10*3/MM3 (ref 140–450)
PLATELET # BLD AUTO: 288 10*3/MM3 (ref 140–450)
PLATELET # BLD AUTO: 290 10*3/MM3 (ref 140–450)
PLATELET # BLD AUTO: 292 10*3/MM3 (ref 140–450)
PLATELET # BLD AUTO: 311 10*3/MM3 (ref 140–450)
PLATELET # BLD AUTO: 312 10*3/MM3 (ref 140–450)
PLATELET # BLD AUTO: 313 10*3/MM3 (ref 140–450)
PLATELET # BLD AUTO: 324 10*3/MM3 (ref 140–450)
PLATELET # BLD AUTO: 354 10*3/MM3 (ref 140–450)
PLATELET # BLD AUTO: 371 10*3/MM3 (ref 140–450)
PLATELET # BLD AUTO: 376 10*3/MM3 (ref 140–450)
PLATELET # BLD AUTO: 380 10*3/MM3 (ref 140–450)
PLATELET # BLD AUTO: 397 10*3/MM3 (ref 140–450)
PLATELET # BLD AUTO: 455 10*3/MM3 (ref 140–450)
PLATELET # BLD AUTO: 456 10*3/MM3 (ref 140–450)
PLATELET # BLD AUTO: 461 10*3/MM3 (ref 140–450)
PLATELET # BLD AUTO: 462 10*3/MM3 (ref 140–450)
PLATELET # BLD AUTO: 469 10*3/MM3 (ref 140–450)
PLATELET # BLD AUTO: 477 10*3/MM3 (ref 140–450)
PLATELET # BLD AUTO: 478 10*3/MM3 (ref 140–450)
PLATELET # BLD AUTO: 495 10*3/MM3 (ref 140–450)
PLATELET # BLD AUTO: 509 10*3/MM3 (ref 140–450)
PLATELET # BLD AUTO: 531 10*3/MM3 (ref 140–450)
PLATELET # BLD AUTO: 569 10*3/MM3 (ref 140–450)
PMV BLD AUTO: 10 FL (ref 6–12)
PMV BLD AUTO: 10.3 FL (ref 6–12)
PMV BLD AUTO: 10.4 FL (ref 6–12)
PMV BLD AUTO: 10.5 FL (ref 6–12)
PMV BLD AUTO: 10.6 FL (ref 6–12)
PMV BLD AUTO: 10.6 FL (ref 6–12)
PMV BLD AUTO: 10.8 FL (ref 6–12)
PMV BLD AUTO: 11.1 FL (ref 6–12)
PMV BLD AUTO: 11.2 FL (ref 6–12)
PMV BLD AUTO: 11.3 FL (ref 6–12)
PMV BLD AUTO: 11.4 FL (ref 6–12)
PMV BLD AUTO: 11.5 FL (ref 6–12)
PMV BLD AUTO: 11.6 FL (ref 6–12)
PMV BLD AUTO: 11.8 FL (ref 6–12)
PMV BLD AUTO: 8.8 FL (ref 6–12)
PMV BLD AUTO: 9.1 FL (ref 6–12)
PMV BLD AUTO: 9.2 FL (ref 6–12)
PMV BLD AUTO: 9.3 FL (ref 6–12)
PMV BLD AUTO: 9.5 FL (ref 6–12)
PMV BLD AUTO: 9.7 FL (ref 6–12)
PMV BLD AUTO: 9.8 FL (ref 6–12)
PMV BLD AUTO: 9.9 FL (ref 6–12)
POIKILOCYTOSIS BLD QL SMEAR: NORMAL
POIKILOCYTOSIS BLD QL SMEAR: NORMAL
POLYCHROMASIA BLD QL SMEAR: ABNORMAL
POLYCHROMASIA BLD QL SMEAR: NORMAL
POTASSIUM SERPL-SCNC: 2.7 MMOL/L (ref 3.5–5.2)
POTASSIUM SERPL-SCNC: 2.8 MMOL/L (ref 3.5–5.2)
POTASSIUM SERPL-SCNC: 3.1 MMOL/L (ref 3.5–5.2)
POTASSIUM SERPL-SCNC: 3.1 MMOL/L (ref 3.5–5.2)
POTASSIUM SERPL-SCNC: 3.2 MMOL/L (ref 3.5–5.2)
POTASSIUM SERPL-SCNC: 3.4 MMOL/L (ref 3.5–5.2)
POTASSIUM SERPL-SCNC: 3.7 MMOL/L (ref 3.5–5.2)
POTASSIUM SERPL-SCNC: 3.8 MMOL/L (ref 3.5–5.2)
POTASSIUM SERPL-SCNC: 3.9 MMOL/L (ref 3.5–5.2)
POTASSIUM SERPL-SCNC: 3.9 MMOL/L (ref 3.5–5.2)
POTASSIUM SERPL-SCNC: 4.1 MMOL/L (ref 3.5–5.2)
POTASSIUM SERPL-SCNC: 4.1 MMOL/L (ref 3.5–5.2)
POTASSIUM SERPL-SCNC: 4.2 MMOL/L (ref 3.5–5.2)
POTASSIUM SERPL-SCNC: 4.5 MMOL/L (ref 3.5–5.2)
POTASSIUM SERPL-SCNC: 4.6 MMOL/L (ref 3.5–5.2)
POTASSIUM SERPL-SCNC: 4.7 MMOL/L (ref 3.5–5.2)
POTASSIUM SERPL-SCNC: 4.7 MMOL/L (ref 3.5–5.2)
PROCALCITONIN SERPL-MCNC: 0.45 NG/ML (ref 0–0.25)
PROT SERPL-MCNC: 6.4 G/DL (ref 6–8.5)
PROT SERPL-MCNC: 6.6 G/DL (ref 6–8.5)
PROT SERPL-MCNC: 6.7 G/DL (ref 6–8.5)
PROT SERPL-MCNC: 6.8 G/DL (ref 6–8.5)
PROT SERPL-MCNC: 6.9 G/DL (ref 6–8.5)
PROT SERPL-MCNC: 7 G/DL (ref 6–8.5)
PROT SERPL-MCNC: 7 G/DL (ref 6–8.5)
PROT SERPL-MCNC: 7.1 G/DL (ref 6–8.5)
PROT SERPL-MCNC: 7.2 G/DL (ref 6–8.5)
PROT SERPL-MCNC: 7.2 G/DL (ref 6–8.5)
PROT SERPL-MCNC: 7.3 G/DL (ref 6–8.5)
PROT SERPL-MCNC: 7.3 G/DL (ref 6–8.5)
PROT SERPL-MCNC: 7.5 G/DL (ref 6–8.5)
PROT SERPL-MCNC: 7.6 G/DL (ref 6–8.5)
PROT UR QL STRIP: ABNORMAL
PROT UR STRIP-MCNC: ABNORMAL MG/DL
QT INTERVAL: 395 MS
QT INTERVAL: 421 MS
RBC # BLD AUTO: 1.84 10*6/MM3 (ref 3.77–5.28)
RBC # BLD AUTO: 1.87 10*6/MM3 (ref 3.77–5.28)
RBC # BLD AUTO: 1.88 10*6/MM3 (ref 3.77–5.28)
RBC # BLD AUTO: 1.88 10*6/MM3 (ref 3.77–5.28)
RBC # BLD AUTO: 1.89 10*6/MM3 (ref 3.77–5.28)
RBC # BLD AUTO: 1.9 10*6/MM3 (ref 3.77–5.28)
RBC # BLD AUTO: 1.91 10*6/MM3 (ref 3.77–5.28)
RBC # BLD AUTO: 1.99 10*6/MM3 (ref 3.77–5.28)
RBC # BLD AUTO: 2.08 10*6/MM3 (ref 3.77–5.28)
RBC # BLD AUTO: 2.14 10*6/MM3 (ref 3.77–5.28)
RBC # BLD AUTO: 2.18 10*6/MM3 (ref 3.77–5.28)
RBC # BLD AUTO: 2.23 10*6/MM3 (ref 3.77–5.28)
RBC # BLD AUTO: 2.33 10*6/MM3 (ref 3.77–5.28)
RBC # BLD AUTO: 2.33 10*6/MM3 (ref 3.77–5.28)
RBC # BLD AUTO: 2.34 10*6/MM3 (ref 3.77–5.28)
RBC # BLD AUTO: 2.36 10*6/MM3 (ref 3.77–5.28)
RBC # BLD AUTO: 2.39 10*6/MM3 (ref 3.77–5.28)
RBC # BLD AUTO: 2.5 10*6/MM3 (ref 3.77–5.28)
RBC # BLD AUTO: 2.51 10*6/MM3 (ref 3.77–5.28)
RBC # BLD AUTO: 2.51 10*6/MM3 (ref 3.77–5.28)
RBC # BLD AUTO: 2.64 10*6/MM3 (ref 3.77–5.28)
RBC # BLD AUTO: 2.66 10*6/MM3 (ref 3.77–5.28)
RBC # BLD AUTO: 2.67 10*6/MM3 (ref 3.77–5.28)
RBC # BLD AUTO: 2.71 10*6/MM3 (ref 3.77–5.28)
RBC # UR STRIP: ABNORMAL /HPF
RBC # UR STRIP: ABNORMAL /UL
REF LAB TEST METHOD: ABNORMAL
RETICS # AUTO: 0.1 10*6/MM3 (ref 0.02–0.13)
RETICS/RBC NFR AUTO: 4.96 % (ref 0.7–1.9)
RH BLD: NEGATIVE
SARS-COV-2 RNA PNL SPEC NAA+PROBE: NOT DETECTED
SCAN SLIDE: NORMAL
SINUS: 3.9 CM
SMALL PLATELETS BLD QL SMEAR: ABNORMAL
SMALL PLATELETS BLD QL SMEAR: ADEQUATE
SMALL PLATELETS BLD QL SMEAR: NORMAL
SODIUM SERPL-SCNC: 126 MMOL/L (ref 136–145)
SODIUM SERPL-SCNC: 127 MMOL/L (ref 136–145)
SODIUM SERPL-SCNC: 128 MMOL/L (ref 136–145)
SODIUM SERPL-SCNC: 131 MMOL/L (ref 136–145)
SODIUM SERPL-SCNC: 133 MMOL/L (ref 136–145)
SODIUM SERPL-SCNC: 134 MMOL/L (ref 136–145)
SODIUM SERPL-SCNC: 135 MMOL/L (ref 136–145)
SODIUM SERPL-SCNC: 135 MMOL/L (ref 136–145)
SODIUM SERPL-SCNC: 136 MMOL/L (ref 136–145)
SODIUM SERPL-SCNC: 137 MMOL/L (ref 136–145)
SODIUM SERPL-SCNC: 137 MMOL/L (ref 136–145)
SODIUM SERPL-SCNC: 138 MMOL/L (ref 136–145)
SODIUM SERPL-SCNC: 140 MMOL/L (ref 136–145)
SP GR UR STRIP: 1.01 (ref 1–1.03)
SP GR UR: 1.02 (ref 1–1.03)
SQUAMOUS #/AREA URNS HPF: ABNORMAL /HPF
STJ: 3.7 CM
STRESS TARGET HR: 125 BPM
T&S EXPIRATION DATE: NORMAL
TARGETS BLD QL SMEAR: NORMAL
TIBC SERPL-MCNC: 267 MCG/DL (ref 298–536)
TIBC SERPL-MCNC: 301 MCG/DL (ref 298–536)
TIBC SERPL-MCNC: 347 MCG/DL (ref 298–536)
TRANSFERRIN SERPL-MCNC: 179 MG/DL (ref 200–360)
TRANSFERRIN SERPL-MCNC: 202 MG/DL (ref 200–360)
TRANSFERRIN SERPL-MCNC: 233 MG/DL (ref 200–360)
TROPONIN T SERPL-MCNC: 0.04 NG/ML (ref 0–0.03)
TROPONIN T SERPL-MCNC: <0.01 NG/ML (ref 0–0.03)
UNIT  ABO: NORMAL
UNIT  RH: NORMAL
UROBILINOGEN UR QL STRIP: ABNORMAL
UROBILINOGEN UR QL: NORMAL
VARIANT LYMPHS NFR BLD MANUAL: 36 % (ref 19.6–45.3)
VARIANT LYMPHS NFR BLD MANUAL: 7 % (ref 0–5)
VIT B12 BLD-MCNC: 317 PG/ML (ref 211–946)
VIT B12 BLD-MCNC: 351 PG/ML (ref 211–946)
WBC # UR STRIP: ABNORMAL /HPF
WBC MORPH BLD: NORMAL
WBC NRBC COR # BLD: 10.76 10*3/MM3 (ref 3.4–10.8)
WBC NRBC COR # BLD: 10.85 10*3/MM3 (ref 3.4–10.8)
WBC NRBC COR # BLD: 11.04 10*3/MM3 (ref 3.4–10.8)
WBC NRBC COR # BLD: 11.13 10*3/MM3 (ref 3.4–10.8)
WBC NRBC COR # BLD: 11.72 10*3/MM3 (ref 3.4–10.8)
WBC NRBC COR # BLD: 11.8 10*3/MM3 (ref 3.4–10.8)
WBC NRBC COR # BLD: 11.94 10*3/MM3 (ref 3.4–10.8)
WBC NRBC COR # BLD: 12.1 10*3/MM3 (ref 3.4–10.8)
WBC NRBC COR # BLD: 12.25 10*3/MM3 (ref 3.4–10.8)
WBC NRBC COR # BLD: 12.59 10*3/MM3 (ref 3.4–10.8)
WBC NRBC COR # BLD: 12.77 10*3/MM3 (ref 3.4–10.8)
WBC NRBC COR # BLD: 13.02 10*3/MM3 (ref 3.4–10.8)
WBC NRBC COR # BLD: 13.07 10*3/MM3 (ref 3.4–10.8)
WBC NRBC COR # BLD: 13.23 10*3/MM3 (ref 3.4–10.8)
WBC NRBC COR # BLD: 13.83 10*3/MM3 (ref 3.4–10.8)
WBC NRBC COR # BLD: 14.71 10*3/MM3 (ref 3.4–10.8)
WBC NRBC COR # BLD: 15.55 10*3/MM3 (ref 3.4–10.8)
WBC NRBC COR # BLD: 15.63 10*3/MM3 (ref 3.4–10.8)
WBC NRBC COR # BLD: 15.74 10*3/MM3 (ref 3.4–10.8)
WBC NRBC COR # BLD: 17.78 10*3/MM3 (ref 3.4–10.8)
WBC NRBC COR # BLD: 18.36 10*3/MM3 (ref 3.4–10.8)
WBC NRBC COR # BLD: 8.36 10*3/MM3 (ref 3.4–10.8)
WBC NRBC COR # BLD: 8.92 10*3/MM3 (ref 3.4–10.8)
WBC NRBC COR # BLD: 9.61 10*3/MM3 (ref 3.4–10.8)
WHOLE BLOOD HOLD COAG: NORMAL
WHOLE BLOOD HOLD COAG: NORMAL
WHOLE BLOOD HOLD SPECIMEN: NORMAL
WHOLE BLOOD HOLD SPECIMEN: NORMAL

## 2022-01-01 PROCEDURE — 36593 DECLOT VASCULAR DEVICE: CPT

## 2022-01-01 PROCEDURE — 86850 RBC ANTIBODY SCREEN: CPT | Performed by: NURSE PRACTITIONER

## 2022-01-01 PROCEDURE — 25010000002 CETUXIMAB PER 10 MG: Performed by: INTERNAL MEDICINE

## 2022-01-01 PROCEDURE — 85025 COMPLETE CBC W/AUTO DIFF WBC: CPT | Performed by: INTERNAL MEDICINE

## 2022-01-01 PROCEDURE — 86900 BLOOD TYPING SEROLOGIC ABO: CPT | Performed by: INTERNAL MEDICINE

## 2022-01-01 PROCEDURE — 86921 COMPATIBILITY TEST INCUBATE: CPT

## 2022-01-01 PROCEDURE — 25010000002 DEXAMETHASONE PER 1 MG: Performed by: NURSE ANESTHETIST, CERTIFIED REGISTERED

## 2022-01-01 PROCEDURE — P9016 RBC LEUKOCYTES REDUCED: HCPCS

## 2022-01-01 PROCEDURE — 80053 COMPREHEN METABOLIC PANEL: CPT | Performed by: INTERNAL MEDICINE

## 2022-01-01 PROCEDURE — 25010000002 VANCOMYCIN 5 G RECONSTITUTED SOLUTION: Performed by: INTERNAL MEDICINE

## 2022-01-01 PROCEDURE — 86870 RBC ANTIBODY IDENTIFICATION: CPT

## 2022-01-01 PROCEDURE — 74177 CT ABD & PELVIS W/CONTRAST: CPT

## 2022-01-01 PROCEDURE — 82565 ASSAY OF CREATININE: CPT

## 2022-01-01 PROCEDURE — 96402 CHEMO HORMON ANTINEOPL SQ/IM: CPT

## 2022-01-01 PROCEDURE — 96361 HYDRATE IV INFUSION ADD-ON: CPT

## 2022-01-01 PROCEDURE — 83735 ASSAY OF MAGNESIUM: CPT | Performed by: INTERNAL MEDICINE

## 2022-01-01 PROCEDURE — 96413 CHEMO IV INFUSION 1 HR: CPT

## 2022-01-01 PROCEDURE — 80053 COMPREHEN METABOLIC PANEL: CPT | Performed by: EMERGENCY MEDICINE

## 2022-01-01 PROCEDURE — 25010000002 DIPHENHYDRAMINE PER 50 MG: Performed by: INTERNAL MEDICINE

## 2022-01-01 PROCEDURE — 96375 TX/PRO/DX INJ NEW DRUG ADDON: CPT

## 2022-01-01 PROCEDURE — 25010000002 HEPARIN LOCK FLUSH PER 10 UNITS: Performed by: INTERNAL MEDICINE

## 2022-01-01 PROCEDURE — 88342 IMHCHEM/IMCYTCHM 1ST ANTB: CPT | Performed by: UROLOGY

## 2022-01-01 PROCEDURE — 86900 BLOOD TYPING SEROLOGIC ABO: CPT

## 2022-01-01 PROCEDURE — 77280 THER RAD SIMULAJ FIELD SMPL: CPT | Performed by: RADIOLOGY

## 2022-01-01 PROCEDURE — 86850 RBC ANTIBODY SCREEN: CPT | Performed by: INTERNAL MEDICINE

## 2022-01-01 PROCEDURE — 71045 X-RAY EXAM CHEST 1 VIEW: CPT

## 2022-01-01 PROCEDURE — 36430 TRANSFUSION BLD/BLD COMPNT: CPT

## 2022-01-01 PROCEDURE — 80053 COMPREHEN METABOLIC PANEL: CPT

## 2022-01-01 PROCEDURE — 25010000002 METHYLPREDNISOLONE PER 125 MG: Performed by: INTERNAL MEDICINE

## 2022-01-01 PROCEDURE — 99215 OFFICE O/P EST HI 40 MIN: CPT | Performed by: INTERNAL MEDICINE

## 2022-01-01 PROCEDURE — 86906 BLD TYPING SEROLOGIC RH PHNT: CPT

## 2022-01-01 PROCEDURE — 25010000002 FULVESTRANT PER 25 MG: Performed by: INTERNAL MEDICINE

## 2022-01-01 PROCEDURE — 25010000002 CEFEPIME PER 500 MG: Performed by: EMERGENCY MEDICINE

## 2022-01-01 PROCEDURE — A9270 NON-COVERED ITEM OR SERVICE: HCPCS | Performed by: INTERNAL MEDICINE

## 2022-01-01 PROCEDURE — 96360 HYDRATION IV INFUSION INIT: CPT

## 2022-01-01 PROCEDURE — 0 TECHNETIUM MEDRONATE KIT: Performed by: INTERNAL MEDICINE

## 2022-01-01 PROCEDURE — 86920 COMPATIBILITY TEST SPIN: CPT

## 2022-01-01 PROCEDURE — 86902 BLOOD TYPE ANTIGEN DONOR EA: CPT

## 2022-01-01 PROCEDURE — 25010000002 HEPARIN (PORCINE) PER 1000 UNITS: Performed by: INTERNAL MEDICINE

## 2022-01-01 PROCEDURE — 84466 ASSAY OF TRANSFERRIN: CPT | Performed by: INTERNAL MEDICINE

## 2022-01-01 PROCEDURE — 83735 ASSAY OF MAGNESIUM: CPT

## 2022-01-01 PROCEDURE — 86922 COMPATIBILITY TEST ANTIGLOB: CPT

## 2022-01-01 PROCEDURE — 99214 OFFICE O/P EST MOD 30 MIN: CPT | Performed by: RADIOLOGY

## 2022-01-01 PROCEDURE — 88305 TISSUE EXAM BY PATHOLOGIST: CPT | Performed by: UROLOGY

## 2022-01-01 PROCEDURE — 0 POTASSIUM CHLORIDE 10 MEQ/100ML SOLUTION: Performed by: INTERNAL MEDICINE

## 2022-01-01 PROCEDURE — 86901 BLOOD TYPING SEROLOGIC RH(D): CPT | Performed by: INTERNAL MEDICINE

## 2022-01-01 PROCEDURE — 96523 IRRIG DRUG DELIVERY DEVICE: CPT

## 2022-01-01 PROCEDURE — 86870 RBC ANTIBODY IDENTIFICATION: CPT | Performed by: INTERNAL MEDICINE

## 2022-01-01 PROCEDURE — 99214 OFFICE O/P EST MOD 30 MIN: CPT | Performed by: SURGERY

## 2022-01-01 PROCEDURE — 83605 ASSAY OF LACTIC ACID: CPT | Performed by: EMERGENCY MEDICINE

## 2022-01-01 PROCEDURE — 74018 RADEX ABDOMEN 1 VIEW: CPT

## 2022-01-01 PROCEDURE — 71250 CT THORAX DX C-: CPT

## 2022-01-01 PROCEDURE — 77014 CHG CT GUIDANCE RADIATION THERAPY FLDS PLACEMENT: CPT | Performed by: RADIOLOGY

## 2022-01-01 PROCEDURE — 86880 COOMBS TEST DIRECT: CPT

## 2022-01-01 PROCEDURE — 0 IOPAMIDOL PER 1 ML: Performed by: INTERNAL MEDICINE

## 2022-01-01 PROCEDURE — 97530 THERAPEUTIC ACTIVITIES: CPT

## 2022-01-01 PROCEDURE — 82962 GLUCOSE BLOOD TEST: CPT

## 2022-01-01 PROCEDURE — C2617 STENT, NON-COR, TEM W/O DEL: HCPCS | Performed by: UROLOGY

## 2022-01-01 PROCEDURE — P9040 RBC LEUKOREDUCED IRRADIATED: HCPCS

## 2022-01-01 PROCEDURE — 80048 BASIC METABOLIC PNL TOTAL CA: CPT | Performed by: INTERNAL MEDICINE

## 2022-01-01 PROCEDURE — 77334 RADIATION TREATMENT AID(S): CPT | Performed by: RADIOLOGY

## 2022-01-01 PROCEDURE — 97165 OT EVAL LOW COMPLEX 30 MIN: CPT

## 2022-01-01 PROCEDURE — 82378 CARCINOEMBRYONIC ANTIGEN: CPT | Performed by: INTERNAL MEDICINE

## 2022-01-01 PROCEDURE — 99223 1ST HOSP IP/OBS HIGH 75: CPT | Performed by: INTERNAL MEDICINE

## 2022-01-01 PROCEDURE — 88341 IMHCHEM/IMCYTCHM EA ADD ANTB: CPT | Performed by: UROLOGY

## 2022-01-01 PROCEDURE — 52332 CYSTOSCOPY AND TREATMENT: CPT | Performed by: UROLOGY

## 2022-01-01 PROCEDURE — 86860 RBC ANTIBODY ELUTION: CPT

## 2022-01-01 PROCEDURE — 36591 DRAW BLOOD OFF VENOUS DEVICE: CPT

## 2022-01-01 PROCEDURE — 77427 RADIATION TX MANAGEMENT X5: CPT | Performed by: RADIOLOGY

## 2022-01-01 PROCEDURE — 77295 3-D RADIOTHERAPY PLAN: CPT | Performed by: RADIOLOGY

## 2022-01-01 PROCEDURE — 77336 RADIATION PHYSICS CONSULT: CPT | Performed by: RADIOLOGY

## 2022-01-01 PROCEDURE — 83540 ASSAY OF IRON: CPT | Performed by: INTERNAL MEDICINE

## 2022-01-01 PROCEDURE — 86850 RBC ANTIBODY SCREEN: CPT

## 2022-01-01 PROCEDURE — 81003 URINALYSIS AUTO W/O SCOPE: CPT | Performed by: UROLOGY

## 2022-01-01 PROCEDURE — 87040 BLOOD CULTURE FOR BACTERIA: CPT | Performed by: EMERGENCY MEDICINE

## 2022-01-01 PROCEDURE — 85007 BL SMEAR W/DIFF WBC COUNT: CPT | Performed by: INTERNAL MEDICINE

## 2022-01-01 PROCEDURE — 82607 VITAMIN B-12: CPT | Performed by: INTERNAL MEDICINE

## 2022-01-01 PROCEDURE — 76000 FLUOROSCOPY <1 HR PHYS/QHP: CPT

## 2022-01-01 PROCEDURE — 36415 COLL VENOUS BLD VENIPUNCTURE: CPT

## 2022-01-01 PROCEDURE — 63710000001 ACETAMINOPHEN 325 MG TABLET: Performed by: INTERNAL MEDICINE

## 2022-01-01 PROCEDURE — 84484 ASSAY OF TROPONIN QUANT: CPT

## 2022-01-01 PROCEDURE — 99214 OFFICE O/P EST MOD 30 MIN: CPT | Performed by: INTERNAL MEDICINE

## 2022-01-01 PROCEDURE — 77300 RADIATION THERAPY DOSE PLAN: CPT | Performed by: RADIOLOGY

## 2022-01-01 PROCEDURE — 86900 BLOOD TYPING SEROLOGIC ABO: CPT | Performed by: NURSE PRACTITIONER

## 2022-01-01 PROCEDURE — 84484 ASSAY OF TROPONIN QUANT: CPT | Performed by: EMERGENCY MEDICINE

## 2022-01-01 PROCEDURE — 77412 RADIATION TX DELIVERY LVL 3: CPT | Performed by: RADIOLOGY

## 2022-01-01 PROCEDURE — 88360 TUMOR IMMUNOHISTOCHEM/MANUAL: CPT | Performed by: UROLOGY

## 2022-01-01 PROCEDURE — 93005 ELECTROCARDIOGRAM TRACING: CPT

## 2022-01-01 PROCEDURE — 25010000002 ONDANSETRON PER 1 MG: Performed by: NURSE ANESTHETIST, CERTIFIED REGISTERED

## 2022-01-01 PROCEDURE — 99024 POSTOP FOLLOW-UP VISIT: CPT | Performed by: UROLOGY

## 2022-01-01 PROCEDURE — 71260 CT THORAX DX C+: CPT

## 2022-01-01 PROCEDURE — 77387 GUIDANCE FOR RADJ TX DLVR: CPT | Performed by: RADIOLOGY

## 2022-01-01 PROCEDURE — 80162 ASSAY OF DIGOXIN TOTAL: CPT

## 2022-01-01 PROCEDURE — 85025 COMPLETE CBC W/AUTO DIFF WBC: CPT

## 2022-01-01 PROCEDURE — 97161 PT EVAL LOW COMPLEX 20 MIN: CPT

## 2022-01-01 PROCEDURE — 25010000002 FUROSEMIDE PER 20 MG: Performed by: INTERNAL MEDICINE

## 2022-01-01 PROCEDURE — U0004 COV-19 TEST NON-CDC HGH THRU: HCPCS | Performed by: EMERGENCY MEDICINE

## 2022-01-01 PROCEDURE — 85007 BL SMEAR W/DIFF WBC COUNT: CPT | Performed by: EMERGENCY MEDICINE

## 2022-01-01 PROCEDURE — 77290 THER RAD SIMULAJ FIELD CPLX: CPT | Performed by: RADIOLOGY

## 2022-01-01 PROCEDURE — G0463 HOSPITAL OUTPT CLINIC VISIT: HCPCS | Performed by: RADIOLOGY

## 2022-01-01 PROCEDURE — 96374 THER/PROPH/DIAG INJ IV PUSH: CPT

## 2022-01-01 PROCEDURE — 87086 URINE CULTURE/COLONY COUNT: CPT | Performed by: INTERNAL MEDICINE

## 2022-01-01 PROCEDURE — 0 IOPAMIDOL PER 1 ML: Performed by: EMERGENCY MEDICINE

## 2022-01-01 PROCEDURE — 25010000002 LEVOFLOXACIN PER 250 MG: Performed by: UROLOGY

## 2022-01-01 PROCEDURE — 99212 OFFICE O/P EST SF 10 MIN: CPT | Performed by: SURGERY

## 2022-01-01 PROCEDURE — A9503 TC99M MEDRONATE: HCPCS | Performed by: INTERNAL MEDICINE

## 2022-01-01 PROCEDURE — 96372 THER/PROPH/DIAG INJ SC/IM: CPT

## 2022-01-01 PROCEDURE — 82728 ASSAY OF FERRITIN: CPT | Performed by: INTERNAL MEDICINE

## 2022-01-01 PROCEDURE — 85045 AUTOMATED RETICULOCYTE COUNT: CPT | Performed by: INTERNAL MEDICINE

## 2022-01-01 PROCEDURE — 25010000002 PROPOFOL 10 MG/ML EMULSION: Performed by: NURSE ANESTHETIST, CERTIFIED REGISTERED

## 2022-01-01 PROCEDURE — 11603 EXC TR-EXT MAL+MARG 2.1-3 CM: CPT | Performed by: UROLOGY

## 2022-01-01 PROCEDURE — 80162 ASSAY OF DIGOXIN TOTAL: CPT | Performed by: EMERGENCY MEDICINE

## 2022-01-01 PROCEDURE — 93005 ELECTROCARDIOGRAM TRACING: CPT | Performed by: EMERGENCY MEDICINE

## 2022-01-01 PROCEDURE — 83735 ASSAY OF MAGNESIUM: CPT | Performed by: EMERGENCY MEDICINE

## 2022-01-01 PROCEDURE — 96366 THER/PROPH/DIAG IV INF ADDON: CPT

## 2022-01-01 PROCEDURE — 86901 BLOOD TYPING SEROLOGIC RH(D): CPT

## 2022-01-01 PROCEDURE — 86901 BLOOD TYPING SEROLOGIC RH(D): CPT | Performed by: NURSE PRACTITIONER

## 2022-01-01 PROCEDURE — 93306 TTE W/DOPPLER COMPLETE: CPT

## 2022-01-01 PROCEDURE — 85025 COMPLETE CBC W/AUTO DIFF WBC: CPT | Performed by: EMERGENCY MEDICINE

## 2022-01-01 PROCEDURE — 80048 BASIC METABOLIC PNL TOTAL CA: CPT | Performed by: UROLOGY

## 2022-01-01 PROCEDURE — 70470 CT HEAD/BRAIN W/O & W/DYE: CPT

## 2022-01-01 PROCEDURE — 93010 ELECTROCARDIOGRAM REPORT: CPT | Performed by: INTERNAL MEDICINE

## 2022-01-01 PROCEDURE — 99284 EMERGENCY DEPT VISIT MOD MDM: CPT

## 2022-01-01 PROCEDURE — 25010000002 ALTEPLASE 2 MG RECONSTITUTED SOLUTION: Performed by: INTERNAL MEDICINE

## 2022-01-01 PROCEDURE — 86901 BLOOD TYPING SEROLOGIC RH(D): CPT | Performed by: EMERGENCY MEDICINE

## 2022-01-01 PROCEDURE — 96367 TX/PROPH/DG ADDL SEQ IV INF: CPT

## 2022-01-01 PROCEDURE — 86900 BLOOD TYPING SEROLOGIC ABO: CPT | Performed by: EMERGENCY MEDICINE

## 2022-01-01 PROCEDURE — 82746 ASSAY OF FOLIC ACID SERUM: CPT | Performed by: INTERNAL MEDICINE

## 2022-01-01 PROCEDURE — 25010000002 CEFEPIME PER 500 MG: Performed by: INTERNAL MEDICINE

## 2022-01-01 PROCEDURE — 86870 RBC ANTIBODY IDENTIFICATION: CPT | Performed by: EMERGENCY MEDICINE

## 2022-01-01 PROCEDURE — 86870 RBC ANTIBODY IDENTIFICATION: CPT | Performed by: NURSE PRACTITIONER

## 2022-01-01 PROCEDURE — 25010000002 FERRIC CARBOXYMALTOSE 750 MG/15ML SOLUTION: Performed by: INTERNAL MEDICINE

## 2022-01-01 PROCEDURE — 77263 THER RADIOLOGY TX PLNG CPLX: CPT | Performed by: RADIOLOGY

## 2022-01-01 PROCEDURE — 93306 TTE W/DOPPLER COMPLETE: CPT | Performed by: INTERNAL MEDICINE

## 2022-01-01 PROCEDURE — 99239 HOSP IP/OBS DSCHRG MGMT >30: CPT | Performed by: INTERNAL MEDICINE

## 2022-01-01 PROCEDURE — 96376 TX/PRO/DX INJ SAME DRUG ADON: CPT

## 2022-01-01 PROCEDURE — 96365 THER/PROPH/DIAG IV INF INIT: CPT

## 2022-01-01 PROCEDURE — C1887 CATHETER, GUIDING: HCPCS | Performed by: UROLOGY

## 2022-01-01 PROCEDURE — 78306 BONE IMAGING WHOLE BODY: CPT

## 2022-01-01 PROCEDURE — 83880 ASSAY OF NATRIURETIC PEPTIDE: CPT

## 2022-01-01 PROCEDURE — 25010000002 HEPARIN LOCK FLUSH PER 10 UNITS

## 2022-01-01 PROCEDURE — 86850 RBC ANTIBODY SCREEN: CPT | Performed by: EMERGENCY MEDICINE

## 2022-01-01 PROCEDURE — 81001 URINALYSIS AUTO W/SCOPE: CPT | Performed by: INTERNAL MEDICINE

## 2022-01-01 PROCEDURE — 94618 PULMONARY STRESS TESTING: CPT

## 2022-01-01 PROCEDURE — 63710000001 INSULIN LISPRO (HUMAN) PER 5 UNITS: Performed by: INTERNAL MEDICINE

## 2022-01-01 PROCEDURE — 25010000002 MIDAZOLAM PER 1 MG: Performed by: ANESTHESIOLOGY

## 2022-01-01 PROCEDURE — 82378 CARCINOEMBRYONIC ANTIGEN: CPT

## 2022-01-01 PROCEDURE — 84145 PROCALCITONIN (PCT): CPT | Performed by: INTERNAL MEDICINE

## 2022-01-01 PROCEDURE — 87641 MR-STAPH DNA AMP PROBE: CPT | Performed by: INTERNAL MEDICINE

## 2022-01-01 DEVICE — IMPLANTABLE DEVICE: Type: IMPLANTABLE DEVICE | Site: URETER | Status: FUNCTIONAL

## 2022-01-01 RX ORDER — SODIUM CHLORIDE 9 MG/ML
250 INJECTION, SOLUTION INTRAVENOUS ONCE
Status: COMPLETED | OUTPATIENT
Start: 2022-01-01 | End: 2022-01-01

## 2022-01-01 RX ORDER — FAMOTIDINE 10 MG/ML
20 INJECTION, SOLUTION INTRAVENOUS AS NEEDED
Status: CANCELLED | OUTPATIENT
Start: 2022-01-01

## 2022-01-01 RX ORDER — POTASSIUM CHLORIDE 7.45 MG/ML
10 INJECTION INTRAVENOUS
Status: COMPLETED | OUTPATIENT
Start: 2022-01-01 | End: 2022-01-01

## 2022-01-01 RX ORDER — HEPARIN SODIUM (PORCINE) LOCK FLUSH IV SOLN 100 UNIT/ML 100 UNIT/ML
500 SOLUTION INTRAVENOUS AS NEEDED
Status: CANCELLED | OUTPATIENT
Start: 2022-01-01

## 2022-01-01 RX ORDER — MORPHINE SULFATE 20 MG/ML
5 SOLUTION ORAL
Qty: 180 ML | Refills: 0 | Status: SHIPPED | OUTPATIENT
Start: 2022-01-01 | End: 2022-01-01 | Stop reason: SDUPTHER

## 2022-01-01 RX ORDER — PROMETHAZINE HYDROCHLORIDE 12.5 MG/1
12.5 TABLET ORAL ONCE AS NEEDED
Status: DISCONTINUED | OUTPATIENT
Start: 2022-01-01 | End: 2022-01-01 | Stop reason: HOSPADM

## 2022-01-01 RX ORDER — SODIUM CHLORIDE 0.9 % (FLUSH) 0.9 %
20 SYRINGE (ML) INJECTION AS NEEDED
Status: DISCONTINUED | OUTPATIENT
Start: 2022-01-01 | End: 2022-01-01 | Stop reason: HOSPADM

## 2022-01-01 RX ORDER — LEVOFLOXACIN 750 MG/1
750 TABLET ORAL DAILY
Qty: 7 TABLET | Refills: 0 | Status: SHIPPED | OUTPATIENT
Start: 2022-01-01 | End: 2022-01-01

## 2022-01-01 RX ORDER — HEPARIN SODIUM (PORCINE) LOCK FLUSH IV SOLN 100 UNIT/ML 100 UNIT/ML
500 SOLUTION INTRAVENOUS AS NEEDED
Status: DISCONTINUED | OUTPATIENT
Start: 2022-01-01 | End: 2022-01-01 | Stop reason: HOSPADM

## 2022-01-01 RX ORDER — DIPHENHYDRAMINE HYDROCHLORIDE 50 MG/ML
50 INJECTION INTRAMUSCULAR; INTRAVENOUS AS NEEDED
Status: CANCELLED | OUTPATIENT
Start: 2022-01-01

## 2022-01-01 RX ORDER — SODIUM CHLORIDE 0.9 % (FLUSH) 0.9 %
20 SYRINGE (ML) INJECTION AS NEEDED
Status: CANCELLED | OUTPATIENT
Start: 2022-01-01

## 2022-01-01 RX ORDER — MEPERIDINE HYDROCHLORIDE 25 MG/ML
25 INJECTION INTRAMUSCULAR; INTRAVENOUS; SUBCUTANEOUS
Status: CANCELLED | OUTPATIENT
Start: 2022-01-01

## 2022-01-01 RX ORDER — SODIUM CHLORIDE 9 MG/ML
250 INJECTION, SOLUTION INTRAVENOUS AS NEEDED
Status: DISCONTINUED | OUTPATIENT
Start: 2022-01-01 | End: 2022-01-01 | Stop reason: HOSPADM

## 2022-01-01 RX ORDER — METHYLPREDNISOLONE SODIUM SUCCINATE 125 MG/2ML
125 INJECTION, POWDER, LYOPHILIZED, FOR SOLUTION INTRAMUSCULAR; INTRAVENOUS ONCE
Status: CANCELLED | OUTPATIENT
Start: 2022-01-01

## 2022-01-01 RX ORDER — HEPARIN SODIUM 5000 [USP'U]/ML
5000 INJECTION, SOLUTION INTRAVENOUS; SUBCUTANEOUS EVERY 8 HOURS SCHEDULED
Status: DISCONTINUED | OUTPATIENT
Start: 2022-01-01 | End: 2022-01-01 | Stop reason: HOSPADM

## 2022-01-01 RX ORDER — SODIUM CHLORIDE 0.9 % (FLUSH) 0.9 %
10 SYRINGE (ML) INJECTION AS NEEDED
Status: CANCELLED | OUTPATIENT
Start: 2022-01-01

## 2022-01-01 RX ORDER — GLIPIZIDE 5 MG/1
5 TABLET, FILM COATED, EXTENDED RELEASE ORAL DAILY
COMMUNITY
Start: 2022-01-01 | End: 2022-01-01

## 2022-01-01 RX ORDER — SODIUM CHLORIDE 9 MG/ML
100 INJECTION, SOLUTION INTRAVENOUS CONTINUOUS
Status: DISCONTINUED | OUTPATIENT
Start: 2022-01-01 | End: 2022-01-01 | Stop reason: HOSPADM

## 2022-01-01 RX ORDER — OXYCODONE HYDROCHLORIDE 5 MG/1
5 TABLET ORAL EVERY 4 HOURS PRN
Qty: 90 TABLET | Refills: 0 | Status: ON HOLD | OUTPATIENT
Start: 2022-01-01 | End: 2022-01-01

## 2022-01-01 RX ORDER — ACETAMINOPHEN 325 MG/1
650 TABLET ORAL ONCE
Status: COMPLETED | OUTPATIENT
Start: 2022-01-01 | End: 2022-01-01

## 2022-01-01 RX ORDER — LANCETS
EACH MISCELLANEOUS
Status: ON HOLD | COMMUNITY
Start: 2022-01-01 | End: 2022-01-01

## 2022-01-01 RX ORDER — ACETAMINOPHEN 325 MG/1
650 TABLET ORAL ONCE
Status: CANCELLED | OUTPATIENT
Start: 2022-01-01

## 2022-01-01 RX ORDER — POTASSIUM CHLORIDE 750 MG/1
40 CAPSULE, EXTENDED RELEASE ORAL ONCE
Status: COMPLETED | OUTPATIENT
Start: 2022-01-01 | End: 2022-01-01

## 2022-01-01 RX ORDER — SODIUM CHLORIDE 0.9 % (FLUSH) 0.9 %
10 SYRINGE (ML) INJECTION EVERY 12 HOURS SCHEDULED
Status: DISCONTINUED | OUTPATIENT
Start: 2022-01-01 | End: 2022-01-01 | Stop reason: HOSPADM

## 2022-01-01 RX ORDER — SODIUM CHLORIDE 9 MG/ML
250 INJECTION, SOLUTION INTRAVENOUS AS NEEDED
Status: CANCELLED | OUTPATIENT
Start: 2022-01-01

## 2022-01-01 RX ORDER — HEPARIN SODIUM (PORCINE) LOCK FLUSH IV SOLN 100 UNIT/ML 100 UNIT/ML
SOLUTION INTRAVENOUS
Status: COMPLETED
Start: 2022-01-01 | End: 2022-01-01

## 2022-01-01 RX ORDER — POTASSIUM CHLORIDE 20 MEQ/1
20 TABLET, EXTENDED RELEASE ORAL DAILY
Qty: 30 TABLET | Refills: 3 | Status: SHIPPED | OUTPATIENT
Start: 2022-01-01

## 2022-01-01 RX ORDER — INSULIN LISPRO 100 [IU]/ML
0-7 INJECTION, SOLUTION INTRAVENOUS; SUBCUTANEOUS
Status: DISCONTINUED | OUTPATIENT
Start: 2022-01-01 | End: 2022-01-01 | Stop reason: HOSPADM

## 2022-01-01 RX ORDER — FAMOTIDINE 10 MG/ML
20 INJECTION, SOLUTION INTRAVENOUS ONCE
Status: COMPLETED | OUTPATIENT
Start: 2022-01-01 | End: 2022-01-01

## 2022-01-01 RX ORDER — DIPHENHYDRAMINE HYDROCHLORIDE 50 MG/ML
25 INJECTION INTRAMUSCULAR; INTRAVENOUS ONCE
Status: COMPLETED | OUTPATIENT
Start: 2022-01-01 | End: 2022-01-01

## 2022-01-01 RX ORDER — METHYLPREDNISOLONE SODIUM SUCCINATE 125 MG/2ML
125 INJECTION, POWDER, LYOPHILIZED, FOR SOLUTION INTRAMUSCULAR; INTRAVENOUS ONCE
Status: CANCELLED | OUTPATIENT
Start: 2022-07-26

## 2022-01-01 RX ORDER — GLIPIZIDE 10 MG/1
10 TABLET, FILM COATED, EXTENDED RELEASE ORAL DAILY
COMMUNITY
Start: 2022-01-01

## 2022-01-01 RX ORDER — FAMOTIDINE 10 MG/ML
20 INJECTION, SOLUTION INTRAVENOUS ONCE
Status: CANCELLED | OUTPATIENT
Start: 2022-01-01

## 2022-01-01 RX ORDER — HEPARIN SODIUM (PORCINE) LOCK FLUSH IV SOLN 100 UNIT/ML 100 UNIT/ML
500 SOLUTION INTRAVENOUS ONCE
Status: DISCONTINUED | OUTPATIENT
Start: 2022-01-01 | End: 2022-01-01

## 2022-01-01 RX ORDER — MEPERIDINE HYDROCHLORIDE 25 MG/ML
12.5 INJECTION INTRAMUSCULAR; INTRAVENOUS; SUBCUTANEOUS
Status: DISCONTINUED | OUTPATIENT
Start: 2022-01-01 | End: 2022-01-01 | Stop reason: HOSPADM

## 2022-01-01 RX ORDER — SODIUM CHLORIDE 9 MG/ML
250 INJECTION, SOLUTION INTRAVENOUS ONCE
Status: CANCELLED | OUTPATIENT
Start: 2022-01-01

## 2022-01-01 RX ORDER — ACETAMINOPHEN 325 MG/1
650 TABLET ORAL ONCE
Status: CANCELLED | OUTPATIENT
Start: 2022-01-01 | End: 2022-01-01

## 2022-01-01 RX ORDER — DOXYCYCLINE HYCLATE 100 MG/1
100 CAPSULE ORAL 2 TIMES DAILY
Qty: 14 CAPSULE | Refills: 0 | Status: SHIPPED | OUTPATIENT
Start: 2022-01-01 | End: 2022-01-01

## 2022-01-01 RX ORDER — FAMOTIDINE 10 MG/ML
20 INJECTION, SOLUTION INTRAVENOUS ONCE
Status: CANCELLED | OUTPATIENT
Start: 2022-07-26

## 2022-01-01 RX ORDER — METHYLPREDNISOLONE SODIUM SUCCINATE 125 MG/2ML
125 INJECTION, POWDER, LYOPHILIZED, FOR SOLUTION INTRAMUSCULAR; INTRAVENOUS ONCE
Status: COMPLETED | OUTPATIENT
Start: 2022-01-01 | End: 2022-01-01

## 2022-01-01 RX ORDER — CEFEPIME 1 G/50ML
2 INJECTION, SOLUTION INTRAVENOUS ONCE
Status: COMPLETED | OUTPATIENT
Start: 2022-01-01 | End: 2022-01-01

## 2022-01-01 RX ORDER — SODIUM CHLORIDE, SODIUM LACTATE, POTASSIUM CHLORIDE, CALCIUM CHLORIDE 600; 310; 30; 20 MG/100ML; MG/100ML; MG/100ML; MG/100ML
9 INJECTION, SOLUTION INTRAVENOUS CONTINUOUS PRN
Status: DISCONTINUED | OUTPATIENT
Start: 2022-01-01 | End: 2022-01-01 | Stop reason: HOSPADM

## 2022-01-01 RX ORDER — FUROSEMIDE 10 MG/ML
20 INJECTION INTRAMUSCULAR; INTRAVENOUS 2 TIMES DAILY
Status: DISCONTINUED | OUTPATIENT
Start: 2022-01-01 | End: 2022-01-01

## 2022-01-01 RX ORDER — PROMETHAZINE HYDROCHLORIDE 25 MG/1
25 SUPPOSITORY RECTAL ONCE AS NEEDED
Status: DISCONTINUED | OUTPATIENT
Start: 2022-01-01 | End: 2022-01-01 | Stop reason: HOSPADM

## 2022-01-01 RX ORDER — LANCETS
EACH MISCELLANEOUS
COMMUNITY
Start: 2022-01-01

## 2022-01-01 RX ORDER — ONDANSETRON 2 MG/ML
4 INJECTION INTRAMUSCULAR; INTRAVENOUS ONCE AS NEEDED
Status: DISCONTINUED | OUTPATIENT
Start: 2022-01-01 | End: 2022-01-01 | Stop reason: HOSPADM

## 2022-01-01 RX ORDER — SODIUM CHLORIDE 9 MG/ML
100 INJECTION, SOLUTION INTRAVENOUS CONTINUOUS
Status: CANCELLED | OUTPATIENT
Start: 2022-01-01

## 2022-01-01 RX ORDER — NICOTINE POLACRILEX 4 MG
24 LOZENGE BUCCAL
Status: DISCONTINUED | OUTPATIENT
Start: 2022-01-01 | End: 2022-01-01 | Stop reason: HOSPADM

## 2022-01-01 RX ORDER — PANTOPRAZOLE SODIUM 40 MG/1
40 TABLET, DELAYED RELEASE ORAL 2 TIMES DAILY
Qty: 60 TABLET | Refills: 5 | Status: SHIPPED | OUTPATIENT
Start: 2022-01-01 | End: 2022-01-01

## 2022-01-01 RX ORDER — HYDROCODONE BITARTRATE AND ACETAMINOPHEN 7.5; 325 MG/1; MG/1
1 TABLET ORAL EVERY 6 HOURS PRN
Qty: 90 TABLET | Refills: 0 | Status: SHIPPED | OUTPATIENT
Start: 2022-01-01

## 2022-01-01 RX ORDER — ESCITALOPRAM OXALATE 10 MG/1
20 TABLET ORAL NIGHTLY
Status: DISCONTINUED | OUTPATIENT
Start: 2022-01-01 | End: 2022-01-01 | Stop reason: HOSPADM

## 2022-01-01 RX ORDER — PANTOPRAZOLE SODIUM 40 MG/1
40 TABLET, DELAYED RELEASE ORAL
Status: DISCONTINUED | OUTPATIENT
Start: 2022-01-01 | End: 2022-01-01 | Stop reason: HOSPADM

## 2022-01-01 RX ORDER — LIDOCAINE HYDROCHLORIDE 10 MG/ML
INJECTION, SOLUTION EPIDURAL; INFILTRATION; INTRACAUDAL; PERINEURAL AS NEEDED
Status: DISCONTINUED | OUTPATIENT
Start: 2022-01-01 | End: 2022-01-01 | Stop reason: HOSPADM

## 2022-01-01 RX ORDER — CIPROFLOXACIN 500 MG/1
500 TABLET, FILM COATED ORAL EVERY 12 HOURS
COMMUNITY

## 2022-01-01 RX ORDER — SODIUM CHLORIDE 0.9 % (FLUSH) 0.9 %
10 SYRINGE (ML) INJECTION AS NEEDED
Status: DISCONTINUED | OUTPATIENT
Start: 2022-01-01 | End: 2022-01-01 | Stop reason: HOSPADM

## 2022-01-01 RX ORDER — FUROSEMIDE 10 MG/ML
40 INJECTION INTRAMUSCULAR; INTRAVENOUS 2 TIMES DAILY
Status: DISCONTINUED | OUTPATIENT
Start: 2022-01-01 | End: 2022-01-01 | Stop reason: HOSPADM

## 2022-01-01 RX ORDER — ONDANSETRON 2 MG/ML
INJECTION INTRAMUSCULAR; INTRAVENOUS AS NEEDED
Status: DISCONTINUED | OUTPATIENT
Start: 2022-01-01 | End: 2022-01-01 | Stop reason: SURG

## 2022-01-01 RX ORDER — FAMOTIDINE 10 MG/ML
20 INJECTION, SOLUTION INTRAVENOUS AS NEEDED
Status: CANCELLED | OUTPATIENT
Start: 2022-07-26

## 2022-01-01 RX ORDER — DIPHENHYDRAMINE HYDROCHLORIDE 50 MG/ML
25 INJECTION INTRAMUSCULAR; INTRAVENOUS ONCE
Status: CANCELLED | OUTPATIENT
Start: 2022-01-01 | End: 2022-01-01

## 2022-01-01 RX ORDER — DIGOXIN 125 MCG
125 TABLET ORAL NIGHTLY
Status: DISCONTINUED | OUTPATIENT
Start: 2022-01-01 | End: 2022-01-01 | Stop reason: HOSPADM

## 2022-01-01 RX ORDER — SODIUM CHLORIDE 9 MG/ML
250 INJECTION, SOLUTION INTRAVENOUS ONCE
Status: CANCELLED | OUTPATIENT
Start: 2022-07-26

## 2022-01-01 RX ORDER — MIDAZOLAM HYDROCHLORIDE 1 MG/ML
1 INJECTION INTRAMUSCULAR; INTRAVENOUS ONCE
Status: COMPLETED | OUTPATIENT
Start: 2022-01-01 | End: 2022-01-01

## 2022-01-01 RX ORDER — SODIUM CHLORIDE 0.9 % (FLUSH) 0.9 %
10 SYRINGE (ML) INJECTION EVERY 12 HOURS SCHEDULED
Status: CANCELLED | OUTPATIENT
Start: 2022-01-01

## 2022-01-01 RX ORDER — HEPARIN SODIUM (PORCINE) LOCK FLUSH IV SOLN 100 UNIT/ML 100 UNIT/ML
5 SOLUTION INTRAVENOUS AS NEEDED
Status: DISCONTINUED | OUTPATIENT
Start: 2022-01-01 | End: 2022-01-01 | Stop reason: HOSPADM

## 2022-01-01 RX ORDER — DEXAMETHASONE SODIUM PHOSPHATE 4 MG/ML
INJECTION, SOLUTION INTRA-ARTICULAR; INTRALESIONAL; INTRAMUSCULAR; INTRAVENOUS; SOFT TISSUE AS NEEDED
Status: DISCONTINUED | OUTPATIENT
Start: 2022-01-01 | End: 2022-01-01 | Stop reason: SURG

## 2022-01-01 RX ORDER — FUROSEMIDE 40 MG/1
40 TABLET ORAL DAILY
Qty: 5 TABLET | Refills: 0 | Status: ON HOLD | OUTPATIENT
Start: 2022-01-01 | End: 2022-01-01

## 2022-01-01 RX ORDER — HEPARIN SODIUM (PORCINE) LOCK FLUSH IV SOLN 100 UNIT/ML 100 UNIT/ML
500 SOLUTION INTRAVENOUS ONCE
Status: DISCONTINUED | OUTPATIENT
Start: 2022-01-01 | End: 2022-01-01 | Stop reason: HOSPADM

## 2022-01-01 RX ORDER — MORPHINE SULFATE 20 MG/ML
5 SOLUTION ORAL
Qty: 180 ML | Refills: 0 | Status: SHIPPED | OUTPATIENT
Start: 2022-01-01

## 2022-01-01 RX ORDER — KETAMINE HCL IN NACL, ISO-OSM 100MG/10ML
SYRINGE (ML) INJECTION AS NEEDED
Status: DISCONTINUED | OUTPATIENT
Start: 2022-01-01 | End: 2022-01-01 | Stop reason: SURG

## 2022-01-01 RX ORDER — PROMETHAZINE HYDROCHLORIDE 12.5 MG/1
25 TABLET ORAL ONCE AS NEEDED
Status: DISCONTINUED | OUTPATIENT
Start: 2022-01-01 | End: 2022-01-01 | Stop reason: HOSPADM

## 2022-01-01 RX ORDER — TC 99M MEDRONATE 20 MG/10ML
22.6 INJECTION, POWDER, LYOPHILIZED, FOR SOLUTION INTRAVENOUS
Status: COMPLETED | OUTPATIENT
Start: 2022-01-01 | End: 2022-01-01

## 2022-01-01 RX ORDER — HEPARIN SODIUM (PORCINE) LOCK FLUSH IV SOLN 100 UNIT/ML 100 UNIT/ML
500 SOLUTION INTRAVENOUS ONCE
Status: COMPLETED | OUTPATIENT
Start: 2022-01-01 | End: 2022-01-01

## 2022-01-01 RX ORDER — HYDROCODONE BITARTRATE AND ACETAMINOPHEN 5; 325 MG/1; MG/1
1 TABLET ORAL EVERY 6 HOURS PRN
Qty: 120 TABLET | Refills: 0 | Status: SHIPPED | OUTPATIENT
Start: 2022-01-01 | End: 2022-01-01 | Stop reason: DRUGHIGH

## 2022-01-01 RX ORDER — DEXTROSE MONOHYDRATE 25 G/50ML
25 INJECTION, SOLUTION INTRAVENOUS
Status: DISCONTINUED | OUTPATIENT
Start: 2022-01-01 | End: 2022-01-01 | Stop reason: HOSPADM

## 2022-01-01 RX ORDER — POTASSIUM CHLORIDE 7.45 MG/ML
10 INJECTION INTRAVENOUS
Status: CANCELLED | OUTPATIENT
Start: 2022-01-01

## 2022-01-01 RX ORDER — ACETAMINOPHEN 325 MG/1
650 TABLET ORAL ONCE
Status: DISCONTINUED | OUTPATIENT
Start: 2022-01-01 | End: 2022-01-01 | Stop reason: HOSPADM

## 2022-01-01 RX ORDER — HYDROCODONE BITARTRATE AND ACETAMINOPHEN 5; 325 MG/1; MG/1
1 TABLET ORAL EVERY 6 HOURS PRN
Status: DISCONTINUED | OUTPATIENT
Start: 2022-01-01 | End: 2022-01-01 | Stop reason: HOSPADM

## 2022-01-01 RX ORDER — MAGNESIUM OXIDE 400 MG/1
400 TABLET ORAL DAILY
Qty: 30 TABLET | Refills: 0 | Status: SHIPPED | OUTPATIENT
Start: 2022-01-01

## 2022-01-01 RX ORDER — HEPARIN SODIUM (PORCINE) LOCK FLUSH IV SOLN 100 UNIT/ML 100 UNIT/ML
SOLUTION INTRAVENOUS
Status: DISPENSED
Start: 2022-01-01 | End: 2022-01-01

## 2022-01-01 RX ORDER — LEVOFLOXACIN 5 MG/ML
500 INJECTION, SOLUTION INTRAVENOUS ONCE
Status: CANCELLED | OUTPATIENT
Start: 2022-01-01 | End: 2022-01-01

## 2022-01-01 RX ORDER — POTASSIUM CHLORIDE 1.5 G/1.77G
40 POWDER, FOR SOLUTION ORAL 2 TIMES DAILY
Status: DISCONTINUED | OUTPATIENT
Start: 2022-01-01 | End: 2022-01-01

## 2022-01-01 RX ORDER — SUCRALFATE 1 G/1
1 TABLET ORAL 3 TIMES DAILY
Status: DISCONTINUED | OUTPATIENT
Start: 2022-01-01 | End: 2022-01-01 | Stop reason: HOSPADM

## 2022-01-01 RX ORDER — POTASSIUM CHLORIDE 20 MEQ/1
20 TABLET, EXTENDED RELEASE ORAL DAILY
COMMUNITY
End: 2022-01-01 | Stop reason: SDUPTHER

## 2022-01-01 RX ORDER — DIPHENHYDRAMINE HYDROCHLORIDE 50 MG/ML
50 INJECTION INTRAMUSCULAR; INTRAVENOUS AS NEEDED
Status: CANCELLED | OUTPATIENT
Start: 2022-07-26

## 2022-01-01 RX ORDER — OXYCODONE HYDROCHLORIDE 5 MG/1
5 TABLET ORAL
Status: DISCONTINUED | OUTPATIENT
Start: 2022-01-01 | End: 2022-01-01 | Stop reason: HOSPADM

## 2022-01-01 RX ORDER — POTASSIUM CHLORIDE 1.5 G/1.77G
40 POWDER, FOR SOLUTION ORAL ONCE
Status: COMPLETED | OUTPATIENT
Start: 2022-01-01 | End: 2022-01-01

## 2022-01-01 RX ORDER — LEVOFLOXACIN 5 MG/ML
500 INJECTION, SOLUTION INTRAVENOUS ONCE
Status: COMPLETED | OUTPATIENT
Start: 2022-01-01 | End: 2022-01-01

## 2022-01-01 RX ORDER — PANTOPRAZOLE SODIUM 40 MG/1
40 TABLET, DELAYED RELEASE ORAL DAILY
COMMUNITY
Start: 2022-01-01

## 2022-01-01 RX ORDER — CEFEPIME 1 G/50ML
2 INJECTION, SOLUTION INTRAVENOUS EVERY 8 HOURS
Status: DISCONTINUED | OUTPATIENT
Start: 2022-01-01 | End: 2022-01-01 | Stop reason: HOSPADM

## 2022-01-01 RX ORDER — IBUPROFEN 600 MG/1
600 TABLET ORAL EVERY 6 HOURS PRN
Status: DISCONTINUED | OUTPATIENT
Start: 2022-01-01 | End: 2022-01-01 | Stop reason: HOSPADM

## 2022-01-01 RX ORDER — HYDROCODONE BITARTRATE AND ACETAMINOPHEN 5; 325 MG/1; MG/1
1 TABLET ORAL EVERY 6 HOURS PRN
COMMUNITY
Start: 2022-01-01 | End: 2022-01-01 | Stop reason: SDUPTHER

## 2022-01-01 RX ORDER — LIDOCAINE HYDROCHLORIDE 20 MG/ML
INJECTION, SOLUTION INFILTRATION; PERINEURAL AS NEEDED
Status: DISCONTINUED | OUTPATIENT
Start: 2022-01-01 | End: 2022-01-01 | Stop reason: SURG

## 2022-01-01 RX ORDER — MAGNESIUM HYDROXIDE 1200 MG/15ML
LIQUID ORAL AS NEEDED
Status: DISCONTINUED | OUTPATIENT
Start: 2022-01-01 | End: 2022-01-01 | Stop reason: HOSPADM

## 2022-01-01 RX ORDER — SUCRALFATE 1 G/1
1 TABLET ORAL 3 TIMES DAILY
Qty: 90 TABLET | Refills: 1 | Status: SHIPPED | OUTPATIENT
Start: 2022-01-01 | End: 2022-01-01

## 2022-01-01 RX ORDER — POTASSIUM CITRATE 5 MEQ/1
40 TABLET, EXTENDED RELEASE ORAL EVERY MORNING
Status: ON HOLD | COMMUNITY
End: 2022-01-01

## 2022-01-01 RX ORDER — ACETAMINOPHEN 325 MG/1
650 TABLET ORAL ONCE
Status: CANCELLED | OUTPATIENT
Start: 2022-07-26

## 2022-01-01 RX ORDER — PROPOFOL 10 MG/ML
VIAL (ML) INTRAVENOUS AS NEEDED
Status: DISCONTINUED | OUTPATIENT
Start: 2022-01-01 | End: 2022-01-01 | Stop reason: SURG

## 2022-01-01 RX ORDER — ACETAMINOPHEN 500 MG
1000 TABLET ORAL ONCE
Status: COMPLETED | OUTPATIENT
Start: 2022-01-01 | End: 2022-01-01

## 2022-01-01 RX ORDER — BLOOD SUGAR DIAGNOSTIC
STRIP MISCELLANEOUS
COMMUNITY
Start: 2022-01-01

## 2022-01-01 RX ADMIN — PANTOPRAZOLE SODIUM 40 MG: 40 TABLET, DELAYED RELEASE ORAL at 08:50

## 2022-01-01 RX ADMIN — SODIUM CHLORIDE, PRESERVATIVE FREE 20 ML: 5 INJECTION INTRAVENOUS at 13:28

## 2022-01-01 RX ADMIN — FULVESTRANT 500 MG: 250 INJECTION INTRAMUSCULAR at 11:02

## 2022-01-01 RX ADMIN — HEPARIN SODIUM (PORCINE) LOCK FLUSH IV SOLN 100 UNIT/ML 500 UNITS: 100 SOLUTION at 15:25

## 2022-01-01 RX ADMIN — Medication 20 ML: at 08:04

## 2022-01-01 RX ADMIN — Medication 20 ML: at 12:36

## 2022-01-01 RX ADMIN — HEPARIN SODIUM (PORCINE) LOCK FLUSH IV SOLN 100 UNIT/ML 500 UNITS: 100 SOLUTION at 08:28

## 2022-01-01 RX ADMIN — SODIUM CHLORIDE 250 ML: 9 INJECTION, SOLUTION INTRAVENOUS at 10:30

## 2022-01-01 RX ADMIN — PROPOFOL 50 MG: 10 INJECTION, EMULSION INTRAVENOUS at 09:04

## 2022-01-01 RX ADMIN — HEPARIN SODIUM (PORCINE) LOCK FLUSH IV SOLN 100 UNIT/ML 500 UNITS: 100 SOLUTION at 14:05

## 2022-01-01 RX ADMIN — DIPHENHYDRAMINE HYDROCHLORIDE 50 MG: 50 INJECTION, SOLUTION INTRAMUSCULAR; INTRAVENOUS at 11:01

## 2022-01-01 RX ADMIN — DIPHENHYDRAMINE HYDROCHLORIDE 50 MG: 50 INJECTION, SOLUTION INTRAMUSCULAR; INTRAVENOUS at 11:30

## 2022-01-01 RX ADMIN — Medication 20 ML: at 12:08

## 2022-01-01 RX ADMIN — FAMOTIDINE 20 MG: 10 INJECTION INTRAVENOUS at 10:35

## 2022-01-01 RX ADMIN — SODIUM CHLORIDE 250 ML: 9 INJECTION, SOLUTION INTRAVENOUS at 10:57

## 2022-01-01 RX ADMIN — CETUXIMAB 900 MG: 2 SOLUTION INTRAVENOUS at 11:07

## 2022-01-01 RX ADMIN — SODIUM CHLORIDE 250 ML: 9 INJECTION, SOLUTION INTRAVENOUS at 14:00

## 2022-01-01 RX ADMIN — HEPARIN SODIUM (PORCINE) LOCK FLUSH IV SOLN 100 UNIT/ML 500 UNITS: 100 SOLUTION at 12:04

## 2022-01-01 RX ADMIN — Medication 20 ML: at 09:25

## 2022-01-01 RX ADMIN — FULVESTRANT 500 MG: 250 INJECTION INTRAMUSCULAR at 14:24

## 2022-01-01 RX ADMIN — SODIUM CHLORIDE 250 ML: 9 INJECTION, SOLUTION INTRAVENOUS at 10:10

## 2022-01-01 RX ADMIN — ACETAMINOPHEN 650 MG: 325 TABLET ORAL at 11:02

## 2022-01-01 RX ADMIN — HEPARIN SODIUM (PORCINE) LOCK FLUSH IV SOLN 100 UNIT/ML 500 UNITS: 100 SOLUTION at 13:10

## 2022-01-01 RX ADMIN — FULVESTRANT 500 MG: 50 INJECTION INTRAMUSCULAR at 12:28

## 2022-01-01 RX ADMIN — CEFEPIME 2 G: 1 INJECTION, SOLUTION INTRAVENOUS at 20:25

## 2022-01-01 RX ADMIN — CETUXIMAB 900 MG: 2 SOLUTION INTRAVENOUS at 10:34

## 2022-01-01 RX ADMIN — Medication 20 ML: at 12:28

## 2022-01-01 RX ADMIN — METHYLPREDNISOLONE SODIUM SUCCINATE 125 MG: 125 INJECTION, POWDER, FOR SOLUTION INTRAMUSCULAR; INTRAVENOUS at 10:32

## 2022-01-01 RX ADMIN — DIPHENHYDRAMINE HYDROCHLORIDE 50 MG: 50 INJECTION, SOLUTION INTRAMUSCULAR; INTRAVENOUS at 10:41

## 2022-01-01 RX ADMIN — HEPARIN SODIUM (PORCINE) LOCK FLUSH IV SOLN 100 UNIT/ML 500 UNITS: 100 SOLUTION at 08:04

## 2022-01-01 RX ADMIN — FAMOTIDINE 20 MG: 10 INJECTION INTRAVENOUS at 14:09

## 2022-01-01 RX ADMIN — SODIUM CHLORIDE 250 ML: 9 INJECTION, SOLUTION INTRAVENOUS at 10:52

## 2022-01-01 RX ADMIN — HEPARIN SODIUM (PORCINE) LOCK FLUSH IV SOLN 100 UNIT/ML 500 UNITS: 100 SOLUTION at 12:59

## 2022-01-01 RX ADMIN — SODIUM CHLORIDE, PRESERVATIVE FREE 20 ML: 5 INJECTION INTRAVENOUS at 12:58

## 2022-01-01 RX ADMIN — FAMOTIDINE 20 MG: 10 INJECTION INTRAVENOUS at 10:55

## 2022-01-01 RX ADMIN — IOPAMIDOL 100 ML: 755 INJECTION, SOLUTION INTRAVENOUS at 09:48

## 2022-01-01 RX ADMIN — SODIUM CHLORIDE 1000 ML: 9 INJECTION, SOLUTION INTRAVENOUS at 12:14

## 2022-01-01 RX ADMIN — SODIUM CHLORIDE 250 ML: 9 INJECTION, SOLUTION INTRAVENOUS at 10:00

## 2022-01-01 RX ADMIN — CETUXIMAB 900 MG: 2 SOLUTION INTRAVENOUS at 14:37

## 2022-01-01 RX ADMIN — SODIUM CHLORIDE 250 ML: 9 INJECTION, SOLUTION INTRAVENOUS at 10:45

## 2022-01-01 RX ADMIN — DIPHENHYDRAMINE HYDROCHLORIDE 50 MG: 50 INJECTION, SOLUTION INTRAMUSCULAR; INTRAVENOUS at 10:52

## 2022-01-01 RX ADMIN — Medication 20 ML: at 19:27

## 2022-01-01 RX ADMIN — FAMOTIDINE 20 MG: 10 INJECTION INTRAVENOUS at 10:43

## 2022-01-01 RX ADMIN — DIPHENHYDRAMINE HYDROCHLORIDE 50 MG: 50 INJECTION, SOLUTION INTRAMUSCULAR; INTRAVENOUS at 11:08

## 2022-01-01 RX ADMIN — Medication 20 ML: at 12:55

## 2022-01-01 RX ADMIN — Medication 20 ML: at 14:34

## 2022-01-01 RX ADMIN — IOPAMIDOL 100 ML: 755 INJECTION, SOLUTION INTRAVENOUS at 10:49

## 2022-01-01 RX ADMIN — HEPARIN SODIUM (PORCINE) LOCK FLUSH IV SOLN 100 UNIT/ML 500 UNITS: 100 SOLUTION at 13:29

## 2022-01-01 RX ADMIN — Medication 20 ML: at 12:00

## 2022-01-01 RX ADMIN — CETUXIMAB 900 MG: 2 SOLUTION INTRAVENOUS at 11:14

## 2022-01-01 RX ADMIN — DIPHENHYDRAMINE HYDROCHLORIDE 25 MG: 50 INJECTION INTRAMUSCULAR; INTRAVENOUS at 11:36

## 2022-01-01 RX ADMIN — ACETAMINOPHEN 650 MG: 325 TABLET ORAL at 11:36

## 2022-01-01 RX ADMIN — ACETAMINOPHEN 650 MG: 325 TABLET ORAL at 13:29

## 2022-01-01 RX ADMIN — Medication 1500 MG: at 10:37

## 2022-01-01 RX ADMIN — POTASSIUM CHLORIDE 40 MEQ: 10 CAPSULE, COATED, EXTENDED RELEASE ORAL at 19:25

## 2022-01-01 RX ADMIN — PROPOFOL 150 MCG/KG/MIN: 10 INJECTION, EMULSION INTRAVENOUS at 09:04

## 2022-01-01 RX ADMIN — SODIUM CHLORIDE 250 ML: 9 INJECTION, SOLUTION INTRAVENOUS at 10:29

## 2022-01-01 RX ADMIN — FUROSEMIDE 20 MG: 10 INJECTION, SOLUTION INTRAMUSCULAR; INTRAVENOUS at 23:47

## 2022-01-01 RX ADMIN — METHYLPREDNISOLONE SODIUM SUCCINATE 125 MG: 125 INJECTION, POWDER, FOR SOLUTION INTRAMUSCULAR; INTRAVENOUS at 13:17

## 2022-01-01 RX ADMIN — ACETAMINOPHEN 650 MG: 325 TABLET ORAL at 14:01

## 2022-01-01 RX ADMIN — DIPHENHYDRAMINE HYDROCHLORIDE 25 MG: 50 INJECTION, SOLUTION INTRAMUSCULAR; INTRAVENOUS at 13:29

## 2022-01-01 RX ADMIN — Medication 20 ML: at 12:04

## 2022-01-01 RX ADMIN — DIPHENHYDRAMINE HYDROCHLORIDE 50 MG: 50 INJECTION, SOLUTION INTRAMUSCULAR; INTRAVENOUS at 10:18

## 2022-01-01 RX ADMIN — DIPHENHYDRAMINE HYDROCHLORIDE 50 MG: 50 INJECTION, SOLUTION INTRAMUSCULAR; INTRAVENOUS at 10:06

## 2022-01-01 RX ADMIN — POTASSIUM CHLORIDE 10 MEQ: 7.46 INJECTION, SOLUTION INTRAVENOUS at 10:57

## 2022-01-01 RX ADMIN — DEXAMETHASONE SODIUM PHOSPHATE 4 MG: 4 INJECTION INTRA-ARTICULAR; INTRALESIONAL; INTRAMUSCULAR; INTRAVENOUS; SOFT TISSUE at 09:01

## 2022-01-01 RX ADMIN — FAMOTIDINE 20 MG: 10 INJECTION INTRAVENOUS at 10:53

## 2022-01-01 RX ADMIN — DIPHENHYDRAMINE HYDROCHLORIDE 50 MG: 50 INJECTION, SOLUTION INTRAMUSCULAR; INTRAVENOUS at 13:23

## 2022-01-01 RX ADMIN — MIDAZOLAM 1 MG: 1 INJECTION INTRAMUSCULAR; INTRAVENOUS at 08:42

## 2022-01-01 RX ADMIN — HEPARIN SODIUM (PORCINE) LOCK FLUSH IV SOLN 100 UNIT/ML 500 UNITS: 100 SOLUTION at 12:42

## 2022-01-01 RX ADMIN — LEVOFLOXACIN 500 MG: 5 INJECTION, SOLUTION INTRAVENOUS at 09:00

## 2022-01-01 RX ADMIN — ACETAMINOPHEN 650 MG: 325 TABLET ORAL at 10:38

## 2022-01-01 RX ADMIN — SODIUM CHLORIDE 2000 ML: 9 INJECTION, SOLUTION INTRAVENOUS at 13:10

## 2022-01-01 RX ADMIN — CETUXIMAB 900 MG: 2 SOLUTION INTRAVENOUS at 11:29

## 2022-01-01 RX ADMIN — ACETAMINOPHEN 650 MG: 325 TABLET ORAL at 13:21

## 2022-01-01 RX ADMIN — DIPHENHYDRAMINE HYDROCHLORIDE 50 MG: 50 INJECTION, SOLUTION INTRAMUSCULAR; INTRAVENOUS at 10:47

## 2022-01-01 RX ADMIN — SODIUM CHLORIDE, PRESERVATIVE FREE 20 ML: 5 INJECTION INTRAVENOUS at 12:41

## 2022-01-01 RX ADMIN — FAMOTIDINE 20 MG: 10 INJECTION INTRAVENOUS at 10:44

## 2022-01-01 RX ADMIN — Medication 25 MG: at 09:04

## 2022-01-01 RX ADMIN — INSULIN LISPRO 2 UNITS: 100 INJECTION, SOLUTION INTRAVENOUS; SUBCUTANEOUS at 12:19

## 2022-01-01 RX ADMIN — SUCRALFATE 1 G: 1 TABLET ORAL at 08:50

## 2022-01-01 RX ADMIN — CETUXIMAB 900 MG: 2 SOLUTION INTRAVENOUS at 11:11

## 2022-01-01 RX ADMIN — SODIUM CHLORIDE, POTASSIUM CHLORIDE, SODIUM LACTATE AND CALCIUM CHLORIDE 9 ML/HR: 600; 310; 30; 20 INJECTION, SOLUTION INTRAVENOUS at 08:14

## 2022-01-01 RX ADMIN — METHYLPREDNISOLONE SODIUM SUCCINATE 125 MG: 125 INJECTION, POWDER, FOR SOLUTION INTRAMUSCULAR; INTRAVENOUS at 10:03

## 2022-01-01 RX ADMIN — Medication 20 ML: at 13:10

## 2022-01-01 RX ADMIN — ACETAMINOPHEN 650 MG: 325 TABLET ORAL at 10:52

## 2022-01-01 RX ADMIN — POTASSIUM CHLORIDE 10 MEQ: 7.46 INJECTION, SOLUTION INTRAVENOUS at 12:08

## 2022-01-01 RX ADMIN — METHYLPREDNISOLONE SODIUM SUCCINATE 125 MG: 125 INJECTION, POWDER, FOR SOLUTION INTRAMUSCULAR; INTRAVENOUS at 10:33

## 2022-01-01 RX ADMIN — LIDOCAINE HYDROCHLORIDE 100 MG: 20 INJECTION, SOLUTION INFILTRATION; PERINEURAL at 09:04

## 2022-01-01 RX ADMIN — HEPARIN SODIUM (PORCINE) LOCK FLUSH IV SOLN 100 UNIT/ML 500 UNITS: 100 SOLUTION at 12:00

## 2022-01-01 RX ADMIN — HEPARIN SODIUM (PORCINE) LOCK FLUSH IV SOLN 100 UNIT/ML 500 UNITS: 100 SOLUTION at 09:31

## 2022-01-01 RX ADMIN — FAMOTIDINE 20 MG: 10 INJECTION INTRAVENOUS at 10:34

## 2022-01-01 RX ADMIN — HEPARIN SODIUM (PORCINE) LOCK FLUSH IV SOLN 100 UNIT/ML 500 UNITS: 100 SOLUTION at 12:39

## 2022-01-01 RX ADMIN — FAMOTIDINE 20 MG: 10 INJECTION INTRAVENOUS at 11:04

## 2022-01-01 RX ADMIN — Medication 20 ML: at 15:44

## 2022-01-01 RX ADMIN — HEPARIN SODIUM (PORCINE) LOCK FLUSH IV SOLN 100 UNIT/ML 500 UNITS: 100 SOLUTION at 12:36

## 2022-01-01 RX ADMIN — FULVESTRANT 500 MG: 50 INJECTION INTRAMUSCULAR at 12:45

## 2022-01-01 RX ADMIN — FUROSEMIDE 40 MG: 10 INJECTION, SOLUTION INTRAMUSCULAR; INTRAVENOUS at 10:36

## 2022-01-01 RX ADMIN — FAMOTIDINE 20 MG: 10 INJECTION INTRAVENOUS at 10:47

## 2022-01-01 RX ADMIN — FERRIC CARBOXYMALTOSE INJECTION 750 MG: 50 INJECTION, SOLUTION INTRAVENOUS at 08:51

## 2022-01-01 RX ADMIN — SODIUM CHLORIDE 250 ML: 9 INJECTION, SOLUTION INTRAVENOUS at 10:37

## 2022-01-01 RX ADMIN — ACETAMINOPHEN 650 MG: 325 TABLET ORAL at 10:39

## 2022-01-01 RX ADMIN — ACETAMINOPHEN 650 MG: 325 TABLET ORAL at 13:14

## 2022-01-01 RX ADMIN — DIPHENHYDRAMINE HYDROCHLORIDE 50 MG: 50 INJECTION, SOLUTION INTRAMUSCULAR; INTRAVENOUS at 14:13

## 2022-01-01 RX ADMIN — SODIUM CHLORIDE 250 ML: 9 INJECTION, SOLUTION INTRAVENOUS at 08:50

## 2022-01-01 RX ADMIN — Medication 20 ML: at 19:33

## 2022-01-01 RX ADMIN — FULVESTRANT 500 MG: 50 INJECTION INTRAMUSCULAR at 12:30

## 2022-01-01 RX ADMIN — METHYLPREDNISOLONE SODIUM SUCCINATE 125 MG: 125 INJECTION, POWDER, FOR SOLUTION INTRAMUSCULAR; INTRAVENOUS at 10:42

## 2022-01-01 RX ADMIN — Medication 25 MG: at 09:16

## 2022-01-01 RX ADMIN — HEPARIN SODIUM (PORCINE) LOCK FLUSH IV SOLN 100 UNIT/ML 500 UNITS: 100 SOLUTION at 19:27

## 2022-01-01 RX ADMIN — CEFEPIME 2 G: 1 INJECTION, SOLUTION INTRAVENOUS at 12:21

## 2022-01-01 RX ADMIN — ALTEPLASE: 2.2 INJECTION, POWDER, LYOPHILIZED, FOR SOLUTION INTRAVENOUS at 11:50

## 2022-01-01 RX ADMIN — METHYLPREDNISOLONE SODIUM SUCCINATE 125 MG: 125 INJECTION, POWDER, FOR SOLUTION INTRAMUSCULAR; INTRAVENOUS at 10:50

## 2022-01-01 RX ADMIN — SODIUM CHLORIDE 1000 ML: 9 INJECTION, SOLUTION INTRAVENOUS at 11:30

## 2022-01-01 RX ADMIN — SODIUM CHLORIDE, PRESERVATIVE FREE 20 ML: 5 INJECTION INTRAVENOUS at 12:39

## 2022-01-01 RX ADMIN — HEPARIN SODIUM (PORCINE) LOCK FLUSH IV SOLN 100 UNIT/ML 500 UNITS: 100 SOLUTION at 09:25

## 2022-01-01 RX ADMIN — HYDROCODONE BITARTRATE AND ACETAMINOPHEN 1 TABLET: 5; 325 TABLET ORAL at 12:19

## 2022-01-01 RX ADMIN — Medication 20 ML: at 12:51

## 2022-01-01 RX ADMIN — CETUXIMAB 900 MG: 2 SOLUTION INTRAVENOUS at 11:09

## 2022-01-01 RX ADMIN — Medication 20 ML: at 09:30

## 2022-01-01 RX ADMIN — HEPARIN SODIUM 5000 UNITS: 5000 INJECTION, SOLUTION INTRAVENOUS; SUBCUTANEOUS at 05:12

## 2022-01-01 RX ADMIN — ACETAMINOPHEN 650 MG: 325 TABLET ORAL at 10:00

## 2022-01-01 RX ADMIN — ACETAMINOPHEN 650 MG: 325 TABLET ORAL at 10:35

## 2022-01-01 RX ADMIN — METHYLPREDNISOLONE SODIUM SUCCINATE 125 MG: 125 INJECTION, POWDER, FOR SOLUTION INTRAMUSCULAR; INTRAVENOUS at 10:14

## 2022-01-01 RX ADMIN — SODIUM CHLORIDE 250 ML: 9 INJECTION, SOLUTION INTRAVENOUS at 11:02

## 2022-01-01 RX ADMIN — ACETAMINOPHEN 650 MG: 325 TABLET ORAL at 10:10

## 2022-01-01 RX ADMIN — HEPARIN SODIUM (PORCINE) LOCK FLUSH IV SOLN 100 UNIT/ML 500 UNITS: 100 SOLUTION at 12:28

## 2022-01-01 RX ADMIN — HEPARIN SODIUM (PORCINE) LOCK FLUSH IV SOLN 100 UNIT/ML 500 UNITS: 100 SOLUTION at 12:55

## 2022-01-01 RX ADMIN — HEPARIN SODIUM (PORCINE) LOCK FLUSH IV SOLN 100 UNIT/ML 500 UNITS: 100 SOLUTION at 12:57

## 2022-01-01 RX ADMIN — HEPARIN SODIUM (PORCINE) LOCK FLUSH IV SOLN 100 UNIT/ML 500 UNITS: 100 SOLUTION at 14:35

## 2022-01-01 RX ADMIN — METHYLPREDNISOLONE SODIUM SUCCINATE 125 MG: 125 INJECTION, POWDER, FOR SOLUTION INTRAMUSCULAR; INTRAVENOUS at 10:57

## 2022-01-01 RX ADMIN — HEPARIN SODIUM 5000 UNITS: 5000 INJECTION, SOLUTION INTRAVENOUS; SUBCUTANEOUS at 23:46

## 2022-01-01 RX ADMIN — IOPAMIDOL 100 ML: 755 INJECTION, SOLUTION INTRAVENOUS at 17:25

## 2022-01-01 RX ADMIN — HEPARIN SODIUM (PORCINE) LOCK FLUSH IV SOLN 100 UNIT/ML 500 UNITS: 100 SOLUTION at 12:08

## 2022-01-01 RX ADMIN — HEPARIN SODIUM (PORCINE) LOCK FLUSH IV SOLN 100 UNIT/ML 500 UNITS: 100 SOLUTION at 17:29

## 2022-01-01 RX ADMIN — ACETAMINOPHEN 650 MG: 325 TABLET ORAL at 10:30

## 2022-01-01 RX ADMIN — HEPARIN SODIUM (PORCINE) LOCK FLUSH IV SOLN 100 UNIT/ML 500 UNITS: 100 SOLUTION at 15:44

## 2022-01-01 RX ADMIN — METHYLPREDNISOLONE SODIUM SUCCINATE 125 MG: 125 INJECTION, POWDER, FOR SOLUTION INTRAMUSCULAR; INTRAVENOUS at 10:56

## 2022-01-01 RX ADMIN — HEPARIN SODIUM (PORCINE) LOCK FLUSH IV SOLN 100 UNIT/ML 500 UNITS: 100 SOLUTION at 12:51

## 2022-01-01 RX ADMIN — METHYLPREDNISOLONE SODIUM SUCCINATE 125 MG: 125 INJECTION, POWDER, FOR SOLUTION INTRAMUSCULAR; INTRAVENOUS at 11:07

## 2022-01-01 RX ADMIN — FAMOTIDINE 20 MG: 10 INJECTION INTRAVENOUS at 10:15

## 2022-01-01 RX ADMIN — DIPHENHYDRAMINE HYDROCHLORIDE 50 MG: 50 INJECTION, SOLUTION INTRAMUSCULAR; INTRAVENOUS at 10:38

## 2022-01-01 RX ADMIN — OXYCODONE HYDROCHLORIDE 5 MG: 5 TABLET ORAL at 10:38

## 2022-01-01 RX ADMIN — DIPHENHYDRAMINE HYDROCHLORIDE 25 MG: 50 INJECTION, SOLUTION INTRAMUSCULAR; INTRAVENOUS at 13:21

## 2022-01-01 RX ADMIN — Medication 20 ML: at 15:25

## 2022-01-01 RX ADMIN — METHYLPREDNISOLONE SODIUM SUCCINATE 125 MG: 125 INJECTION, POWDER, FOR SOLUTION INTRAMUSCULAR; INTRAVENOUS at 10:38

## 2022-01-01 RX ADMIN — CETUXIMAB 900 MG: 2 SOLUTION INTRAVENOUS at 11:21

## 2022-01-01 RX ADMIN — METHYLPREDNISOLONE SODIUM SUCCINATE 125 MG: 125 INJECTION, POWDER, FOR SOLUTION INTRAMUSCULAR; INTRAVENOUS at 14:06

## 2022-01-01 RX ADMIN — HEPARIN SODIUM (PORCINE) LOCK FLUSH IV SOLN 100 UNIT/ML 500 UNITS: 100 SOLUTION at 19:33

## 2022-01-01 RX ADMIN — HEPARIN SODIUM (PORCINE) LOCK FLUSH IV SOLN 100 UNIT/ML 500 UNITS: 100 SOLUTION at 19:59

## 2022-01-01 RX ADMIN — ACETAMINOPHEN 650 MG: 325 TABLET ORAL at 10:45

## 2022-01-01 RX ADMIN — HEPARIN SODIUM (PORCINE) LOCK FLUSH IV SOLN 100 UNIT/ML 500 UNITS: 100 SOLUTION at 20:40

## 2022-01-01 RX ADMIN — SODIUM CHLORIDE 250 ML: 9 INJECTION, SOLUTION INTRAVENOUS at 10:34

## 2022-01-01 RX ADMIN — HEPARIN SODIUM (PORCINE) LOCK FLUSH IV SOLN 100 UNIT/ML 500 UNITS: 100 SOLUTION at 12:22

## 2022-01-01 RX ADMIN — ACETAMINOPHEN 650 MG: 325 TABLET ORAL at 10:55

## 2022-01-01 RX ADMIN — SODIUM CHLORIDE, PRESERVATIVE FREE 20 ML: 5 INJECTION INTRAVENOUS at 08:28

## 2022-01-01 RX ADMIN — CETUXIMAB 450 MG: 2 SOLUTION INTRAVENOUS at 11:27

## 2022-01-01 RX ADMIN — ACETAMINOPHEN 1000 MG: 500 TABLET ORAL at 08:17

## 2022-01-01 RX ADMIN — TC 99M MEDRONATE 22.6 MILLICURIE: 20 INJECTION, POWDER, LYOPHILIZED, FOR SOLUTION INTRAVENOUS at 07:47

## 2022-01-01 RX ADMIN — CETUXIMAB 900 MG: 2 SOLUTION INTRAVENOUS at 13:55

## 2022-01-01 RX ADMIN — POTASSIUM CHLORIDE 40 MEQ: 1.5 POWDER, FOR SOLUTION ORAL at 02:42

## 2022-01-01 RX ADMIN — FAMOTIDINE 20 MG: 10 INJECTION INTRAVENOUS at 10:01

## 2022-01-01 RX ADMIN — SODIUM CHLORIDE 250 ML: 9 INJECTION, SOLUTION INTRAVENOUS at 13:15

## 2022-01-01 RX ADMIN — FAMOTIDINE 20 MG: 10 INJECTION INTRAVENOUS at 13:21

## 2022-01-01 RX ADMIN — ONDANSETRON 4 MG: 2 INJECTION INTRAMUSCULAR; INTRAVENOUS at 09:01

## 2022-01-01 RX ADMIN — HEPARIN SODIUM (PORCINE) LOCK FLUSH IV SOLN 100 UNIT/ML: 100 SOLUTION at 18:09

## 2022-01-01 RX ADMIN — CETUXIMAB 900 MG: 2 SOLUTION INTRAVENOUS at 11:53

## 2022-01-01 RX ADMIN — Medication 20 ML: at 12:21

## 2022-01-01 RX ADMIN — Medication 20 ML: at 12:56

## 2022-01-01 RX ADMIN — Medication 20 ML: at 14:05

## 2022-01-01 RX ADMIN — CETUXIMAB 900 MG: 2 SOLUTION INTRAVENOUS at 10:57

## 2022-01-04 NOTE — PROGRESS NOTES
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 mutated Colon Cancer:   - Diagnosed 5/7/15; Low grade; staged pT4a pN0 M0 stage IIB; 0/37 LN; no MMR testing; RIGHT sided  - RIGHT hemicolectomy 5/7/15  - adjuvant Xeloda X 6 months  - Recurrent dx diagnosed via RIGHT ureter mass resection (5/1/18)  - FOLFIRI X 9 (7/31-12/4/18)  - s/p XRT RIGHT pelvis total 5320 cGy (12/14/18-1/24/19)  - CARIS report from specimen dated 5/7/15: BRAF V600E mutation (possible benefit from cetuximab), MSI stable, TMB-low, TAURUS-High (33%), TP53 mutated  - CT CAP 1/21/2021: Interim development of right-sided hydronephrosis and a soft tissue mass along the right iliac chain measuring 2.9 x 1.2 cm.  There is a second nodule identified below the aortic bifurcation in the midline measuring 1.4 cm.  This was consistent with disease recurrence versus her new primary small bowel cancer.    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 Lacey). Pathology from the ileum was positive for adenocarcinoma that appeared to be small bowel in origin and represent a new primary malignancy, 2.2cm, grade 1, tumor perforates the visceral peritoneum, pT4pN0, with 0/12 lymph nodes involved.  - Post surgical CT Abd/Pelvis 4/24/21:  Mild thickening of the wall of some portion of the small intestine, could be post-surgical changes. Moderate dilation of the right renal collecting system is unchanged with ureteral double-J stent.    - CT CAP on 3/25 prior to surgery showed no evidence of disease in the chest  - Caris report also showed BRAF V600E mutation consistent with pts prior colon cancer.     - started Xeloda on 6/5/21 and continued through November 2021  - CT A/P on 11/9/21: probably worsening of peritoneal carcinomatosis  - started Cetuximab in November 2021.  Added Encorafenib in December 2021.     3) Breast Cancer:  -RIGHT breast DCIS diagnosed 3/15/06; low-grade; s/p lumpectomy and adjuvant XRT; ER: 100%, DE: 70%  -LEFT breast invasive lobular carcinoma; diagnosed 06/2012; staged pT3 pN0 M0 stage IIB, ER: 83%, DE: 0, HER2: 1+, Ki-67: 26%, treated with mastectomy and SLND 04/2012, adjuvant DD AC and Taxotere, Adjuvant Arimidex then Femara (11/13/19)    4) Anemia:  -multifactorial due to her history of GI bleeding from small bowel ulcer/tumor  -intolerant to oral iron. Treatment with IV iron in the past.   -iron studies on 5/11/12 were normal    5) right hydronephrosis:  -Noted on CT scan from 1/21/2021, secondary to soft tissue mass  -Likely secondary to recurrent colon cancer, biopsy pending  -Cystoscopy and right ureteral stent (Dr. Chi) on 1/28/2021    6) DVT:  -bracheocephalic and subclavian thrombosis March 2021 ([facility])     Malignant neoplasm of left breast in female, estrogen receptor positive (HCC)   3/5/2018 Initial Diagnosis    Breast cancer (CMS/HCC)     Colon cancer (HCC)   3/5/2018 Initial Diagnosis    Colon cancer (CMS/HCC)     6/28/2021 - 7/7/2021 Chemotherapy    OP SUPPORTIVE ELECTROLYTE REPLACEMENT     10/21/2021 -  Chemotherapy    OP SUPPORTIVE Ferric Carboxymaltose (INJECTAFER)      11/23/2021 -  Chemotherapy    OP COLORECTAL Cetuximab / Encorafenib      Malignant neoplasm of colon (HCC)   3/5/2018 Initial Diagnosis    Malignant neoplasm of colon (HCC)     Metastasis to bone (HCC)   12/13/2021 Initial Diagnosis    Metastasis to bone (HCC)         History of Present Illness  Patient comes in today for her next cycle of chemotherapy.  Reviewed her labs which shows that her glucose still very elevated at 302.  Her hemoglobin is stable at 7.7.  Patient does admit  that she has had some melena over the past few weeks.  She did not mention it previously but her  eventually noticed and asked her about it.    The patient has not been in to see her primary care provider about her blood sugar.    Review of Systems   Constitutional: Positive for appetite change (Low appetite) and fatigue. Negative for diaphoresis, fever, unexpected weight gain and unexpected weight loss.   HENT: Negative for hearing loss, sore throat and voice change.    Eyes: Negative for blurred vision, double vision, pain, redness and visual disturbance.   Respiratory: Negative for cough, shortness of breath and wheezing.    Cardiovascular: Positive for chest pain (oain underneath left breast and armpit ). Negative for palpitations and leg swelling.   Gastrointestinal: Positive for abdominal pain and nausea.   Endocrine: Negative for cold intolerance, heat intolerance, polydipsia and polyuria.   Genitourinary: Negative for decreased urine volume, difficulty urinating, frequency and urinary incontinence.   Musculoskeletal: Positive for back pain (lower back ) and joint swelling (Shoulder pain ). Negative for arthralgias and myalgias.   Skin: Negative for color change, rash, skin lesions and bruise.   Neurological: Positive for weakness and numbness (right hand numb ). Negative for dizziness, seizures and headache.   Hematological: Negative for adenopathy. Does not bruise/bleed easily.   Psychiatric/Behavioral: Negative for depressed mood. The patient is not nervous/anxious.    All other systems reviewed and are negative.      Past Medical History:   Diagnosis Date   • Abdominal pain    • Anemia    • Arthritis    • Breast cancer (HCC)    • Colon cancer (HCC)    • Constipation    • Diarrhea    • DM (diabetes mellitus), type 2 (HCC)    • High cholesterol    • History of blood clots     neck   • Hypertension    • Hypokalemia    • Iron deficiency anemia secondary to blood loss (chronic)     GI BLEED   •  Paroxysmal A-fib (HCC)    • Shortness of breath      Past Surgical History:   Procedure Laterality Date   • BACK SURGERY     • BREAST LUMPECTOMY Right    • COLON SURGERY     • COLONOSCOPY     • CYSTOSCOPY W/ URETERAL STENT PLACEMENT     • CYSTOSCOPY W/ URETERAL STENT PLACEMENT Right 8/17/2021    Procedure: CYSTOSCOPY, RIGHT URETERAL STENT INSERTION;  Surgeon: Kelli Chi MD;  Location: [facility];  Service: Urology;  Laterality: Right;   • HYSTERECTOMY      partial    • JOINT REPLACEMENT      hip    • MASTECTOMY Left    • OTHER SURGICAL HISTORY      BIOPSY   • VENOUS ACCESS DEVICE (PORT) INSERTION       Social History     Socioeconomic History   • Marital status:    Tobacco Use   • Smoking status: Never Smoker   • Smokeless tobacco: Never Used   Vaping Use   • Vaping Use: Never used   Substance and Sexual Activity   • Alcohol use: Never   • Drug use: Never   • Sexual activity: Defer     Family History   Problem Relation Age of Onset   • Skin cancer Other    • Colon cancer Sister        Objective   Physical Exam  General: Alert, cooperative, no acute distress  Eyes: Anicteric sclera, PERRLA  Respiratory: normal respiratory effort  Cardiovascular: no lower extremity edema  Skin: Normal tone, no rash, no lesions  Psychiatric: Appropriate affect, intact judgment, remains determined to continue treatment  Neurologic: No focal sensory or motor deficits, normal cognition   Musculoskeletal: Reduced muscle strength and tone, wheelchair  Extremities: No clubbing, cyanosis, or deformities    Vitals:    01/04/22 0929   BP: 122/68   Pulse: 87   Resp: 18   SpO2: 100%   Weight: 65.8 kg (145 lb 1 oz)   PainSc:   7               PHQ-9 Total Score:         Result Review :   The following data was reviewed by: Jennifer Schroeder MD PhD on 01/04/2022:  Lab Results   Component Value Date    HGB 7.7 (C) 01/04/2022    HCT 25.1 (L) 01/04/2022    .0 (H) 01/04/2022     01/04/2022    WBC 11.04 (H) 01/04/2022     NEUTROABS 6.05 01/04/2022    LYMPHSABS 4.22 (H) 01/04/2022    MONOSABS 0.46 01/04/2022    EOSABS 0.19 01/04/2022    BASOSABS 0.07 01/04/2022     Lab Results   Component Value Date    GLUCOSE 302 (H) 01/04/2022    BUN 17 01/04/2022    CREATININE 1.01 (H) 01/04/2022     (L) 01/04/2022    K 4.5 01/04/2022     01/04/2022    CO2 19.0 (L) 01/04/2022    CALCIUM 8.4 (L) 01/04/2022    PROTEINTOT 7.2 01/04/2022    ALBUMIN 3.65 01/04/2022    BILITOT 0.2 01/04/2022    ALKPHOS 190 (H) 01/04/2022    AST 39 (H) 01/04/2022    ALT 6 01/04/2022          Assessment and Plan    Diagnoses and all orders for this visit:    1. Overlapping malignant neoplasm of colon (HCC) (Primary)  -     Ferritin; Future  -     Iron Profile; Future  -     CEA; Future      Metastatic colon cancer: Patient will resume cetuximab.  The toxicity she has suffered including hyperglycemia and pancreatitis is likely associated with encorafenib.  She will continue to hold this medication for at least 1 week and talk to her primary care provider about better glucose management.  Patient will return to clinic in 2 weeks for repeat toxicity check prior to the next cycle of cetuximab.  I will increase her cetuximab dose to 500 mg/m², which is the typical dose given once every 2 weeks.    Hyperglycemia: Secondary to poorly controlled type 2 diabetes.  This was exacerbated by previous treatment with encorafenib and steroid.  The patient's glucose has not improved.  She will talk with her primary care provider as above and start insulin or other medication if necessary.    Melena: Patient has had chronic GI bleeding due to her underlying disease as well as her blood thinner.  Patient's hemoglobin is stable at 7.7.  I will discuss decreasing her dose of Eliquis to 2.5 mg twice daily at the next appointment.    Patient was given instructions and counseling regarding her condition or for health maintenance advice. Please see specific information pulled into the  AVS if appropriate.     Jennifer Schroeder MD PhD    1/5/2022

## 2022-01-04 NOTE — PROGRESS NOTES
Outpatient Nutrition Oncology Follow Up    Patient Name: Lorraine Dominguez  YOB: 1950  MRN: [id number]  Assessment Date: 1/4/2022    CLINICAL NUTRITION ASSESSMENT    Dx:  Colorectal Ca      Type of Cancer Treatment  Cetuximab, Encorafenib           Reason for Assessment  Follow-up protocol, Unintentional weight loss     H&P:    Past Medical History:   Diagnosis Date   • Abdominal pain    • Anemia    • Arthritis    • Breast cancer (HCC)    • Colon cancer (HCC)    • Constipation    • Diarrhea    • DM (diabetes mellitus), type 2 (HCC)    • High cholesterol    • History of blood clots     neck   • Hypertension    • Hypokalemia    • Iron deficiency anemia secondary to blood loss (chronic)     GI BLEED   • Paroxysmal A-fib (HCC)    • Shortness of breath         Current Problems:   Patient Active Problem List   Diagnosis Code   • Encounter for adjustment or management of vascular access device Z45.2   • Allergic rhinitis J30.9   • Anemia D64.9   • Anxiety F41.9   • Arthritis M19.90   • Malignant neoplasm of left breast in female, estrogen receptor positive (HCC) C50.912, Z17.0   • Cataract H26.9   • Degeneration of intervertebral disc [id number]   • Depression F32.A   • Diabetic neuropathy (HCC) E11.40   • Esophageal reflux K21.9   • Hiatal hernia K44.9   • High blood pressure I10   • Hydroureteronephrosis N13.30   • Hyperlipidemia E78.5   • Leg pain M79.606   • Limb swelling M79.89   • Colon cancer (HCC) C18.9   • Malignant neoplasm of colon (HCC) C18.9   • Muscle cramps R25.2   • Neuropathy G62.9   • Primary localized osteoarthritis of pelvic region and thigh M16.10   • Shortness of breath R06.02   • Diabetes mellitus (HCC) E11.9   • Type 2 diabetes mellitus (HCC) E11.9   • Ureteral mass N28.89   • Special screening for malignant neoplasms, colon Z12.11   • Atrial fibrillation (HCC) I48.91   • Central venous catheter in place Z78.9   • Chronic anxiety F41.9   • Disorder of vitamin B12 E53.8   • Dyslipidemia  E78.5   • Gastrointestinal hemorrhage K92.2   • History of lumbar discectomy Z98.890   • History of malignant neoplasm of breast Z85.3   • Hypokalemia E87.6   • Hypothyroidism E03.9   • Iron deficiency E61.1   • Iron deficiency anemia secondary to blood loss (chronic) D50.0   • Osteopenia M85.80   • Tension-type headache G44.209   • Ulcerative lesion JLO4370   • Hypomagnesemia E83.42   • Hypocalcemia E83.51   • History of colon cancer Z85.038   • Fall against object W18.00XA   • Generalized anxiety disorder F41.1   • Malabsorption of iron K90.9   • Urgency of urination R39.15   • Moderate nausea R11.0   • Hyperglycemia R73.9   • Dehydration E86.0   • Metastasis to bone (HCC) C79.51   • Physical deconditioning R53.81   • Generalized abdominal pain R10.84         Anthropometrics     Row Name 01/04/22 1101 01/04/22 0830       Anthropometrics    Weight 65.8 kg (145 lb 1 oz) 65.8 kg (145 lb 1 oz)       Usual Body Weight (UBW)    Weight Loss unintentional --    Weight Loss Time Frame 4.3% wt decline in 1 week; 6% total decline in 1 month --                BMI kg/m2   Body mass index is 24.14 kg/m².    Weight Hx  Wt Readings from Last 30 Encounters:   01/04/22 0929 65.8 kg (145 lb 1 oz)   01/04/22 1101 65.8 kg (145 lb 1 oz)   01/04/22 0830 65.8 kg (145 lb 1 oz)   12/28/21 0756 68.8 kg (151 lb 10.8 oz)   12/21/21 1032 69 kg (152 lb 1.9 oz)   12/20/21 1526 69.1 kg (152 lb 5.4 oz)   12/14/21 0845 70 kg (154 lb 5.2 oz)   12/07/21 0822 70 kg (154 lb 5.2 oz)   12/07/21 0839 70 kg (154 lb 5.2 oz)   11/30/21 0700 69.5 kg (153 lb 3.5 oz)   11/30/21 0837 69.5 kg (153 lb 3.5 oz)   11/23/21 0835 71 kg (156 lb 8.4 oz)   11/23/21 0837 71 kg (156 lb 8.4 oz)   11/15/21 0940 72 kg (158 lb 11.7 oz)   11/09/21 1844 72 kg (158 lb 11.7 oz)   10/28/21 0952 76 kg (167 lb 8.8 oz)   10/25/21 0843 74.7 kg (164 lb 10.9 oz)   10/22/21 1006 75.1 kg (165 lb 9.1 oz)   10/18/21 1055 74.3 kg (163 lb 12.8 oz)   09/30/21 1117 72.1 kg (158 lb 15.2 oz)    09/21/21 1234 72.1 kg (159 lb)   09/20/21 1152 72 kg (158 lb 11.7 oz)   09/14/21 0842 72.2 kg (159 lb 2.8 oz)   09/07/21 0923 71.9 kg (158 lb 9.6 oz)   08/24/21 0909 72.6 kg (160 lb)   08/23/21 1038 73 kg (160 lb 15 oz)   08/17/21 0604 73 kg (160 lb 15 oz)   08/03/21 1454 69.9 kg (154 lb)   07/07/21 0927 72.5 kg (159 lb 13.3 oz)   06/22/21 1049 70 kg (154 lb 5.2 oz)   05/28/21 1336 69.9 kg (154 lb 1.6 oz)   05/19/21 0000 70.8 kg (156 lb 2 oz)        Labs/Medications        Pertinent Labs Reviewed.   Results from last 7 days   Lab Units 01/04/22  0828   SODIUM mmol/L 128*   POTASSIUM mmol/L 4.5   CHLORIDE mmol/L 100   CO2 mmol/L 19.0*   BUN mg/dL 17   CREATININE mg/dL 1.01*   CALCIUM mg/dL 8.4*   BILIRUBIN mg/dL 0.2   ALK PHOS U/L 190*   ALT (SGPT) U/L 6   AST (SGOT) U/L 39*   GLUCOSE mg/dL 302*     Results from last 7 days   Lab Units 01/04/22  0828   MAGNESIUM mg/dL 1.4*   HEMOGLOBIN g/dL 7.7*   HEMATOCRIT % 25.1*     Lab Results   Component Value Date    HGBA1C 6.0 (H) 09/09/2019         Pertinent Medications Accu-Chek Softclix Lancets, Calcium Carb-Cholecalciferol, HYDROcodone-acetaminophen, LORazepam, Probiotic, amoxicillin-clavulanate, apixaban, budesonide-formoterol, calcium carbonate, capecitabine, cefdinir, citalopram, cromolyn, digoxin, encorafenib, escitalopram, gabapentin, glucose blood, loperamide, magnesium oxide, metFORMIN, metoprolol tartrate, ondansetron, oxyCODONE, pantoprazole, potassium chloride, pravastatin, and senna     Physical Findings        Malnutrition Severity Assessment     Row Name 01/04/22 1102          Malnutrition Severity Assessment    Malnutrition Type Chronic Disease - Related Malnutrition            Insufficient Energy Intake     Insufficient Energy Intake Findings Severe     Insufficient Energy Intake  <75% of est. energy requirement for > or equal to 1 month            Unintentional Weight Loss     Unintentional Weight Loss Findings Severe     Unintentional Weight Loss   "Weight loss greater than 2% in one week            Muscle Loss    Loss of Muscle Mass Findings Moderate     [place] Region Moderate - slight depression            Fat Loss    Subcutaneous Fat Loss Findings Moderate     Orbital Region  Moderate -  somewhat hollowness, slightly dark circles            Criteria Met (Must meet criteria for severity in at least 2 of these categories: M Wasting, Fat Loss, Fluid, Secondary Signs, Wt. Status, Intake)    Patient meets criteria for  Moderate (non-severe) Malnutrition                  Current Nutrition Orders & Evaluation of Intake       Oral Nutrition     Current PO Diet Regular diet with occasional snacks; questionable protein & fluid intake   Supplement Glucerna shake once daily (encouraged 4 oz. BID)     Nutrition Diagnosis        Nutrition Dx Problem 1 Moderate malnutrition related to decreased ability to consume sufficient energy as evidenced by physiological causes increasing nutrient needs., hypermetabolic state., muscle wasting., fat loss., inadequate energy intake., decreased appetite., patient report. and family report.       Nutrition Intervention       RD Action Nutrition follow up     Monitor/Evaluation       Monitor Per oncology nutrition protocol.     Comments:    Significant wt decline noted in the past week & month.  Spoke to pt & family while pt was waiting for MD today.  Family reports pt is not eating.  Pt reports she is \"not hungry\" like she used to be.  She will try to eat and won't be able to eat much (early satiety).  Pt tries to eat high kcal/high protein foods & to drink a Glucerna shake occasionally, but struggles.  BG noted to be elevated today- likely related to steroids.  Provided pt with 2 6-pack samples of Glucerna shakes.  Encouraged to ask for more at each appointment & Oncology RD will provide as available.  Encouraged pt/family to speak to MD about appetite medications, as pt is knowledgeable of high kcal/high protein foods & oral nutrition " supplements to be consuming, but lacks appetite to do so.    *May consider to adjust oral diabetic medications, if appropriate.  *May consider a trial of Marinol, if appropriate.      Electronically signed by:  Jennifer Sam RD  01/04/22 11:02 EST

## 2022-01-18 NOTE — ADDENDUM NOTE
Encounter addended by: Dominique Cunningham RN on: 1/18/2022 2:40 PM   Actions taken: Charge Capture section accepted

## 2022-01-18 NOTE — PROGRESS NOTES
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:   - Diagnosed 5/7/15; Low grade; staged pT4a pN0 M0 stage IIB; 0/37 LN; no MMR testing; RIGHT sided  - RIGHT hemicolectomy 5/7/15  - adjuvant Xeloda X 6 months  - Recurrent dx diagnosed via RIGHT ureter mass resection (5/1/18)  - FOLFIRI X 9 (7/31-12/4/18)  - s/p XRT RIGHT pelvis total 5320 cGy (12/14/18-1/24/19)  - CARIS report from specimen dated 5/7/15: BRAF V600E mutation (possible benefit from cetuximab), MSI stable, TMB-low, TAURUS-High (33%), TP53 mutated  - CT CAP 1/21/2021: Interim development of right-sided hydronephrosis and a soft tissue mass along the right iliac chain measuring 2.9 x 1.2 cm.  There is a second nodule identified below the aortic bifurcation in the midline measuring 1.4 cm.  This was consistent with disease recurrence versus her new primary small bowel cancer.    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 Lacey). Pathology from the ileum was positive for adenocarcinoma that appeared to be small bowel in origin and represent a new primary malignancy, 2.2cm, grade 1, tumor perforates the visceral peritoneum, pT4pN0, with 0/12 lymph nodes involved.  - Post surgical CT Abd/Pelvis 4/24/21:  Mild thickening of the wall of some portion of the small intestine, could be post-surgical changes. Moderate dilation of the right renal collecting system is unchanged with ureteral double-J stent.    - CT CAP on 3/25 prior to surgery showed no evidence of disease in the chest  - [facility] report also showed BRAF V600E mutation consistent with pts prior colon cancer.     - started Xeloda on 6/5/21 and continued through November 2021  - CT A/P on 11/9/21: probably worsening of peritoneal carcinomatosis  - started Cetuximab in November 2021.  Added Encorafenib in December 2021.     3) Breast Cancer:  -RIGHT breast DCIS diagnosed 3/15/06; low-grade; s/p lumpectomy and adjuvant XRT; ER: 100%, OR: 70%  -LEFT breast invasive lobular carcinoma; diagnosed 06/2012; staged pT3 pN0 M0 stage IIB, ER: 83%, OR: 0, HER2: 1+, Ki-67: 26%, treated with mastectomy and SLND 04/2012, adjuvant DD AC and Taxotere, Adjuvant Arimidex then Femara (11/13/19)    4) Anemia:  -multifactorial due to her history of GI bleeding from small bowel ulcer/tumor  -intolerant to oral iron. Treatment with IV iron in the past.   -iron studies on 5/11/12 were normal    5) right hydronephrosis:  -Noted on CT scan from 1/21/2021, secondary to soft tissue mass  -Likely secondary to recurrent colon cancer, biopsy pending  -Cystoscopy and right ureteral stent (Dr. Chi) on 1/28/2021    6) DVT:  -bracheocephalic and subclavian thrombosis March 2021 ([facility])     Malignant neoplasm of left breast in female, estrogen receptor positive (HCC)   3/5/2018 Initial Diagnosis    Breast cancer (CMS/HCC)     Colon cancer (HCC)   3/5/2018 Initial Diagnosis    Colon cancer (CMS/HCC)     6/28/2021 - 7/7/2021 Chemotherapy    OP SUPPORTIVE ELECTROLYTE REPLACEMENT     10/21/2021 -  Chemotherapy    OP SUPPORTIVE Ferric Carboxymaltose (INJECTAFER)      11/23/2021 -  Chemotherapy    OP COLORECTAL Cetuximab / Encorafenib      Malignant neoplasm of colon (HCC)   3/5/2018 Initial Diagnosis    Malignant neoplasm of colon (HCC)     Metastasis to bone (HCC)   12/13/2021 Initial Diagnosis    Metastasis to bone (HCC)         History of Present Illness  Patient comes in today for toxicity check prior to her next cycle of treatment.  Since last appointment she reports falling at home due to lower extremity weakness.  She hit the right side  of her face but did not lose consciousness.  The patient did not go to the hospital.  We again discussed the possibility of stopping treatment but the patient does not agree with this.  She would like to continue active treatment at this time.      I also discussed her persistent hyperglycemia.  Today her blood sugar is 235 which is very elevated but significantly improved from previous visits.  She tells me that she will continue to work with her primary care provider.      She has not noticed any new episodes of melena over the past 2 weeks.  In fact, she has had more constipation.  Her hemoglobin is lower at 7.0 but she does not have any significant worsening of her dizziness.    The patient is concerned because she has bumps on her skin that continues to pop up.  They are not painful or itchy.  They are flesh-colored and do not contain a partial.    Review of Systems   Constitutional: Positive for fatigue. Negative for appetite change, diaphoresis, fever, unexpected weight gain and unexpected weight loss.   HENT: Negative for hearing loss, sore throat and voice change.    Eyes: Negative for blurred vision, double vision, pain, redness and visual disturbance.   Respiratory: Negative for cough, shortness of breath and wheezing.    Cardiovascular: Positive for chest pain (raised up bruise on right breast ). Negative for palpitations and leg swelling.   Gastrointestinal: Positive for abdominal pain.   Endocrine: Negative for cold intolerance, heat intolerance, polydipsia and polyuria.   Genitourinary: Negative for decreased urine volume, difficulty urinating, frequency and urinary incontinence.   Musculoskeletal: Negative for arthralgias, back pain, joint swelling and myalgias.   Skin: Negative for color change, rash, skin lesions and wound.   Neurological: Positive for dizziness, weakness and headache (Constant headache ). Negative for seizures and numbness.   Hematological: Negative for adenopathy. Bruises/bleeds  easily.   Psychiatric/Behavioral: Negative for depressed mood. The patient is not nervous/anxious.    All other systems reviewed and are negative.      Past Medical History:   Diagnosis Date   • Abdominal pain    • Anemia    • Arthritis    • Breast cancer (HCC)    • Colon cancer (HCC)    • Constipation    • Diarrhea    • DM (diabetes mellitus), type 2 (HCC)    • High cholesterol    • History of blood clots     neck   • Hypertension    • Hypokalemia    • Iron deficiency anemia secondary to blood loss (chronic)     GI BLEED   • Paroxysmal A-fib (HCC)    • Shortness of breath      Past Surgical History:   Procedure Laterality Date   • BACK SURGERY     • BREAST LUMPECTOMY Right    • COLON SURGERY     • COLONOSCOPY     • CYSTOSCOPY W/ URETERAL STENT PLACEMENT     • CYSTOSCOPY W/ URETERAL STENT PLACEMENT Right 8/17/2021    Procedure: CYSTOSCOPY, RIGHT URETERAL STENT INSERTION;  Surgeon: Kelli Chi MD;  Location: [facility];  Service: Urology;  Laterality: Right;   • HYSTERECTOMY      partial    • JOINT REPLACEMENT      hip    • MASTECTOMY Left    • OTHER SURGICAL HISTORY      BIOPSY   • VENOUS ACCESS DEVICE (PORT) INSERTION       Social History     Socioeconomic History   • Marital status:    Tobacco Use   • Smoking status: Never Smoker   • Smokeless tobacco: Never Used   Vaping Use   • Vaping Use: Never used   Substance and Sexual Activity   • Alcohol use: Never   • Drug use: Never   • Sexual activity: Defer     Family History   Problem Relation Age of Onset   • Skin cancer Other    • Colon cancer Sister        Objective   Physical Exam  General: Alert, cooperative, no acute distress.   Eyes: Anicteric sclera, PERRLA  Respiratory: normal respiratory effort  Cardiovascular: no lower extremity edema  Skin: Normal tone, no rash, no lesions  Psychiatric: Appropriate affect, intact judgment  Neurologic: No focal sensory or motor deficits, normal cognition   Musculoskeletal: Reduced muscle strength and tone,  in a wheelchair, frail appearing  Extremities: No clubbing, cyanosis, or deformities    Vitals:    01/18/22 0901   BP: 131/62   Pulse: 97   Resp: 18   Temp: 98.4 °F (36.9 °C)   SpO2: 98%   Weight: 67.8 kg (149 lb 7.6 oz)   PainSc:   6     ECOG score: 2         PHQ-9 Total Score:         Result Review :   The following data was reviewed by: Jennifer Schroeder MD PhD on 01/18/2022:  Lab Results   Component Value Date    HGB 7.0 (C) 01/18/2022    HCT 23.0 (L) 01/18/2022    .1 (H) 01/18/2022     01/18/2022    WBC 8.36 01/18/2022    NEUTROABS 4.24 01/18/2022    LYMPHSABS 3.40 (H) 01/18/2022    MONOSABS 0.45 01/18/2022    EOSABS 0.09 01/18/2022    BASOSABS 0.07 01/18/2022     Lab Results   Component Value Date    GLUCOSE 235 (H) 01/18/2022    BUN 15 01/18/2022    CREATININE 1.03 (H) 01/18/2022     (L) 01/18/2022    K 4.6 01/18/2022     01/18/2022    CO2 19.2 (L) 01/18/2022    CALCIUM 8.6 01/18/2022    PROTEINTOT 6.6 01/18/2022    ALBUMIN 3.53 01/18/2022    BILITOT 0.2 01/18/2022    ALKPHOS 193 (H) 01/18/2022    AST 24 01/18/2022    ALT 6 01/18/2022          Assessment and Plan    Diagnoses and all orders for this visit:    1. Anemia, unspecified type (Primary)  -     Vitamin B12; Future  -     Folate; Future    2. Adenocarcinoma of small bowel (HCC)  -     CEA; Future        Metastatic colon cancer: Patient will start the two week dose of cetuximab (500 mg/m²) and restart at 25% the recommended dose of encorafenib, one pill daily.      Hyperglycemia: Secondary to poorly controlled type 2 diabetes. I asked her to have her PCP help monitory and treat.      Severe Anemia with fatigue: multifactorial, due to chemotherapy, chronic illness and [id number]. Her B12 on 11/23/21 was low at 164. No current evidence of melanan. If she report more signs of melena or GI bleeding is will decrease the dose of Eliquis to 2.5 mg twice daily. I will recheck B12 and folate and start her on B12 supplementation if  it is still low.      I spent 42 minutes caring for Lorraine on this date of service. This time includes time spent by me in the following activities: preparing for the visit, reviewing tests, obtaining and/or reviewing a separately obtained history, performing a medically appropriate examination and/or evaluation, counseling and educating the patient/family/caregiver, ordering medications, tests, or procedures and referring and communicating with other health care professionals      Patient was given instructions and counseling regarding her condition or for health maintenance advice. Please see specific information pulled into the AVS if appropriate.     Jennifer Schroeder MD PhD    1/19/2022

## 2022-01-19 NOTE — TELEPHONE ENCOUNTER
Augusto from [facility] called and ask if there was a dose change with the pt's Braftovi. Augusto is asking if the pt's med decreased from 300mg dose to 75mg dose. This nurse does not see any notes in the pt's chart that indicates a med change and is unable to give Augusto an answer. Augusto asked for some one to call back and speak with any available pharmacist. [phone]

## 2022-01-20 NOTE — TELEPHONE ENCOUNTER
Caller: RODOLFO    Relationship: PHARMACIST     Best call back number: [phone]    What medications are you currently taking:   Current Outpatient Medications on File Prior to Visit   Medication Sig Dispense Refill   • Accu-Chek Softclix Lancets lancets      • amoxicillin-clavulanate (AUGMENTIN) 875-125 MG per tablet Take 1 tablet by mouth 2 (Two) Times a Day. 14 tablet 0   • apixaban (Eliquis) 5 MG tablet tablet Take 5 mg by mouth 2 (two) times a day. Per Sandhya patient does not need to stop eliquis for procedure     • apixaban (Eliquis) 5 MG tablet tablet Every 12 (Twelve) Hours.     • Bacillus Coagulans-Inulin (Probiotic) 1-250 BILLION-MG capsule Probiotic Complex 25 billion cell -100 mg oral capsule take 1 capsule by oral route 2 times a day   Suspended     • budesonide-formoterol (SYMBICORT) 160-4.5 MCG/ACT inhaler Inhale 2 puffs.     • Calcium Carb-Cholecalciferol 500-10 MG-MCG chewable tablet Chew 1 tablet 2 (two) times a day. (Patient taking differently: Chew 1 tablet Daily.) 60 tablet 6   • calcium carbonate (OS-TERELL) 1250 (500 Ca) MG chewable tablet Calcium 500  500 mg calcium (1,250 mg) chewable tablet   Take 1 tablet every day by oral route.     • capecitabine (Xeloda) 500 MG chemo tablet Take 3 tablets by mouth 2 (two) times a day for 7 days, then off for 7 days. 42 tablet 11   • cefdinir (OMNICEF) 300 MG capsule Every 12 (Twelve) Hours.     • citalopram (CeleXA) 40 MG tablet      • cromolyn (OPTICROM) 4 % ophthalmic solution cromolyn 4 % eye drops   INSTILL 1 DROP INTO AFFECTED EYE(S) BY OPHTHALMIC ROUTE 4 TIMES PER DAY     • digoxin (LANOXIN) 125 MCG tablet Take 125 mcg by mouth Daily.     • encorafenib (BRAFTOVI) 75 MG capsule Take 4 capsules by mouth Daily. 120 capsule 3   • escitalopram (LEXAPRO) 20 MG tablet      • gabapentin (NEURONTIN) 400 MG capsule Take 400 mg by mouth 3 (Three) Times a Day.     • glucose blood test strip Every 8 (Eight) Hours.     • glucose blood test strip Accu-Chek Yelena  Plus test strips   TEST BLOOD SUGAR THREE TIMES DAILY     • HYDROcodone-acetaminophen (Norco) 5-325 MG per tablet Take 1 tablet by mouth Every 6 (Six) Hours As Needed for Moderate Pain . 120 tablet 0   • loperamide (IMODIUM) 2 MG capsule loperamide 2 mg oral capsule take 2 capsules (4 mg) by oral route after 1st loose stool, followed by 1 capsule after each subsequent loose stool not to exceed 16 mg/day for 30 days   Suspended     • LORazepam (ATIVAN) 1 MG tablet TAKE 1 TABLET BY MOUTH ONCE DAILY IN THE MORNING AND 2 ONCE DAILY AT BEDTIME     • magnesium oxide (MAG-OX) 400 MG tablet magnesium oxide 400 mg (241.3 mg magnesium) tablet   Take 1 tablet(s) every day by oral route.     • metFORMIN (GLUCOPHAGE) 500 MG tablet Take 500 mg by mouth 2 (Two) Times a Day With Meals.     • metoprolol tartrate (LOPRESSOR) 25 MG tablet Every 12 (Twelve) Hours.     • ondansetron (ZOFRAN) 8 MG tablet Take 1 tablet by mouth Every 8 (Eight) Hours As Needed for Nausea or Vomiting. 60 tablet 4   • oxyCODONE (OXY-IR) 5 MG capsule Take 1 capsule by mouth Every 4 (Four) Hours As Needed for Moderate Pain . 90 capsule 0   • pantoprazole (PROTONIX) 40 MG EC tablet      • pravastatin (PRAVACHOL) 40 MG tablet      • senna 8.6 MG tablet Take 1 tablet by mouth Daily As Needed.       Current Facility-Administered Medications on File Prior to Visit   Medication Dose Route Frequency Provider Last Rate Last Admin   • heparin injection 500 Units  500 Units Intravenous PRN Jennifer Schroeder MD PhD   500 Units at 09/21/21 1522   • [DISCONTINUED] heparin injection 500 Units  500 Units Intravenous PRJennifer James MD PhD   500 Units at 01/18/22 1239   • [DISCONTINUED] sodium chloride 0.9 % flush 20 mL  20 mL Intravenous PRN Jennifer Schroeder MD PhD   20 mL at 01/18/22 1239        Which medication are you concerned about: BRAFTOVI    Who prescribed you this medication: [facility]    What are your concerns: PATIENT HAS INDICATED THAT HER DOSE HAS CHANGED  FROM 300-MG. AND HAS BEEN CHANGED TO 75-MG.  THEREFORE, [facility] NEEDS A NEW PRESCRIPTION IN ORDER TO REFILL CORRECTLY.

## 2022-01-26 PROBLEM — N13.5 OBSTRUCTION OF BOTH URETERS: Status: ACTIVE | Noted: 2022-01-01

## 2022-01-26 NOTE — TELEPHONE ENCOUNTER
Spoke to patient and scheduled her Cystoscopy/Stent Exchange @ [facility] on 2/3/2022.     She is also complaining of urinary frequency and burning with urination. We discussed the need for a urine culture. I placed that order and patient will go in the near future to have that done.

## 2022-01-26 NOTE — TELEPHONE ENCOUNTER
Caller: ROMINA FROM [facility]    Relationship to patient: PHARMACY TECH    Best call back number: [phone]    PHARMACY NEEDS CLARIFICATION ON BRAFTOVI 75 MG CAPSULE. SHE NEEDS CLARIFICATION ON THE QUANTITY.

## 2022-01-26 NOTE — TELEPHONE ENCOUNTER
Pt had stent placed in August and was told to f/u every 6 months for stent exchange. She has been having a lot of right kidney pain and pain with urination and frequency. She is also a colon ca patient. She doesn't think she is scheduled for the next procedure but it is near time and with all the pain she is having she wanted to check on it.

## 2022-01-26 NOTE — TELEPHONE ENCOUNTER
Pharmacy reports that Braftovi comes in 60 tab bottle. Advised they can send patient 60 tab instead of 30.

## 2022-01-26 NOTE — H&P
[facility]   Urology HISTORY AND PHYSICAL    Patient Name: Lorraine Dominguez  : 1950  MRN: [id number]  Primary Care Physician:  Shayna Lewis APRN  Date of admission: (Not on file)    Subjective   Subjective     Chief Complaint: Bilateral ureteral obstruction    Patient has indwelling bilateral ureteral stents and presents for stent exchange.       Personal History     Past Medical History:   Diagnosis Date   • Abdominal pain    • Anemia    • Arthritis    • Breast cancer (HCC)    • Colon cancer (HCC)    • Constipation    • Diarrhea    • DM (diabetes mellitus), type 2 (HCC)    • High cholesterol    • History of blood clots     neck   • Hypertension    • Hypokalemia    • Iron deficiency anemia secondary to blood loss (chronic)     GI BLEED   • Paroxysmal A-fib (HCC)    • Shortness of breath        Past Surgical History:   Procedure Laterality Date   • BACK SURGERY     • BREAST LUMPECTOMY Right    • COLON SURGERY     • COLONOSCOPY     • CYSTOSCOPY W/ URETERAL STENT PLACEMENT     • CYSTOSCOPY W/ URETERAL STENT PLACEMENT Right 2021    Procedure: CYSTOSCOPY, RIGHT URETERAL STENT INSERTION;  Surgeon: Kelli Chi MD;  Location: [facility], [place];  Service: Urology;  Laterality: Right;   • HYSTERECTOMY      partial    • JOINT REPLACEMENT      hip    • MASTECTOMY Left    • OTHER SURGICAL HISTORY      BIOPSY   • VENOUS ACCESS DEVICE (PORT) INSERTION         Family History: family history includes Colon cancer in her sister; Skin cancer in an other family member. Otherwise pertinent FHx was reviewed and not pertinent to current issue.    Social History:  reports that she has never smoked. She has never used smokeless tobacco. She reports that she does not drink alcohol and does not use drugs.    Home Medications:  Accu-Chek Softclix Lancets, Calcium Carb-Cholecalciferol, HYDROcodone-acetaminophen, LORazepam, Probiotic, amoxicillin-clavulanate, apixaban, budesonide-formoterol, calcium carbonate,  capecitabine, cefdinir, citalopram, cromolyn, digoxin, encorafenib, escitalopram, gabapentin, glucose blood, loperamide, magnesium oxide, metFORMIN, metoprolol tartrate, ondansetron, oxyCODONE, pantoprazole, pravastatin, and senna    Allergies:  No Known Allergies    Objective    Objective     Vitals:        Physical Exam  Constitutional:       Appearance: Normal appearance.   Cardiovascular:      Rate and Rhythm: Normal rate and regular rhythm.   Pulmonary:      Effort: Pulmonary effort is normal.      Breath sounds: Normal breath sounds.   Neurological:      Mental Status: She is alert. Mental status is at baseline.   Psychiatric:         Mood and Affect: Mood and affect normal.         Speech: Speech normal.         Judgment: Judgment normal.         Result Review    Result Review:  I have personally reviewed the results from the time of this admission to 1/26/2022 14:15 EST and agree with these findings:  [x]  Laboratory  []  Microbiology  [x]  Radiology  []  EKG/Telemetry   []  Cardiology/Vascular   []  Pathology  [x]  Old records  []  Other:      Assessment/Plan   Assessment / Plan       Active Hospital Problems:  There are no active hospital problems to display for this patient.      Plan: Cystoscopy and bilateral ureteral stent exchange.   Risks and benefits discussed with patient and they are agreeable to proceed.    DVT prophylaxis:  No DVT prophylaxis order currently exists.    CODE STATUS:           Electronically signed by Kelli Chi MD, 01/26/22, 2:15 PM EST.

## 2022-01-27 NOTE — TELEPHONE ENCOUNTER
In addition to the Eliquis question, Ciarra needs to inform you that the patients Hgb was 7 on 01/18/22.Please advise her of any new orders. Extension [id number].

## 2022-01-27 NOTE — TELEPHONE ENCOUNTER
CYSTOSCOPY URETERAL CATHETER/STENT INSERTION 02/03.    Ciarra called from PAT.  She needs directive on Eliquis put into chart.  She does not see it on the H and P or order.  The patient told her that she doesn't have to stop taking it, but this isn't documented anywhere.

## 2022-01-27 NOTE — TELEPHONE ENCOUNTER
LMOM for Jana that  does NOT need to stop her blood thinners per . Patient is aware that she does not need to stop them as well.

## 2022-01-27 NOTE — TELEPHONE ENCOUNTER
LMOM for Jana that   1)patient does not have stop her blood thinners per . Patient is aware of this.   2) was made aware of patient's HGB levels and does not have any additional orders at this time.

## 2022-02-01 NOTE — PROGRESS NOTES
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 Colon Cancer:   - Diagnosed 5/7/15; Low grade; staged pT4a pN0 M0 stage IIB; 0/37 LN; no MMR testing; RIGHT sided  - RIGHT hemicolectomy 5/7/15  - adjuvant Xeloda X 6 months  - Recurrent dx diagnosed via RIGHT ureter mass resection (5/1/18)  - FOLFIRI X 9 (7/31-12/4/18)  - s/p XRT RIGHT pelvis total 5320 cGy (12/14/18-1/24/19)  - CARIS report from specimen dated 5/7/15: BRAF V600E mutation (possible benefit from cetuximab), MSI stable, TMB-low, TAURUS-High (33%), TP53 mutated  - CT CAP 1/21/2021: Interim development of right-sided hydronephrosis and a soft tissue mass along the right iliac chain measuring 2.9 x 1.2 cm.  There is a second nodule identified below the aortic bifurcation in the midline measuring 1.4 cm.  This was consistent with disease recurrence versus her new primary small bowel cancer.    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 Lacey). Pathology from the ileum was positive for adenocarcinoma that appeared to be small bowel in origin and represent a new primary malignancy, 2.2cm, grade 1, tumor perforates the visceral peritoneum, pT4pN0, with 0/12 lymph nodes involved.  - Post surgical CT Abd/Pelvis 4/24/21:  Mild thickening of the wall of some portion of the small intestine, could be post-surgical changes. Moderate dilation of the right renal collecting system is unchanged with ureteral double-J stent.    - CT CAP on 3/25 prior to surgery showed no evidence of disease in the chest  - Caris report also showed BRAF V600E mutation consistent with pts prior colon cancer.    -  started Xeloda on 6/5/21 and continued through November 2021  - CT A/P on 11/9/21: probably worsening of peritoneal carcinomatosis  - started Cetuximab in November 2021.  Added Encorafenib in December 2021.     3) Breast Cancer:  -RIGHT breast DCIS diagnosed 3/15/06; low-grade; s/p lumpectomy and adjuvant XRT; ER: 100%, OR: 70%  -LEFT breast invasive lobular carcinoma; diagnosed 06/2012; staged pT3 pN0 M0 stage IIB, ER: 83%, OR: 0, HER2: 1+, Ki-67: 26%, treated with mastectomy and SLND 04/2012, adjuvant DD AC and Taxotere, Adjuvant Arimidex then Femara (11/13/19)    4) Anemia:  -multifactorial due to her history of GI bleeding from small bowel ulcer/tumor  -intolerant to oral iron. Treatment with IV iron in the past.   -iron studies on 5/11/12 were normal    5) right hydronephrosis:  -Noted on CT scan from 1/21/2021, secondary to soft tissue mass  -Likely secondary to recurrent colon cancer, biopsy pending  -Cystoscopy and right ureteral stent (Dr. Chi) on 1/28/2021    6) DVT:  -bracheocephalic and subclavian thrombosis March 2021 ([facility])     Malignant neoplasm of left breast in female, estrogen receptor positive (HCC)   3/5/2018 Initial Diagnosis    Breast cancer (CMS/HCC)     Colon cancer (HCC)   3/5/2018 Initial Diagnosis    Colon cancer (CMS/HCC)     6/28/2021 - 7/7/2021 Chemotherapy    OP SUPPORTIVE ELECTROLYTE REPLACEMENT     10/21/2021 -  Chemotherapy    OP SUPPORTIVE Ferric Carboxymaltose (INJECTAFER)      11/23/2021 -  Chemotherapy    OP COLORECTAL Cetuximab / Encorafenib      Malignant neoplasm of colon (HCC)   3/5/2018 Initial Diagnosis    Malignant neoplasm of colon (HCC)     Metastasis to bone (HCC)   12/13/2021 Initial Diagnosis    Metastasis to bone (HCC)         History of Present Illness  Patient comes in today for toxicity check prior to cycle 3.  She continues to be extremely weak. She is in a wheelchair again today. Her glucose is 355. Her other labs are stable although she is  still severely anemic. Her CEA is also rising which suggests her tumor may be progressing.  I again discussed the possibility of stopping treatment due to her overall decline in health but she does not want to do this.      Review of Systems   Constitutional: Positive for fatigue. Negative for appetite change, diaphoresis, fever, unexpected weight gain and unexpected weight loss.   HENT: Negative for hearing loss, sore throat and voice change.    Eyes: Negative for blurred vision, double vision, pain, redness and visual disturbance.   Respiratory: Negative for cough, shortness of breath and wheezing.    Cardiovascular: Negative for chest pain, palpitations and leg swelling.   Gastrointestinal: Positive for abdominal pain (Right side pain and kidney pain ).   Endocrine: Negative for cold intolerance, heat intolerance, polydipsia and polyuria.   Genitourinary: Negative for decreased urine volume, difficulty urinating, frequency and urinary incontinence.   Musculoskeletal: Positive for back pain (Lower right side of back in pain ). Negative for arthralgias, joint swelling and myalgias.   Skin: Negative for color change, rash, skin lesions and wound.   Neurological: Positive for headache (Constant headache ). Negative for dizziness, seizures and numbness.   Hematological: Negative for adenopathy. Does not bruise/bleed easily.   Psychiatric/Behavioral: Negative for depressed mood. The patient is not nervous/anxious.    All other systems reviewed and are negative.      Past Medical History:   Diagnosis Date   • Abdominal pain    • Anemia     LOW HGB, OCCASSIONAL WEAKNESS   • Arthritis    • Breast cancer (HCC)    • Cataract     L EYE   • Colon cancer (HCC)    • Constipation     INTERMITTANTLY   • Diarrhea     INTERMITTANTLY   • DM (diabetes mellitus), type 2 (HCC)    • High cholesterol    • History of blood clots     neck, RESOLVED   • Hypertension     NO MEDS NOW   • Hypokalemia    • Iron deficiency anemia secondary to  blood loss (chronic)     GI BLEED   • Paroxysmal A-fib (HCC)     FOLLOWS BAIRES     Past Surgical History:   Procedure Laterality Date   • BACK SURGERY  1977    RUPTURED DISC/PINCHED NERVE LUMBAR SPINE   • BREAST LUMPECTOMY Right    • COLON SURGERY  2015    COLON RESECTION R/T CA, EEA   • COLONOSCOPY     • CORNEAL TRANSPLANT Right    • CYSTOSCOPY W/ URETERAL STENT PLACEMENT Bilateral     PT HAS LONG TERM URETERAL STENTS    • CYSTOSCOPY W/ URETERAL STENT PLACEMENT Right 8/17/2021    Procedure: CYSTOSCOPY, RIGHT URETERAL STENT INSERTION;  Surgeon: Kelli Chi MD;  Location: [facility] MAIN OR;  Service: Urology;  Laterality: Right;   • HYSTERECTOMY      partial    • JOINT REPLACEMENT      R THR   • MASTECTOMY Left     NO BP OR NS L ARM   • VENOUS ACCESS DEVICE (PORT) INSERTION       Social History     Socioeconomic History   • Marital status:    Tobacco Use   • Smoking status: Never Smoker   • Smokeless tobacco: Never Used   Vaping Use   • Vaping Use: Never used   Substance and Sexual Activity   • Alcohol use: Never   • Drug use: Never   • Sexual activity: Defer     Family History   Problem Relation Age of Onset   • Skin cancer Other    • Colon cancer Sister    • Malig Hyperthermia Neg Hx        Objective   Physical Exam  General: Alert, cooperative, no acute distress but ill appearing  Eyes: Anicteric sclera, PERRLA  Respiratory: normal respiratory effort  Cardiovascular: no lower extremity edema  Skin: pale tone, no rash, firm nodules on the back of the scalp and axilla and other places.   Psychiatric: Appropriate affect, intact judgment  Neurologic: No focal sensory or motor deficits, normal cognition   Musculoskeletal: weak muscle strength and tone  Extremities: No clubbing, cyanosis, or deformities    Vitals:    02/01/22 0851   BP: 106/49   Pulse: 94   Resp: 17   Temp: 98.8 °F (37.1 °C)   SpO2: 97%   Weight: 67.3 kg (148 lb 5.9 oz)   PainSc:   4     ECOG score: 2         PHQ-9 Total Score:         Result  "Review :   The following data was reviewed by: Jennifer Schroeder MD PhD on 02/01/2022:  Lab Results   Component Value Date    HGB 6.5 (C) 02/08/2022    HCT 21.2 (L) 02/08/2022    MCV 99.1 (H) 02/08/2022     02/08/2022    WBC 10.76 02/08/2022    NEUTROABS 5.70 02/08/2022    LYMPHSABS 3.98 (H) 02/08/2022    MONOSABS 0.67 02/08/2022    EOSABS 0.08 02/08/2022    BASOSABS 0.07 02/08/2022     Lab Results   Component Value Date    GLUCOSE 299 (H) 02/08/2022    BUN 12 02/08/2022    CREATININE 0.90 02/08/2022     (L) 02/08/2022    K 3.4 (L) 02/08/2022    CL 98 02/08/2022    CO2 22.3 02/08/2022    CALCIUM 7.8 (L) 02/08/2022    PROTEINTOT 6.6 02/08/2022    ALBUMIN 3.45 (L) 02/08/2022    BILITOT 0.2 02/08/2022    ALKPHOS 155 (H) 02/08/2022    AST 18 02/08/2022    ALT 4 02/08/2022          Assessment and Plan    Diagnoses and all orders for this visit:    1. Overlapping malignant neoplasm of colon (HCC) (Primary)    Other orders  -     magnesium oxide (MAG-OX) 400 MG tablet; Take 1 tablet by mouth Daily. (Patient taking differently: Take 400 mg by mouth 2 (Two) Times a Day.)  Dispense: 30 tablet; Refill: 0      Metastatic Colon Cancer:  The patient comes in today for toxicity check prior to her next cycle of treatment.  We continue to discuss the possibility of hospice or palliative care since the patient's overall health continues to decline slowly.  I believe that would be appropriate but the patient does not want to stop treatment and vows to continue \"fighting\" as long as she is able.  She does not feel that she is getting any significantly worse while on the treatment.  I would plan to repeat labs including a CEA in 2 weeks and discuss her goals of care again.    Patient was given instructions and counseling regarding her condition or for health maintenance advice. Please see specific information pulled into the AVS if appropriate.     Jennifer Schroeder MD PhD    2/13/2022      "

## 2022-02-03 NOTE — TELEPHONE ENCOUNTER
Pt called to reschedule her procedure that she had to cancel today because of the weather. Please call her to reschedule.

## 2022-02-08 NOTE — ADDENDUM NOTE
Encounter addended by: Dominique Cunningham RN on: 2/8/2022 1:08 PM   Actions taken: Pharmacy for encounter modified, Child order released for a procedure order

## 2022-02-08 NOTE — TELEPHONE ENCOUNTER
PT STATED SHE JUST MISSED A CALL FROM OUR OFFICE IN THE LAST FEW MINS. W/T PT TO OFFICE TO FURTHER ASSIST AS NO MSG IN CHART.

## 2022-02-15 PROBLEM — G89.3 CANCER RELATED PAIN: Status: ACTIVE | Noted: 2022-01-01

## 2022-02-15 PROBLEM — R22.9 SKIN NODULE: Status: ACTIVE | Noted: 2022-01-01

## 2022-02-15 NOTE — PROGRESS NOTES
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 Colon Cancer:   - Diagnosed 5/7/15; Low grade; staged pT4a pN0 M0 stage IIB; 0/37 LN; no MMR testing; RIGHT sided  - RIGHT hemicolectomy 5/7/15  - adjuvant Xeloda X 6 months  - Recurrent dx diagnosed via RIGHT ureter mass resection (5/1/18)  - FOLFIRI X 9 (7/31-12/4/18)  - s/p XRT RIGHT pelvis total 5320 cGy (12/14/18-1/24/19)  - CARIS report from specimen dated 5/7/15: BRAF V600E mutation (possible benefit from cetuximab), MSI stable, TMB-low, TAURUS-High (33%), TP53 mutated  - CT CAP 1/21/2021: Interim development of right-sided hydronephrosis and a soft tissue mass along the right iliac chain measuring 2.9 x 1.2 cm.  There is a second nodule identified below the aortic bifurcation in the midline measuring 1.4 cm.  This was consistent with disease recurrence versus her new primary small bowel cancer.    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 Lacey). Pathology from the ileum was positive for adenocarcinoma that appeared to be small bowel in origin and represent a new primary malignancy, 2.2cm, grade 1, tumor perforates the visceral peritoneum, pT4pN0, with 0/12 lymph nodes involved.  - Post surgical CT Abd/Pelvis 4/24/21:  Mild thickening of the wall of some portion of the small intestine, could be post-surgical changes. Moderate dilation of the right renal collecting system is unchanged with ureteral double-J stent.    - CT CAP on 3/25 prior to surgery showed no evidence of disease in the chest  - Caris report also showed BRAF V600E mutation consistent with pts prior colon cancer.    -  started Xeloda on 6/5/21 and continued through November 2021  - CT A/P on 11/9/21: probably worsening of peritoneal carcinomatosis  - started Cetuximab in November 2021.  Added Encorafenib in December 2021.     3) Breast Cancer:  -RIGHT breast DCIS diagnosed 3/15/06; low-grade; s/p lumpectomy and adjuvant XRT; ER: 100%, CT: 70%  -LEFT breast invasive lobular carcinoma; diagnosed 06/2012; staged pT3 pN0 M0 stage IIB, ER: 83%, CT: 0, HER2: 1+, Ki-67: 26%, treated with mastectomy and SLND 04/2012, adjuvant DD AC and Taxotere, Adjuvant Arimidex then Femara (11/13/19)    4) Anemia:  -multifactorial due to her history of GI bleeding from small bowel ulcer/tumor  -intolerant to oral iron. Treatment with IV iron in the past.   -iron studies on 5/11/12 were normal    5) right hydronephrosis:  -Noted on CT scan from 1/21/2021, secondary to soft tissue mass  -Likely secondary to recurrent colon cancer, biopsy pending  -Cystoscopy and right ureteral stent (Dr. Chi) on 1/28/2021    6) DVT:  -bracheocephalic and subclavian thrombosis March 2021 ([facility])     Malignant neoplasm of left breast in female, estrogen receptor positive (HCC)   3/5/2018 Initial Diagnosis    Breast cancer (CMS/HCC)     Colon cancer (HCC)   3/5/2018 Initial Diagnosis    Colon cancer (CMS/HCC)     6/28/2021 - 7/7/2021 Chemotherapy    OP SUPPORTIVE ELECTROLYTE REPLACEMENT     10/21/2021 -  Chemotherapy    OP SUPPORTIVE Ferric Carboxymaltose (INJECTAFER)      11/23/2021 -  Chemotherapy    OP COLORECTAL Cetuximab / Encorafenib      Malignant neoplasm of colon (HCC)   3/5/2018 Initial Diagnosis    Malignant neoplasm of colon (HCC)     Metastasis to bone (HCC)   12/13/2021 Initial Diagnosis    Metastasis to bone (HCC)         History of Present Illness  Patient comes in today for toxicity check prior to her next cycle of chemotherapy.  She says she has intermittent diarrhea that is dark but not black or melanotic.  Patient received blood  transfusion last week and is feeling a bit better.  She has been taking pain medication at least once a day but tries to avoid it as much as possible.  She said she is fearful that she will run out of the medication.  I tried to reassure her that I will refill her pain medication as needed.    Patient remains concerned about some skin nodules that have developed under his parts of her body.  They are most prominent in her bilateral axilla and in her groin.  They have not significantly changed since the last visit.  Patient tells me she is having her ureteral stent exchanged by Dr. Chi later in the week.    At the end of visit the patient asked me how long we working to continue treatment.  I replied to her that she can stop treatment at any time if she decides that she has had enough.  I continue to believe that she would be appropriate for hospice but the patient has wanted to continue treatment indefinitely.  I offered again to stop medication anytime and we ultimately agreed to discuss this again at the next appointment.    Review of Systems   Constitutional: Positive for fatigue. Negative for appetite change, diaphoresis, fever, unexpected weight gain and unexpected weight loss.   HENT: Negative for hearing loss, sore throat and voice change.    Eyes: Negative for blurred vision, double vision, pain, redness and visual disturbance.   Respiratory: Negative for cough, shortness of breath and wheezing.    Cardiovascular: Negative for chest pain, palpitations and leg swelling.   Gastrointestinal: Positive for abdominal pain.   Endocrine: Negative for cold intolerance, heat intolerance, polydipsia and polyuria.   Genitourinary: Negative for decreased urine volume, difficulty urinating, frequency and urinary incontinence.   Musculoskeletal: Positive for neck pain (neck and shoulder pain ). Negative for arthralgias, back pain, joint swelling and myalgias.   Skin: Negative for color change, rash, skin lesions and  wound.   Neurological: Positive for headache. Negative for dizziness, seizures and numbness.   Hematological: Negative for adenopathy. Does not bruise/bleed easily.   Psychiatric/Behavioral: Negative for depressed mood. The patient is not nervous/anxious.    All other systems reviewed and are negative.      Past Medical History:   Diagnosis Date   • Abdominal pain    • Anemia     LOW HGB, OCCASSIONAL WEAKNESS   • Arthritis    • Breast cancer (HCC)    • Cataract     L EYE   • Colon cancer (HCC)    • Constipation     INTERMITTANTLY   • Diarrhea     INTERMITTANTLY   • DM (diabetes mellitus), type 2 (HCC)    • High cholesterol    • History of blood clots     neck, RESOLVED   • Hypertension     NO MEDS NOW   • Hypokalemia    • Iron deficiency anemia secondary to blood loss (chronic)     GI BLEED   • Paroxysmal A-fib (HCC)     FOLLOWS BAIRES     Past Surgical History:   Procedure Laterality Date   • BACK SURGERY  1977    RUPTURED DISC/PINCHED NERVE LUMBAR SPINE   • BREAST LUMPECTOMY Right    • COLON SURGERY  2015    COLON RESECTION R/T CA, EEA   • COLONOSCOPY     • CORNEAL TRANSPLANT Right    • CYSTOSCOPY W/ URETERAL STENT PLACEMENT Bilateral     PT HAS LONG TERM URETERAL STENTS    • CYSTOSCOPY W/ URETERAL STENT PLACEMENT Right 8/17/2021    Procedure: CYSTOSCOPY, RIGHT URETERAL STENT INSERTION;  Surgeon: Kelli Chi MD;  Location: [facility];  Service: Urology;  Laterality: Right;   • HYSTERECTOMY      partial    • JOINT REPLACEMENT      R THR   • MASTECTOMY Left     NO BP OR NS L ARM   • VENOUS ACCESS DEVICE (PORT) INSERTION       Social History     Socioeconomic History   • Marital status:    Tobacco Use   • Smoking status: Never Smoker   • Smokeless tobacco: Never Used   Vaping Use   • Vaping Use: Never used   Substance and Sexual Activity   • Alcohol use: Never   • Drug use: Never   • Sexual activity: Defer     Family History   Problem Relation Age of Onset   • Skin cancer Other    • Colon cancer  Sister    • Malig Hyperthermia Neg Hx        Objective   Physical Exam  General: Alert, cooperative, no acute distress, weak and frail appearing  Eyes: Anicteric sclera, PERRLA  Respiratory: normal respiratory effort  Cardiovascular: no lower extremity edema  Skin: Pale skin tone, firm flesh-colored nodules in the axilla  Psychiatric: Appropriate affect, intact judgment  Neurologic: No focal sensory or motor deficits, normal cognition   Musculoskeletal: Reduced muscle strength and tone, in wheelchair  Extremities: No clubbing, cyanosis, or deformities    Vitals:    02/15/22 0818   BP: 136/66   Pulse: 96   Resp: 20   Temp: 98.3 °F (36.8 °C)   SpO2: 99%   Weight: 66.9 kg (147 lb 7.8 oz)   PainSc:   4   PainLoc: Abdomen     ECOG score: 2         PHQ-9 Total Score:         Result Review :   The following data was reviewed by: Jennifer Schroeder MD PhD on 02/15/2022:  Lab Results   Component Value Date    HGB 7.5 (C) 02/15/2022    HCT 24.2 (L) 02/15/2022    MCV 96.4 02/15/2022     02/15/2022    WBC 11.80 (H) 02/15/2022    NEUTROABS 7.00 02/15/2022    LYMPHSABS 3.98 (H) 02/15/2022    MONOSABS 0.59 02/15/2022    EOSABS 0.07 02/15/2022    BASOSABS 0.06 02/15/2022     Lab Results   Component Value Date    GLUCOSE 367 (H) 02/15/2022    BUN 20 02/15/2022    CREATININE 0.99 02/15/2022     (L) 02/15/2022    K 3.9 02/15/2022    CL 94 (L) 02/15/2022    CO2 22.4 02/15/2022    CALCIUM 8.0 (L) 02/15/2022    PROTEINTOT 7.2 02/15/2022    ALBUMIN 3.60 02/15/2022    BILITOT 0.3 02/15/2022    ALKPHOS 143 (H) 02/15/2022    AST 19 02/15/2022    ALT 4 02/15/2022          Assessment and Plan    Diagnoses and all orders for this visit:    1. Malignant neoplasm of ascending colon (HCC) (Primary)    2. Skin nodule    3. Metastasis to bone (HCC)    4. Cancer related pain    Other orders  -     oxyCODONE (Roxicodone) 5 MG immediate release tablet; Take 1 tablet by mouth Every 4 (Four) Hours As Needed for Moderate Pain .  Dispense: 90  tablet; Refill: 0        BRAF positive metastatic colon cancer: I reviewed the patient's CBC and CMP today there is no evidence of significant toxicity on her labs.  She will continue treatment today and follow-up with me in 1 month for repeat toxicity check.    Cancer-related pain: The patient has known bone involvement.  She also has extensive abdominal disease causing pain.  I will refill her pain medication and encouraged her to take it more frequently if needed.  This may have the added benefit of helping control her diarrhea.    Skin nodule: I spoke with her urologist, Dr. Chi, and asked her to remove one of the skin nodules while the patient is asleep for her stent exchange.  Dr. Chi agreed this could be done and will discuss it with the patient.      Patient was given instructions and counseling regarding her condition or for health maintenance advice. Please see specific information pulled into the AVS if appropriate.     Jennifer Schroeder MD PhD    2/15/2022

## 2022-02-17 NOTE — ANESTHESIA PREPROCEDURE EVALUATION
Anesthesia Evaluation     Patient summary reviewed and Nursing notes reviewed   no history of anesthetic complications:  NPO Solid Status: > 8 hours  NPO Liquid Status: > 2 hours           Airway   Mallampati: I  TM distance: >3 FB  Neck ROM: full  No difficulty expected  Dental    (+) edentulous    Pulmonary - normal exam    breath sounds clear to auscultation  (+) shortness of breath,   Cardiovascular - normal exam  Exercise tolerance: good (4-7 METS)    Rhythm: regular  Rate: normal    (+) hypertension, dysrhythmias Atrial Fib, hyperlipidemia,     ROS comment:  Follows w/[facility]. Hx of paroxysmal afib during proc yrs ago. No blocks/fos. 4/21 echo EF 55, mild AI    Neuro/Psych  (+) headaches,    GI/Hepatic/Renal/Endo    (+)  GERD,  renal disease, diabetes mellitus, thyroid problem     Musculoskeletal     Abdominal    Substance History - negative use     OB/GYN          Other   arthritis, blood dyscrasia anemia,   history of cancer active                    Anesthesia Plan    ASA 4     MAC and general   total IV anesthesia    Anesthetic plan, all risks, benefits, and alternatives have been provided, discussed and informed consent has been obtained with: patient.        CODE STATUS:

## 2022-02-17 NOTE — ANESTHESIA POSTPROCEDURE EVALUATION
Patient: Lorraine Dominguez    Procedure Summary     Date: 02/17/22 Room / Location: [facility] OR 07 / [facility] MAIN OR    Anesthesia Start: 0900 Anesthesia Stop: 0951    Procedure: CYSTOSCOPY, RIGHT URETERAL STENT EXCHANGE, EXCISION OF SKIN LESION (Right ) Diagnosis:       History of colon cancer      Obstruction of both ureters      (History of colon cancer [Z85.038])      (Obstruction of both ureters [N13.5])    Surgeons: Kelli Chi MD Provider: Rodolfo Brumfield MD    Anesthesia Type: MAC, general ASA Status: 4          Anesthesia Type: MAC, general    Vitals  Vitals Value Taken Time   /75 02/17/22 1012   Temp 37.3 °C (99.1 °F) 02/17/22 0950   Pulse 78 02/17/22 1015   Resp 13 02/17/22 1010   SpO2 97 % 02/17/22 1015   Vitals shown include unvalidated device data.        Post Anesthesia Care and Evaluation    Patient location during evaluation: bedside  Patient participation: complete - patient participated  Level of consciousness: awake  Pain management: adequate  Airway patency: patent  Anesthetic complications: No anesthetic complications  PONV Status: none  Cardiovascular status: acceptable and stable  Respiratory status: acceptable  Hydration status: acceptable    Comments: An Anesthesiologist personally participated in the most demanding procedures (including induction and emergence if applicable) in the anesthesia plan, monitored the course of anesthesia administration at frequent intervals and remained physically present and available for immediate diagnosis and treatment of emergencies.

## 2022-02-17 NOTE — DISCHARGE INSTRUCTIONS
DISCHARGE INSTRUCTIONS CYSTOSCOPY      ? For your surgery you had:  ? General anesthesia (you may have a sore throat for the first 24 hours)  ? IV sedation  ? Local anesthesia  ? Monitored anesthesia care  ? You received a medicated patch for nausea prevention today (behind your ear). It is recommended that you remove it 24-48 hours post-operatively. It must be removed within 72 hours.  ? You have received an anesthesia medication today that can cause hormonal forms of birth control to be ineffective. You should use a different form of birth control (to prevent pregnancy) for 7 days.   ? You may experience dizziness, drowsiness, or lightheadedness for several hours following surgery.  ? Do not stay alone today or tonight.  ? Limit your activity for 24 hours.  ? You should not drive, operate machinery, drink alcohol, or sign legally binding documents for 24 hours or while you are taking pain medication.  ? Resume your diet slowly.  Follow any special dietary instructions you may have been given by your doctor.    NOTIFY YOUR DOCTOR IF YOU EXPERIENCE ANY OF THE FOLLOWING:  ? Temperature greater than 101 degrees Fahrenheit  ? Shaking Chills  ? Redness or excessive drainage from incision  ? Nausea, vomiting and/or pain that is not controlled by prescribed medications  ? Increase in bleeding or bleeding that is excessive  ? Unable to urinate in 6 hours after surgery  ? If unable to reach your doctor, please go to the closest Emergency Room   ? Following your cystoscopy exam, you may experience burning upon urination.  You may also pass some bloody urine.  If the burning sensation and/or bloody urine should persist beyond 48 hours, call your doctor.  ? To encourage kidney and bladder function, you should drink as much fluid as possible.  ? If you have difficulty urinating, try sitting in a bath tub of warm water.  If you become uncomfortable because you cannot urinate, call your doctor or come to the Emergency Room at  the hospital.  ? Medications per physician instructions as indicated on Discharge Medication Information Sheet.        SPECIAL INSTRUCTIONS:                           Last dose of pain medication given at:   Oxy 5mg @ 1038am.

## 2022-02-17 NOTE — OP NOTE
CYSTOSCOPY URETERAL CATHETER/STENT INSERTION  Procedure Report    Patient Name:  Lorraine Dominguez  YOB: 1950    Date of Surgery:  2/17/2022     Pre-op Diagnosis:   History of colon cancer [Z85.038]  Obstruction of both ureters [N13.5]       Post-Op Diagnosis Codes:     * History of colon cancer [Z85.038]     * Obstruction of both ureters [N13.5]      Procedure/CPT® Codes:    Procedure(s):  CYSTOSCOPY, RIGHT URETERAL STENT EXCHANGE, EXCISION OF SKIN LESION      Staff:  Surgeon(s):  Kelli Chi MD         Anesthesia: Monitored Anesthesia Care    Estimated Blood Loss: none    Implants:    Implant Name Type Inv. Item Serial No.  Lot No. LRB No. Used Action   STNT LITHOSTENT 7F 22CM - [id number] Stent STNT LITHOSTENT 7F 22CM  [facility] [id number] Right 1 Implanted       Specimen:          Specimens     ID Source Type Tests Collected By Collected At Frozen?    A Thigh, Left Tissue · TISSUE PATHOLOGY EXAM   Kelli Chi MD 2/17/22 0925     Description: LEFT INNER THIGH SKIN LESION              Complications: None    Description of Procedure:     After proper consent was obtained, patient was taken to operating room and after induction of MAC anesthesia the patient was placed in the dorsal lithotomy position and prepped and draped in the normal sterile fashion for cystoscopy.  Her left anterior thigh was prepped and draped as well.      There were several skin lesions within the groin.  Oncology had asked that I remove one of these lesions to send for pathology.  I chose a lesion on the left anterior thigh.  10cc of lidocaine were used as local anesthesia.  I then excised the skin lesion which was about 2.5 cm in size.  I used [place] to obtain hemostasis.  The skin was then closed in an interrupted fashion using 3-0 chromic suture.  A sterile dressing was applied.     A 22 Khmer rigid cystoscope was inserted into the bladder.  The bladder was inspected in a systemic meridian  fashion using a 30 degree lens.  Both ureteral orifices were normal in appearance.  There was a stent coming from the right UO.  It was grasped and removed.     A glidewire was passed up the right ureter and over this a 7 [place]itan 22 cm litho ureteral stent was placed.  The stent was seen in good position under fluoroscopy.  The bladder was emptied and the scope was removed      Kelli Chi MD     Date: 2/17/2022  Time: 10:13 EST

## 2022-02-22 NOTE — TELEPHONE ENCOUNTER
Per Pathology patient has malignant diagnosis. Left inner thigh skin lesion with metastatic carcinoma consistent with breast primary, present at peripheral and deep margins.

## 2022-02-23 NOTE — ASSESSMENT & PLAN NOTE
- Chromic sutures which should go away on their own, no need to take them out and she does see Dr. Schroeder in the next couple of days to go over the fact that it is breast cancer in her biopsy of her skin.

## 2022-02-23 NOTE — PROGRESS NOTES
"Chief Complaint  Post-op Follow-up (1 week follow up. )    Subjective          Lorraine Dominguez presents to [facility] UROLOGY  History of Present Illness     Fern Dominguez is here for follow-up after a stent exchange last week in the operating room. She also had some skin lesions and I did biopsy one of these at the request of Dr. Schroeder. It did come back as metastatic breast cancer. She has an appointment with Dr. Schroeder on 03/01/2022.    She denies any issues with the stent change. She states the place on her leg is painful. She notes she occasionally experiences pain in her kidneys.       Objective   Vital Signs:   /41   Pulse 101   Ht 162.6 cm (64\")   Wt 63.8 kg (140 lb 9.6 oz)   SpO2 96%   BMI 24.13 kg/m²     Physical Exam  Vitals and nursing note reviewed.   Constitutional:       Appearance: Normal appearance. She is well-developed.   Pulmonary:      Effort: Pulmonary effort is normal.      Breath sounds: Normal air entry.   Neurological:      Mental Status: She is alert and oriented to person, place, and time.      Motor: Motor function is intact.   Psychiatric:         Mood and Affect: Mood normal.         Behavior: Behavior normal.        Result Review :   The following data was reviewed by: Kelli Chi MD on 02/23/2022:          Results for orders placed or performed in visit on 02/23/22   POC Urinalysis Dipstick, Automated    Specimen: Urine   Result Value Ref Range    Color Yellow Yellow, Straw, Dark Yellow, Ele    Clarity, UA Slightly Cloudy (A) Clear    Specific Gravity  1.025 1.005 - 1.030    pH, Urine 5.5 5.0 - 8.0    Leukocytes Small (1+) (A) Negative    Nitrite, UA Negative Negative    Protein,  mg/dL (A) Negative mg/dL    Glucose, UA >=1000 mg/dL (3+) (A) Negative, 1000 mg/dL (3+) mg/dL    Ketones, UA Negative Negative    Urobilinogen, UA Normal Normal    Bilirubin Negative Negative    Blood, UA Large (A) Negative    Lot Number [id number]     " Expiration Date 02/01/2023        Assessment and Plan    Diagnoses and all orders for this visit:    1. Obstruction of both ureters (Primary)  Assessment & Plan:  - Her incisions are well healed from her biopsy. She will need a stent exchange in 6 months.    Orders:  -     POC Urinalysis Dipstick, Automated    2. Overlapping malignant neoplasm of colon (HCC)    3. Malignant neoplasm of left breast in female, estrogen receptor positive, unspecified site of breast (HCC)  Assessment & Plan:  - Chromic sutures which should go away on their own, no need to take them out and she does see Dr. Schroeder in the next couple of days to go over the fact that it is breast cancer in her biopsy of her skin.        Follow Up   No follow-ups on file.  Patient was given instructions and counseling regarding her condition or for health maintenance advice. Please see specific information pulled into the AVS if appropriate.     Transcribed from ambient dictation for Kelli Chi MD by MAYCO BUNN.  02/23/22   11:48 EST    Patient verbalized consent to the visit recording.  I have personally performed the services described in this document as transcribed by the above individual, and it is both accurate and complete.  Kelli Chi MD  2/25/2022  13:43 EST

## 2022-03-01 PROBLEM — C79.2 METASTASIS TO SKIN: Status: ACTIVE | Noted: 2022-01-01

## 2022-03-01 NOTE — PROGRESS NOTES
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:   - Diagnosed 5/7/15; Low grade; staged pT4a pN0 M0 stage IIB; 0/37 LN; no MMR testing; RIGHT sided  - RIGHT hemicolectomy 5/7/15  - adjuvant Xeloda X 6 months  - Recurrent dx diagnosed via RIGHT ureter mass resection (5/1/18)  - FOLFIRI X 9 (7/31-12/4/18)  - s/p XRT RIGHT pelvis total 5320 cGy (12/14/18-1/24/19)  - CARIS report from specimen dated 5/7/15: BRAF V600E mutation (possible benefit from cetuximab), MSI stable, TMB-low, TAURUS-High (33%), TP53 mutated  - CT CAP 1/21/2021: Interim development of right-sided hydronephrosis and a soft tissue mass along the right iliac chain measuring 2.9 x 1.2 cm.  There is a second nodule identified below the aortic bifurcation in the midline measuring 1.4 cm.  This was consistent with disease recurrence versus her new primary small bowel cancer.    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 Lacey). Pathology from the ileum was positive for adenocarcinoma that appeared to be small bowel in origin and represent a new primary malignancy, 2.2cm, grade 1, tumor perforates the visceral peritoneum, pT4pN0, with 0/12 lymph nodes involved.  - Post surgical CT Abd/Pelvis 4/24/21:  Mild thickening of the wall of some portion of the small intestine, could be post-surgical changes. Moderate dilation of the right renal collecting system is unchanged with ureteral double-J stent.    - CT CAP on 3/25 prior to surgery showed no evidence of disease in the chest  - Caris report also showed BRAF V600E mutation consistent with pts prior colon cancer.    -  started Xeloda on 6/5/21 and continued through November 2021  - CT A/P on 11/9/21: probably worsening of peritoneal carcinomatosis  - started Cetuximab in November 2021.  Added Encorafenib in December 2021.     3) Metastatic Breast Cancer:  -RIGHT breast DCIS diagnosed 3/15/06; low-grade; s/p lumpectomy and adjuvant XRT; ER: 100%, WA: 70%  -LEFT breast invasive lobular carcinoma; diagnosed 06/2012; staged pT3 pN0 M0 stage IIB, ER: 83%, WA: 0, HER2: 1+, Ki-67: 26%, treated with mastectomy and SLND 04/2012, adjuvant DD AC and Taxotere, Adjuvant Arimidex then Femara. Discontinued in 2019.  - metastatic breast cancer diagnosed on 2/17/22 from biopsy of an inner thigh skin lesion: adenocarcinoma of breast primary, ER+ (80%), WA+ (5%) and HER2 negative.   - started Faslodex March 2022    4) Anemia:  -multifactorial due to her history of GI bleeding from small bowel ulcer/tumor  -intolerant to oral iron. Treatment with IV iron in the past.   -iron studies on 5/11/12 were normal    5) right hydronephrosis:  -Noted on CT scan from 1/21/2021, secondary to soft tissue mass  -Likely secondary to recurrent colon cancer, biopsy pending  -Cystoscopy and right ureteral stent (Dr. Chi) on 1/28/2021    6) DVT:  -bracheocephalic and subclavian thrombosis March 2021 ([facility])     Malignant neoplasm of left breast in female, estrogen receptor positive (HCC)   3/5/2018 Initial Diagnosis    Breast cancer (CMS/HCC)     3/15/2022 Biopsy    OP BREAST [place]  Plan Provider: Jennifer Schroeder MD PhD  Treatment goal: Palliative  Line of treatment: [No plan line of treatment]     Colon cancer (HCC)   3/5/2018 Initial Diagnosis    Colon cancer (CMS/HCC)     6/28/2021 - 7/7/2021 Chemotherapy    OP SUPPORTIVE ELECTROLYTE REPLACEMENT     10/21/2021 -  Chemotherapy    OP SUPPORTIVE Ferric Carboxymaltose (INJECTAFER)      11/23/2021 -  Chemotherapy    OP COLORECTAL Cetuximab / Encorafenib      Malignant neoplasm of colon (HCC)    3/5/2018 Initial Diagnosis    Malignant neoplasm of colon (HCC)     Metastasis to bone (HCC)   12/13/2021 Initial Diagnosis    Metastasis to bone (HCC)         History of Present Illness  Patient comes in today for her next cycle of cetuximab.  She does not feel significantly different today.  She notes that she had a recent biopsy which was positive for breast cancer.  Since this biopsy was of a skin nodule, it means that she has metastatic disease.  I discussed her past treatment with breast cancer.  The patient has not been on antiestrogen therapy for more than 2 years.  I discussed the risks and benefits of restarting an aromatase inhibitor versus starting Faslodex.  The patient selected to be on Faslodex since she could get this treatment monthly while she is here for her other medication.  We discussed the need for complete restaging scans.  The patient is agreeable to CT scans as well as a bone scan.  It is still unclear if she can have a brain MRI.  In the meantime she is agreeable to a CT of the head.  She does have worsening headaches across the front of her forehead.  She also feels that she gets somewhat weak at times.  She continues to have melena daily for the past 7 days.  She is also fatigued and realizes that her hemoglobin is significantly lower.        Review of Systems   Constitutional: Positive for fatigue. Negative for appetite change, diaphoresis, fever, unexpected weight gain and unexpected weight loss.   HENT: Negative for hearing loss, sore throat and voice change.    Eyes: Negative for blurred vision, double vision, pain, redness and visual disturbance.   Respiratory: Negative for cough, shortness of breath and wheezing.    Cardiovascular: Negative for chest pain, palpitations and leg swelling.   Gastrointestinal: Positive for abdominal pain (4/10).   Endocrine: Negative for cold intolerance, heat intolerance, polydipsia and polyuria.   Genitourinary: Positive for urinary incontinence  (Pain when peeing ) and vaginal pain. Negative for decreased urine volume, difficulty urinating and frequency.   Musculoskeletal: Negative for arthralgias, back pain, joint swelling and myalgias.   Skin: Negative for color change, rash, skin lesions and wound.   Neurological: Positive for headache (Constant headache ). Negative for dizziness, seizures and numbness.   Hematological: Negative for adenopathy. Does not bruise/bleed easily.   Psychiatric/Behavioral: Negative for depressed mood. The patient is not nervous/anxious.    All other systems reviewed and are negative.      Past Medical History:   Diagnosis Date   • Abdominal pain    • Anemia     LOW HGB, OCCASSIONAL WEAKNESS   • Arthritis    • Breast cancer (HCC)    • Cataract     L EYE   • Colon cancer (HCC)    • Constipation     INTERMITTANTLY   • Diarrhea     INTERMITTANTLY   • DM (diabetes mellitus), type 2 (HCC)    • High cholesterol    • History of blood clots     neck, RESOLVED   • Hypertension     NO MEDS NOW   • Hypokalemia    • Iron deficiency anemia secondary to blood loss (chronic)     GI BLEED   • Paroxysmal A-fib (HCC)     FOLLOWS BAIRES     Past Surgical History:   Procedure Laterality Date   • BACK SURGERY  1977    RUPTURED DISC/PINCHED NERVE LUMBAR SPINE   • BREAST LUMPECTOMY Right    • COLON SURGERY  2015    COLON RESECTION R/T CA, EEA   • COLONOSCOPY     • CORNEAL TRANSPLANT Right    • CYSTOSCOPY W/ URETERAL STENT PLACEMENT Bilateral     PT HAS LONG TERM URETERAL STENTS    • CYSTOSCOPY W/ URETERAL STENT PLACEMENT Right 8/17/2021    Procedure: CYSTOSCOPY, RIGHT URETERAL STENT INSERTION;  Surgeon: Kelli Chi MD;  [facility];  Service: Urology;  Laterality: Right;   • CYSTOSCOPY W/ URETERAL STENT PLACEMENT Right 2/17/2022    Procedure: CYSTOSCOPY, RIGHT URETERAL STENT EXCHANGE, EXCISION OF SKIN LESION;  Surgeon: Kelli Chi MD;  Location: [facility] OR;  Service: Urology;  Laterality: Right;   • HYSTERECTOMY       partial    • JOINT REPLACEMENT      R THR   • MASTECTOMY Left     NO BP OR NS L ARM   • VENOUS ACCESS DEVICE (PORT) INSERTION       Social History     Socioeconomic History   • Marital status:    Tobacco Use   • Smoking status: Never Smoker   • Smokeless tobacco: Never Used   Vaping Use   • Vaping Use: Never used   Substance and Sexual Activity   • Alcohol use: Never   • Drug use: Never   • Sexual activity: Defer     Family History   Problem Relation Age of Onset   • Skin cancer Other    • Colon cancer Sister    • Malig Hyperthermia Neg Hx        Objective   Physical Exam  General: Alert, cooperative, no acute distress, somewhat frail-appearing in a wheelchair but full of life and actively conversant  Eyes: Anicteric sclera, PERRLA  Respiratory: normal respiratory effort  Cardiovascular: no lower extremity edema  Skin: Normal tone, no rash, no lesions  Psychiatric: Appropriate affect, intact judgment  Neurologic: No focal sensory or motor deficits, normal cognition   Musculoskeletal: Reduced muscle strength and tone  Extremities: No clubbing, cyanosis, or deformities    Vitals:    03/01/22 0915   BP: 108/53   Pulse: 107   Resp: 20   Temp: 99.4 °F (37.4 °C)   SpO2: 96%   Weight: 65.2 kg (143 lb 11.8 oz)   PainSc:   4     ECOG score: 2         PHQ-9 Total Score:         Result Review :   The following data was reviewed by: Jennifer Schroeder MD PhD on 03/01/2022:  Lab Results   Component Value Date    HGB 6.2 (C) 03/01/2022    HCT 19.9 (C) 03/01/2022    .0 (H) 03/01/2022     03/01/2022    WBC 12.10 (H) 03/01/2022    NEUTROABS 6.58 03/01/2022    LYMPHSABS 4.57 (H) 03/01/2022    MONOSABS 0.68 03/01/2022    EOSABS 0.04 03/01/2022    BASOSABS 0.10 03/01/2022     Lab Results   Component Value Date    GLUCOSE 352 (H) 03/01/2022    BUN 22 03/01/2022    CREATININE 1.01 (H) 03/01/2022     (L) 03/01/2022    K 4.7 03/01/2022    CL 95 (L) 03/01/2022    CO2 20.3 (L) 03/01/2022    CALCIUM 8.5 (L)  03/01/2022    PROTEINTOT 7.0 03/01/2022    ALBUMIN 3.74 03/01/2022    BILITOT 0.3 03/01/2022    ALKPHOS 130 (H) 03/01/2022    AST 18 03/01/2022    ALT 4 03/01/2022          Assessment and Plan    Diagnoses and all orders for this visit:    1. Anemia, unspecified type (Primary)  -     Type and screen; Future  -     Ambulatory Referral to [facility] For Infusion Treatment    2. UTI symptoms  -     Urinalysis With Microscopic - Urine, Clean Catch; Future  -     Urine Culture - Urine, Urine, Clean Catch; Future    3. Malignant neoplasm of ascending colon (HCC)  -     CT chest w contrast; Future  -     CT abdomen pelvis w contrast; Future    4. Adenocarcinoma of small bowel (HCC)  -     CT chest w contrast; Future  -     CT abdomen pelvis w contrast; Future  -     CT Head With & Without Contrast; Future  -     NM Bone Scan Whole Body; Future    5. Malignant neoplasm of left breast in female, estrogen receptor positive, unspecified site of breast (HCC)  -     CT chest w contrast; Future  -     CT abdomen pelvis w contrast; Future  -     CT Head With & Without Contrast; Future  -     NM Bone Scan Whole Body; Future    6. Overlapping malignant neoplasm of colon (HCC)  -     CBC and Differential; Future  -     Comprehensive metabolic panel; Future  -     Magnesium; Future    7. Metastasis to skin (HCC)        Metastatic Breast Cancer:  This is a new diagnosis for the patient based on biopsy on her skin nodule.  This cancer is not curable and I would not recommend aggressive treatment at this time given her other metastatic cancer diagnosis and general poor prognosis.  I do recommend Faslodex monthly which I will plan to start with her next Cetuximab dose.  I will also send the patient for restaging CT CAP with contrast as well as a bone scan.  I will include a CT of the brain with contrast as well.  I will also discussed her case with Dr. Chi to see what type of stent was placed.  It would be most optimal for the patient to have  an MRI of the brain if possible.  Also discussed genetic testing again with the patient.  This is at least her third cancer diagnosis.  Although it may not influence her treatment plan, it would certainly be important for her daughter to understand her genetic risk.  The patient is agreeable so we will make the referral today.    BRAF positive metastatic colon cancer: I reviewed the patient's CBC and CMP today there is no evidence of significant toxicity on her labs.  She will continue treatment today and follow-up with me in 1 month for repeat toxicity check.    Severe anemia: Secondary to ongoing GI bleeding.  I will order 1 unit of blood for the patient today.  It would likely take a couple of days to get her scheduled for blood transfusion given the presence of antibodies and difficulty with matching.  I will give her 1 L of normal saline today to help with volume replacement.  I will recheck her CBC in 2 weeks and transfuse as needed.  The patient will contact me sooner if she has symptoms of severe anemia so that we can check labs before that time.    Dysuria: Patient continues to complain of UTI symptoms such as dysuria and frequency.  I will check a UA and culture.  I will also discussed this with Dr. Chi.  The patient may be a candidate for prophylactic antibiotics.    Patient was given instructions and counseling regarding her condition or for health maintenance advice. Please see specific information pulled into the AVS if appropriate.     Jennifer Schroeder MD PhD    3/1/2022

## 2022-03-03 NOTE — ADDENDUM NOTE
Encounter addended by: Natacha Hanna on: 3/3/2022 7:02 AM   Actions taken: Order list changed, Pharmacy for encounter modified

## 2022-03-24 NOTE — TELEPHONE ENCOUNTER
Caller: RANI    Relationship: [facility]  Best call back number: [phone]    Requested Prescriptions:   BRAFTOVI 75 MG     Pharmacy where request should be sent:    [id number] [id number] [place] [address] [phone] [facility] [phone] FX      Does the patient have less than a 3 day supply:  [] Yes  [x] No    Jyothi NELSON Rep   03/24/22 11:26 EDT

## 2022-03-29 NOTE — TELEPHONE ENCOUNTER
WARMED TRANSFERRED PHARMACIST VANESSA FROM [facility] DUE TO IT WAS RELAYED TO HIM BY PATIENT THAT HER HEMOGLOBIN LEVELS DROPPED TO 5.4 AND SHE WAS GOING TO THE ER TO HAVE INFUSIONS.  VANESSA NEEDED TO REPORT THIS AND ADIDITIONAL INFORMATION PERTAINING ON WHAT TO DO DEALING WITH PATIENTS MEDICATIONS GOING FORWARD.           [facility]

## 2022-03-29 NOTE — TELEPHONE ENCOUNTER
Received call from pharmacy at [facility]. Stated that patient them that her treatment was on hold. Informed them her her chemo was held today and , that we we normally bring the patients back in next for labs and to receive treatment. They want to know if their will be a change to her encorafenib. They were getting ready to ship her meds.

## 2022-03-31 NOTE — TELEPHONE ENCOUNTER
Patient wants to know if Dr. Carballo will do a colonoscopy.      She has had black stools.. She had to get 2 units of blood 2 weeks ago, and 2 more units yesterday.  Cancer care ordered it for her.  She said her blood was 5.4.  She had loose stools a couple weeks, and took medication, and it stopped a few days, and started again.  Feeling tired.    She had a colonoscopy last year.

## 2022-03-31 NOTE — TELEPHONE ENCOUNTER
Per Dr Carballo patient just needs an appt. Spoke with the patient and she is coming in on 4/20/22 @10am.

## 2022-04-05 PROBLEM — C66.9 MALIGNANT TUMOR OF URETER (HCC): Status: ACTIVE | Noted: 2022-01-01

## 2022-04-05 NOTE — PROGRESS NOTES
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:   - Diagnosed 5/7/15; Low grade; staged pT4a pN0 M0 stage IIB; 0/37 LN; no MMR testing; RIGHT sided  - RIGHT hemicolectomy 5/7/15  - adjuvant Xeloda X 6 months  - Recurrent dx diagnosed via RIGHT ureter mass resection (5/1/18)  - FOLFIRI X 9 (7/31-12/4/18)  - s/p XRT RIGHT pelvis total 5320 cGy (12/14/18-1/24/19)  - CARIS report from specimen dated 5/7/15: BRAF V600E mutation (possible benefit from cetuximab), MSI stable, TMB-low, TAURUS-High (33%), TP53 mutated  - CT CAP 1/21/2021: Interim development of right-sided hydronephrosis and a soft tissue mass along the right iliac chain measuring 2.9 x 1.2 cm.  There is a second nodule identified below the aortic bifurcation in the midline measuring 1.4 cm.  This was consistent with disease recurrence versus her new primary small bowel cancer.    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 Lacey). Pathology from the ileum was positive for adenocarcinoma that appeared to be small bowel in origin and represent a new primary malignancy, 2.2cm, grade 1, tumor perforates the visceral peritoneum, pT4pN0, with 0/12 lymph nodes involved.  - Post surgical CT Abd/Pelvis 4/24/21:  Mild thickening of the wall of some portion of the small intestine, could be post-surgical changes. Moderate dilation of the right renal collecting system is unchanged with ureteral double-J stent.    - CT CAP on 3/25 prior to surgery showed no evidence of disease in the chest  - Caris report also showed BRAF V600E mutation consistent with pts prior colon cancer.    -  started Xeloda on 6/5/21 and continued through November 2021  - CT A/P on 11/9/21: probably worsening of peritoneal carcinomatosis  - started Cetuximab in November 2021.  Added Encorafenib in December 2021.     3) Metastatic Breast Cancer:  -RIGHT breast DCIS diagnosed 3/15/06; low-grade; s/p lumpectomy and adjuvant XRT; ER: 100%, FL: 70%  -LEFT breast invasive lobular carcinoma; diagnosed 06/2012; staged pT3 pN0 M0 stage IIB, ER: 83%, FL: 0, HER2: 1+, Ki-67: 26%, treated with mastectomy and SLND 04/2012, adjuvant DD AC and Taxotere, Adjuvant Arimidex then Femara. Discontinued in 2019.  - metastatic breast cancer diagnosed on 2/17/22 from biopsy of an inner thigh skin lesion: adenocarcinoma of breast primary, ER+ (80%), FL+ (5%) and HER2 negative.   - started Faslodex March 2022    4) Anemia:  -multifactorial due to her history of GI bleeding from small bowel ulcer/tumor  -intolerant to oral iron. Treatment with IV iron in the past.   -iron studies on 5/11/12 were normal    5) right hydronephrosis:  -Noted on CT scan from 1/21/2021, secondary to soft tissue mass  -Likely secondary to recurrent colon cancer, biopsy pending  -Cystoscopy and right ureteral stent (Dr. Chi) on 1/28/2021    6) DVT:  -bracheocephalic and subclavian thrombosis March 2021 ([facility])     Malignant neoplasm of left breast in female, estrogen receptor positive (HCC)   3/5/2018 Initial Diagnosis    Breast cancer (CMS/HCC)     3/15/2022 Biopsy    OP BREAST Fulvestrant  Plan Provider: Jennifer Schroeder MD PhD  Treatment goal: Palliative  Line of treatment: [No plan line of treatment]     Colon cancer (HCC)   3/5/2018 Initial Diagnosis    Colon cancer (CMS/HCC)     6/28/2021 - 7/7/2021 Chemotherapy    OP SUPPORTIVE ELECTROLYTE REPLACEMENT     10/21/2021 -  Chemotherapy    OP SUPPORTIVE Ferric Carboxymaltose (INJECTAFER)      11/23/2021 -  Chemotherapy    OP COLORECTAL Cetuximab / Encorafenib      Malignant neoplasm of colon (HCC)    3/5/2018 Initial Diagnosis    Malignant neoplasm of colon (HCC)     Metastasis to bone (HCC)   12/13/2021 Initial Diagnosis    Metastasis to bone (HCC)         HPI  Patient comes in today with her daughter for toxicity check prior to her next cycle of treatment.  She continues to have significant amounts of melanotic stool.  Last week she had received 2 units of blood for hemoglobin less than 6.  On review the patient's labs today her hemoglobin remains very low at 6.9.    I explained to the patient and her daughter how complicated her case is.  Her anemia is certainly due in part to persistent GI bleeding.  It is also complicated by her chemotherapy.  Blood transfusions are complicated by multiple antibodies against various blood types.  This also limits my ability to be more aggressive with her breast cancer therapy.  The patient understands all of these limitations.  She points out various skin lesions that she is concerned about.  Some of them are enlarging in size.    Review of Systems   Constitutional: Positive for fatigue. Negative for appetite change, diaphoresis, fever, unexpected weight gain and unexpected weight loss.   HENT: Negative for hearing loss, sore throat and voice change.    Eyes: Negative for blurred vision, double vision, pain, redness and visual disturbance.   Respiratory: Negative for cough, shortness of breath and wheezing.    Cardiovascular: Negative for chest pain, palpitations and leg swelling.   Gastrointestinal: Positive for abdominal pain and diarrhea.   Endocrine: Negative for cold intolerance, heat intolerance, polydipsia and polyuria.   Genitourinary: Negative for decreased urine volume, difficulty urinating, frequency and urinary incontinence.   Musculoskeletal: Negative for arthralgias, back pain, joint swelling and myalgias.   Skin: Negative for color change, rash, skin lesions and wound.   Neurological: Positive for headache. Negative for dizziness, seizures and numbness.    Hematological: Negative for adenopathy. Does not bruise/bleed easily.   Psychiatric/Behavioral: Negative for depressed mood. The patient is not nervous/anxious.    All other systems reviewed and are negative.      Past Medical History:   Diagnosis Date   • Abdominal pain    • Anemia     LOW HGB, OCCASSIONAL WEAKNESS   • Arthritis    • Breast cancer (HCC)    • Cataract     L EYE   • Colon cancer (HCC)    • Constipation     INTERMITTANTLY   • Diarrhea     INTERMITTANTLY   • DM (diabetes mellitus), type 2 (HCC)    • High cholesterol    • History of blood clots     neck, RESOLVED   • Hypertension     NO MEDS NOW   • Hypokalemia    • Iron deficiency anemia secondary to blood loss (chronic)     GI BLEED   • Paroxysmal A-fib (HCC)     FOLLOWS BAIRES     Past Surgical History:   Procedure Laterality Date   • BACK SURGERY  1977    RUPTURED DISC/PINCHED NERVE LUMBAR SPINE   • BREAST LUMPECTOMY Right    • COLON SURGERY  2015    COLON RESECTION R/T CA, EEA   • COLONOSCOPY     • CORNEAL TRANSPLANT Right    • CYSTOSCOPY W/ URETERAL STENT PLACEMENT Bilateral     PT HAS LONG TERM URETERAL STENTS    • CYSTOSCOPY W/ URETERAL STENT PLACEMENT Right 8/17/2021    Procedure: CYSTOSCOPY, RIGHT URETERAL STENT INSERTION;  Surgeon: Kelli Chi MD;  Location: [facility]  Service: Urology;  Laterality: Right;   • CYSTOSCOPY W/ URETERAL STENT PLACEMENT Right 2/17/2022    Procedure: CYSTOSCOPY, RIGHT URETERAL STENT EXCHANGE, EXCISION OF SKIN LESION;  Surgeon: Kelli Chi MD;  Location: [facility]  Service: Urology;  Laterality: Right;   • HYSTERECTOMY      partial    • JOINT REPLACEMENT      R THR   • MASTECTOMY Left     NO BP OR NS L ARM   • VENOUS ACCESS DEVICE (PORT) INSERTION       Social History     Socioeconomic History   • Marital status:    Tobacco Use   • Smoking status: Never Smoker   • Smokeless tobacco: Never Used   Vaping Use   • Vaping Use: Never used   Substance and Sexual Activity   • Alcohol use:  Never   • Drug use: Never   • Sexual activity: Defer     Family History   Problem Relation Age of Onset   • Skin cancer Other    • Colon cancer Sister    • Malig Hyperthermia Neg Hx        Objective   Physical Exam  General: Alert, cooperative, no acute distress, chronically ill-appearing but in good spirits and not acutely ill  Eyes: Anicteric sclera, PERRLA  Respiratory: normal respiratory effort  Cardiovascular: no lower extremity edema  Skin: Pale skin tone, two horn like skin lesions on the chest that resemble actinic keratosis, persistent firm skin nodules in the axilla and the back of the neck that are consistent with known cutaneous breast cancer   Psychiatric: Appropriate affect, intact judgment  Neurologic: No focal sensory or motor deficits, normal cognition   Musculoskeletal: Normal muscle strength and tone  Extremities: No clubbing, cyanosis, or deformities    Vitals:    04/05/22 1032   BP: 121/56   Pulse: 107   Resp: 18   Temp: 97.5 °F (36.4 °C)   SpO2: 100%   Weight: 67.5 kg (148 lb 13 oz)   PainSc:   4   PainLoc: Abdomen     ECOG score: 2         PHQ-9 Total Score:         Result Review :   The following data was reviewed by: Jennifer Schroeder MD PhD on 04/05/2022:  Lab Results   Component Value Date    HGB 6.9 (C) 04/05/2022    HCT 22.1 (L) 04/05/2022    MCV 93.6 04/05/2022     (H) 04/05/2022    WBC 12.77 (H) 04/05/2022    NEUTROABS 7.44 (H) 04/05/2022    LYMPHSABS 4.19 (H) 04/05/2022    MONOSABS 0.91 (H) 04/05/2022    EOSABS 0.08 04/05/2022    BASOSABS 0.08 04/05/2022     Lab Results   Component Value Date    GLUCOSE 160 (H) 04/05/2022    BUN 25 (H) 04/05/2022    CREATININE 0.97 04/05/2022     (L) 04/05/2022    K 3.8 04/05/2022     04/05/2022    CO2 19.3 (L) 04/05/2022    CALCIUM 8.3 (L) 04/05/2022    PROTEINTOT 6.9 04/05/2022    ALBUMIN 3.24 (L) 04/05/2022    BILITOT 0.2 04/05/2022    ALKPHOS 130 (H) 04/05/2022    AST 23 04/05/2022    ALT 7 04/05/2022          Assessment and  Plan    Diagnoses and all orders for this visit:    1. Malignant neoplasm of ascending colon (HCC) (Primary)    2. Anemia, unspecified type  -     Type and screen; Future  -     Ambulatory Referral to [facility] For Infusion Treatment    3. Metastasis to bone (HCC)    4. Metastasis to skin (HCC)    5. Malignant neoplasm of left breast in female, estrogen receptor positive, unspecified site of breast (HCC)    Other orders  -     OK To Treat      Recurrent Cancer of the colon and small bowel: The pt remains on treatment with cetuximab and encorafinib. I will not make any adjustments to her treatment regimen at this time as I am concerned about worsening anemia.  At this time there is not evidence of significant toxicity that I can attribute to her treatment. I will f/u with her in 1 month with repeat toxicity check.      Metastatic Breast Cancer with dermal involvement.  The pt will get her second cycle of fulvestrant today.  It is unclear if the skin lesions are responding but I would not expect to see a difference for a least 3 months and maybe longer.      Bone metastasis: Mostly likely secondary to recurrent/metastatic breast cancer. She has an appt with Dr. Alvarez to consider palliative radiation to the bone metastasis on the cranium that is likely causing her head aches.      Severe Anemia: most significantly related to ongoing melena. I recommend transfusion of 1 unit of pRBCs when possible. I will add Type and cross today. I discussed her case with Dr. Carballo since the patient had requested repeat endoscopy. Neither of us believe that endoscopy would reveal a cause of bleeding that could be reasonably treated given the complexity of cancers. I discussed this with the patient. She agrees to hold off on endoscopy for now since it is unlikely to be beneficial.      Patient was given instructions and counseling regarding her condition or for health maintenance advice. Please see specific information pulled into the  AVS if appropriate.

## 2022-04-11 NOTE — PROGRESS NOTES
Lorraine Dominguez, a 72-year old female, was seen for genetic counseling due to a personal history of cancer. Genetic counseling was provided via telehealth. Ms. Dominguez has a personal history of colon cancer diagnosed at age 65, small bowel cancer at age 71, right breast cancer diagnosed at age 56, and a left breast cancer diagnosed at age 62. Ms. Dominguez has had a hysterectomy, but she retains her ovaries. Ms. Dominguez previously had negative genetic testing via a small panel in 2015 that included BRCA1/2, TP53, PTEN, and Fuentes syndrome genes. We discussed the availability of more comprehensive multigene panels that includes genes that were not evaluated on her previously testing. Ms. Dominguez was interested in discussing her risk for a hereditary cancer syndrome, and decided to pursue genetic testing.   Ms. Dominguez opted to pursue comprehensive testing via the CancerNext panel ordered through [employer] which includes 36 genes associated with increased cancer risk. Results are expected in 2-3 weeks.    PERTINENT FAMILY HISTORY: (See pedigree)   Sister:  Breast cancer dx < 50  Sister:  Breast cancer dx > 50  Brother: Leukemia  Brother: Unspecified cancer type  Niece:  Colon cancer, 30s    We do not have medical records regarding the diagnoses in the family.     GENETIC COUNSELING (30 minutes): We reviewed the family history information in detail.  Cases of breast cancer follow three general patterns: sporadic, familial, and hereditary.  While most cancer is sporadic, some cases appear to occur in family clusters.  These cases are said to be familial and account for 10-20% of breast cancer cases.  Familial cases may be due to a combination of shared genes and environmental factors among family members.  In even fewer families, the cancer is said to be inherited, and the genes responsible for the cancer are known.      Family histories typical of hereditary cancer syndromes usually include multiple first- and  second-degree relatives diagnosed with cancer types that define a syndrome.  These cases tend to be diagnosed at younger-than-expected ages and can be bilateral or multifocal.  The cancer in these families follows an autosomal dominant inheritance pattern, which indicates the likely presence of a mutation in a cancer susceptibility gene.  Children and siblings of an individual believed to carry this mutation have a 50% chance of inheriting that mutation, thereby inheriting the increased risk to develop cancer.  These mutations can be passed down from the maternal or the paternal lineage.    Hereditary breast cancer accounts for 5-10% of all cases of breast cancer.  A significant proportion (up to 50%) of hereditary breast cancer can be attributed to mutations in the BRCA1 and BRCA2 genes.  Mutations in these genes confer an increased risk for breast cancer, ovarian cancer, male breast cancer, prostate cancer and pancreatic cancer.  Women with a BRCA1 or BRCA2 mutation who have already been diagnosed with breast cancer have a 40-60% lifetime risk of a second breast cancer.  These genes are not responsible for every case of hereditary breast cancer, and we discussed multigene panels that can evaluate BRCA1/2 and a number of additional cancer related genes simultaneously. A few of the additional genes that have been associated with hereditary breast cancer include PALB2, CHEK2, and ANUSHKA.  The NCCN has established management guidelines for individuals found to carry mutations in these genes. There are additional genes that are evaluated that have been more recently described, and there may be less data regarding the risks and therefore may not have established management guidelines.  Based on Ms. Dominguez’s desire to get as much information as possible regarding her personal risks and potential risks for her family, she opted to pursue testing through a panel that would look at several other genes known to increase the  risk for cancer.    GENETIC TESTING:  The risks, benefits and limitations of genetic testing and implications for clinical management following testing were reviewed.  DNA test results can influence decisions regarding screening, prevention and surgical management.  Genetic testing can have significant psychological implications for both individuals and families.  Also discussed was the possibility of employment and insurance discrimination based on genetic test results and the laws in place to prevent this (ADDI), as well as the limitations of these laws.    We discussed panel testing, which would involve testing for BRCA1/2 and 34 additional genes associated with increased cancer risk. The benefits and limitations of genetic testing were discussed and Ms. Dominguez decided to pursue testing via the panel. The implications of a positive or negative test result were discussed. We discussed the possibility that, in some cases, genetic test results may be ambiguous due to the identification of a genetic variant. These variants may or may not be associated with an increased cancer risk.  Given her personal history, a negative test result does not eliminate all breast cancer risk to her relatives, although the risk would not be as high as it would with positive genetic testing.      PLAN: Genetic testing via the CancerNext panel through [employer] was ordered and results are expected within 2-3 weeks. Ms. Dominguez is welcome to contact us in the meantime with any questions she may have at [phone].         Cindi Centeno MS, [facility] [place] [facility]       Licensed Certified Genetic Counselor

## 2022-04-19 NOTE — PROGRESS NOTES
Outpatient Nutrition Oncology Follow Up    Patient Name: Lorraine Dominguez  YOB: 1950  MRN: [id number]  Assessment Date: 4/19/2022    CLINICAL NUTRITION ASSESSMENT    Dx:  Colorectal Ca      Type of Cancer Treatment  Cetuximab, Encorafeniv         Reason for Assessment  Follow-up protocol, Unintentional weight loss   H&P:    Past Medical History:   Diagnosis Date   • Abdominal pain    • Anemia     LOW HGB, OCCASSIONAL WEAKNESS   • Arthritis    • Breast cancer (HCC)    • Cataract     L EYE   • Colon cancer (HCC)    • Constipation     INTERMITTANTLY   • Diarrhea     INTERMITTANTLY   • DM (diabetes mellitus), type 2 (HCC)    • High cholesterol    • History of blood clots     neck, RESOLVED   • Hypertension     NO MEDS NOW   • Hypokalemia    • Iron deficiency anemia secondary to blood loss (chronic)     GI BLEED   • Paroxysmal A-fib (HCC)     FOLLOWS BAIRES      Current Problems:   Patient Active Problem List   Diagnosis Code   • Encounter for adjustment or management of vascular access device Z45.2   • Allergic rhinitis J30.9   • Anemia D64.9   • Anxiety F41.9   • Arthritis M19.90   • Malignant neoplasm of left breast in female, estrogen receptor positive (HCC) C50.912, Z17.0   • Cataract H26.9   • Degeneration of intervertebral disc [id number]   • Depression F32.A   • Diabetic neuropathy (HCC) E11.40   • Esophageal reflux K21.9   • Hiatal hernia K44.9   • High blood pressure I10   • Hydroureteronephrosis N13.30   • Hyperlipidemia E78.5   • Leg pain M79.606   • Limb swelling M79.89   • Colon cancer (HCC) C18.9   • Malignant neoplasm of colon (HCC) C18.9   • Muscle cramps R25.2   • Neuropathy G62.9   • Primary localized osteoarthritis of pelvic region and thigh M16.10   • Shortness of breath R06.02   • Diabetes mellitus (HCC) E11.9   • Type 2 diabetes mellitus (HCC) E11.9   • Ureteral mass N28.89   • Special screening for malignant neoplasms, colon Z12.11   • Atrial fibrillation (HCC) I48.91   • Central  "venous catheter in place Z78.9   • Chronic anxiety F41.9   • Disorder of vitamin B12 E53.8   • Dyslipidemia E78.5   • Gastrointestinal hemorrhage K92.2   • History of lumbar discectomy Z98.890   • History of malignant neoplasm of breast Z85.3   • Hypokalemia E87.6   • Hypothyroidism E03.9   • Iron deficiency E61.1   • Iron deficiency anemia secondary to blood loss (chronic) D50.0   • Osteopenia M85.80   • Tension-type headache G44.209   • Ulcerative lesion [id number]   • Hypomagnesemia E83.42   • Hypocalcemia E83.51   • History of colon cancer Z85.038   • Fall against object W18.00XA   • Generalized anxiety disorder F41.1   • Malabsorption of iron K90.9   • Urgency of urination R39.15   • Moderate nausea R11.0   • Hyperglycemia R73.9   • Dehydration E86.0   • Metastasis to bone (HCC) C79.51   • Physical deconditioning R53.81   • Generalized abdominal pain R10.84   • Obstruction of both ureters N13.5   • Skin nodule R22.9   • Cancer related pain G89.3   • Metastasis to skin (HCC) C79.2   • 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    Weight Hx  Wt Readings from Last 30 Encounters:   04/19/22 0903 66.3 kg (146 lb 2.6 oz)   04/05/22 1010 67.5 kg (148 lb 13 oz)   04/05/22 1032 67.5 kg (148 lb 13 oz)   03/29/22 1004 66.9 kg (147 lb 7.8 oz)   03/15/22 0804 64.5 kg (142 lb 3.2 oz)   03/15/22 0806 64.5 kg (142 lb 3.2 oz)   03/01/22 0826 65.2 kg (143 lb 11.8 oz)   03/01/22 0915 65.2 kg (143 lb 11.8 oz)   02/23/22 1035 63.8 kg (140 lb 9.6 oz)   02/17/22 0717 64 kg (141 lb 1.5 oz)   02/15/22 0800 66.9 kg (147 lb 7.8 oz)   02/15/22 0818 66.9 kg (147 lb 7.8 oz)   02/10/22 1312 64 kg (141 lb)   02/01/22 0700 67.3 kg (148 lb 5.9 oz)   02/01/22 0851 67.3 kg (148 lb 5.9 oz)   01/18/22 0838 67.8 kg (149 lb 7.6 oz)   01/18/22 0901 67.8 kg (149 lb " 7.6 oz)   01/04/22 1101 65.8 kg (145 lb 1 oz)   01/04/22 0830 65.8 kg (145 lb 1 oz)   01/04/22 0929 65.8 kg (145 lb 1 oz)   12/28/21 0756 68.8 kg (151 lb 10.8 oz)   12/21/21 1032 69 kg (152 lb 1.9 oz)   12/20/21 1526 69.1 kg (152 lb 5.4 oz)   12/14/21 0845 70 kg (154 lb 5.2 oz)   12/07/21 0822 70 kg (154 lb 5.2 oz)   12/07/21 0839 70 kg (154 lb 5.2 oz)   11/30/21 0700 69.5 kg (153 lb 3.5 oz)   11/30/21 0837 69.5 kg (153 lb 3.5 oz)   11/23/21 0835 71 kg (156 lb 8.4 oz)   11/23/21 0837 71 kg (156 lb 8.4 oz)   11/15/21 0940 72 kg (158 lb 11.7 oz)        Labs/Medications        Pertinent Labs Reviewed.   Results from last 7 days   Lab Units 04/19/22  0858   SODIUM mmol/L 135*   POTASSIUM mmol/L 3.9   CHLORIDE mmol/L 106   CO2 mmol/L 18.1*   BUN mg/dL 23   CREATININE mg/dL 0.97   CALCIUM mg/dL 8.3*   BILIRUBIN mg/dL 0.2   ALK PHOS U/L 131*   ALT (SGPT) U/L 8   AST (SGOT) U/L 35*   GLUCOSE mg/dL 164*     Results from last 7 days   Lab Units 04/19/22  0858   MAGNESIUM mg/dL 1.6   HEMOGLOBIN g/dL 5.6*   HEMATOCRIT % 18.5*         Pertinent Medications Calcium Carb-Cholecalciferol, HYDROcodone-acetaminophen, LORazepam, apixaban, cromolyn, digoxin, encorafenib, escitalopram, gabapentin, glipizide, loperamide, magnesium oxide, metFORMIN, ondansetron, pantoprazole, potassium chloride, pravastatin, and senna     Current Nutrition Orders & Evaluation of Intake       Oral Nutrition     Current PO Diet Regular diet, snacks in between meals   Supplement Glucerna shake once daily (encouraged 4 oz. BID)     Nutrition Diagnosis        Nutrition Dx Problem 1 Moderate malnutrition related to increased nutrient needs due to catabolic disease as evidenced by physiological causes increasing nutrient needs., hypermetabolic state., muscle wasting. and fat loss.       Nutrition Intervention       RD Action Nutrition f/u     Monitor/Evaluation       Monitor Per oncology nutrition protocol.     Comments:    1.8% wt decline in 2 weeks.  Pt had  reported grade 1 constipation & grade 1 diarrhea to RN today.  When asked pt if she was experiencing nutrition-related concerns, including n/v, diarrhea, constipation, or changes in appetite, pt denied this.  She reports to continue to drink Glucerna shake once daily and requested more samples- a 6 pack of samples was provided to her today.    Electronically signed by:  Jennifer Sam RD  04/19/22 11:11 EDT

## 2022-04-26 NOTE — PROGRESS NOTES
Chief Complaint: Colonoscopy    Subjective         History of Present Illness  Lorraine Dominguez is a 72 y.o. female presents to [facility] GENERAL SURGERY. The patient is to be seen for metastatic cancer and to discuss a colonoscopy.    Rectal bleeding  The patient reports she has been experiencing significant bleeding from her rectum associated with abdominal pain. She has breast cancer and cancer in her intestines. The patient notes she has lab work when she has her chemo treatments. She reports black stools, she states once she starts passing a bowel movement, it is difficulty to stop it. The patient notes taking 2-3 antidiarrhea pills to stop the bowel movements, this lasts about 1-2 days. The patient reports she is currently taking Eliquis. The patient reports she is currently taking pantoprazole once daily.    Objective     Past Medical History:   Diagnosis Date   • Abdominal pain    • Anemia     LOW HGB, OCCASSIONAL WEAKNESS   • Arthritis    • Breast cancer (HCC)    • Cataract     L EYE   • Colon cancer (HCC)    • Constipation     INTERMITTANTLY   • Diarrhea     INTERMITTANTLY   • DM (diabetes mellitus), type 2 (HCC)    • High cholesterol    • History of blood clots     neck, RESOLVED   • Hypertension     NO MEDS NOW   • Hypokalemia    • Iron deficiency anemia secondary to blood loss (chronic)     GI BLEED   • Paroxysmal A-fib (HCC)     FOLLOWS BAIRES       Past Surgical History:   Procedure Laterality Date   • BACK SURGERY  1977    RUPTURED DISC/PINCHED NERVE LUMBAR SPINE   • BREAST LUMPECTOMY Right    • COLON SURGERY  2015    COLON RESECTION R/T CA, EEA   • COLONOSCOPY     • CORNEAL TRANSPLANT Right    • CYSTOSCOPY W/ URETERAL STENT PLACEMENT Bilateral     PT HAS LONG TERM URETERAL STENTS    • CYSTOSCOPY W/ URETERAL STENT PLACEMENT Right 8/17/2021    Procedure: CYSTOSCOPY, RIGHT URETERAL STENT INSERTION;  Surgeon: Kelli Chi MD;  Location: [facility] MAIN OR;  Service: Urology;   Laterality: Right;   • CYSTOSCOPY W/ URETERAL STENT PLACEMENT Right 2/17/2022    Procedure: CYSTOSCOPY, RIGHT URETERAL STENT EXCHANGE, EXCISION OF SKIN LESION;  Surgeon: Kelli Chi MD;  Location: [facility] MAIN OR;  Service: Urology;  Laterality: Right;   • HYSTERECTOMY      partial    • JOINT REPLACEMENT      R THR   • MASTECTOMY Left     NO BP OR NS L ARM   • VENOUS ACCESS DEVICE (PORT) INSERTION           Current Outpatient Medications:   •  Accu-Chek Softclix Lancets lancets, , Disp: , Rfl:   •  apixaban (ELIQUIS) 5 MG tablet tablet, Take 5 mg by mouth 2 (two) times a day. Per Sandhya patient does not need to stop eliquis for procedure, Disp: , Rfl:   •  Calcium Carb-Cholecalciferol 500-10 MG-MCG chewable tablet, Chew 1 tablet 2 (two) times a day. (Patient taking differently: Chew 1 tablet Daily.), Disp: 60 tablet, Rfl: 6  •  cromolyn (OPTICROM) 4 % ophthalmic solution, Administer 1 drop to both eyes 4 (Four) Times a Day., Disp: , Rfl:   •  digoxin (LANOXIN) 125 MCG tablet, Take 125 mcg by mouth Every Night., Disp: , Rfl:   •  encorafenib (BRAFTOVI) 75 MG capsule, Take 1 capsule by mouth Daily., Disp: 30 capsule, Rfl: 3  •  escitalopram (LEXAPRO) 20 MG tablet, Take 20 mg by mouth Every Night., Disp: , Rfl:   •  gabapentin (NEURONTIN) 400 MG capsule, Take 400 mg by mouth 3 (Three) Times a Day., Disp: , Rfl:   •  glipizide (GLUCOTROL XL) 10 MG 24 hr tablet, , Disp: , Rfl:   •  HYDROcodone-acetaminophen (NORCO) 5-325 MG per tablet, Take 1 tablet by mouth Every 6 (Six) Hours As Needed., Disp: , Rfl:   •  loperamide (IMODIUM) 2 MG capsule, Take 2 mg by mouth 4 (Four) Times a Day As Needed for Diarrhea., Disp: , Rfl:   •  LORazepam (ATIVAN) 1 MG tablet, Take 1 mg by mouth Take As Directed. Pt takes 1 qam and 2hs, Disp: , Rfl:   •  magnesium oxide (MAG-OX) 400 MG tablet, Take 1 tablet by mouth Daily. (Patient taking differently: Take 400 mg by mouth 2 (Two) Times a Day.), Disp: 30 tablet, Rfl: 0  •  metFORMIN  (GLUCOPHAGE) 500 MG tablet, Take 500 mg by mouth 2 (Two) Times a Day With Meals. INST PER ANESTHESIA  PROTOCOL, Disp: , Rfl:   •  pantoprazole (PROTONIX) 40 MG EC tablet, Take 40 mg by mouth 2 (Two) Times a Day., Disp: , Rfl:   •  potassium chloride (K-DUR,KLOR-CON) 20 MEQ CR tablet, Take 20 mEq by mouth Daily., Disp: , Rfl:   •  pravastatin (PRAVACHOL) 40 MG tablet, Take 40 mg by mouth Every Night., Disp: , Rfl:   •  senna 8.6 MG tablet, Take 1 tablet by mouth Daily As Needed for Constipation., Disp: , Rfl:   •  glipizide (GLUCOTROL XL) 5 MG ER tablet, Take 5 mg by mouth Daily., Disp: , Rfl:   •  ondansetron (ZOFRAN) 8 MG tablet, Take 1 tablet by mouth Every 8 (Eight) Hours As Needed for Nausea or Vomiting., Disp: 60 tablet, Rfl: 4  No current facility-administered medications for this visit.    Facility-Administered Medications Ordered in Other Visits:   •  heparin injection 500 Units, 500 Units, Intravenous, PRN, Jennifer Schroeder MD PhD, 500 Units at 09/21/21 1522    No Known Allergies     Family History   Problem Relation Age of Onset   • Skin cancer Other    • Colon cancer Sister    • Malig Hyperthermia Neg Hx        Social History     Socioeconomic History   • Marital status:    Tobacco Use   • Smoking status: Never Smoker   • Smokeless tobacco: Never Used   Vaping Use   • Vaping Use: Never used   Substance and Sexual Activity   • Alcohol use: Never   • Drug use: Never   • Sexual activity: Defer        Physical Exam  Vitals and nursing note reviewed.   Constitutional:       Appearance: Normal appearance. She is well-developed and normal weight.   Cardiovascular:      Rate and Rhythm: Normal rate and regular rhythm.   Pulmonary:      Effort: Pulmonary effort is normal.      Breath sounds: Normal air entry.   Abdominal:      General: Bowel sounds are normal.      Palpations: Abdomen is soft.   Skin:     General: Skin is warm and dry.   Neurological:      Mental Status: She is alert and oriented to  person, place, and time.      Motor: Motor function is intact.   Psychiatric:         Mood and Affect: Mood normal.            Result Review :               Assessment and Plan    There are no diagnoses linked to this encounter.   1. Patient with unfortunate situation is metastatic, likely incurable cancer with anemia and GI bleeding.  We discussed multiple options including scopes for her comfort and palliation, but at this point in time, we are going to try and treat her medically. I have recommended stopping her Eliquis regardless of her cardiac history and this is secondary to her increased risk of bleeding, but certainly does put her at risk of a blood clot or potential cardiac issue, but given her advanced state of cancer, I do not think that is necessarily prohibitive and we will treat her for presumed ulcer and start her on Protonix twice daily and Carafate three times daily. She can follow up with me in 2 to 4 weeks.    Discussed with the patient - all questions were answered they voiced understanding and agreed to proceed with above plan        Follow Up   No follow-ups on file.  Patient was given instructions and counseling regarding her condition or for health maintenance advice. Please see specific information pulled into the AVS if appropriate.       Transcribed from ambient dictation for Alli Carballo MD by Belkys Diggs.  04/26/22   14:11 EDT    Patient verbalized consent to the visit recording.

## 2022-04-28 NOTE — PROGRESS NOTES
Follow Up Office Visit      Encounter Date: 04/28/2022   Patient Name: Lorraine Dominguez  YOB: 1950   Medical Record Number: [id number]   Primary Diagnosis: Metastasis to bone (HCC) [C79.51]       Chief Complaint:    Chief Complaint   Patient presents with   • Follow-up   • Breast Cancer   • Colon Cancer     Bone mets       History of Present Illness: Lorraine Doimnguez is seen in follow-up regarding progression of bony metastatic disease.  She reports a severe headache for which she takes Norco.  She primarily takes this medication for diffuse pain.  Bone scan on 3/14/2022 revealed a new focal area of uptake within the parietal calvarium and new focal uptake within the right anterior fifth rib with the latter likely related to trauma.  CT scan of the abdomen pelvis performed on the same day revealed innumerable sclerotic bony metastases.  She had a new abscess in the anterior upper pelvis measuring 29 mm in diameter.  She additionally had thickening of multiple small bowel loops in the abdomen and pelvis.  CT scan of the chest revealed innumerable sclerotic metastases as well.    Subjective      Review of Systems: Review of Systems   Constitutional: Positive for fatigue. Negative for appetite change.   Respiratory: Negative for cough and shortness of breath.    Gastrointestinal: Positive for abdominal distention, abdominal pain (4/10), diarrhea (taking medication with relief) and nausea (taking medication with relief). Negative for constipation.   Genitourinary: Positive for frequency. Negative for difficulty urinating, dysuria and urgency.   Musculoskeletal: Positive for back pain.   Skin: Negative for rash.   Neurological: Positive for dizziness (with position chnges and randomly) and headaches (frontal portion- takes tylenol occasionally with some relief).   Psychiatric/Behavioral: Positive for sleep disturbance (gets up several times r/t nocturia).       The following portions of the  patient's history were reviewed and updated as appropriate: allergies, current medications, past family history, past medical history, past social history, past surgical history and problem list.    Medications:     Current Outpatient Medications:   •  Accu-Chek Softclix Lancets lancets, , Disp: , Rfl:   •  Calcium Carb-Cholecalciferol 500-10 MG-MCG chewable tablet, Chew 1 tablet 2 (two) times a day. (Patient taking differently: Chew 1 tablet Daily.), Disp: 60 tablet, Rfl: 6  •  cromolyn (OPTICROM) 4 % ophthalmic solution, Administer 1 drop to both eyes 4 (Four) Times a Day., Disp: , Rfl:   •  encorafenib (BRAFTOVI) 75 MG capsule, Take 1 capsule by mouth Daily., Disp: 30 capsule, Rfl: 3  •  escitalopram (LEXAPRO) 20 MG tablet, Take 20 mg by mouth Every Night., Disp: , Rfl:   •  gabapentin (NEURONTIN) 400 MG capsule, Take 400 mg by mouth 3 (Three) Times a Day., Disp: , Rfl:   •  glipizide (GLUCOTROL XL) 10 MG 24 hr tablet, , Disp: , Rfl:   •  HYDROcodone-acetaminophen (NORCO) 5-325 MG per tablet, Take 1 tablet by mouth Every 6 (Six) Hours As Needed., Disp: , Rfl:   •  loperamide (IMODIUM) 2 MG capsule, Take 2 mg by mouth 4 (Four) Times a Day As Needed for Diarrhea., Disp: , Rfl:   •  LORazepam (ATIVAN) 1 MG tablet, Take 1 mg by mouth Take As Directed. Pt takes 1 qam and 2hs, Disp: , Rfl:   •  magnesium oxide (MAG-OX) 400 MG tablet, Take 1 tablet by mouth Daily. (Patient taking differently: Take 400 mg by mouth 2 (Two) Times a Day.), Disp: 30 tablet, Rfl: 0  •  metFORMIN (GLUCOPHAGE) 500 MG tablet, Take 500 mg by mouth 2 (Two) Times a Day With Meals. INST PER ANESTHESIA  PROTOCOL, Disp: , Rfl:   •  potassium chloride (K-DUR,KLOR-CON) 20 MEQ CR tablet, Take 20 mEq by mouth Daily., Disp: , Rfl:   •  pravastatin (PRAVACHOL) 40 MG tablet, Take 40 mg by mouth Every Night., Disp: , Rfl:   •  sucralfate (Carafate) 1 g tablet, Take 1 tablet by mouth 3 (Three) Times a Day for 30 days., Disp: 90 tablet, Rfl: 1  •  apixaban  (ELIQUIS) 5 MG tablet tablet, Take 5 mg by mouth 2 (two) times a day. Per Sandhya patient does not need to stop eliquis for procedure, Disp: , Rfl:   •  digoxin (LANOXIN) 125 MCG tablet, Take 125 mcg by mouth Every Night., Disp: , Rfl:   •  ondansetron (ZOFRAN) 8 MG tablet, Take 1 tablet by mouth Every 8 (Eight) Hours As Needed for Nausea or Vomiting., Disp: 60 tablet, Rfl: 4  •  pantoprazole (PROTONIX) 40 MG EC tablet, Take 1 tablet by mouth 2 (Two) Times a Day for 30 days., Disp: 60 tablet, Rfl: 5  •  senna 8.6 MG tablet, Take 1 tablet by mouth Daily As Needed for Constipation., Disp: , Rfl:   No current facility-administered medications for this visit.    Facility-Administered Medications Ordered in Other Visits:   •  heparin injection 500 Units, 500 Units, Intravenous, PRN, Jennifer Schroeder MD PhD, 500 Units at 09/21/21 1522    Allergies:   No Known Allergies    ECOG: (2) Ambulatory and capable of self care, unable to carry out work activity, up and about > 50% or waking hours  Quality of Life: 80 - Restricted Physical Activity     Objective     Physical Exam:   Vital Signs:   Vitals:    04/28/22 0932 04/28/22 0956   BP: 99/47 104/54  Comment: Manual   Pulse: 88    Resp: 16    Temp: 97.5 °F (36.4 °C)    TempSrc: Temporal    SpO2: 94%    Weight: 66.1 kg (145 lb 11.6 oz)    PainSc:   4    PainLoc: Abdomen      Body mass index is 25.01 kg/m².     Physical Exam  Constitutional:       General: She is not in acute distress.  HENT:      Head: Normocephalic and atraumatic.      Nose: Nose normal.   Abdominal:      General: There is no distension.   Skin:     General: Skin is warm and dry.   Neurological:      General: No focal deficit present.      Mental Status: She is alert and oriented to person, place, and time.      Cranial Nerves: No cranial nerve deficit.   Psychiatric:         Mood and Affect: Mood normal.         Behavior: Behavior normal.         Judgment: Judgment normal.         Radiographs: CT Head With  & Without Contrast    Result Date: 3/14/2022    1. Innumerable sclerotic lesions in the calvarium consistent with metastatic disease.  A lytic lesion in the left frontoparietal calvarium appears enlarged when compared with the prior study and could reflect progression of disease or post treatment change.  This appears to correspond to an area of increased uptake on bone scan. 2. No acute intracranial abnormality.  No evidence of intracranial metastatic disease.     BELLA DEL ROSARIO MD       Electronically Signed and Approved By: BELLA DEL ROSARIO MD on 3/14/2022 at 12:15             CT Chest With Contrast Diagnostic    Result Date: 3/14/2022    1. Innumerable sclerotic bony metastasis.  The bone marrow appears generally more heterogeneous than before without definite new metastasis or progression of disease.  This may represent a treatment effect. 2. Stable findings of left mastectomy with residual seroma. 3. Small focus of airspace disease in the right upper lobe concerning for pneumonia. 4. Stable ascending aortic aneurysm measuring 44 mm.     BELLA DEL ROSARIO MD       Electronically Signed and Approved By: BELLA DEL ROSARIO MD on 3/14/2022 at 12:22             NM Bone Scan Whole Body    Result Date: 3/14/2022    1. New focal area of uptake in the left parietal calvarium.  Consider CT evaluation.  Appearance is nonspecific on bone scan. 2. New focal uptake in the right anterior 5th rib.  This is most likely related to trauma.  Correlate for history of trauma and consider CT evaluation. 3. New abnormal uptake throughout the bones of the right ankle.  This does not have the appearance of metastatic disease.  Radiographic follow-up recommended.     BELLA DEL ROSARIO MD       Electronically Signed and Approved By: BELLA DEL ROSARIO MD on 3/14/2022 at 11:07             CT Abdomen Pelvis With Contrast    Result Date: 3/14/2022    1. Innumerable sclerotic bony metastasis.  Although the bone marrow appears more heterogeneous than before,  there are no definite new or progressive bone metastasis.  The appearance could be related to treatment changes. 2. There is a new abscess in the anterior upper pelvis measuring 29 mm diameter. 3. There is progressive wall thickening noted at multiple small bowel loops in the abdomen and pelvis.  This could be related to inner itis.  Metastatic disease not excluded.  There does not appear to be evidence of bowel obstruction. 4. Stable mesenteric edema and trace ascites. 5. A right ureteral stent appears unchanged.  Previously identified metastatic lesion adjacent to the right ovary is ureter is not well demonstrated.  There is decreased right-sided hydronephrosis , but with right renal atrophy.  6. New mild left-sided hydronephrosis without obvious obstructing lesion.     BELLA DEL ROSARIO MD       Electronically Signed and Approved By: BELLA DEL ROSARIO MD on 3/14/2022 at 12:30             XR Abdomen KUB    Result Date: 2/17/2022   Intraoperative imaging during exchange of the patient's right ureteral stent.  On the final film this appears in appropriate position.  Details of the procedure can be found in the urologist's note.     Alli Acosta MD       Electronically Signed and Approved By: Alli Acosta MD on 2/17/2022 at 10:05                I personally reviewed the bone scan as well as a CT scan of the chest, abdomen and pelvis and head performed on 3/14/2022.  The pertinent findings are as above in HPI.  Assessment / Plan          Assessment/Plan:   Lorraine Dominguez is a 72-year-old female with history of metastatic colon cancer as well as metastatic breast cancer.  She has diffuse bony metastatic disease including a left calvarial metastasis resulting in severe pain.  ECOG 1    I discussed the clinical, radiographic and pathologic findings today with Ms. Dominguez.  I explained that she has a metastasis within the left calvarium resulting in her severe pain.  I recommended external beam radiotherapy to this lesion,  outlining the risks, potential benefits and alternatives.  Ms. Dominguez is agreeable to my recommendation and is scheduled for CT simulation for treatment planning purposes.        Prabhjot Alvarez MD  Radiation Oncology  [facility]    This document has been signed by Prabhjot Alvarez MD on April 28, 2022 10:37 EDT

## 2022-04-29 PROBLEM — C79.51 SECONDARY MALIGNANT NEOPLASM OF BONE (HCC): Status: ACTIVE | Noted: 2022-01-01

## 2022-04-29 NOTE — PROGRESS NOTES
Lorraine Dominguez, a 72-year old female, was seen for genetic counseling due to a personal history of cancer. Genetic counseling was provided via telehealth. Ms. Dominguez has a personal history of colon cancer diagnosed at age 65, small bowel cancer at age 71, right breast cancer diagnosed at age 56, and a left breast cancer diagnosed at age 62. Ms. Dominguez has had a hysterectomy, but she retains her ovaries. Ms. Dominguez previously had negative genetic testing via a small panel in 2015 that included BRCA1/2, TP53, PTEN, and Fuentes syndrome genes. We discussed the availability of more comprehensive multigene panels that includes genes that were not evaluated on her previous testing. Ms. Dominguez was interested in discussing her risk for a hereditary cancer syndrome, and decided to pursue genetic testing.   Ms. Dominguez opted to pursue comprehensive testing via the CancerNext panel ordered through [facility] which includes 36 genes associated with increased cancer risk. The genes on this panel include APC, ANUSHKA, AXIN2, BARD1, BMPR1A, BRCA1, BRCA2, BRIP1, CDH1, CDK4, CDKN2A, CHEK2, DICER1, EPCAM, GREM1, HOXB13, MLH1, MSH2, MSH3, MSH6, MUTYH, NBN, NF1, NTHL1, PALB2, PMS2, POLD1, POLE, PTEN, RAD51C, RAD51D, RECQL, SMAD4, SMARCA4, STK11, and TP53. Genetic testing was negative for pathogenic mutations in BRCA1/2 and 34 additional genes on the CancerNext panel.  These normal results were discussed with Ms. Dominguez on 4/26/2022.      PERTINENT FAMILY HISTORY: (See pedigree)   Sister:  Breast cancer dx < 50  Sister:  Breast cancer dx > 50  Brother: Leukemia  Brother: Unspecified cancer type  Niece:  Colon cancer, 30s    We do not have medical records regarding the diagnoses in the family.     GENETIC COUNSELING: We reviewed the family history information in detail.  Cases of breast cancer follow three general patterns: sporadic, familial, and hereditary.  While most cancer is sporadic, some cases appear to occur in family clusters.   These cases are said to be familial and account for 10-20% of breast cancer cases.  Familial cases may be due to a combination of shared genes and environmental factors among family members.  In even fewer families, the cancer is said to be inherited, and the genes responsible for the cancer are known.      Family histories typical of hereditary cancer syndromes usually include multiple first- and second-degree relatives diagnosed with cancer types that define a syndrome.  These cases tend to be diagnosed at younger-than-expected ages and can be bilateral or multifocal.  The cancer in these families follows an autosomal dominant inheritance pattern, which indicates the likely presence of a mutation in a cancer susceptibility gene.  Children and siblings of an individual believed to carry this mutation have a 50% chance of inheriting that mutation, thereby inheriting the increased risk to develop cancer.  These mutations can be passed down from the maternal or the paternal lineage.    Hereditary breast cancer accounts for 5-10% of all cases of breast cancer.  A significant proportion (up to 50%) of hereditary breast cancer can be attributed to mutations in the BRCA1 and BRCA2 genes.  Mutations in these genes confer an increased risk for breast cancer, ovarian cancer, male breast cancer, prostate cancer and pancreatic cancer.  Women with a BRCA1 or BRCA2 mutation who have already been diagnosed with breast cancer have a 40-60% lifetime risk of a second breast cancer.  These genes are not responsible for every case of hereditary breast cancer, and we discussed multigene panels that can evaluate BRCA1/2 and a number of additional cancer related genes simultaneously. A few of the additional genes that have been associated with hereditary breast cancer include PALB2, CHEK2, and ANUSHKA.  The NCCN has established management guidelines for individuals found to carry mutations in these genes. There are additional genes that are  evaluated that have been more recently described, and there may be less data regarding the risks and therefore may not have established management guidelines.  Based on Ms. Dominguez’s desire to get as much information as possible regarding her personal risks and potential risks for her family, she opted to pursue testing through a panel that would look at several other genes known to increase the risk for cancer.    GENETIC TESTING:  The risks, benefits and limitations of genetic testing and implications for clinical management following testing were reviewed.  DNA test results can influence decisions regarding screening, prevention and surgical management.  Genetic testing can have significant psychological implications for both individuals and families.  Also discussed was the possibility of employment and insurance discrimination based on genetic test results and the laws in place to prevent this (ADDI), as well as the limitations of these laws.    We discussed panel testing, which would involve testing for BRCA1/2 and 34 additional genes associated with increased cancer risk. The benefits and limitations of genetic testing were discussed and Ms. Dominguez decided to pursue testing via the panel. The implications of a positive or negative test result were discussed. We discussed the possibility that, in some cases, genetic test results may be ambiguous due to the identification of a genetic variant. These variants may or may not be associated with an increased cancer risk.  Given her personal history, a negative test result does not eliminate all breast cancer risk to her relatives, although the risk would not be as high as it would with positive genetic testing.      TEST RESULTS:  Genetic testing was negative for known pathogenic mutations by sequencing, rearrangement testing, and RNA analysis for the genes on the CancerNext panel (see attached results). This negative result greatly lowers but does not eliminate the  risk of a hereditary cancer syndrome for Ms. Dominguez. This assessment is based on the information provided at time of consultation.    CANCER SCREENING: Ms. Dominguez’s surveillance and management should be determined by her oncology team. Despite the negative genetic test results, Ms. Dominguez’s female relatives may have a somewhat increased lifetime risk for breast cancer based on family history. Female relatives could have a risk assessment performed using a family history-based model, such as the Tyrer-Cuzick model, to determine their individual risks.   Surveillance for individuals with a high lifetime risk of breast cancer (>20%, versus the average risk of 12%), based on NCCN guidelines, would consist of semi-annual clinical breast exams and monthly self-breast exams starting by age 18 and annual mammography starting 10 years younger than the earliest diagnosis in a close relative, or starting by age 40, whichever is earliest.  According to an American [employer] Society expert panel, annual breast MRI should be offered to women whose lifetime risk of breast cancer is 20-25 percent or more, also starting by age 40 or earlier if indicated by family history.      For her family members, current NCCN guidelines recommend that individuals who have a first-degree relative diagnosed with colorectal cancer at any age should begin colonoscopy screening at age 40 or ten years before the earliest diagnosis of colorectal cancer in the family, whichever is earliest, and repeat every five years or more frequently based on personal clinical findings. This assessment is based on the information provided at the time of the consultation and could change should new information become available regarding the family history.    PLAN: Genetic counseling remains available to Ms. Dominguez and her family. She is welcome to contact us with any questions or concerns at [phone].      Cindi Centeno MS, [facility] [place], [facility]       Licensed Certified  Genetic Counselor       Cc: Lorraine Schroeder MD                                                   Respiratory

## 2022-05-03 NOTE — PROGRESS NOTES
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 mutated Colon Cancer:   - Diagnosed 5/7/15; Low grade; staged pT4a pN0 M0 stage IIB; 0/37 LN; no MMR testing; RIGHT sided  - RIGHT hemicolectomy 5/7/15  - adjuvant Xeloda X 6 months  - Recurrent dx diagnosed via RIGHT ureter mass resection (5/1/18)  - FOLFIRI X 9 (7/31-12/4/18)  - s/p XRT RIGHT pelvis total 5320 cGy (12/14/18-1/24/19)  - CARIS report from specimen dated 5/7/15: BRAF V600E mutation (possible benefit from cetuximab), MSI stable, TMB-low, TAURUS-High (33%), TP53 mutated  - CT CAP 1/21/2021: Interim development of right-sided hydronephrosis and a soft tissue mass along the right iliac chain measuring 2.9 x 1.2 cm.  There is a second nodule identified below the aortic bifurcation in the midline measuring 1.4 cm.  This was consistent with disease recurrence versus her new primary small bowel cancer.    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 Lacey). Pathology from the ileum was positive for adenocarcinoma that appeared to be small bowel in origin and represent a new primary malignancy, 2.2cm, grade 1, tumor perforates the visceral peritoneum, pT4pN0, with 0/12 lymph nodes involved.  - Post surgical CT Abd/Pelvis 4/24/21:  Mild thickening of the wall of some portion of the small intestine, could be post-surgical changes. Moderate dilation of the right renal collecting system is unchanged with ureteral double-J stent.    - CT CAP on 3/25 prior to surgery showed no evidence of disease in the chest  - Caris report also showed BRAF V600E mutation consistent with pts prior colon cancer.    -  started Xeloda on 6/5/21 and continued through November 2021  - CT A/P on 11/9/21: probably worsening of peritoneal carcinomatosis  - started Cetuximab in November 2021.  Added Encorafenib in December 2021.     3) Metastatic Breast Cancer:  -RIGHT breast DCIS diagnosed 3/15/06; low-grade; s/p lumpectomy and adjuvant XRT; ER: 100%, TX: 70%  -LEFT breast invasive lobular carcinoma; diagnosed 06/2012; staged pT3 pN0 M0 stage IIB, ER: 83%, TX: 0, HER2: 1+, Ki-67: 26%, treated with mastectomy and SLND 04/2012, adjuvant DD AC and Taxotere, Adjuvant Arimidex then Femara. Discontinued in 2019.  - metastatic breast cancer diagnosed on 2/17/22 from biopsy of an inner thigh skin lesion: adenocarcinoma of breast primary, ER+ (80%), TX+ (5%) and HER2 negative.   - started Faslodex March 2022    4) Anemia:  -multifactorial due to her history of GI bleeding from small bowel ulcer/tumor  -intolerant to oral iron. Treatment with IV iron in the past.   -iron studies on 5/11/12 were normal    5) right hydronephrosis:  -Noted on CT scan from 1/21/2021, secondary to soft tissue mass  -Likely secondary to recurrent colon cancer, biopsy pending  -Cystoscopy and right ureteral stent (Dr. Chi) on 1/28/2021    6) DVT:  -bracheocephalic and subclavian thrombosis March 2021 ([facility])     Malignant neoplasm of female breast (HCC)   3/5/2018 Initial Diagnosis    Breast cancer (CMS/HCC)     3/15/2022 Biopsy    OP BREAST Fulvestrant  Plan Provider: Jennifer Schroeder MD PhD  Treatment goal: Palliative  Line of treatment: [No plan line of treatment]     Colon cancer (HCC)   3/5/2018 Initial Diagnosis    Colon cancer (CMS/HCC)     6/28/2021 - 7/7/2021 Chemotherapy    OP SUPPORTIVE ELECTROLYTE REPLACEMENT     10/21/2021 -  Chemotherapy    OP SUPPORTIVE Ferric Carboxymaltose (INJECTAFER)      11/23/2021 -  Chemotherapy    OP COLORECTAL Cetuximab / Encorafenib      Malignant neoplasm of colon (HCC)   3/5/2018 Initial Diagnosis     Malignant neoplasm of colon (HCC)     Metastasis to bone (HCC)   12/13/2021 Initial Diagnosis    Metastasis to bone (HCC)     Secondary malignant neoplasm of bone (HCC)   4/29/2022 Initial Diagnosis    Secondary malignant neoplasm of bone (HCC)     4/29/2022 -  Radiation    RADIATION THERAPY Treatment Details (Noted on 4/29/2022)  Site: Bone  Technique: 3D CRT  Goal: No goal specified  Planned Treatment Start Date: No planned start date specified         HPI  Patient comes in today for toxicity check prior to her next cycle of cetuximab.  She continues to take oral encorafenib.  She does not report any side effects consistent with these medications.  However review of her CBC revealed a hemoglobin of 5.4.  Patient is very pale and is fatigued but says that she does not feel significantly different.  She has had hemoglobins less than 6 several times in the last couple of months.  She is getting approximately 2 units of packed red blood cells every 2 weeks.  She has persistent melena and has seen Dr. Carballo.  He is unsure if he will be able to help her by repeating an EGD and colonoscopy but is willing to do it if the patient insists.  She is unsure at this time if she is going to proceed with the procedure.    I again brought up the fact that the patient could consider hospice since it is unclear if we are having any benefit in treating her cancer.    Patient plans to undergo radiation starting 5/9/2022 on cranial metastases that are causing pain.    Review of Systems   Constitutional: Positive for fatigue. Negative for appetite change, diaphoresis, fever, unexpected weight gain and unexpected weight loss.   HENT: Negative for hearing loss, sore throat and voice change.    Eyes: Negative for blurred vision, double vision, pain, redness and visual disturbance.   Respiratory: Negative for cough, shortness of breath and wheezing.    Cardiovascular: Negative for chest pain, palpitations and leg swelling.    Gastrointestinal: Positive for abdominal pain.   Endocrine: Negative for cold intolerance, heat intolerance, polydipsia and polyuria.   Genitourinary: Negative for decreased urine volume, difficulty urinating, frequency and urinary incontinence.   Musculoskeletal: Positive for back pain. Negative for arthralgias, joint swelling and myalgias.   Skin: Negative for color change, rash, skin lesions and wound.   Neurological: Positive for numbness and headache. Negative for dizziness and seizures.   Hematological: Negative for adenopathy. Does not bruise/bleed easily.   Psychiatric/Behavioral: Negative for depressed mood. The patient is not nervous/anxious.    All other systems reviewed and are negative.      Past Medical History:   Diagnosis Date   • Abdominal pain    • Anemia     LOW HGB, OCCASSIONAL WEAKNESS   • Arthritis    • Breast cancer (HCC)    • Cataract     L EYE   • Colon cancer (HCC)    • Constipation     INTERMITTANTLY   • Diarrhea     INTERMITTANTLY   • DM (diabetes mellitus), type 2 (HCC)    • High cholesterol    • History of blood clots     neck, RESOLVED   • Hypertension     NO MEDS NOW   • Hypokalemia    • Iron deficiency anemia secondary to blood loss (chronic)     GI BLEED   • Paroxysmal A-fib (HCC)     FOLLOWS BAIRES     Past Surgical History:   Procedure Laterality Date   • BACK SURGERY  1977    RUPTURED DISC/PINCHED NERVE LUMBAR SPINE   • BREAST LUMPECTOMY Right    • COLON SURGERY  2015    COLON RESECTION R/T CA, EEA   • COLONOSCOPY     • CORNEAL TRANSPLANT Right    • CYSTOSCOPY W/ URETERAL STENT PLACEMENT Bilateral     PT HAS LONG TERM URETERAL STENTS    • CYSTOSCOPY W/ URETERAL STENT PLACEMENT Right 8/17/2021    Procedure: CYSTOSCOPY, RIGHT URETERAL STENT INSERTION;  Surgeon: Kelli Chi MD;  [facility]: [facility];  Service: Urology;  Laterality: Right;   • CYSTOSCOPY W/ URETERAL STENT PLACEMENT Right 2/17/2022    Procedure: CYSTOSCOPY, RIGHT URETERAL STENT EXCHANGE, EXCISION OF SKIN  LESION;  Surgeon: Kelli Chi MD;  Location: [facility] MAIN OR;  Service: Urology;  Laterality: Right;   • HYSTERECTOMY      partial    • JOINT REPLACEMENT      R THR   • MASTECTOMY Left     NO BP OR NS L ARM   • VENOUS ACCESS DEVICE (PORT) INSERTION       Social History     Socioeconomic History   • Marital status:    Tobacco Use   • Smoking status: Never Smoker   • Smokeless tobacco: Never Used   Vaping Use   • Vaping Use: Never used   Substance and Sexual Activity   • Alcohol use: Never   • Drug use: Never   • Sexual activity: Defer     Family History   Problem Relation Age of Onset   • Skin cancer Other    • Colon cancer Sister    • Malig Hyperthermia Neg Hx        Objective   Physical Exam  General: Alert, cooperative, no acute distress, but chronically ill  Eyes: Anicteric sclera, PERRLA  Respiratory: normal respiratory effort  Cardiovascular: no lower extremity edema  Skin: Very pale skin tone, no rash, no lesions  Psychiatric: Appropriate affect, intact judgment  Neurologic: No focal sensory or motor deficits, normal cognition   Musculoskeletal: Reduced muscle strength and tone, in a wheelchair  Extremities: No clubbing, cyanosis, or deformities    Vitals:    05/03/22 0850   BP: 116/52   Pulse: 101   Resp: 18   Temp: 99 °F (37.2 °C)   SpO2: 100%   Weight: 66.4 kg (146 lb 6.2 oz)   PainSc:   6               PHQ-9 Total Score:         Result Review :   The following data was reviewed by: Jennifer Schroeder MD PhD on 05/03/2022:  Lab Results   Component Value Date    HGB 5.4 (C) 05/03/2022    HCT 17.7 (C) 05/03/2022    MCV 94.7 05/03/2022     (H) 05/03/2022    WBC 15.55 (H) 05/03/2022    NEUTROABS 9.75 (H) 05/03/2022    LYMPHSABS 4.46 (H) 05/03/2022    MONOSABS 0.95 (H) 05/03/2022    EOSABS 0.05 05/03/2022    BASOSABS 0.13 05/03/2022     Lab Results   Component Value Date    GLUCOSE 134 (H) 05/03/2022    BUN 15 05/03/2022    CREATININE 0.90 05/03/2022     05/03/2022    K 3.1 (L)  05/03/2022     05/03/2022    CO2 16.7 (L) 05/03/2022    CALCIUM 8.1 (L) 05/03/2022    PROTEINTOT 6.7 05/03/2022    ALBUMIN 2.82 (L) 05/03/2022    BILITOT <0.2 05/03/2022    ALKPHOS 133 (H) 05/03/2022    AST 42 (H) 05/03/2022    ALT 9 05/03/2022          Assessment and Plan    Diagnoses and all orders for this visit:    1. Overlapping malignant neoplasm of colon (HCC) (Primary)  -     CEA; Future  -     CBC & Differential; Standing  -     CT chest w contrast; Future  -     CT abdomen pelvis w contrast; Future    2. Malignant neoplasm of breast in female, estrogen receptor positive, unspecified laterality, unspecified site of breast (HCC)    3. Anemia, unspecified type  -     Type and screen; Future  -     Ambulatory Referral to ACU For Infusion Treatment; Future    4. Metastasis to bone (HCC)    5. 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:   - Diagnosed 5/7/15; Low grade; staged pT4a pN0 M0 stage IIB; 0/37 LN; no MMR testing; RIGHT sided  - RIGHT hemicolectomy 5/7/15  - adjuvant Xeloda X 6 months  - Recurrent dx diagnosed via RIGHT ureter mass resection (5/1/18)  - FOLFIRI X 9 (7/31-12/4/18)  - s/p XRT RIGHT pelvis total 5320 cGy (12/14/18-1/24/19)  - CARAYDE report from specimen dated 5/7/15: BRAF V600E mutation (possible benefit from cetuximab), MSI stable, TMB-low, TAURUS-High (33%), TP53 mutated  - CT CAP 1/21/2021: Interim development of right-sided hydronephrosis and a soft tissue mass along the right iliac chain measuring 2.9 x 1.2 cm.  There is a second nodule identified below the aortic  bifurcation in the midline measuring 1.4 cm.  This was consistent with disease recurrence versus her new primary small bowel cancer.    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 Lacey). Pathology from the ileum was positive for adenocarcinoma that appeared to be small bowel in origin and represent a new primary malignancy, 2.2cm, grade 1, tumor perforates the visceral peritoneum, pT4pN0, with 0/12 lymph nodes involved.  - Post surgical CT Abd/Pelvis 4/24/21:  Mild thickening of the wall of some portion of the small intestine, could be post-surgical changes. Moderate dilation of the right renal collecting system is unchanged with ureteral double-J stent.    - CT CAP on 3/25 prior to surgery showed no evidence of disease in the chest  - Caris report also showed BRAF V600E mutation consistent with pts prior colon cancer.    - started Xeloda on 6/5/21 and continued through November 2021  - CT A/P on 11/9/21: probably worsening of peritoneal carcinomatosis  - started Cetuximab in November 2021.  Added Encorafenib in December 2021.     3) Metastatic Breast Cancer:  -RIGHT breast DCIS diagnosed 3/15/06; low-grade; s/p lumpectomy and adjuvant XRT; ER: 100%, TN: 70%  -LEFT breast invasive lobular carcinoma; diagnosed 06/2012; staged pT3 pN0 M0 stage IIB, ER: 83%, TN: 0, HER2: 1+, Ki-67: 26%, treated with mastectomy and SLND 04/2012, adjuvant DD AC and Taxotere, Adjuvant Arimidex then Femara. Discontinued in 2019.  - metastatic breast cancer diagnosed on 2/17/22 from biopsy of an inner thigh skin lesion: adenocarcinoma of breast primary, ER+ (80%), TN+ (5%) and HER2 negative.   - started Faslodex March 2022    4) Anemia:  -multifactorial due to her history of GI bleeding from small bowel ulcer/tumor  -intolerant to oral iron. Treatment with IV iron in the past.   -iron studies on 5/11/12  were normal    5) right hydronephrosis:  -Noted on CT scan from 1/21/2021, secondary to soft tissue mass  -Likely secondary to recurrent colon cancer, biopsy pending  -Cystoscopy and right ureteral stent (Dr. Chi) on 1/28/2021    6) DVT:  -bracheocephalic and subclavian thrombosis March 2021 ([facility])     Malignant neoplasm of left breast in female, estrogen receptor positive (HCC)   3/5/2018 Initial Diagnosis    Breast cancer (CMS/HCC)     3/15/2022 Biopsy    OP BREAST Fulvestrant  Plan Provider: Jennifer Schroeder MD PhD  Treatment goal: Palliative  Line of treatment: [No plan line of treatment]     Colon cancer (HCC)   3/5/2018 Initial Diagnosis    Colon cancer (CMS/HCC)     6/28/2021 - 7/7/2021 Chemotherapy    OP SUPPORTIVE ELECTROLYTE REPLACEMENT     10/21/2021 -  Chemotherapy    OP SUPPORTIVE Ferric Carboxymaltose (INJECTAFER)      11/23/2021 -  Chemotherapy    OP COLORECTAL Cetuximab / Encorafenib      Malignant neoplasm of colon (HCC)   3/5/2018 Initial Diagnosis    Malignant neoplasm of colon (HCC)     Metastasis to bone (HCC)   12/13/2021 Initial Diagnosis    Metastasis to bone (HCC)         HPI  Patient comes in today with her daughter for toxicity check prior to her next cycle of treatment.  She continues to have significant amounts of melanotic stool.  Last week she had received 2 units of blood for hemoglobin less than 6.  On review the patient's labs today her hemoglobin remains very low at 6.9.    I explained to the patient and her daughter how complicated her case is.  Her anemia is certainly due in part to persistent GI bleeding.  It is also complicated by her chemotherapy.  Blood transfusions are complicated by multiple antibodies against various blood types.  This also limits my ability to be more aggressive with her breast cancer therapy.  The patient understands all of these limitations.  She points out various skin lesions that she is concerned about.  Some of them are  enlarging in size.    Review of Systems   Constitutional: Positive for fatigue. Negative for appetite change, diaphoresis, fever, unexpected weight gain and unexpected weight loss.   HENT: Negative for hearing loss, sore throat and voice change.    Eyes: Negative for blurred vision, double vision, pain, redness and visual disturbance.   Respiratory: Negative for cough, shortness of breath and wheezing.    Cardiovascular: Negative for chest pain, palpitations and leg swelling.   Gastrointestinal: Positive for abdominal pain and diarrhea.   Endocrine: Negative for cold intolerance, heat intolerance, polydipsia and polyuria.   Genitourinary: Negative for decreased urine volume, difficulty urinating, frequency and urinary incontinence.   Musculoskeletal: Negative for arthralgias, back pain, joint swelling and myalgias.   Skin: Negative for color change, rash, skin lesions and wound.   Neurological: Positive for headache. Negative for dizziness, seizures and numbness.   Hematological: Negative for adenopathy. Does not bruise/bleed easily.   Psychiatric/Behavioral: Negative for depressed mood. The patient is not nervous/anxious.    All other systems reviewed and are negative.      Past Medical History:   Diagnosis Date   • Abdominal pain    • Anemia     LOW HGB, OCCASSIONAL WEAKNESS   • Arthritis    • Breast cancer (HCC)    • Cataract     L EYE   • Colon cancer (HCC)    • Constipation     INTERMITTANTLY   • Diarrhea     INTERMITTANTLY   • DM (diabetes mellitus), type 2 (HCC)    • High cholesterol    • History of blood clots     neck, RESOLVED   • Hypertension     NO MEDS NOW   • Hypokalemia    • Iron deficiency anemia secondary to blood loss (chronic)     GI BLEED   • Paroxysmal A-fib (HCC)     FOLLOWS BAIRES     Past Surgical History:   Procedure Laterality Date   • BACK SURGERY  1977    RUPTURED DISC/PINCHED NERVE LUMBAR SPINE   • BREAST LUMPECTOMY Right    • COLON SURGERY  2015    COLON RESECTION R/T CA, EEA   •  COLONOSCOPY     • CORNEAL TRANSPLANT Right    • CYSTOSCOPY W/ URETERAL STENT PLACEMENT Bilateral     PT HAS LONG TERM URETERAL STENTS    • CYSTOSCOPY W/ URETERAL STENT PLACEMENT Right 8/17/2021    Procedure: CYSTOSCOPY, RIGHT URETERAL STENT INSERTION;  Surgeon: Kelli Chi MD;  Location: [facility] MAIN OR;  Service: Urology;  Laterality: Right;   • CYSTOSCOPY W/ URETERAL STENT PLACEMENT Right 2/17/2022    Procedure: CYSTOSCOPY, RIGHT URETERAL STENT EXCHANGE, EXCISION OF SKIN LESION;  Surgeon: Kelli Chi MD;  Location: [facility] MAIN OR;  Service: Urology;  Laterality: Right;   • HYSTERECTOMY      partial    • JOINT REPLACEMENT      R THR   • MASTECTOMY Left     NO BP OR NS L ARM   • VENOUS ACCESS DEVICE (PORT) INSERTION       Social History     Socioeconomic History   • Marital status:    Tobacco Use   • Smoking status: Never Smoker   • Smokeless tobacco: Never Used   Vaping Use   • Vaping Use: Never used   Substance and Sexual Activity   • Alcohol use: Never   • Drug use: Never   • Sexual activity: Defer     Family History   Problem Relation Age of Onset   • Skin cancer Other    • Colon cancer Sister    • Malig Hyperthermia Neg Hx        Objective   Physical Exam  General: Alert, cooperative, no acute distress, chronically ill-appearing but in good spirits and not acutely ill  Eyes: Anicteric sclera, PERRLA  Respiratory: normal respiratory effort  Cardiovascular: no lower extremity edema  Skin: Pale skin tone, two horn like skin lesions on the chest that resemble actinic keratosis, persistent firm skin nodules in the axilla and the back of the neck that are consistent with known cutaneous breast cancer   Psychiatric: Appropriate affect, intact judgment  Neurologic: No focal sensory or motor deficits, normal cognition   Musculoskeletal: Normal muscle strength and tone  Extremities: No clubbing, cyanosis, or deformities    Vitals:    04/05/22 1032   BP: 121/56   Pulse: 107   Resp: 18   Temp: 97.5 °F  (36.4 °C)   SpO2: 100%   Weight: 67.5 kg (148 lb 13 oz)   PainSc:   4   PainLoc: Abdomen     ECOG score: 2         PHQ-9 Total Score:         Result Review :   The following data was reviewed by: Jennifer Schroeder MD PhD on 04/05/2022:  Lab Results   Component Value Date    HGB 6.9 (C) 04/05/2022    HCT 22.1 (L) 04/05/2022    MCV 93.6 04/05/2022     (H) 04/05/2022    WBC 12.77 (H) 04/05/2022    NEUTROABS 7.44 (H) 04/05/2022    LYMPHSABS 4.19 (H) 04/05/2022    MONOSABS 0.91 (H) 04/05/2022    EOSABS 0.08 04/05/2022    BASOSABS 0.08 04/05/2022     Lab Results   Component Value Date    GLUCOSE 160 (H) 04/05/2022    BUN 25 (H) 04/05/2022    CREATININE 0.97 04/05/2022     (L) 04/05/2022    K 3.8 04/05/2022     04/05/2022    CO2 19.3 (L) 04/05/2022    CALCIUM 8.3 (L) 04/05/2022    PROTEINTOT 6.9 04/05/2022    ALBUMIN 3.24 (L) 04/05/2022    BILITOT 0.2 04/05/2022    ALKPHOS 130 (H) 04/05/2022    AST 23 04/05/2022    ALT 7 04/05/2022          Assessment and Plan    Diagnoses and all orders for this visit:    1. Malignant neoplasm of ascending colon (HCC) (Primary)    2. Anemia, unspecified type  -     Type and screen; Future  -     Ambulatory Referral to ACU For Infusion Treatment    3. Metastasis to bone (HCC)    4. Metastasis to skin (HCC)    5. Malignant neoplasm of left breast in female, estrogen receptor positive, unspecified site of breast (HCC)    Other orders  -     OK To Treat      Recurrent Cancer of the colon and small bowel: The pt remains on treatment with cetuximab and encorafinib. I will not make any adjustments to her treatment regimen at this time as I believe her  worsening anemia is due to melena and not treatment related.  At this time there is not evidence of significant toxicity that I can attribute to her treatment and her CEA has improved.  I will plan for repeat CT CAP prior to her next f/u in 2 weeks.    Metastatic Breast Cancer with dermal involvement.  The pt will continue  fulvestrant but is not a candidate for more aggressive therapy such as a CDK4/6 inhibitory due to severe anemia.      Bone metastasis: Mostly likely secondary to recurrent/metastatic breast cancer. She has an appt with Dr. Alvarez for palliative radiation.    Severe Anemia: Hgb of 5.4 today. I recommend transfusion of 2 units of pRBCs as soon as possible. I will add Type and cross today. The patient has several anti-bodies making donor matching a slow process.  I will check her CBC weekly to monitor.      Patient was given instructions and counseling regarding her condition or for health maintenance advice. Please see specific information pulled into the AVS if appropriate.             Patient was given instructions and counseling regarding her condition or for health maintenance advice. Please see specific information pulled into the AVS if appropriate.

## 2022-05-04 PROBLEM — K92.1 GASTROINTESTINAL HEMORRHAGE WITH MELENA: Status: ACTIVE | Noted: 2022-01-01

## 2022-05-09 NOTE — PROGRESS NOTES
Diagnosis: Metastatic breast cancer and recurrent cancer of the colon and small bowel    Reason for referral: First week of tx/rounding    Comments: OSW met with pt and spouse in [facility] during first week of tx/rounding. Distress assessment completed during consultation back on 10/22/21. Pt rated level of distress a 2 at the time with a PHQ-9 score of 3. Pt presented in a pleasant mood today. OSW has previously met with pt back in 2018 and 2019. Reintroduced myself and discussed OSW role/psychosocial services available. Pt and spouse resides in [facility] with their adult daughter who assists with pt's care. Pt has [employer] Medicare insurance. Pt's spouse provides transportation and expressed interest in receiving gas assistance. Provided pt and spouse with a $15 gas card today. Also assisted pt in completing a financial application through [employer] to apply for a $750 darek to aid with transportation and other needed expenses throughout her care. Submitted finalized application this afternoon via fax. Pt identifies no home care needs at this time. Pt reports she has a wheelchair and three walkers at home, but typically utilizes her rollator the most. Pt is not currently active with home health care. Provided pt and spouse with my business card again, encouraging OSW support remains available. Pt and spouse expressed gratitude.    Services/Referrals Provided: Transportation and financial assistance - $15 gas card, [employer]

## 2022-05-10 NOTE — PROGRESS NOTES
"Chief Complaint  Follow-up (2 week)    Subjective          Lorraine Dominguez presents to [facility] for follow-up after a visit for possible colonoscopy or EGD.     History of Present Illness  Abdominal pain.   The patient reports that she received 2 units of blood 05/04/2022. She states that she had high blood pressure afterwards and her face got really red, and she had difficulty breathing. She believes that she might be getting a blood transfusion tomorrow. Dr. Schroeder has been ordering her blood transfusion. Patient had her blood count today. Denies dark stools or other symptoms. Patient reports that her stomach is feeling a lot better and her pain has improved. She has been taking Carafate 3 times a day.     Difficulty breathing.   Patient has been experiencing difficulty breathing. She will have to prop her head up on a pillow and will start coughing. She has been using her inhaler.     Objective   Vital Signs:  Resp 16   Ht 162.6 cm (64\")   Wt 66.7 kg (147 lb)   BMI 25.23 kg/m²           Physical Exam  Constitutional:       General: She is not in acute distress.     Appearance: Normal appearance. She is well-developed and normal weight.      Comments: No acute distress. She does appear weak and tired.   Pulmonary:      Effort: Pulmonary effort is normal.      Breath sounds: Normal air entry.      Comments: Nonlabored breathing.  Abdominal:      General: There is no distension.      Tenderness: There is no abdominal tenderness.      Comments: Abdomen is soft.          Result Review :                 Assessment and Plan    There are no diagnoses linked to this encounter.    Assessment and Plan   Diagnoses and all orders for this visit:     1. Patient with likely some degree of GI bleeding.     - She has discontinued her Eliquis. We have her on prophylactic ulcer therapy. We will not do any scopes at this time. Patient's H&H have been checked and it is 7.3. I will discuss her " next phase of care with Dr. Schroeder.     Discussed with the patient - all questions were answered they voiced understanding and agreed to proceed with above plan       Follow Up   No follow-ups on file.  Patient was given instructions and counseling regarding her condition or for health maintenance advice. Please see specific information pulled into the AVS if appropriate.       Transcribed from ambient dictation for Alli Carballo MD by Анна Moss.  05/10/22   15:14 EDT    Patient verbalized consent to the visit recording.

## 2022-05-11 NOTE — PROGRESS NOTES
5/11/22:    Oncology RD familiar with pt from previous infusion and prior radiation tx.  Now receiving XRT to L calvarium (2' bone mets; 3/5 tx given).  Pt had lost significant wt in the past, however she has gained wt since February:  ~3 kg.  Wt obtained by radiation oncology staff 5/10/22:  66.7 kg.  She reports to be eating well and drinks oral nutrition shakes, Glucerna shake, to promote wt maintenance.  Provided pt with more samples of the Glucerna shake today to help.  Pt denies nutrition-related concerns at this time, although reported to MD 1 week ago diarrhea, nausea, and abd pn (treating appropriately w/ medication).     Oncology RD will continue to monitor per protocol.

## 2022-05-12 NOTE — PROGRESS NOTES
On Treatment Visit       Patient: Lorraine Dominguez   YOB: 1950   Medical Record Number: [id number]     Date of Visit  May 12, 2022   Primary Diagnosis:Secondary malignant neoplasm of bone (HCC) [C79.51]  Cancer Staging: Cancer Staging  No matching staging information was found for the patient.       was seen today for an on treatment visit.  She is receiving radiation therapy to the left calvarium.  She  has received 1600 cGy in 4 fractions out of a planned dose of 2000 cGy in 5 fractions. .     Today on exam the patient is tolerating radiation therapy as expected.  She does complain of head pain for which she takes hydrocodone.                                       Review of Systems:   Review of Systems   Constitutional: Positive for appetite change (decrease in appetite) and fatigue (5/10).   HENT: Positive for congestion. Negative for sinus pressure, sinus pain, sore throat and trouble swallowing.    Eyes: Positive for visual disturbance.   Respiratory: Positive for cough, chest tightness, shortness of breath and wheezing.    Cardiovascular: Negative for chest pain, palpitations and leg swelling.   Gastrointestinal: Positive for abdominal pain, constipation, diarrhea and nausea (occasional). Negative for vomiting.   Endocrine: Negative.    Genitourinary: Positive for frequency and urgency. Negative for difficulty urinating.   Musculoskeletal: Positive for back pain.   Skin: Negative.    Neurological: Positive for dizziness, weakness, light-headedness (with positional changes) and headaches (almost daily x 2-3 weeks).   Hematological: Negative.    Psychiatric/Behavioral: Negative.        Vitals:     Vitals:    05/12/22 1005   BP: 119/57   Pulse: 92   Resp: 16   Temp: 99.7 °F (37.6 °C)   SpO2: 98%       Weight:   Wt Readings from Last 3 Encounters:   05/12/22 66.5 kg (146 lb 9.7 oz)   05/10/22 66.7 kg (147 lb)   05/03/22 66.4 kg (146 lb 6.2 oz)      Pain:    Pain Score    05/12/22 1005    PainSc:   4   PainLoc: Back         Physical Exam:  Physical Exam  Constitutional:       General: She is not in acute distress.  HENT:      Head:      Comments: Generalized hair thinning.  Pulmonary:      Effort: Pulmonary effort is normal.   Abdominal:      Palpations: Abdomen is soft.      Tenderness: There is abdominal tenderness.           Plan: I have reviewed treatment setup notes, checked and approved the daily guidance images.  I reviewed dose delivery, treatment parameters and deemed them appropriate. We plan to continue radiation therapy as prescribed.      Dottie Ingram MD  Radiation Oncology   Electronically signed 5/12/2022  11:46 EDT

## 2022-05-18 PROBLEM — J96.01 ACUTE RESPIRATORY FAILURE WITH HYPOXIA (HCC): Status: ACTIVE | Noted: 2022-01-01

## 2022-05-18 NOTE — ED PROVIDER NOTES
Subjective   The patient also has documented complaints of shortness of breath dating back a week ago to a surgery visit in which she complained of some orthopnea like symptoms.  The patient has frequent GI bleeding is complications of her cancer.  She is no longer on blood thinners.  The patient does not believe that she is recently received steroids but is a questionably accurate historian.  She denies significant productive cough.      Shortness of Breath  Severity:  Moderate  Onset quality:  Gradual  Duration:  2 weeks  Timing:  Constant  Progression:  Worsening  Chronicity:  New  Context comment:  At the beginning of the month the patient had a blood transfusion which is not uncommon for her but after this when she had some reddening of her face and swelling and developed shortness of breath but states she went home.    Relieved by:  Nothing  Worsened by:  Exertion (Lying down)  Ineffective treatments:  None tried  Associated symptoms: no chest pain, no cough, no fever, no hemoptysis, no sore throat and no vomiting    Risk factors: hx of cancer    Risk factors comment:  Denies sick contacts      Review of Systems   Constitutional: Negative for fever.   HENT: Negative for sore throat.    Respiratory: Positive for shortness of breath. Negative for cough and hemoptysis.    Cardiovascular: Negative for chest pain.   Gastrointestinal: Negative for vomiting.   All other systems reviewed and are negative.      Past Medical History:   Diagnosis Date   • Abdominal pain    • Anemia     LOW HGB, OCCASSIONAL WEAKNESS   • Arthritis    • Breast cancer (HCC)    • Cataract     L EYE   • Colon cancer (HCC)    • Constipation     INTERMITTANTLY   • Diarrhea     INTERMITTANTLY   • DM (diabetes mellitus), type 2 (HCC)    • High cholesterol    • History of blood clots     neck, RESOLVED   • Hypertension     NO MEDS NOW   • Hypokalemia    • Iron deficiency anemia secondary to blood loss (chronic)     GI BLEED   • Paroxysmal A-fib  (HCC)     FOLLOWS BAIRES       No Known Allergies    Past Surgical History:   Procedure Laterality Date   • BACK SURGERY  1977    RUPTURED DISC/PINCHED NERVE LUMBAR SPINE   • BREAST LUMPECTOMY Right    • COLON SURGERY  2015    COLON RESECTION R/T CA, EEA   • COLONOSCOPY     • CORNEAL TRANSPLANT Right    • CYSTOSCOPY W/ URETERAL STENT PLACEMENT Bilateral     PT HAS LONG TERM URETERAL STENTS    • CYSTOSCOPY W/ URETERAL STENT PLACEMENT Right 8/17/2021    Procedure: CYSTOSCOPY, RIGHT URETERAL STENT INSERTION;  Surgeon: Kelli Chi MD;  Location: [facility] MAIN OR;  Service: Urology;  Laterality: Right;   • CYSTOSCOPY W/ URETERAL STENT PLACEMENT Right 2/17/2022    Procedure: CYSTOSCOPY, RIGHT URETERAL STENT EXCHANGE, EXCISION OF SKIN LESION;  Surgeon: Kelli Chi MD;  Location: [facility] MAIN OR;  Service: Urology;  Laterality: Right;   • HYSTERECTOMY      partial    • JOINT REPLACEMENT      R THR   • MASTECTOMY Left     NO BP OR NS L ARM   • VENOUS ACCESS DEVICE (PORT) INSERTION         Family History   Problem Relation Age of Onset   • Skin cancer Other    • Colon cancer Sister    • Malig Hyperthermia Neg Hx        Social History     Socioeconomic History   • Marital status:    Tobacco Use   • Smoking status: Never Smoker   • Smokeless tobacco: Never Used   Vaping Use   • Vaping Use: Never used   Substance and Sexual Activity   • Alcohol use: Never   • Drug use: Never   • Sexual activity: Defer           Objective   Physical Exam  Vitals and nursing note reviewed.   Constitutional:       General: She is not in acute distress.     Appearance: She is ill-appearing.   HENT:      Head: Normocephalic.   Neck:      Vascular: No JVD.   Cardiovascular:      Rate and Rhythm: Regular rhythm. Tachycardia present.   Pulmonary:      Effort: Pulmonary effort is normal.      Breath sounds: Rales present.   Chest:      Chest wall: No tenderness.   Abdominal:      Palpations: Abdomen is soft.      Tenderness: There is no  abdominal tenderness.   Musculoskeletal:      Right lower leg: No tenderness. No edema.      Left lower leg: No tenderness. No edema.   Skin:     General: Skin is warm and dry.      Coloration: Skin is pale.   Neurological:      Mental Status: She is alert and oriented to person, place, and time.   Psychiatric:         Behavior: Behavior normal.         Procedures           ED Course                                                 MDM  Number of Diagnoses or Management Options     Amount and/or Complexity of Data Reviewed  Decide to obtain previous medical records or to obtain history from someone other than the patient: yes  Review and summarize past medical records: yes      This is a 72-year-old female with multiple malignancies who is recently received radiation therapy.  She had some GI bleeding and is discontinued her blood thinner but did require occasional blood transfusions along the way and most recently at the beginning of the month after transfusion followed by what might have been a transfusion reaction of some sort.  She claims her face was red and swollen and that she was mildly short of breath but went home.  She denies that it was treated with any medications that she is aware of.  She believes that since then her symptoms been progressively worsening which have been documented in a surgical follow-up note from a week ago and then today.  Although the patient is not in respiratory distress she is hypoxic on room air which is new for her.  Her chest x-ray shows infiltrates possibly consistent with COVID versus pneumonia versus volume overload.  Her physical exam is not consistent with volume overload though her BNP is elevated.  Although she has not had fever or productive cough she does have an elevated white blood cell count.  Will give antibiotics and admit for hypoxic respiratory failure and further differentiation of her cause for respiratory failure.  Final diagnoses:   Acute respiratory  failure with hypoxia (HCC)   Pneumonia of both lungs due to infectious organism, unspecified part of lung       ED Disposition  ED Disposition     ED Disposition   Decision to Admit    Condition   --    Comment   Level of Care: Med/Surg [1]   Diagnosis: Acute respiratory failure with hypoxia (HCC) [288189]   Isolate for COVID?: No [0]   Certification: I Certify That Inpatient Hospital Services Are Medically Necessary For Greater Than 2 Midnights               No follow-up provider specified.       Medication List      No changes were made to your prescriptions during this visit.          Alli Cantu DO  05/18/22 8945

## 2022-05-19 NOTE — PROGRESS NOTES
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  Results from last 7 days   Lab Units 05/19/22  0517 05/18/22  1726 05/17/22  0819   BUN mg/dL 13 12 10   CREATININE mg/dL 0.80 0.96 0.94   HD/PD/CRRT?: no  Contrast Administered: yes, 5/9  Lab Results   Component Value Date    WBC 15.74 (H) 05/19/2022      Temperature    05/18/22 1709 05/18/22 2238 05/19/22 0317   Temp: 98.2 °F (36.8 °C) 97.8 °F (36.6 °C) 99.3 °F (37.4 °C)     Relevant Micro:   Microbiology Results (last 10 days)       Procedure Component Value - Date/Time    COVID-19,APTIMA PANTHER([place]),[facility], NP/OP SWAB IN UTM/VTM/SALINE TRANSPORT MEDIA,24 HR TAT - Swab, Nasopharynx [[id number]]  (Normal) Collected: 05/18/22 2023    Lab Status: Final result Specimen: Swab from Nasopharynx Updated: 05/19/22 0047     COVID19 Not Detected    Narrative:      Fact sheet for providers: https://www.fda.gov/media/636084/download     Fact sheet for patients: https://www.fda.gov/media/501488/download    Test performed by RT PCR.           Relevant Radiology:   5/18 CT Chest: There are worsening areas of airspace consolidation in the upper lobes and new airspace consolidation in the right lower lobe with developing airspace disease in the left lower lobe.    This is compared to 5/9/2022 chest CT.  This is favored to reflect pneumonia.  Pulmonary edema is possible.  Metastatic disease felt to be much less " likely.    Other Antimicrobial Therapy: cefepime    Assessment/Plan  Lorraine Dominguez is a 72 y.o.female admitted with respiratory sx. Pharmacy has been consulted to dose IV Vancomycin + cefepime.    Regimen: 1500 mg IV every 24 hours.  Exposure target: AUC24 (range)400-600 mg/L.hr   AUC24,ss: 509 mg/L.hr  PAUC*: 77 %  Ctrough,ss: 14.0 mg/L  Pconc*: 19 %  Tox.: 9 %    Level due: prior to third dose  Labs ordered: BMP x 3 days, vancomycin random AM 5/21      Thank you for this consult. Pharmacy will continue to monitor.

## 2022-05-19 NOTE — PLAN OF CARE
Goal Outcome Evaluation:  Plan of Care Reviewed With: patient, spouse        Progress: no change  Outcome Evaluation: Pt presents with limitations that impede their ability to safely and independently transfer and ambulate. The skills of a therapist will be required to safely and effectively implement the following treatment plan to restore maximal level of function.

## 2022-05-19 NOTE — NURSING NOTE
Exercise Oximetry    Patient Name:Lorraine Dominguez   MRN: [id number]   Date: 05/19/22             ROOM AIR BASELINE   SpO2% 87   Heart Rate    Blood Pressure      EXERCISE ON ROOM AIR SpO2% EXERCISE ON O2 @  2 LPM SpO2%   1 MINUTE  93 1 MINUTE    2 MINUTES 92 2 MINUTES    3 MINUTES 88 3 MINUTES    4 MINUTES 87 4 MINUTES    5 MINUTES  5 MINUTES 90   6 MINUTES  6 MINUTES 91              Distance Walked   Distance Walked   Dyspnea (Meg Scale)   Dyspnea (Meg Scale)   Fatigue (Meg Scale)   Fatigue (Meg Scale)   SpO2% Post Exercise   SpO2% Post Exercise   HR Post Exercise   HR Post Exercise   Time to Recovery   Time to Recovery     Comments:

## 2022-05-19 NOTE — CASE MANAGEMENT/SOCIAL WORK
Discharge Planning Assessment   [place]     Patient Name: Lorraine Dominguez  MRN: [id number]  Today's Date: 5/19/2022    Admit Date: 5/18/2022     Discharge Needs Assessment     Row Name 05/19/22 0918       Living Environment    People in Home spouse    Current Living Arrangements home    Primary Care Provided by self    Provides Primary Care For no one    Family Caregiver if Needed child(barrington), adult;spouse    Quality of Family Relationships helpful;involved;supportive    Able to Return to Prior Arrangements yes       Resource/Environmental Concerns    Resource/Environmental Concerns none    Transportation Concerns none       Transition Planning    Patient/Family Anticipates Transition to home with family    Patient/Family Anticipated Services at Transition durable medical equipment    Transportation Anticipated family or friend will provide       Discharge Needs Assessment    Readmission Within the Last 30 Days no previous admission in last 30 days    Equipment Currently Used at Home cane, straight;walker, standard    Concerns to be Addressed discharge planning    Anticipated Changes Related to Illness none    Equipment Needed After Discharge oxygen               Discharge Plan     Row Name 05/19/22 0918       Plan    Plan Patient is alert and oriented, currently eating breakfast. Spoke with spouse per patient request. Introduced self and explained role of CM RN. Verified demographics and preferred pharmacy. Patient is independent, lives with spouse who can assist with any needs. Patient has had home health in the past, unsure of which agency was used. Patient and spouse do not believe home health is needed again, but if PT/OT recommends they are agreeable. Currently on 3 L NC, does not wear O2 at home. Will need to perform a walk test if it is needed. No preference in DME agency verbalized. CM RN will continue to follow for discharge needs.              Continued Care and Services - Admitted Since 5/18/2022     Coordination has not been started for this encounter.     Selected Continued Care - Episodes Includes selections from active Coordinated Care Management episodes    Oncology Episode start date: 11/3/2021   There are no active outsourced providers for this episode.                  Demographic Summary     Row Name 05/19/22 0916       General Information    Admission Type inpatient    Arrived From emergency department    Reason for Consult discharge planning    Preferred Language English       Contact Information    Permission Granted to Share Info With family/designee    Contact Information Obtained for                Functional Status     Row Name 05/19/22 0916       Functional Status    Usual Activity Tolerance good    Current Activity Tolerance moderate       Functional Status, IADL    Medications independent    Meal Preparation independent    Housekeeping independent    Laundry independent    Shopping assistive person       Mental Status    General Appearance WDL WDL       Mental Status Summary    Recent Changes in Mental Status/Cognitive Functioning no changes               Psychosocial    No documentation.                Abuse/Neglect    No documentation.                Legal    No documentation.                Substance Abuse    No documentation.                Patient Forms     Row Name 05/19/22 0910       Patient Forms    Important Message from Medicare (IMM) Delivered  Scanned by REG 5/18/22                  Moe Warner RN

## 2022-05-19 NOTE — H&P
[facility]  [facility]   Patient Name: Lorraine Dominguez  : 1950  MRN: [id number]    Primary Care Physician:  Shayna Lewis APRN  Family Point of Contact:  Date of admission: 2022      Subjective     Chief Complaint: SOB since prbc transfusion 2 weeks ago    HPI:  Lorraine Dominguez is a diabetic and formerly anti-coagulated (ceased 1-2 weeks ago 2/2 stuttering neoplasm-induced LGIB) 72F twice breast cancer survivor and thrice adeno cancer (now with bone mets; encorafenib) survivor who presents with the above sx.     She last received chemotherapy yesterday.      Denies pain, bleeding from any orifice, constitutional or GI sxs.  No documented CHF, CXT-induced or otherwise. She has a chronic low-grade cough.    Upon arrival, she was mildly hypoxic with a 2L O2 need. CXR abnl and CT scan sought. Has received empiric cefepime from the ER.    Hb 7.4 (near baseline) and sK+ 3.2.    Personal History     Past Medical History:  Past Medical History:   Diagnosis Date   • Abdominal pain    • Anemia     LOW HGB, OCCASSIONAL WEAKNESS   • Arthritis    • Breast cancer (HCC)    • Cataract     L EYE   • Colon cancer (HCC)    • Constipation     INTERMITTANTLY   • Diarrhea     INTERMITTANTLY   • DM (diabetes mellitus), type 2 (HCC)    • High cholesterol    • History of blood clots     neck, RESOLVED   • Hypertension     NO MEDS NOW   • Hypokalemia    • Iron deficiency anemia secondary to blood loss (chronic)     GI BLEED   • Paroxysmal A-fib (HCC)     FOLLOWS BAIRES     Adeno left breast s/p MRM,   Right breast lumpectomy  Adeno colon x3 s/p 12\" hemicolectomy and CXT  Bone mets s/p XRT, last 22  DMT2  PAF/former DOAC    Past Surgical History:  Past Surgical History:   Procedure Laterality Date   • BACK SURGERY      RUPTURED DISC/PINCHED NERVE LUMBAR SPINE   • BREAST LUMPECTOMY Right    • COLON SURGERY      COLON RESECTION R/T CA, EEA   • COLONOSCOPY     • " CORNEAL TRANSPLANT Right    • CYSTOSCOPY W/ URETERAL STENT PLACEMENT Bilateral     PT HAS LONG TERM URETERAL STENTS    • CYSTOSCOPY W/ URETERAL STENT PLACEMENT Right 8/17/2021    Procedure: CYSTOSCOPY, RIGHT URETERAL STENT INSERTION;  Surgeon: Kelli Chi MD;  Location: [facility] MAIN OR;  Service: Urology;  Laterality: Right;   • CYSTOSCOPY W/ URETERAL STENT PLACEMENT Right 2/17/2022    Procedure: CYSTOSCOPY, RIGHT URETERAL STENT EXCHANGE, EXCISION OF SKIN LESION;  Surgeon: Kelli Chi MD;  Location: [facility] MAIN OR;  Service: Urology;  Laterality: Right;   • HYSTERECTOMY      partial    • JOINT REPLACEMENT      R THR   • MASTECTOMY Left     NO BP OR NS L ARM   • VENOUS ACCESS DEVICE (PORT) INSERTION         Family History:   Family History   Problem Relation Age of Onset   • Skin cancer Other    • Colon cancer Sister    • Malig Hyperthermia Neg Hx       Non-contributory    Social History:    with 3 children, 6 grandchildren, and 10 great grandchildren   Retired tobacco farmer  Never smoker without deleterious habits    Home Medications:  Accu-Chek Softclix Lancets, Calcium Carb-Cholecalciferol, HYDROcodone-acetaminophen, LORazepam, apixaban, cromolyn, digoxin, encorafenib, escitalopram, glipizide, loperamide, magnesium oxide, metFORMIN, ondansetron, pantoprazole, potassium chloride, pravastatin, senna, and sucralfate    Allergies:  No Known Allergies    Review of Systems  Please see HPI. All else asked and negative.    Objective     Vitals:   Temp:  [98.2 °F (36.8 °C)] 98.2 °F (36.8 °C)  Heart Rate:  [102] 102  Resp:  [16] 16  BP: (125)/(69) 125/69  Flow (L/min):  [2] 2    Physical Exam    Constitutional: Frail appearing A&O x3, non-septic appearing, NAD   Eyes: PERRLA, EOMI, sclerae anicteric, no conjunctival injection   HENT: NC/AT, mucous membranes moist, edentulous   Neck: Supple, no JVD, thyromegaly, or lymphadenopathy; trachea midline   Respiratory: Clear to auscultation bilaterally without  wheezes, rhonchi, or rales   Cardiovascular: RRR, no murmurs, rubs, or gallops   Gastrointestinal: Soft, NT/ND with NABS x4; no ascites   Musculoskeletal: No c/c/e; palpable pedal pulses bilaterally   Psychiatric: Appropriate affect, cooperative   Neurologic: Awake and alert; CN II-XII grossly intact; no tremor moves all four extremities without sensory   deficits  Skin: No rashes, breakdown, or bleeding from any orifice.  Rectal: deferred    Result Review    Result Review:  I have personally reviewed the results from the time of this admission to 5/18/2022 20:18 EDT and agree with these findings:  [x]  Laboratory  [x]  Microbiology  [x]  Radiology  [x]  EKG/Telemetry   [x]  Cardiology/Vascular   [x]  Pathology  [x]  Old records  []  Other:      Assessment & Plan   Assessment / Plan     Assessment:  Subacute hypoxia-induced SOB since prbc transfusion (transfusion-associated circulatory overload?)   Twice breast and thrice colon cancer survivor with current XRT and encorafenib need  DMT2/metformin  Chronic blood-loss anemia  PAF without current DOAC  Generalized fraility    Plan:   Admit to medicine for O2, chest CT scan, TTE, gentle diuresis, sK+ replacement +/- IV iron.  Rx reconciliation; hold DOAC (it is appearing on her list) and oral K+; I see no present need for abx continuation (unless CT chest dictates)  Daily labs with iron panel +/- IV iron  Will exclude metformin-induced Cbl deficiency  ADA diet with insulin sliding scale  VTE prophy via sc heparin   PT input  Rest & exercise oximetry before discharge  DNI status verified    CODE STATUS:    Medical Intervention Limits: NO intubation (DNI)  Level Of Support Discussed With: Patient  Code Status (Patient has no pulse and is not breathing): CPR (Attempt to Resuscitate)  Medical Interventions (Patient has pulse or is breathing): Limited Support      Admission Status:  I believe this patient meets admission status.    Electronically signed by Steven Pena  DO, 05/18/22, 8:18 PM EDT.

## 2022-05-19 NOTE — PLAN OF CARE
Goal Outcome Evaluation:  Plan of Care Reviewed With: patient        Progress: no change  Outcome Evaluation: Patient arrived from the ED to the floor, admission assessment completed. Patient had several BMs this shift, but had just completed a round of chemo on 05/17/22. Patient is slow to respond but alert and oriented x4. No complaints of pain, nausea, or vomiting. Rested in between nursing interventions.

## 2022-05-19 NOTE — DISCHARGE SUMMARY
[facility]         HOSPITALIST  DISCHARGE SUMMARY    Patient Name: Lorraine Dominguez  : 1950  MRN: [id number]    Date of Admission: 2022  Date of Discharge:  2022  Primary Care Physician: Shayna Lewis APRN    Consults     Date and Time Order Name Status Description    2022  7:32 PM IP General Consult (Use specialty-specific consult if known)            Active and Resolved Hospital Problems:  Acute hypoxic respiratory failure  Community-acquired pneumonia  Concern for fluid overload, heart failure with preserved ejection fraction  Breast cancer, colon cancer current XRT and encorafenib   DMT2/metformin  Chronic blood-loss anemia  Paroxysmal atrial fibrillation, not on anticoagulants      Hospital Course     Hospital Course:  Lorraine Dominguez is a 72 y.o. female with a past medical history of diabetes, breast cancer, colon cancer currently on XRT and encorafenib.  Patient states she has been short of breath for the past 2 weeks following a transfusion.  Patient was fluid overloaded on exam on presentation with crackles as well as an elevated proBNP, provided diuretics inpatient.  Echocardiogram shows preserved ejection fraction with diastolic dysfunction.  Patient's chest x-ray and CT also concerning for infection.  Procalcitonin elevated.  Patient started on antibiotics, will cover MRSA, due to MRSA the nares positive.  The following morning after admission, patient requesting to discharge home, stating she feels symptomatically better and would feel better at home.  Patient discharged on oxygen after failing walk test.  Patient will be discharged on doxycycline to cover MRSA.  Also provided 4 days of Lasix 40 mg daily for fluid.  Patient agrees to seeing her PCP as soon as possible given her request to discharge home so quickly.  Patient was given the option to stay in the hospital an additional couple of days for IV diuretics as well as IV antibiotics, again  patient requested to discharge home.   at bedside agreeable.  Patient seen on date of discharge, clinically and hemodynamically stable.  Patient provided concerning signs and symptoms prompting immediate medical attention, patient understanding and agreeable    DISCHARGE Follow Up Recommendations for labs and diagnostics:   Follow-up with PCP soon as possible      Day of Discharge     Vital Signs:  Temp:  [97.8 °F (36.6 °C)-99.3 °F (37.4 °C)] 98 °F (36.7 °C)  Heart Rate:  [102-113] 102  Resp:  [16-18] 16  BP: (125-151)/(63-90) 138/63  Flow (L/min):  [2-3] 3  Physical Exam:               General: Sitting up in bed eating lunch, no acute distress, breathing comfortably with nasal cannula in place              Eyes: PERRLA, EOMI, sclerae anicteric, no conjunctival injection              HENT: NC/AT, mucous membranes moist, edentulous              Neck: Supple, no JVD, thyromegaly, or lymphadenopathy; trachea midline              Respiratory: Minimal bibasilar crackles, no wheezing no rhonchi              Cardiovascular: RRR, no murmurs, rubs, or gallops              Gastrointestinal: Soft, NT/ND with NABS x4; no ascites              Musculoskeletal: No c/c/e; palpable pedal pulses bilaterally              Psychiatric: Appropriate affect, cooperative              Neurologic: Awake and alert; CN II-XII grossly intact; no tremor moves all four extremities without sensory deficits      Discharge Details        Discharge Medications      New Medications      Instructions Start Date   doxycycline 100 MG capsule  Commonly known as: VIBRAMYCIN   100 mg, Oral, 2 Times Daily      furosemide 40 MG tablet  Commonly known as: Lasix   40 mg, Oral, Daily         Changes to Medications      Instructions Start Date   Calcium Carb-Cholecalciferol 500-10 MG-MCG chewable tablet  What changed: when to take this   1 tablet, Oral, 2 times daily      magnesium oxide 400 MG tablet  Commonly known as: MAG-OX  What changed: when to take  this   400 mg, Oral, Daily         Continue These Medications      Instructions Start Date   Accu-Chek Softclix Lancets lancets   No dose, route, or frequency recorded.      cromolyn 4 % ophthalmic solution  Commonly known as: OPTICROM   1 drop, Both Eyes, 4 Times Daily      digoxin 125 MCG tablet  Commonly known as: LANOXIN   125 mcg, Oral, Nightly      encorafenib 75 MG capsule  Commonly known as: BRAFTOVI   75 mg, Oral, Daily      escitalopram 20 MG tablet  Commonly known as: LEXAPRO   20 mg, Oral, Nightly      glipizide 10 MG 24 hr tablet  Commonly known as: GLUCOTROL XL   No dose, route, or frequency recorded.      HYDROcodone-acetaminophen 5-325 MG per tablet  Commonly known as: NORCO   1 tablet, Oral, Every 6 Hours PRN      loperamide 2 MG capsule  Commonly known as: IMODIUM   2 mg, Oral, 4 Times Daily PRN      LORazepam 1 MG tablet  Commonly known as: ATIVAN   1 mg, Oral, Take As Directed, Pt takes 1 qam and 2hs      metFORMIN 500 MG tablet  Commonly known as: GLUCOPHAGE   500 mg, Oral, 2 Times Daily With Meals, INST PER ANESTHESIA  PROTOCOL      ondansetron 8 MG tablet  Commonly known as: ZOFRAN   8 mg, Oral, Every 8 Hours PRN      pantoprazole 40 MG EC tablet  Commonly known as: PROTONIX   40 mg, Oral, 2 Times Daily      potassium chloride 20 MEQ CR tablet  Commonly known as: K-DUR,KLOR-CON   20 mEq, Oral, Daily      pravastatin 40 MG tablet  Commonly known as: PRAVACHOL   40 mg, Oral, Nightly      senna 8.6 MG tablet  Commonly known as: SENOKOT   1 tablet, Oral, Daily PRN      sucralfate 1 g tablet  Commonly known as: Carafate   1 g, Oral, 3 Times Daily         Stop These Medications    apixaban 5 MG tablet tablet  Commonly known as: ELIQUIS            No Known Allergies    Discharge Disposition:  Home-Health Care [facility]    Diet:  Hospital:  Diet Order   Procedures   • Diet Regular; Consistent Carbohydrate       Discharge Activity:   Activity Instructions     Activity as Tolerated            CODE  STATUS:  Code Status and Medical Interventions:   Ordered at: 05/18/22 2018     Medical Intervention Limits:    NO intubation (DNI)     Level Of Support Discussed With:    Patient     Code Status (Patient has no pulse and is not breathing):    CPR (Attempt to Resuscitate)     Medical Interventions (Patient has pulse or is breathing):    Limited Support         Future Appointments   Date Time Provider Department Center   5/24/2022  1:00 PM INJ ROOM 01 [facility]   5/31/2022  8:45 AM CHAIR 15 [facility]   6/7/2022 10:30 AM INJ ROOM 01 [facility]   6/14/2022  8:45 AM CHAIR 14 [facility]   6/14/2022  9:15 AM Jennifer Schroeder MD PhD [facility] [place] [facility]   6/21/2022 10:00 AM INJ ROOM 01 [facility]   6/28/2022  8:45 AM CHAIR 06 [facility] IN[facility]  [facility]   6/28/2022  9:15 AM Jennifer Schroeder MD PhD [facility] [place]   7/5/2022 10:30 AM INJ ROOM 01 [facility]   7/12/2022  8:45 AM CHAIR 02 [facility] INFU  [facility]   7/15/2022 10:00 AM Prabhjot Alvarez MD [facility]       Additional Instructions for the Follow-ups that You Need to Schedule     Discharge Follow-up with PCP   As directed       Currently Documented PCP:    Shayna Lewis APRN    PCP Phone Number:    [phone]     Follow Up Details: In less than one week               Pertinent  and/or Most Recent Results     PROCEDURES:       LAB RESULTS:      Lab 05/19/22  0517 05/18/22 2023 05/18/22  1726 05/17/22  0819   WBC 15.74*  --  18.36* 11.13*   HEMOGLOBIN 7.4*  --  7.4* 6.5*   HEMATOCRIT 24.4*  --  24.1* 21.3*   PLATELETS 495*  --  455* 461*   NEUTROS ABS 10.87*  --  14.42* 5.34   IMMATURE GRANS (ABS) 0.13*  --  0.24* 0.07*   LYMPHS ABS 3.97*  --  2.90 4.85*   MONOS ABS 0.71  --  0.70 0.70   EOS ABS 0.01  --  0.04 0.03   MCV 90.0  --  91.3 91.0   PROCALCITONIN 0.45*  --   --   --    LACTATE  --  1.3  --   --          Lab 05/19/22  0517 05/18/22  1727  05/17/22  0819   SODIUM 136 136 136   POTASSIUM 3.7 3.2* 2.7*   CHLORIDE 100 102 105   CO2 20.1* 20.2* 20.7*   ANION GAP 15.9* 13.8 10.3   BUN 13 12 10   CREATININE 0.80 0.96 0.94   EGFR 77.9 62.6 64.2   GLUCOSE 122* 158* 98   CALCIUM 9.0 8.4* 7.7*   MAGNESIUM  --   --  1.6         Lab 05/18/22  1726 05/17/22  0819   TOTAL PROTEIN 7.5 6.7   ALBUMIN 2.60* 2.68*   GLOBULIN 4.9 4.0   ALT (SGPT) 8 9   AST (SGOT) 58* 56*   BILIRUBIN 0.3 0.3   ALK PHOS 155* 128*         Lab 05/18/22  1726   PROBNP 14,241.0*   TROPONIN T 0.036*             Lab 05/18/22  1726   IRON 16*   IRON SATURATION 6*   TIBC 267*   TRANSFERRIN 179*   FERRITIN 440.30*   VITAMIN B 12 351         Brief Urine Lab Results  (Last result in the past 365 days)      Color   Clarity   Blood   Leuk Est   Nitrite   Protein   CREAT   Urine HCG        03/01/22 1131 Yellow   Cloudy   Small (1+)   Moderate (2+)   Negative   30 mg/dL (1+)               Microbiology Results (last 10 days)     Procedure Component Value - Date/Time    MRSA Screen, PCR (Inpatient) - Swab, Nares [[id number]]  (Abnormal) Collected: 05/19/22 1059    Lab Status: Final result Specimen: Swab from Nares Updated: 05/19/22 1259     MRSA PCR MRSA Detected    COVID-19,APTIMA PANTHER([place]),[facility], NP/OP SWAB IN UTM/VTM/[facility]ALINE TRANSPORT MEDIA,24 HR TAT - Swab, Nasopharynx [[id number]]  (Normal) Collected: 05/18/22 2023    Lab Status: Final result Specimen: Swab from Nasopharynx Updated: 05/19/22 0047     COVID19 Not Detected    Narrative:      Fact sheet for providers: https://www.fda.gov/media/250692/download     Fact sheet for patients: https://www.fda.gov/media/621156/download    Test performed by RT PCR.          CT Chest Without Contrast Diagnostic    Result Date: 5/18/2022  Impression:   1. There are worsening areas of airspace consolidation in the upper lobes and new airspace consolidation in the right lower lobe with developing airspace disease in the left lower lobe.  This is compared to  5/9/2022 chest CT.  This is favored to reflect pneumonia.  Pulmonary edema is possible.  Metastatic disease felt to be much less likely. 2. Diffuse sclerotic bone metastasis appear unchanged. 3. Increasing small to moderate right and increasing small left pleural effusions. 4. There is suggestion of anemia.     BELLA DEL ROSARIO MD       Electronically Signed and Approved By: BELLA DEL ROSARIO MD on 5/18/2022 at 23:52             CT Chest With Contrast Diagnostic    Result Date: 5/9/2022  Impression:   1. Increased ground-glass opacities predominantly involving both upper lobes.  In the setting of small bilateral pleural effusions and smooth interlobular septal thickening, this could represent alveolar pulmonary edema.  Drug related pneumonitis could also have this appearance. 2. Stable appearance of the diffuse sclerotic bone metastasis. 3. Stable appearance of the left mastectomy site. 4. Please see separately dictated CT abdomen and pelvis report for findings below the diaphragm.      ERNST DRIVER MD       Electronically Signed and Approved By: ERNST DRIVER MD on 5/09/2022 at 11:59             CT Abdomen Pelvis With Contrast    Result Date: 5/9/2022  Impression:   1. Decrease in size of the anterior pelvic fluid collection compared to the prior examination, now measuring up to 1.7 cm in size. 2. Stable abnormal soft tissue thickening surrounding multiple segments of small bowel and colon suspicious for peritoneal disease. 3. Right-sided double-J ureteral stent appears in expected position with mild decrease in size of the soft tissue mass adjacent to the right ureter. 4. Resolution of the previously seen left-sided hydronephrosis. 5. Diffuse sclerotic metastatic lesions.      ERNST DRIVER MD       Electronically Signed and Approved By: ERNST DRIVER MD on 5/09/2022 at 12:08             XR Chest 1 View    Result Date: 5/18/2022  Impression:   Ill-defined multifocal airspace infiltrates bilaterally with  associated interstitial changes indicating findings related to atypical/viral infection or multifocal pneumonia.  The findings may indicate changes of COVID-19 infection.  Recommend follow-up to ensure improvement/resolution.      CHERYL LEGER MD       Electronically Signed and Approved By: CHERYL LEGER MD on 5/18/2022 at 17:56                       Results for orders placed during the hospital encounter of 05/18/22    Adult Transthoracic Echo Complete W/ Cont if Necessary Per Protocol    Interpretation Summary  · Estimated right ventricular systolic pressure from tricuspid regurgitation is normal (<35 mmHg).  · Left ventricular wall thickness is consistent with mild concentric hypertrophy.  · Left ventricular ejection fraction appears to be 56 - 60%.  · Left ventricular diastolic function is consistent with (grade Ia w/high LAP) impaired relaxation.  · There is mild, bileaflet mitral valve thickening present.  · Moderate dilation of the ascending aorta is present. 4.4 cm      Labs Pending at Discharge:  Pending Labs     Order Current Status    Blood Culture - Blood, Arm, Left In process    Blood Culture - Blood, Arm, Left In process            Time spent on Discharge including face to face service:  35 minutes    Electronically signed by Rashi Herbert MD, 05/19/22, 2:46 PM EDT.

## 2022-05-19 NOTE — THERAPY EVALUATION
Patient Name: Lorraine Dominguez  : 1950    MRN: [id number]                              Today's Date: 2022       Admit Date: 2022    Visit Dx:     ICD-10-CM ICD-9-CM   1. Acute respiratory failure with hypoxia (HCC)  J96.01 518.81   2. Pneumonia of both lungs due to infectious organism, unspecified part of lung  J18.9 483.8   3. Decreased activities of daily living (ADL)  Z78.9 V49.89     Patient Active Problem List   Diagnosis   • Encounter for adjustment or management of vascular access device   • Allergic rhinitis   • Anemia   • Anxiety   • Arthritis   • Malignant neoplasm of female breast (HCC)   • Cataract   • Degeneration of intervertebral disc   • Depression   • Diabetic neuropathy (HCC)   • Esophageal reflux   • Hiatal hernia   • High blood pressure   • Hydroureteronephrosis   • Hyperlipidemia   • Leg pain   • Limb swelling   • Colon cancer (HCC)   • Malignant neoplasm of colon (HCC)   • Muscle cramps   • Neuropathy   • Primary localized osteoarthritis of pelvic region and thigh   • Shortness of breath   • Diabetes mellitus (HCC)   • Type 2 diabetes mellitus (HCC)   • Ureteral mass   • Special screening for malignant neoplasms, colon   • Atrial fibrillation (HCC)   • Central venous catheter in place   • Chronic anxiety   • Disorder of vitamin B12   • Dyslipidemia   • Gastrointestinal hemorrhage   • History of lumbar discectomy   • History of malignant neoplasm of breast   • Hypokalemia   • Hypothyroidism   • Iron deficiency   • Iron deficiency anemia secondary to blood loss (chronic)   • Osteopenia   • Tension-type headache   • Ulcerative lesion   • Hypomagnesemia   • Hypocalcemia   • History of colon cancer   • Fall against object   • Generalized anxiety disorder   • Malabsorption of iron   • Urgency of urination   • Moderate nausea   • Hyperglycemia   • Dehydration   • Metastasis to bone (HCC)   • Physical deconditioning   • Generalized abdominal pain   • Obstruction of both ureters   •  Skin nodule   • Cancer related pain   • Metastasis to skin (HCC)   • Malignant tumor of ureter (HCC)   • Secondary malignant neoplasm of bone (HCC)   • Gastrointestinal hemorrhage with melena   • Acute respiratory failure with hypoxia (HCC)     Past Medical History:   Diagnosis Date   • Abdominal pain    • Anemia     LOW HGB, OCCASSIONAL WEAKNESS   • Arthritis    • Breast cancer (HCC)    • Cataract     L EYE   • Colon cancer (HCC)    • Constipation     INTERMITTANTLY   • Diarrhea     INTERMITTANTLY   • DM (diabetes mellitus), type 2 (HCC)    • High cholesterol    • History of blood clots     neck, RESOLVED   • Hypertension     NO MEDS NOW   • Hypokalemia    • Iron deficiency anemia secondary to blood loss (chronic)     GI BLEED   • Paroxysmal A-fib (HCC)     FOLLOWS BAIRES     Past Surgical History:   Procedure Laterality Date   • BACK SURGERY  1977    RUPTURED DISC/PINCHED NERVE LUMBAR SPINE   • BREAST LUMPECTOMY Right    • COLON SURGERY  2015    COLON RESECTION R/T CA, EEA   • COLONOSCOPY     • CORNEAL TRANSPLANT Right    • CYSTOSCOPY W/ URETERAL STENT PLACEMENT Bilateral     PT HAS LONG TERM URETERAL STENTS    • CYSTOSCOPY W/ URETERAL STENT PLACEMENT Right 8/17/2021    Procedure: CYSTOSCOPY, RIGHT URETERAL STENT INSERTION;  Surgeon: Kelli Chi MD;  Location: [facility];  Service: Urology;  Laterality: Right;   • CYSTOSCOPY W/ URETERAL STENT PLACEMENT Right 2/17/2022    Procedure: CYSTOSCOPY, RIGHT URETERAL STENT EXCHANGE, EXCISION OF SKIN LESION;  Surgeon: Kelli Chi MD;  Location: [facility];  Service: Urology;  Laterality: Right;   • HYSTERECTOMY      partial    • JOINT REPLACEMENT      R THR   • MASTECTOMY Left     NO BP OR NS L ARM   • VENOUS ACCESS DEVICE (PORT) INSERTION        General Information     Row Name 05/19/22 0950 05/19/22 0939       OT Time and Intention    Document Type therapy note (daily note)  -PG evaluation  -PG    Mode of Treatment individual therapy;occupational  therapy  -PG individual therapy;occupational therapy  -PG    Row Name 05/19/22 0939          General Information    Patient Profile Reviewed yes  -PG     Prior Level of Function independent:;ADL's;transfer  -PG     Existing Precautions/Restrictions fall;oxygen therapy device and L/min  -PG     Barriers to Rehab none identified  -PG     Row Name 05/19/22 0939          Occupational Profile    Reason for Services/Referral (Occupational Profile) Patient is a pleasant 72-year-old female admitted for acute respiratory failure, pneumonia and currently receiving chemotherapy.  No previous OT services identified.  Patient is being evaluated to assess ADL status and help determine discharge needs.  -PG     Row Name 05/19/22 0939          Living Environment    People in Home spouse  -PG     Row Name 05/19/22 0939          Cognition    Orientation Status (Cognition) oriented x 3  -PG     Row Name 05/19/22 0939          Safety Issues, Functional Mobility    Safety Issues Affecting Function (Mobility) ability to follow commands;awareness of need for assistance  -PG     Impairments Affecting Function (Mobility) balance;endurance/activity tolerance;shortness of breath;strength  -PG           User Key  (r) = Recorded By, (t) = Taken By, (c) = Cosigned By    Initials Name Provider Type    PG Orlando Perez, OT Occupational Therapist                 Mobility/ADL's     Row Name 05/19/22 0950 05/19/22 0941       Transfers    Transfers sit-stand transfer;bed-chair transfer  -PG sit-stand transfer;bed-chair transfer  -PG    Bed-Chair Wise (Transfers) verbal cues;contact guard  -PG verbal cues;contact guard  -PG    Assistive Device (Bed-Chair Transfers) walker, front-wheeled  -PG walker, front-wheeled  -PG    Sit-Stand Wise (Transfers) verbal cues;contact guard  -PG verbal cues;contact guard  -PG    Row Name 05/19/22 0950 05/19/22 0941       Sit-Stand Transfer    Assistive Device (Sit-Stand Transfers) walker, front-wheeled   -PG walker, front-wheeled  -PG    Row Name 05/19/22 0941          Activities of Daily Living    BADL Assessment/Intervention bathing;upper body dressing;lower body dressing;grooming;toileting  -PG     Row Name 05/19/22 0941          Bathing Assessment/Intervention    [place] Level (Bathing) bathing skills;moderate assist (50% patient effort)  -PG     Row Name 05/19/22 0941          Upper Body Dressing Assessment/Training    [place] Level (Upper Body Dressing) upper body dressing skills;set up  -PG     Row Name 05/19/22 0941          Lower Body Dressing Assessment/Training    [place] Level (Lower Body Dressing) moderate assist (50% patient effort);maximum assist (25% patient effort)  -PG     Row Name 05/19/22 0941          Grooming Assessment/Training    [place] Level (Grooming) grooming skills;set up  -PG     Row Name 05/19/22 0941          Toileting Assessment/Training    [place] Level (Toileting) toileting skills;moderate assist (50% patient effort)  -PG           User Key  (r) = Recorded By, (t) = Taken By, (c) = Cosigned By    Initials Name Provider Type    PG Orlando Perez OT Occupational Therapist               Obj/Interventions     Row Name 05/19/22 0943          Sensory Assessment (Somatosensory)    Sensory Assessment (Somatosensory) sensation intact  -PG     Row Name 05/19/22 0943          Vision Assessment/Intervention    Visual Impairment/Limitations corrective lenses full-time  -PG     Row Name 05/19/22 0943          Range of Motion Comprehensive    General Range of Motion no range of motion deficits identified  -PG     Row Name 05/19/22 0943          Strength Comprehensive (MMT)    Comment, General Manual Muscle Testing (MMT) Assessment Bilateral shoulder strength 4+, elbows 5/5  -PG     Row Name 05/19/22 0943          Motor Skills    Motor Skills coordination;functional endurance  -PG     Coordination WFL  -PG     Functional Endurance Fair minus  -PG           User Key  (r) =  Recorded By, (t) = Taken By, (c) = Cosigned By    Initials Name Provider Type    PG Orlando Perez, OT Occupational Therapist               Goals/Plan     Row Name 05/19/22 0944          Transfer Goal 1 (OT)    Activity/Assistive Device (Transfer Goal 1, OT) transfers, all  -PG     [place] Level/Cues Needed (Transfer Goal 1, OT) modified independence  -PG     Time Frame (Transfer Goal 1, OT) long term goal (LTG);10 days  -PG     Row Name 05/19/22 0944          Bathing Goal 1 (OT)    Activity/Device (Bathing Goal 1, OT) bathing skills, all  -PG     [place] Level/Cues Needed (Bathing Goal 1, OT) modified independence  -PG     Time Frame (Bathing Goal 1, OT) long term goal (LTG);10 days  -PG     Row Name 05/19/22 0944          Dressing Goal 1 (OT)    Activity/Device (Dressing Goal 1, OT) dressing skills, all  -PG     [place]/Cues Needed (Dressing Goal 1, OT) modified independence  -PG     Time Frame (Dressing Goal 1, OT) long term goal (LTG);10 days  -PG     Row Name 05/19/22 0944          Toileting Goal 1 (OT)    Activity/Device (Toileting Goal 1, OT) toileting skills, all  -PG     [place] Level/Cues Needed (Toileting Goal 1, OT) modified independence  -PG     Time Frame (Toileting Goal 1, OT) long term goal (LTG);10 days  -PG     Row Name 05/19/22 0944          Grooming Goal 1 (OT)    Activity/Device (Grooming Goal 1, OT) grooming skills, all  -PG     [place] (Grooming Goal 1, OT) modified independence  -PG     Time Frame (Grooming Goal 1, OT) long term goal (LTG);10 days  -PG     Row Name 05/19/22 0944          Problem Specific Goal 1 (OT)    Problem Specific Goal 1 (OT) She will improve activity tolerance to fair plus to support independence and engagement with ADL activities  -PG     Time Frame (Problem Specific Goal 1, OT) long term goal (LTG);10 days  -PG     Row Name 05/19/22 0944          Therapy Assessment/Plan (OT)    Planned Therapy Interventions (OT) activity tolerance training;BADL  retraining;strengthening exercise;transfer/mobility retraining;patient/caregiver education/training;occupation/activity based interventions  -PG           User Key  (r) = Recorded By, (t) = Taken By, (c) = Cosigned By    Initials Name Provider Type    PG Orlando Perez, [place] Occupational Therapist               Clinical Impression     Row Name 05/19/22 0943          Pain Assessment    Pretreatment Pain Rating 5/10  -PG     Posttreatment Pain Rating 5/10  -PG     Pain Location - back  -PG     Pain Intervention(s) Nursing Notified  -PG     Row Name 05/19/22 0943          Plan of Care Review    Plan of Care Reviewed With patient  -PG     Progress no change  -PG     Outcome Evaluation Patient presents with limitations affecting strength, activity tolerance, and balance impacting patient's ability to return home safely and independently.  The skills of a therapist will be required to safely and effectively implement the following treatment plan to restore maximal level of function  -PG     Row Name 05/19/22 0943          Therapy Assessment/Plan (OT)    Patient/Family Therapy Goal Statement (OT) Patient would like to return home independently and gain her strength back  -PG     Rehab Potential (OT) good, to achieve stated therapy goals  -PG     Criteria for Skilled Therapeutic Interventions Met (OT) yes;meets criteria;skilled treatment is necessary  -PG     Therapy Frequency (OT) 5 times/wk  -PG     Row Name 05/19/22 0943          Therapy Plan Review/Discharge Plan (OT)    Anticipated Discharge Disposition (OT) home with home health  -PG           User Key  (r) = Recorded By, (t) = Taken By, (c) = Cosigned By    Initials Name Provider Type    PG Orlando Perez OT Occupational Therapist               Outcome Measures     Row Name 05/19/22 0903          How much help from another is currently needed...    Putting on and taking off regular lower body clothing? 2  -PG     Bathing (including washing, rinsing, and drying) 2  -PG      Toileting (which includes using toilet bed pan or urinal) 3  -PG     Putting on and taking off regular upper body clothing 3  -PG     Taking care of personal grooming (such as brushing teeth) 3  -PG     Eating meals 4  -PG     AM-PAC 6 Clicks Score (OT) 17  -PG     Row Name 05/18/22 8349          How much help from another person do you currently need...    Turning from your back to your side while in flat bed without using bedrails? 4  -AH     Moving from lying on back to sitting on the side of a flat bed without bedrails? 4  -AH     Moving to and from a bed to a chair (including a wheelchair)? 3  -AH     Standing up from a chair using your arms (e.g., wheelchair, bedside chair)? 3  -AH     Climbing 3-5 steps with a railing? 3  -AH     To walk in hospital room? 3  -AH     AM-PAC 6 Clicks Score (PT) 20  -AH     Highest level of mobility 6 --> Walked 10 steps or more  -     Row Name 05/19/22 0948          Functional Assessment    Outcome Measure Options AM-PAC 6 Clicks Daily Activity (OT);Optimal Instrument  -PG     Row Name 05/19/22 0948          Optimal Instrument    Optimal Instrument Optimal - 3  -PG     Bending/Stooping 3  -PG     Standing 2  -PG     Reaching 1  -PG     From the list, choose the 3 activities you would most like to be able to do without any difficulty Bending/stooping;Standing;Reaching  -PG     Total Score Optimal - 3 6  -PG           User Key  (r) = Recorded By, (t) = Taken By, (c) = Cosigned By    Initials Name Provider Type     Marek April, RNA Registered Nurse    Orlando Pang, [place] Occupational Therapist                Occupational Therapy Education                 Title: PT OT SLP Therapies (Done)     Topic: Occupational Therapy (Done)     Point: ADL training (Done)     Description:   Instruct learner(s) on proper safety adaptation and remediation techniques during self care or transfers.   Instruct in proper use of assistive devices.              Learning Progress Summary            Patient Acceptance, E,D, DU by PG at 5/19/2022 0949                   Point: Home exercise program (Done)     Description:   Instruct learner(s) on appropriate technique for monitoring, assisting and/or progressing therapeutic exercises/activities.              Learning Progress Summary           Patient Acceptance, E,D, DU by PG at 5/19/2022 0949                   Point: Precautions (Done)     Description:   Instruct learner(s) on prescribed precautions during self-care and functional transfers.              Learning Progress Summary           Patient Acceptance, E,D, DU by PG at 5/19/2022 0949                   Point: Body mechanics (Done)     Description:   Instruct learner(s) on proper positioning and spine alignment during self-care, functional mobility activities and/or exercises.              Learning Progress Summary           Patient Acceptance, E,D, DU by PG at 5/19/2022 0949                               User Key     Initials Effective Dates Name Provider Type Discipline     06/16/21 -  Orlando Perez OT Occupational Therapist OT              OT Recommendation and Plan  Planned Therapy Interventions (OT): activity tolerance training, BADL retraining, strengthening exercise, transfer/mobility retraining, patient/caregiver education/training, occupation/activity based interventions  Therapy Frequency (OT): 5 times/wk  Plan of Care Review  Plan of Care Reviewed With: patient  Progress: no change  Outcome Evaluation: Patient presents with limitations affecting strength, activity tolerance, and balance impacting patient's ability to return home safely and independently.  The skills of a therapist will be required to safely and effectively implement the following treatment plan to restore maximal level of function     Time Calculation:    Time Calculation- OT     Row Name 05/19/22 0951             Time Calculation- OT    OT Received On 05/19/22  -      OT Goal Re-Cert Due Date 05/28/22  -PG               Timed Charges    94900 - OT Therapeutic Activity Minutes 8  -PG              Untimed Charges    OT Eval/Re-eval Minutes 30  -PG              Total Minutes    Timed Charges Total Minutes 8  -PG      Untimed Charges Total Minutes 30  -PG       Total Minutes 38  -PG            User Key  (r) = Recorded By, (t) = Taken By, (c) = Cosigned By    Initials Name Provider Type    PG Orlando Perez OT Occupational Therapist              Therapy Charges for Today     Code Description Service Date Service Provider Modifiers Qty    [id number]  OT THERAPEUTIC ACT EA 15 MIN 5/19/2022 Orlando Perez OT GO 1    [id number]  OT EVAL LOW COMPLEXITY 2 5/19/2022 Orlando Perez OT GO 1               Orlando Perez OT  5/19/2022

## 2022-05-19 NOTE — THERAPY EVALUATION
Acute Care - Physical Therapy Initial Evaluation   [place]     Patient Name: Lorraine Dominguez  : 1950  MRN: [id number]  Today's Date: 2022      Visit Dx:     ICD-10-CM ICD-9-CM   1. Acute respiratory failure with hypoxia (HCC)  J96.01 518.81   2. Pneumonia of both lungs due to infectious organism, unspecified part of lung  J18.9 483.8   3. Decreased activities of daily living (ADL)  Z78.9 V49.89   4. Difficulty walking  R26.2 719.7     Patient Active Problem List   Diagnosis   • Encounter for adjustment or management of vascular access device   • Allergic rhinitis   • Anemia   • Anxiety   • Arthritis   • Malignant neoplasm of female breast (HCC)   • Cataract   • Degeneration of intervertebral disc   • Depression   • Diabetic neuropathy (HCC)   • Esophageal reflux   • Hiatal hernia   • High blood pressure   • Hydroureteronephrosis   • Hyperlipidemia   • Leg pain   • Limb swelling   • Colon cancer (HCC)   • Malignant neoplasm of colon (HCC)   • Muscle cramps   • Neuropathy   • Primary localized osteoarthritis of pelvic region and thigh   • Shortness of breath   • Diabetes mellitus (HCC)   • Type 2 diabetes mellitus (HCC)   • Ureteral mass   • Special screening for malignant neoplasms, colon   • Atrial fibrillation (HCC)   • Central venous catheter in place   • Chronic anxiety   • Disorder of vitamin B12   • Dyslipidemia   • Gastrointestinal hemorrhage   • History of lumbar discectomy   • History of malignant neoplasm of breast   • Hypokalemia   • Hypothyroidism   • Iron deficiency   • Iron deficiency anemia secondary to blood loss (chronic)   • Osteopenia   • Tension-type headache   • Ulcerative lesion   • Hypomagnesemia   • Hypocalcemia   • History of colon cancer   • Fall against object   • Generalized anxiety disorder   • Malabsorption of iron   • Urgency of urination   • Moderate nausea   • Hyperglycemia   • Dehydration   • Metastasis to bone (HCC)   • Physical deconditioning   • Generalized  abdominal pain   • Obstruction of both ureters   • Skin nodule   • Cancer related pain   • Metastasis to skin (HCC)   • Malignant tumor of ureter (HCC)   • Secondary malignant neoplasm of bone (HCC)   • Gastrointestinal hemorrhage with melena   • Acute respiratory failure with hypoxia (HCC)     Past Medical History:   Diagnosis Date   • Abdominal pain    • Anemia     LOW HGB, OCCASSIONAL WEAKNESS   • Arthritis    • Breast cancer (HCC)    • Cataract     L EYE   • Colon cancer (HCC)    • Constipation     INTERMITTANTLY   • Diarrhea     INTERMITTANTLY   • DM (diabetes mellitus), type 2 (HCC)    • High cholesterol    • History of blood clots     neck, RESOLVED   • Hypertension     NO MEDS NOW   • Hypokalemia    • Iron deficiency anemia secondary to blood loss (chronic)     GI BLEED   • Paroxysmal A-fib (HCC)     FOLLOWS BAIRES     Past Surgical History:   Procedure Laterality Date   • BACK SURGERY  1977    RUPTURED DISC/PINCHED NERVE LUMBAR SPINE   • BREAST LUMPECTOMY Right    • COLON SURGERY  2015    COLON RESECTION R/T CA, EEA   • COLONOSCOPY     • CORNEAL TRANSPLANT Right    • CYSTOSCOPY W/ URETERAL STENT PLACEMENT Bilateral     PT HAS LONG TERM URETERAL STENTS    • CYSTOSCOPY W/ URETERAL STENT PLACEMENT Right 8/17/2021    Procedure: CYSTOSCOPY, RIGHT URETERAL STENT INSERTION;  Surgeon: Kelli Chi MD;  Location: [facility];  Service: Urology;  Laterality: Right;   • CYSTOSCOPY W/ URETERAL STENT PLACEMENT Right 2/17/2022    Procedure: CYSTOSCOPY, RIGHT URETERAL STENT EXCHANGE, EXCISION OF SKIN LESION;  Surgeon: Kelli Chi MD;  Location: [facility];  Service: Urology;  Laterality: Right;   • HYSTERECTOMY      partial    • JOINT REPLACEMENT      R THR   • MASTECTOMY Left     NO BP OR NS L ARM   • VENOUS ACCESS DEVICE (PORT) INSERTION       PT Assessment (last 12 hours)     PT Evaluation and Treatment     Row Name 05/19/22 1500          Physical Therapy Time and Intention    Subjective Information  no complaints  -CS     Document Type evaluation  -CS     Mode of Treatment individual therapy;physical therapy  -CS     Patient Effort fair  -CS     Symptoms Noted During/After Treatment shortness of breath  -CS     Row Name 05/19/22 1500          General Information    Patient Profile Reviewed yes  -CS     Patient Observations alert;cooperative;agree to therapy  -CS     Prior Level of Function independent:;all household mobility;gait;transfer;bed mobility;ADL's  -CS     Equipment Currently Used at Home walker, rolling  -CS     Existing Precautions/Restrictions fall;oxygen therapy device and L/min  -CS     Barriers to Rehab none identified  -CS     Row Name 05/19/22 1500          Living Environment    Current Living Arrangements home  -CS     Home Accessibility stairs to enter home;stairs within home  -CS     People in Home spouse  -CS     Primary Care Provided by self  -CS     Row Name 05/19/22 1500          Home Main Entrance    Number of Stairs, Main Entrance none  -CS     Row Name 05/19/22 1500          Stairs Within Home, Primary    Number of Stairs, Within Home, Primary none  -CS     Row Name 05/19/22 1500          Cognition    Orientation Status (Cognition) oriented x 3  -CS     Row Name 05/19/22 1500          Range of Motion Comprehensive    General Range of Motion bilateral lower extremity ROM WFL  -CS     Row Name 05/19/22 1500          Strength Comprehensive (MMT)    General Manual Muscle Testing (MMT) Assessment lower extremity strength deficits identified  -CS     Comment, General Manual Muscle Testing (MMT) Assessment BLEs assessed at 3+/5  -CS     Row Name 05/19/22 1500          Bed Mobility    Bed Mobility bed mobility (all) activities  -CS     All Activities, [place] (Bed Mobility) standby assist  -CS     Bed Mobility, Safety Issues decreased use of arms for pushing/pulling;decreased use of legs for bridging/pushing  -CS     Assistive Device (Bed Mobility) bed rails  -CS     Row Name 05/19/22  1500          Transfers    Transfers sit-stand transfer;stand-sit transfer;toilet transfer  -CS     Sit-Stand [place] (Transfers) verbal cues;contact guard  -CS     Stand-Sit [place] (Transfers) verbal cues;contact guard  -CS     [place] Level (Toilet Transfer) verbal cues;contact guard  -CS     Assistive Device (Toilet Transfer) walker, front-wheeled  -CS     Row Name 05/19/22 1500          Sit-Stand Transfer    Assistive Device (Sit-Stand Transfers) walker, front-wheeled  -CS     Row Name 05/19/22 1500          Stand-Sit Transfer    Assistive Device (Stand-Sit Transfers) walker, front-wheeled  -CS     Row Name 05/19/22 1500          Toilet Transfer    Type (Toilet Transfer) sit-stand;stand-sit  -CS     Row Name 05/19/22 1500          Gait/Stairs (Locomotion)    Gait/Stairs Locomotion gait/ambulation assistive device  -CS     [place] Level (Gait) verbal cues;contact guard  -CS     Assistive Device (Gait) walker, front-wheeled  -CS     Distance in Feet (Gait) 15x2  -CS     Deviations/Abnormal Patterns (Gait) gait speed decreased;base of support, narrow  -CS     Bilateral Gait Deviations forward flexed posture  -CS     Row Name 05/19/22 1500          Safety Issues, Functional Mobility    Safety Issues Affecting Function (Mobility) awareness of need for assistance;insight into deficits/self-awareness  -     Impairments Affecting Function (Mobility) balance;endurance/activity tolerance;shortness of breath;strength  -CS     Row Name 05/19/22 1500          Balance    Balance Assessment standing dynamic balance  -CS     Dynamic Standing Balance contact guard  -CS     Position/Device Used, Standing Balance walker, front-wheeled  -CS     Row Name 05/19/22 1500          Plan of Care Review    Plan of Care Reviewed With patient;spouse  -CS     Progress no change  -CS     Outcome Evaluation Pt presents with limitations that impede their ability to safely and independently transfer and ambulate. The skills  of a therapist will be required to safely and effectively implement the following treatment plan to restore maximal level of function.  -CS     Row Name 05/19/22 1500          Therapy Assessment/Plan (PT)    Rehab Potential (PT) good, to achieve stated therapy goals  -CS     Criteria for Skilled Interventions Met (PT) yes;skilled treatment is necessary  -CS     Therapy Frequency (PT) daily  -CS     Predicted Duration of Therapy Intervention (PT) 10 days  -CS     Problem List (PT) problems related to;balance;strength;mobility  -CS     Activity Limitations Related to Problem List (PT) unable to ambulate safely;unable to transfer safely  -CS     Row Name 05/19/22 1500          PT Evaluation Complexity    History, PT Evaluation Complexity 1-2 personal factors and/or comorbidities  -CS     Examination of Body Systems (PT Eval Complexity) total of 4 or more elements  -CS     Clinical Presentation (PT Evaluation Complexity) stable  -CS     Clinical Decision Making (PT Evaluation Complexity) low complexity  -CS     Overall Complexity (PT Evaluation Complexity) low complexity  -     Row Name 05/19/22 1500          Therapy Plan Review/Discharge Plan (PT)    Therapy Plan Review (PT) evaluation/treatment results reviewed;patient  -[facility] Name 05/19/22 1500          Physical Therapy Goals    Bed Mobility Goal Selection (PT) bed mobility, PT goal 1  -CS     Transfer Goal Selection (PT) transfer, PT goal 1  -CS     Gait Training Goal Selection (PT) gait training, PT goal 1  -[facility] Name 05/19/22 1500          Bed Mobility Goal 1 (PT)    Activity/Assistive Device (Bed Mobility Goal 1, PT) bed mobility activities, all  -CS     [place] Level/Cues Needed (Bed Mobility Goal 1, PT) independent  -CS     Time Frame (Bed Mobility Goal 1, PT) long term goal (LTG);10 days  -[facility] Name 05/19/22 1500          Transfer Goal 1 (PT)    Activity/Assistive Device (Transfer Goal 1, PT)  sit-to-stand/stand-to-sit;bed-to-chair/chair-to-bed;walker, rolling  -CS     [place] Level/Cues Needed (Transfer Goal 1, PT) modified independence  -CS     Time Frame (Transfer Goal 1, PT) long term goal (LTG);10 days  -CS     Row Name 05/19/22 1500          Gait Training Goal 1 (PT)    Activity/Assistive Device (Gait Training Goal 1, PT) gait (walking locomotion);assistive device use;walker, rolling  -CS     [place] Level (Gait Training Goal 1, PT) supervision required  -CS     Distance (Gait Training Goal 1, PT) 100  -CS     Time Frame (Gait Training Goal 1, PT) long term goal (LTG);10 days  -CS           User Key  (r) = Recorded By, (t) = Taken By, (c) = Cosigned By    Initials Name Provider Type    Uyen Rg PT Physical Therapist                Physical Therapy Education                 Title: PT OT SLP Therapies (In Progress)     Topic: Physical Therapy (In Progress)     Point: Mobility training (In Progress)     Learning Progress Summary           Patient Acceptance, E, NR by  at 5/19/2022 1509                   Point: Home exercise program (Not Started)     Learner Progress:  Not documented in this visit.          Point: Body mechanics (Not Started)     Learner Progress:  Not documented in this visit.          Point: Precautions (In Progress)     Learning Progress Summary           Patient Acceptance, E, NR by  at 5/19/2022 1509                               User Key     Initials Effective Dates Name Provider Type Discipline     04/25/21 -  Uyen Nuñez PT Physical Therapist PT              PT Recommendation and Plan  Anticipated Discharge Disposition (PT): home with home health, home with assist  Planned Therapy Interventions (PT): balance training, bed mobility training, gait training, transfer training, strengthening  Therapy Frequency (PT): daily  Plan of Care Reviewed With: patient, spouse  Progress: no change  Outcome Evaluation: Pt presents with limitations that impede  their ability to safely and independently transfer and ambulate. The skills of a therapist will be required to safely and effectively implement the following treatment plan to restore maximal level of function.   Outcome Measures     Row Name 05/19/22 1500             How much help from another person do you currently need...    Turning from your back to your side while in flat bed without using bedrails? 4  -CS      Moving from lying on back to sitting on the side of a flat bed without bedrails? 4  -CS      Moving to and from a bed to a chair (including a wheelchair)? 3  -CS      Standing up from a chair using your arms (e.g., wheelchair, bedside chair)? 3  -CS      Climbing 3-5 steps with a railing? 3  -CS      To walk in hospital room? 3  -CS      AM-PAC 6 Clicks Score (PT) 20  -CS              Functional Assessment    Outcome Measure Options AM-PAC 6 Clicks Basic Mobility (PT)  -CS            User Key  (r) = Recorded By, (t) = Taken By, (c) = Cosigned By    Initials Name Provider Type    Uyen Rg PT Physical Therapist                 Time Calculation:    PT Charges     Row Name 05/19/22 1500             Time Calculation    PT Received On 05/19/22  -CS      PT Goal Re-Cert Due Date 05/28/22  -CS              Untimed Charges    PT Eval/Re-eval Minutes 46  -CS              Total Minutes    Untimed Charges Total Minutes 46  -CS       Total Minutes 46  -CS            User Key  (r) = Recorded By, (t) = Taken By, (c) = Cosigned By    Initials Name Provider Type    Uyen Rg, JENNI Physical Therapist              Therapy Charges for Today     Code Description Service Date Service Provider Modifiers Qty    56198501631 HC PT EVAL LOW COMPLEXITY 4 5/19/2022 Uyen Nuñez PT GP 1          PT G-Codes  Outcome Measure Options: AM-PAC 6 Clicks Basic Mobility (PT)  AM-PAC 6 Clicks Score (PT): 20  AM-PAC 6 Clicks Score (OT): 17    Uyen Nuñez PT  5/19/2022

## 2022-05-19 NOTE — NURSING NOTE
Exercise Oximetry    Patient Name:Lorraine Dominguez   MRN: [id number]   Date: 05/19/22             ROOM AIR BASELINE   SpO2% 93   Heart Rate    Blood Pressure      EXERCISE ON ROOM AIR SpO2% EXERCISE ON O2 @  2LPM SpO2%   1 MINUTE 92 1 MINUTE    2 MINUTES 92 2 MINUTES    3 MINUTES  3 MINUTES 88   4 MINUTES  4 MINUTES 87   5 MINUTES  5 MINUTES 90   6 MINUTES  6 MINUTES 91              Distance Walked   Distance Walked   Dyspnea (Meg Scale)   Dyspnea (Meg Scale)   Fatigue (Meg Scale)   Fatigue (Meg Scale)   SpO2% Post Exercise   SpO2% Post Exercise   HR Post Exercise   HR Post Exercise   Time to Recovery  Time to Recovery     Comments:

## 2022-05-19 NOTE — SIGNIFICANT NOTE
05/19/22 1000   Coping/Psychosocial   Observed Emotional State calm;cooperative   Verbalized Emotional State hopefulness   Trust Relationship/Rapport empathic listening provided   Family/Support Persons spouse   Involvement in Care interacting with patient   Additional Documentation Spiritual Care (Group)   Spiritual Care   Use of Spiritual Resources spirituality for coping, indicated strong use of   Spiritual Care Source  initiative   Spiritual Care Follow-Up follow-up, none required as presently assessed   Response to Spiritual Care engaged in conversation   Spiritual Care Interventions supportive conversation provided   Spiritual Care Visit Type initial   Receptivity to Spiritual Care visit welcomed

## 2022-05-20 NOTE — OUTREACH NOTE
Prep Survey    Flowsheet Row Responses   [facility] facility patient discharged from? [place]   Is LACE score < 7 ? No   Emergency Room discharge w/ pulse ox? No   Eligibility Readm Mgmt   Discharge diagnosis Community-acquired pneumonia   Does the patient have one of the following disease processes/diagnoses(primary or secondary)? COPD/Pneumonia   Does the patient have Home health ordered? No   Is there a DME ordered? Yes   What DME was ordered?  O2 through [employer].   Prep survey completed? Yes          RENALDO HARGROVE - Registered Nurse

## 2022-05-23 NOTE — TELEPHONE ENCOUNTER
Diagnosis: Metastatic breast cancer and recurrent cancer of the colon and small bowel    Reason for referral: Missed call    Comments: OSW received a missed call from pt this afternoon. OSW is unable to comprehend the VM that was left. Attempted to return pt's call to further assess and determine how OSW may be of assistance. Provided my direct number where I may be reached back at ([phone]), encouraging OSW support remains available.    Update 5/23: Received return VM from pt that she was calling for an update regarding her [employer] financial assistance application, as she has not received anything yet. OSW sent a secure email to [employer] requesting an update on pt's financial application. Awaiting response. OSW support remains available.

## 2022-05-24 NOTE — OUTREACH NOTE
COPD/PN Week 1 Survey    Flowsheet Row Responses   [facility] facility patient discharged from? [place]   Does the patient have one of the following disease processes/diagnoses(primary or secondary)? COPD/Pneumonia   Was the primary reason for admission: Pneumonia   Week 1 attempt successful? No   Unsuccessful attempts Attempt 1          BENNY GHOSH - Registered Nurse

## 2022-05-27 NOTE — OUTREACH NOTE
COPD/PN Week 1 Survey    Flowsheet Row Responses   [facility] patient discharged from? [place]   Does the patient have one of the following disease processes/diagnoses(primary or secondary)? COPD/Pneumonia   Was the primary reason for admission: Pneumonia   Week 1 attempt successful? Yes   Call start time 1508   Call end time 1512   Discharge diagnosis Community-acquired pneumonia   Meds reviewed with patient/caregiver? Yes   Is the patient having any side effects they believe may be caused by any medication additions or changes? No   Does the patient have all medications ordered at discharge? Yes   Is the patient taking all medications as directed (includes completed medication regime)? Yes   Does the patient have a primary care provider?  Yes   Does the patient have an appointment with their PCP or specialist within 7 days of discharge? Yes   Comments regarding PCP Pt has followed up with PCP since hospital d/c on 5/19/22   Has the patient kept scheduled appointments due by today? Yes   What DME was ordered?  O2 through [employer].   Has all DME been delivered? Yes   Pulse Ox monitoring Intermittent   O2 Sat comments 94% on RA   Psychosocial issues? No   Did the patient receive a copy of their discharge instructions? Yes   Nursing interventions Reviewed instructions with patient   What is the patient's perception of their health status since discharge? Improving   Nursing Interventions Nurse provided patient education   Are the patient's immunizations up to date?  Yes   If the patient is a current smoker, are they able to teach back resources for cessation? Not a smoker   Is the patient/caregiver able to teach back the hierarchy of who to call/visit for symptoms/problems? PCP, Specialist, Home health nurse, Urgent Care, ED, 911 Yes   Is the patient/caregiver able to teach back signs and symptoms of worsening condition: Chest pain, Shortness of breath, Fever/chills   Is the patient/caregiver able to teach back  importance of completing antibiotic course of treatment? Yes   Week 1 call completed? Yes          ARMANDO LINCOLN - Registered Nurse

## 2022-06-01 NOTE — PROGRESS NOTES
Outpatient Nutrition Oncology Follow Up    Patient Name: Lorraine Dominguez  YOB: 1950  MRN: [id number]  Assessment Date: 6/1/2022    CLINICAL NUTRITION ASSESSMENT  Dx: Colon CA, Mets to bone      Type of Cancer Treatment  Cetuximab, Encorafenib       CLINICAL NUTRITION ASSESSMENT      Reason for Assessment  Follow-up protocol     H&P:    Past Medical History:   Diagnosis Date   • Abdominal pain    • Anemia     LOW HGB, OCCASSIONAL WEAKNESS   • Arthritis    • Breast cancer (HCC)    • Cataract     L EYE   • Colon cancer (HCC)    • Constipation     INTERMITTANTLY   • Diarrhea     INTERMITTANTLY   • DM (diabetes mellitus), type 2 (HCC)    • High cholesterol    • History of blood clots     neck, RESOLVED   • Hypertension     NO MEDS NOW   • Hypokalemia    • Iron deficiency anemia secondary to blood loss (chronic)     GI BLEED   • Paroxysmal A-fib (HCC)     FOLLOWS BAIRES        Current Problems:   Patient Active Problem List   Diagnosis Code   • Encounter for adjustment or management of vascular access device Z45.2   • Allergic rhinitis J30.9   • Anemia D64.9   • Anxiety F41.9   • Arthritis M19.90   • Malignant neoplasm of female breast (HCC) C50.919   • Cataract H26.9   • Degeneration of intervertebral disc [id number]   • Depression F32.A   • Diabetic neuropathy (HCC) E11.40   • Esophageal reflux K21.9   • Hiatal hernia K44.9   • High blood pressure I10   • Hydroureteronephrosis N13.30   • Hyperlipidemia E78.5   • Leg pain M79.606   • Limb swelling M79.89   • Colon cancer (HCC) C18.9   • Malignant neoplasm of colon (HCC) C18.9   • Muscle cramps R25.2   • Neuropathy G62.9   • Primary localized osteoarthritis of pelvic region and thigh M16.10   • Shortness of breath R06.02   • Diabetes mellitus (HCC) E11.9   • Type 2 diabetes mellitus (HCC) E11.9   • Ureteral mass N28.89   • Special screening for malignant neoplasms, colon Z12.11   • Atrial fibrillation (HCC) I48.91   • Central venous catheter in place Z78.9    • Chronic anxiety F41.9   • Disorder of vitamin B12 E53.8   • Dyslipidemia E78.5   • Gastrointestinal hemorrhage K92.2   • History of lumbar discectomy Z98.890   • History of malignant neoplasm of breast Z85.3   • Hypokalemia E87.6   • Hypothyroidism E03.9   • Iron deficiency E61.1   • Iron deficiency anemia secondary to blood loss (chronic) D50.0   • Osteopenia M85.80   • Tension-type headache G44.209   • Ulcerative lesion [id number]   • Hypomagnesemia E83.42   • Hypocalcemia E83.51   • History of colon cancer Z85.038   • Fall against object W18.00XA   • Generalized anxiety disorder F41.1   • Malabsorption of iron K90.9   • Urgency of urination R39.15   • Moderate nausea R11.0   • Hyperglycemia R73.9   • Dehydration E86.0   • Metastasis to bone (HCC) C79.51   • Physical deconditioning R53.81   • Generalized abdominal pain R10.84   • Obstruction of both ureters N13.5   • Skin nodule R22.9   • Cancer related pain G89.3   • Metastasis to skin (HCC) C79.2   • 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    Weight Hx  Wt Readings from Last 30 Encounters:   06/01/22 0845 61.9 kg (136 lb 7.4 oz)   05/18/22 2238 65.6 kg (144 lb 10 oz)   05/18/22 1709 65.9 kg (145 lb 4.5 oz)   05/17/22 0809 65.9 kg (145 lb 4.5 oz)   05/17/22 0842 65.9 kg (145 lb 4.5 oz)   05/12/22 1005 66.5 kg (146 lb 9.7 oz)   05/10/22 1308 66.7 kg (147 lb)   05/03/22 0815 66.4 kg (146 lb 6.2 oz)   05/03/22 0850 66.4 kg (146 lb 6.2 oz)   04/28/22 0932 66.1 kg (145 lb 11.6 oz)   04/26/22 1303 65 kg (143 lb 3.2 oz)   04/19/22 0903 66.3 kg (146 lb 2.6 oz)   04/05/22 1010 67.5 kg (148 lb 13 oz)   04/05/22 1032 67.5 kg (148 lb 13 oz)   03/29/22  1004 66.9 kg (147 lb 7.8 oz)   03/15/22 0804 64.5 kg (142 lb 3.2 oz)   03/15/22 0806 64.5 kg (142 lb 3.2 oz)   03/01/22 0826 65.2 kg (143 lb 11.8 oz)   03/01/22 0915 65.2 kg (143 lb 11.8 oz)   02/23/22 1035 63.8 kg (140 lb 9.6 oz)   02/17/22 0717 64 kg (141 lb 1.5 oz)   02/15/22 0800 66.9 kg (147 lb 7.8 oz)   02/15/22 0818 66.9 kg (147 lb 7.8 oz)   02/10/22 1312 64 kg (141 lb)   02/01/22 0700 67.3 kg (148 lb 5.9 oz)   02/01/22 0851 67.3 kg (148 lb 5.9 oz)   01/18/22 0838 67.8 kg (149 lb 7.6 oz)   01/18/22 0901 67.8 kg (149 lb 7.6 oz)   01/04/22 1101 65.8 kg (145 lb 1 oz)   01/04/22 0830 65.8 kg (145 lb 1 oz)   01/04/22 0929 65.8 kg (145 lb 1 oz)   12/28/21 0756 68.8 kg (151 lb 10.8 oz)        Labs/Medications        Pertinent Labs Reviewed.   Results from last 7 days   Lab Units 06/01/22  0837   SODIUM mmol/L 134*   POTASSIUM mmol/L 4.2   CHLORIDE mmol/L 104   CO2 mmol/L 19.0*   BUN mg/dL 25*   CREATININE mg/dL 1.07*   CALCIUM mg/dL 8.3*   BILIRUBIN mg/dL 0.3   ALK PHOS U/L 229*   ALT (SGPT) U/L 11   AST (SGOT) U/L 57*   GLUCOSE mg/dL 162*     Results from last 7 days   Lab Units 06/01/22  0837   MAGNESIUM mg/dL 1.9   HEMOGLOBIN g/dL 6.0*   HEMATOCRIT % 19.7*     COVID19   Date Value Ref Range Status   05/18/2022 Not Detected Not Detected - Ref. Range Final     Lab Results   Component Value Date    HGBA1C 6.0 (H) 09/09/2019         Pertinent Medications Accu-Chek Softclix Lancets, Calcium Carb-Cholecalciferol, HYDROcodone-acetaminophen, LORazepam, cromolyn, digoxin, encorafenib, escitalopram, furosemide, glipizide, loperamide, magnesium oxide, metFORMIN, ondansetron, potassium chloride, pravastatin, and senna     Physical Findings        Malnutrition Severity Assessment     Row Name 06/01/22 1216          Malnutrition Severity Assessment    Malnutrition Type Chronic Disease - Related Malnutrition            Muscle Loss    Temple Region Moderate - slight depression     Clavicle Bone Region Moderate - some protrusion  in females, visible in males            Fat Loss    Orbital Region  Moderate -  somewhat hollowness, slightly dark circles                  Current Nutrition Orders & Evaluation of Intake       Oral Nutrition     Current PO Diet Small meals   Supplement Occ. Glucerna Shakes     Enteral Nutrition     Current Formula    Schedule      Parenteral Nutrition     Current Prescription    Schedule      Nutrition Diagnosis        Nutrition Dx Problem 1 Moderate malnutrition related to increased nutrient needs due to catabolic disease as evidenced by physiological causes increasing nutrient needs.       Nutrition Intervention       RD Action Nutritional Counseling  Written Materials: Smoothie recipes, MNT for Increased Gas, Ensure / Boost samples and Ensure coupons  Nutrition F/U      Monitor/Evaluation       Monitor Per oncology nutrition protocol.     Comments:  Spoke with pt during infusion. Current weight indicates weight loss of 5.6% X 2 weeks. She reports a very poor appetite recently along with increased gas. She consumes smaller meals, usually 3 times a day. She tries to consume good protein sources, (eggs, meat, fish). Discussed with pt MNT for increased gas and what foods to avoid. Also discussed high kcal/high protein diet to prevent further unintended weight loss. Pt was consuming Glucerna Shakes 1X/day. Discussed with pt other higher kcal/higher protein oral supplements to try given increased anorexia and weight loss. Provided samples and coupons upon request. Also provided homemade smoothie recipes and milkshake recipes to try (including low-sugar recipes). Encouraged pt to increase oral intake with 6 small meals/day along with oral nutrition supplements.     Recommendations: Consider appetite stimulant unless contraindicated in pt.     Electronically signed by:  Valentina Washburn RD  06/01/22 12:16 EDT

## 2022-06-02 NOTE — ADDENDUM NOTE
Encounter addended by: Kaylie Hassan on: 6/2/2022 7:41 AM   Actions taken: Pharmacy for encounter modified, Order list changed

## 2022-06-07 NOTE — TELEPHONE ENCOUNTER
Patient had labs drawn today, calling to check results. Hgb 6.8 today. Patient states she does not feel she needs a transfusion this week. Denies dizziness or increased fatigue. Advised we will continue to monitor labs. Instructed to notify us for any increased symptoms.

## 2022-06-08 NOTE — PROGRESS NOTES
"Patient completed radiation treatment for metastatic breast cancer on 05/13/22. Following up with patient regarding any concerns the patient may have at this time and receiving feedback in regards to patient over all care while under treatment.     Patient asked about symptoms and side effects that continue to be bothersome. Patient states she is doing well overall.  Denies skin irritation, ongoing fatigue and pain in abdomen that radiates to the back, takes Hydrocodone as needed. Pain above left eye has improved if not completely gone.    Patient states that Pain is at 5 on scale of 0/10. Patient states medications have not changed. No refills needed at this time    Patient was asked if there was anything that could've made their experience better at [facility] while they were under treatment. Patient states: \"No, ma'am\"    Patient encouraged to call the office if any concerns arise. Patient reminded of Follow up appointment on 7/15/22 @ 1000.    "

## 2022-06-14 NOTE — PROGRESS NOTES
Patient  Lorraine Dominguez    Location  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 Colon Cancer:   - Diagnosed 5/7/15; Low grade; staged pT4a pN0 M0 stage IIB; 0/37 LN; no MMR testing; RIGHT sided  - RIGHT hemicolectomy 5/7/15  - adjuvant Xeloda X 6 months  - Recurrent dx diagnosed via RIGHT ureter mass resection (5/1/18)  - FOLFIRI X 9 (7/31-12/4/18)  - s/p XRT RIGHT pelvis total 5320 cGy (12/14/18-1/24/19)  - CARIS report from specimen dated 5/7/15: BRAF V600E mutation (possible benefit from cetuximab), MSI stable, TMB-low, TAURUS-High (33%), TP53 mutated  - CT CAP 1/21/2021: Interim development of right-sided hydronephrosis and a soft tissue mass along the right iliac chain measuring 2.9 x 1.2 cm.  There is a second nodule identified below the aortic bifurcation in the midline measuring 1.4 cm.  This was consistent with disease recurrence versus her new primary small bowel cancer.    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 Lacey). Pathology from the ileum was positive for adenocarcinoma that appeared to be small bowel in origin and represent a new primary malignancy, 2.2cm, grade 1, tumor perforates the visceral peritoneum, pT4pN0, with 0/12 lymph nodes involved.  - Post surgical CT Abd/Pelvis 4/24/21:  Mild thickening of the wall of some portion of the small intestine, could be post-surgical changes. Moderate dilation of the right renal collecting system is unchanged with ureteral double-J stent.    - CT CAP on 3/25 prior to surgery showed no evidence of disease in the chest  - Caris report also showed BRAF V600E mutation consistent with pts prior colon cancer.    -  started Xeloda on 6/5/21 and continued through November 2021  - CT A/P on 11/9/21: probably worsening of peritoneal carcinomatosis  - started Cetuximab in November 2021.  Added Encorafenib in December 2021.     3) Metastatic Breast Cancer:  -RIGHT breast DCIS diagnosed 3/15/06; low-grade; s/p lumpectomy and adjuvant XRT; ER: 100%, AK: 70%  -LEFT breast invasive lobular carcinoma; diagnosed 06/2012; staged pT3 pN0 M0 stage IIB, ER: 83%, AK: 0, HER2: 1+, Ki-67: 26%, treated with mastectomy and SLND 04/2012, adjuvant DD AC and Taxotere, Adjuvant Arimidex then Femara. Discontinued in 2019.  - metastatic breast cancer diagnosed on 2/17/22 from biopsy of an inner thigh skin lesion: adenocarcinoma of breast primary, ER+ (80%), AK+ (5%) and HER2 negative.   - started Faslodex March 2022    4) Anemia:  -multifactorial due to her history of GI bleeding from small bowel ulcer/tumor  -intolerant to oral iron. Treatment with IV iron in the past.   -iron studies on 5/11/12 were normal    5) right hydronephrosis:  -Noted on CT scan from 1/21/2021, secondary to soft tissue mass  -Likely secondary to recurrent colon cancer, biopsy pending  -Cystoscopy and right ureteral stent (Dr. Chi) on 1/28/2021    6) DVT:  -bracheocephalic and subclavian thrombosis March 2021 ([facility])     Malignant neoplasm of female breast (HCC)   3/5/2018 Initial Diagnosis    Breast cancer (CMS/HCC)     3/15/2022 Biopsy    OP BREAST Fulvestrant  Plan Provider: Jennifer Schroeder MD PhD  Treatment goal: Palliative  Line of treatment: [No plan line of treatment]     Colon cancer (HCC)   3/5/2018 Initial Diagnosis    Colon cancer (CMS/HCC)     6/28/2021 - 7/7/2021 Chemotherapy    OP SUPPORTIVE ELECTROLYTE REPLACEMENT     10/21/2021 - 10/28/2021 Chemotherapy    OP SUPPORTIVE Ferric Carboxymaltose (INJECTAFER)      11/23/2021 -  Chemotherapy    OP COLORECTAL Cetuximab / Encorafenib      Malignant neoplasm of colon (HCC)   3/5/2018 Initial Diagnosis     Malignant neoplasm of colon (HCC)     Metastasis to bone (HCC)   12/13/2021 Initial Diagnosis    Metastasis to bone (HCC)     Secondary malignant neoplasm of bone (HCC)   4/29/2022 Initial Diagnosis    Secondary malignant neoplasm of bone (HCC)     4/29/2022 -  Radiation    RADIATION THERAPY Treatment Details (Noted on 4/29/2022)  Site: Bone  Technique: 3D CRT  Goal: No goal specified  Planned Treatment Start Date: No planned start date specified         HPI  Patient comes in today for toxicity check prior to her seventh cycle of treatment.  She appears to not feel well today.  Labs reveal her hemoglobin is 5.5.  Her bicarb is also 14.  She is on oxygen intermittently.  She complains of right lower abdominal pain that is worse when she moves around or stretches.  She is not having any urinary issues.    Review of Systems   Constitutional: Positive for fatigue. Negative for appetite change, diaphoresis, fever, unexpected weight gain and unexpected weight loss.   HENT: Negative for hearing loss, sore throat and voice change.    Eyes: Negative for blurred vision, double vision, pain, redness and visual disturbance.   Respiratory: Negative for cough, shortness of breath and wheezing.    Cardiovascular: Negative for chest pain, palpitations and leg swelling.   Gastrointestinal: Positive for abdominal pain, constipation and diarrhea.   Endocrine: Negative for cold intolerance, heat intolerance, polydipsia and polyuria.   Genitourinary: Negative for decreased urine volume, difficulty urinating, frequency and urinary incontinence.   Musculoskeletal: Positive for back pain. Negative for arthralgias, joint swelling and myalgias.   Skin: Negative for color change, rash, skin lesions and wound.   Neurological: Negative for dizziness, seizures, numbness and headache.   Hematological: Negative for adenopathy. Does not bruise/bleed easily.   Psychiatric/Behavioral: Negative for depressed mood. The patient is not  nervous/anxious.    All other systems reviewed and are negative.      Past Medical History:   Diagnosis Date   • Abdominal pain    • Anemia     LOW HGB, OCCASSIONAL WEAKNESS   • Arthritis    • Breast cancer (HCC)    • Cataract     L EYE   • Colon cancer (HCC)    • Constipation     INTERMITTANTLY   • Diarrhea     INTERMITTANTLY   • DM (diabetes mellitus), type 2 (HCC)    • High cholesterol    • History of blood clots     neck, RESOLVED   • Hypertension     NO MEDS NOW   • Hypokalemia    • Iron deficiency anemia secondary to blood loss (chronic)     GI BLEED   • Paroxysmal A-fib (HCC)     FOLLOWS BAIRES     Past Surgical History:   Procedure Laterality Date   • BACK SURGERY  1977    RUPTURED DISC/PINCHED NERVE LUMBAR SPINE   • BREAST LUMPECTOMY Right    • COLON SURGERY  2015    COLON RESECTION R/T CA, EEA   • COLONOSCOPY     • CORNEAL TRANSPLANT Right    • CYSTOSCOPY W/ URETERAL STENT PLACEMENT Bilateral     PT HAS LONG TERM URETERAL STENTS    • CYSTOSCOPY W/ URETERAL STENT PLACEMENT Right 8/17/2021    Procedure: CYSTOSCOPY, RIGHT URETERAL STENT INSERTION;  Surgeon: Kelli Chi MD;  Location: [facility];  Service: Urology;  Laterality: Right;   • CYSTOSCOPY W/ URETERAL STENT PLACEMENT Right 2/17/2022    Procedure: CYSTOSCOPY, RIGHT URETERAL STENT EXCHANGE, EXCISION OF SKIN LESION;  Surgeon: Kelli Chi MD;  Location: [facility] OR;  Service: Urology;  Laterality: Right;   • HYSTERECTOMY      partial    • JOINT REPLACEMENT      R THR   • MASTECTOMY Left     NO BP OR NS L ARM   • VENOUS ACCESS DEVICE (PORT) INSERTION       Social History     Socioeconomic History   • Marital status:    Tobacco Use   • Smoking status: Never Smoker   • Smokeless tobacco: Never Used   Vaping Use   • Vaping Use: Never used   Substance and Sexual Activity   • Alcohol use: Never   • Drug use: Never   • Sexual activity: Defer     Family History   Problem Relation Age of Onset   • Skin cancer Other    • Colon cancer  Sister    • Malig Hyperthermia Neg Hx        Objective   Physical Exam  General: Alert, cooperative, no acute distress, but weak and chronically ill-appearing  Eyes: Anicteric sclera, PERRLA  Respiratory: normal respiratory effort  Cardiovascular: no lower extremity edema  Skin: Normal tone, no rash, no lesions  Psychiatric: Appropriate affect, intact judgment  Neurologic: No focal sensory or motor deficits, normal cognition   Musculoskeletal: Reduced muscle strength and tone, in a wheelchair  Extremities: No clubbing, cyanosis, or deformities    There were no vitals filed for this visit.            PHQ-9 Total Score:         Result Review :   The following data was reviewed by: Dannielle Haji MA on 06/14/2022:  Lab Results   Component Value Date    HGB 6.8 (C) 06/07/2022    HCT 22.3 (L) 06/07/2022    MCV 95.7 06/07/2022     06/07/2022    WBC 13.83 (H) 06/07/2022    NEUTROABS 6.62 06/07/2022    LYMPHSABS 6.11 (H) 06/07/2022    MONOSABS 0.94 (H) 06/07/2022    EOSABS 0.04 06/07/2022    BASOSABS 0.07 06/07/2022     Lab Results   Component Value Date    GLUCOSE 162 (H) 06/01/2022    BUN 25 (H) 06/01/2022    CREATININE 1.07 (H) 06/01/2022     (L) 06/01/2022    K 4.2 06/01/2022     06/01/2022    CO2 19.0 (L) 06/01/2022    CALCIUM 8.3 (L) 06/01/2022    PROTEINTOT 7.3 06/01/2022    ALBUMIN 2.84 (L) 06/01/2022    BILITOT 0.3 06/01/2022    ALKPHOS 229 (H) 06/01/2022    AST 57 (H) 06/01/2022    ALT 11 06/01/2022          Assessment and Plan    There are no diagnoses linked to this encounter.    Recurrent Cancer of the colon and small bowel: The pt remains on treatment with cetuximab and encorafinib. I will not make any adjustments to her treatment regimen at this time as I believe her worsening anemia is due to melena and not treatment related although the etiology is never entirely clear.  At this time there is not evidence of significant toxicity that I can attribute to her treatment and her CEA has  improved.     Metastatic Breast Cancer with dermal involvement.  The pt will continue fulvestrant but is not a candidate for more aggressive therapy such as a CDK4/6 inhibitory due to severe anemia.      Bone metastasis: Mostly likely secondary to recurrent/metastatic breast cancer. She underwent radiation to a cranial lesion to alleviate pain.    Severe Anemia: Hgb of 5.5 today. I recommend transfusion of 1 unit of pRBCs as soon as possible. I will check her CBC weekly to monitor.        Patient was given instructions and counseling regarding her condition or for health maintenance advice. Please see specific information pulled into the AVS if appropriate.

## 2022-06-23 NOTE — ADDENDUM NOTE
Encounter addended by: Emely Tobar on: 6/23/2022 1:26 PM   Actions taken: Visit diagnoses modified, Order list changed, Diagnosis association updated, Child order released for a procedure order

## 2022-06-27 NOTE — H&P
[facility]   Urology HISTORY AND PHYSICAL    Patient Name: Lorraine Dominguez  : 1950  MRN: [id number]  Primary Care Physician:  Shayna Lewis APRN  Date of admission: (Not on file)    Subjective   Subjective       Patient presents for cystoscopy and right ureteral stent exchange.      Personal History     Past Medical History:   Diagnosis Date   • Abdominal pain    • Anemia     LOW HGB, OCCASSIONAL WEAKNESS   • Arthritis    • Breast cancer (HCC)    • Cataract     L EYE   • Colon cancer (HCC)    • Constipation     INTERMITTANTLY   • Diarrhea     INTERMITTANTLY   • DM (diabetes mellitus), type 2 (HCC)    • High cholesterol    • History of blood clots     neck, RESOLVED   • Hypertension     NO MEDS NOW   • Hypokalemia    • Iron deficiency anemia secondary to blood loss (chronic)     GI BLEED   • Paroxysmal A-fib (HCC)     FOLLOWS BAIRES       Past Surgical History:   Procedure Laterality Date   • BACK SURGERY      RUPTURED DISC/PINCHED NERVE LUMBAR SPINE   • BREAST LUMPECTOMY Right    • COLON SURGERY      COLON RESECTION R/T CA, EEA   • COLONOSCOPY     • CORNEAL TRANSPLANT Right    • CYSTOSCOPY W/ URETERAL STENT PLACEMENT Bilateral     PT HAS LONG TERM URETERAL STENTS    • CYSTOSCOPY W/ URETERAL STENT PLACEMENT Right 2021    Procedure: CYSTOSCOPY, RIGHT URETERAL STENT INSERTION;  Surgeon: Kelli Chi MD;  Location: [facility] OR;  Service: Urology;  Laterality: Right;   • CYSTOSCOPY W/ URETERAL STENT PLACEMENT Right 2022    Procedure: CYSTOSCOPY, RIGHT URETERAL STENT EXCHANGE, EXCISION OF SKIN LESION;  Surgeon: Kelli Chi MD;  Location: [facility] OR;  Service: Urology;  Laterality: Right;   • HYSTERECTOMY      partial    • JOINT REPLACEMENT      R THR   • MASTECTOMY Left     NO BP OR NS L ARM   • VENOUS ACCESS DEVICE (PORT) INSERTION         Family History: family history includes Colon cancer in her sister; Skin cancer in an other family member. Otherwise pertinent  FHx was reviewed and not pertinent to current issue.    Social History:  reports that she has never smoked. She has never used smokeless tobacco. She reports that she does not drink alcohol and does not use drugs.    Home Medications:  Calcium Carb-Cholecalciferol, HYDROcodone-acetaminophen, LORazepam, ciprofloxacin, cromolyn, digoxin, encorafenib, escitalopram, glipizide, loperamide, magnesium oxide, metFORMIN, ondansetron, pantoprazole, potassium chloride, pravastatin, and senna    Allergies:  No Known Allergies    Objective    Objective     Vitals:        Physical Exam  Constitutional:       Appearance: Normal appearance.   Cardiovascular:      Rate and Rhythm: Normal rate and regular rhythm.   Pulmonary:      Effort: Pulmonary effort is normal.      Breath sounds: Normal breath sounds.   Neurological:      Mental Status: She is alert. Mental status is at baseline.   Psychiatric:         Mood and Affect: Mood and affect normal.         Speech: Speech normal.         Judgment: Judgment normal.         Result Review    Result Review:  I have personally reviewed the results from the time of this admission to 6/27/2022 17:23 EDT and agree with these findings:  [x]  Laboratory  []  Microbiology  [x]  Radiology  []  EKG/Telemetry   []  Cardiology/Vascular   []  Pathology  [x]  Old records  []  Other:      Assessment & Plan   Assessment / Plan       Active Hospital Problems:  There are no active hospital problems to display for this patient.      Plan: Cystoscopy and right ureteral stent exchange.  Risks and benefits discussed with patient and they are agreeable to proceed.    DVT prophylaxis:  No DVT prophylaxis order currently exists.    CODE STATUS:           Electronically signed by Kelli Chi MD, 06/27/22, 5:23 PM EDT.

## 2022-06-28 PROBLEM — R76.8 RED BLOOD CELL ANTIBODY POSITIVE WITH COMPATIBLE PRBC DIFFICULT TO OBTAIN: Status: ACTIVE | Noted: 2022-01-01

## 2022-06-28 PROBLEM — R47.81 SLURRED SPEECH: Status: ACTIVE | Noted: 2022-01-01

## 2022-06-28 PROBLEM — R62.7 FAILURE TO THRIVE IN ADULT: Status: ACTIVE | Noted: 2022-01-01

## 2022-06-28 NOTE — TELEPHONE ENCOUNTER
----- Message from Kelli Chi MD sent at 6/27/2022  5:25 PM EDT -----  Regarding: RE: SX  Orders are done in chart.  Please schedule her for August. Thanks.   ----- Message -----  From: [place] Sam LPN  Sent: 6/23/2022   3:41 PM EDT  To: Kelli Chi MD  Subject: FW: SX                                             ----- Message -----  From: Ralph Snyder RegSched Rep  Sent: 6/23/2022   3:32 PM EDT  To: [place] Sam LPN  Subject: SX                                               PT GOT HER 6MON RECALL LETTER TO SCHEDULED HER STENT EXCHANGE IN THE OR FOR AUG, I TOLD HER YOU WOULD HAVE TO GET THAT SCHEDULED FOR HER AND CALL HER BACK WITH THAT APPT.

## 2022-06-28 NOTE — PROGRESS NOTES
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:   - Diagnosed 5/7/15; Low grade; staged pT4a pN0 M0 stage IIB; 0/37 LN; no MMR testing; RIGHT sided  - RIGHT hemicolectomy 5/7/15  - adjuvant Xeloda X 6 months  - Recurrent dx diagnosed via RIGHT ureter mass resection (5/1/18)  - FOLFIRI X 9 (7/31-12/4/18)  - s/p XRT RIGHT pelvis total 5320 cGy (12/14/18-1/24/19)  - CARIS report from specimen dated 5/7/15: BRAF V600E mutation (possible benefit from cetuximab), MSI stable, TMB-low, TAURUS-High (33%), TP53 mutated  - CT CAP 1/21/2021: Interim development of right-sided hydronephrosis and a soft tissue mass along the right iliac chain measuring 2.9 x 1.2 cm.  There is a second nodule identified below the aortic bifurcation in the midline measuring 1.4 cm.  This was consistent with disease recurrence versus her new primary small bowel cancer.    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 Lacey). Pathology from the ileum was positive for adenocarcinoma that appeared to be small bowel in origin and represent a new primary malignancy, 2.2cm, grade 1, tumor perforates the visceral peritoneum, pT4pN0, with 0/12 lymph nodes involved.  - Post surgical CT Abd/Pelvis 4/24/21:  Mild thickening of the wall of some portion of the small intestine, could be post-surgical changes. Moderate dilation of the right renal collecting system is unchanged with ureteral double-J stent.    - CT CAP on 3/25 prior to surgery showed no evidence of disease in the chest  - Caris report also showed BRAF V600E mutation consistent with pts prior colon cancer.    -  started Xeloda on 6/5/21 and continued through November 2021  - CT A/P on 11/9/21: probably worsening of peritoneal carcinomatosis  - started Cetuximab in November 2021.  Added Encorafenib in December 2021.     3) Metastatic Breast Cancer:  -RIGHT breast DCIS diagnosed 3/15/06; low-grade; s/p lumpectomy and adjuvant XRT; ER: 100%, MT: 70%  -LEFT breast invasive lobular carcinoma; diagnosed 06/2012; staged pT3 pN0 M0 stage IIB, ER: 83%, MT: 0, HER2: 1+, Ki-67: 26%, treated with mastectomy and SLND 04/2012, adjuvant DD AC and Taxotere, Adjuvant Arimidex then Femara. Discontinued in 2019.  - metastatic breast cancer diagnosed on 2/17/22 from biopsy of an inner thigh skin lesion: adenocarcinoma of breast primary, ER+ (80%), MT+ (5%) and HER2 negative.   - started Faslodex March 2022    4) Anemia:  -multifactorial due to her history of GI bleeding from small bowel ulcer/tumor  -intolerant to oral iron. Treatment with IV iron in the past.   -iron studies on 5/11/12 were normal    5) right hydronephrosis:  -Noted on CT scan from 1/21/2021, secondary to soft tissue mass  -Likely secondary to recurrent colon cancer, biopsy pending  -Cystoscopy and right ureteral stent (Dr. Chi) on 1/28/2021    6) DVT:  -bracheocephalic and subclavian thrombosis March 2021 ([facility])     Malignant neoplasm of female breast (HCC)   3/5/2018 Initial Diagnosis    Breast cancer (CMS/HCC)     3/15/2022 Biopsy    OP BREAST Fulvestrant  Plan Provider: Jennifer Schroeder MD PhD  Treatment goal: Palliative  Line of treatment: [No plan line of treatment]     Colon cancer (HCC)   3/5/2018 Initial Diagnosis    Colon cancer (CMS/HCC)     6/28/2021 - 7/7/2021 Chemotherapy    OP SUPPORTIVE ELECTROLYTE REPLACEMENT     10/21/2021 - 10/28/2021 Chemotherapy    OP SUPPORTIVE Ferric Carboxymaltose (INJECTAFER)      11/23/2021 -  Chemotherapy    OP COLORECTAL Cetuximab / Encorafenib      Malignant neoplasm of colon (HCC)   3/5/2018 Initial Diagnosis     Malignant neoplasm of colon (HCC)     Metastasis to bone (HCC)   12/13/2021 Initial Diagnosis    Metastasis to bone (HCC)     Secondary malignant neoplasm of bone (HCC)   4/29/2022 Initial Diagnosis    Secondary malignant neoplasm of bone (HCC)     4/29/2022 -  Radiation    RADIATION THERAPY Treatment Details (Noted on 4/29/2022)  Site: Bone  Technique: 3D CRT  Goal: No goal specified  Planned Treatment Start Date: No planned start date specified         HPI  Patient comes in today for toxicity check prior to her next cycle of treatment.  Her  says she has taken a turn for the worse over the past few days.  She is getting increasingly weak.  Patient tells me she continues to have significant abdominal pain all across the lower abdomen.  This is consistent with the pain she has had for quite some time.  She denies headaches or other focal neurologic symptoms.  However, her  and I both noticed that she has a barely audible voice.  She is having difficulty physically forming words although her thinking is clear.      I recommended she go to the hospital for evaluation and she refused.  We agreed to hold treatment but give her IV fluids with potassium since her potassium level is critically low.  She agreed to this plan.  However, when she got to the infusion room and started IV fluids, she started having severe diarrhea.  The nurses report that she had significant blood in her stool as well.  The patient then agreed to go to the hospital.     Review of Systems   Constitutional: Positive for fatigue. Negative for appetite change, diaphoresis, fever, unexpected weight gain and unexpected weight loss.   HENT: Negative for hearing loss, sore throat and voice change.    Eyes: Negative for blurred vision, double vision, pain, redness and visual disturbance.   Respiratory: Negative for cough, shortness of breath and wheezing.    Cardiovascular: Negative for chest pain, palpitations and leg swelling.    Gastrointestinal: Positive for abdominal pain and nausea.   Endocrine: Negative for cold intolerance, heat intolerance, polydipsia and polyuria.   Genitourinary: Negative for decreased urine volume, difficulty urinating, frequency and urinary incontinence.   Musculoskeletal: Positive for back pain. Negative for arthralgias, joint swelling and myalgias.   Skin: Negative for color change, rash, skin lesions and wound.   Neurological: Positive for speech difficulty and weakness. Negative for dizziness, seizures, numbness and headache.   Hematological: Negative for adenopathy. Does not bruise/bleed easily.   Psychiatric/Behavioral: Negative for depressed mood. The patient is not nervous/anxious.    All other systems reviewed and are negative.      Past Medical History:   Diagnosis Date   • Abdominal pain    • Anemia     LOW HGB, OCCASSIONAL WEAKNESS   • Arthritis    • Breast cancer (HCC)    • Cataract     L EYE   • Colon cancer (HCC)    • Constipation     INTERMITTANTLY   • Diarrhea     INTERMITTANTLY   • DM (diabetes mellitus), type 2 (HCC)    • High cholesterol    • History of blood clots     neck, RESOLVED   • Hypertension     NO MEDS NOW   • Hypokalemia    • Iron deficiency anemia secondary to blood loss (chronic)     GI BLEED   • Paroxysmal A-fib (HCC)     FOLLOWS BAIRES     Past Surgical History:   Procedure Laterality Date   • BACK SURGERY  1977    RUPTURED DISC/PINCHED NERVE LUMBAR SPINE   • BREAST LUMPECTOMY Right    • COLON SURGERY  2015    COLON RESECTION R/T CA, EEA   • COLONOSCOPY     • CORNEAL TRANSPLANT Right    • CYSTOSCOPY W/ URETERAL STENT PLACEMENT Bilateral     PT HAS LONG TERM URETERAL STENTS    • CYSTOSCOPY W/ URETERAL STENT PLACEMENT Right 8/17/2021    Procedure: CYSTOSCOPY, RIGHT URETERAL STENT INSERTION;  Surgeon: Kelli Chi MD;  Location: [facility];  Service: Urology;  Laterality: Right;   • CYSTOSCOPY W/ URETERAL STENT PLACEMENT Right 2/17/2022    Procedure: CYSTOSCOPY, RIGHT  URETERAL STENT EXCHANGE, EXCISION OF SKIN LESION;  Surgeon: Kelli Chi MD;  Location: [facility] OR;  Service: Urology;  Laterality: Right;   • HYSTERECTOMY      partial    • JOINT REPLACEMENT      R THR   • MASTECTOMY Left     NO BP OR NS L ARM   • VENOUS ACCESS DEVICE (PORT) INSERTION       Social History     Socioeconomic History   • Marital status:    Tobacco Use   • Smoking status: Never Smoker   • Smokeless tobacco: Never Used   Vaping Use   • Vaping Use: Never used   Substance and Sexual Activity   • Alcohol use: Never   • Drug use: Never   • Sexual activity: Defer     Family History   Problem Relation Age of Onset   • Skin cancer Other    • Colon cancer Sister    • Malig Hyperthermia Neg Hx        Objective   Physical Exam  General: Alert, cooperative, very ill-appearing  Eyes: Anicteric sclera, PERRLA  Respiratory: normal respiratory effort  Cardiovascular: no lower extremity edema  Skin: Normal tone, no rash, no lesions  Psychiatric: Appropriate affect, intact judgment  Neurologic: No focal motor deficits except slurred speech, equal bilateral handgrip, moving both lower extremities, normal cognition   Musculoskeletal: Reduced muscle strength and tone, very weak and in a wheelchair, difficulty moving her mouth well enough to articulate sounds  Extremities: No clubbing, cyanosis, or deformities  Vitals:    06/28/22 0834   BP: 142/68   Pulse: 103   Resp: 18   Temp: 97 °F (36.1 °C)   SpO2: 98%   Weight: 64.1 kg (141 lb 5 oz)   PainSc:   8   PainLoc: Generalized     ECOG score: 2         PHQ-9 Total Score:         Result Review :   The following data was reviewed by: Jennifer Schroeder MD PhD on 06/28/2022:  Lab Results   Component Value Date    HGB 7.9 (C) 06/28/2022    HCT 25.3 (L) 06/28/2022    MCV 94.8 06/28/2022     06/28/2022    WBC 10.85 (H) 06/28/2022    NEUTROABS 5.81 06/28/2022    LYMPHSABS 3.96 (H) 06/28/2022    MONOSABS 0.79 06/28/2022    EOSABS 0.09 06/28/2022    BASOSABS  0.13 06/28/2022     Lab Results   Component Value Date    GLUCOSE 107 (H) 06/28/2022    BUN 14 06/28/2022    CREATININE 0.82 06/28/2022     06/28/2022    K 2.8 (L) 06/28/2022     (H) 06/28/2022    CO2 14.6 (L) 06/28/2022    CALCIUM 7.8 (L) 06/28/2022    PROTEINTOT 6.8 06/28/2022    ALBUMIN 2.55 (L) 06/28/2022    BILITOT 0.4 06/28/2022    ALKPHOS 218 (H) 06/28/2022    AST 62 (H) 06/28/2022    ALT 9 06/28/2022          Assessment and Plan    Diagnoses and all orders for this visit:    1. Overlapping malignant neoplasm of colon (HCC) (Primary)    2. Malignant neoplasm of breast in female, estrogen receptor positive, unspecified laterality, unspecified site of breast (HCC)    3. Cancer related pain  -     HYDROcodone-acetaminophen (NORCO) 7.5-325 MG per tablet; Take 1 tablet by mouth Every 6 (Six) Hours As Needed for Moderate Pain .  Dispense: 90 tablet; Refill: 0    4. Metastasis to bone (HCC)    5. Metastasis to skin (HCC)    6. Gastrointestinal hemorrhage with melena    7. Red blood cell antibody positive with compatible PRBC difficult to obtain    8. Failure to thrive in adult    9. Slurred speech      Failure to Thrive and Slurred Speech: The patient came into clinic today for her next cycle of treatment for 2 metastatic cancers.  She appears remarkably worse over all. Her speech is slurred and difficult to understand. She is also more weak and has been in this state for 2-3 days per her . He cannot take care of her at home. The patient initially refused to go to the ER but then agreed after she developed profuse diarrhea with bleeding.  I am very concerned the patient is reaching the end of her life. She has refused Hospice for many months but did agree to palliative care today. I highly recommend consulting these services while she is in the hospital.      Recurrent Cancer of the colon and small bowel: The pt remains on treatment with cetuximab and encorafinib but this was held today. It is  unclear if this treatment is still benefiting her and she does not have other options.      Metastatic Breast Cancer with dermal involvement.  The pt is on monthly fulvestrant but is not a candidate for more aggressive therapy such as a CDK4/6 inhibitory due to severe anemia.      Bone metastasis: Mostly likely secondary to recurrent/metastatic breast cancer. She underwent radiation to a cranial lesion to alleviate pain. I have increased her pain medication today.      Severe Anemia: Hgb is >7 after 2 unit of blood last week.  She has anti-bodies which make blood match very difficult.       Patient was given instructions and counseling regarding her condition or for health maintenance advice. Please see specific information pulled into the AVS if appropriate.

## 2022-06-28 NOTE — PROGRESS NOTES
Diagnosis: Metastatic breast cancer and recurrent cancer of the colon and small bowel    Reason for referral: Palliative care    Comments: OSW assistance requested by clinical RN, Ligia RUIZ, in referring pt to palliative care. Pt in agreement to this referral. Pt is very sick and weak, however, not in agreement to going to the hospital or admitting with hospice at this time. OSW contacted [facility] via telephone this afternoon to provide referral. Will fax supporting documents to [facility] as requested. OSW support remains available.    Services/Referrals Provided: Palliative care - [facility]

## 2022-06-28 NOTE — TELEPHONE ENCOUNTER
LMOM to see what day in August patient is wanting to schedule surgery. Patient is aware surgeries are done on Tuesday and Thursdays.

## 2022-06-29 NOTE — TELEPHONE ENCOUNTER
Caller: Lorraine Dominguez    Relationship: Self    Best call back number: [phone]      What was the call regarding: PATIENT WAS ADVISED TO CALL THE OFFICE, SHE WAS RELEASED YESTERDAY FROM THE HOSPITAL AND PATIENT WANTED TO SPEAK TO FRANK

## 2022-06-29 NOTE — TELEPHONE ENCOUNTER
Patient states she is still feeling very weak. She states diarrhea is better because she took imodium. Denies any bleeding at this time. Patient does report that ER left her port accessed when they sent her home. Advised patient that she needs to come back to clinic to have the port needle removed.  Will have Dr. Schroeder review scans.

## 2022-06-29 NOTE — TELEPHONE ENCOUNTER
----- Message from Jyothi Quinones sent at 6/23/2022  3:31 PM EDT -----  Regarding: SX  PT GOT HER 6MON RECALL LETTER TO SCHEDULED HER STENT EXCHANGE IN THE OR FOR AUG, I TOLD HER YOU WOULD HAVE TO GET THAT SCHEDULED FOR HER AND CALL HER BACK WITH THAT APPT.

## 2022-06-29 NOTE — TELEPHONE ENCOUNTER
pt called and states that she was dc from  ER yesterday and was instructed to call Dr Schroeder today. pt also states that her blood was low. This nurse looked at the pt's labs from the ER and her HGB was 7.5. pt was informed that someone would call her back about her POC.

## 2022-06-29 NOTE — TELEPHONE ENCOUNTER
Caller: NETTA HENNESSY    Relationship to patient: DAUGHTER    Best call back number: [phone]    Patient is needing: TO RETURN CARLI'S PHONE CALL.

## 2022-06-29 NOTE — CODE DOCUMENTATION
Patient received two runs of potassium. During which time she experienced multiple episodes of diarrhea. With consultation, it was decided to send patient to er via ambulance. Patient also received approximately 800ml of ordered normal saline.

## 2022-07-05 NOTE — ADDENDUM NOTE
Encounter addended by: Dominique Cunningham RN on: 7/5/2022 12:43 PM   Actions taken: Charge Capture section accepted

## 2022-07-05 NOTE — ADDENDUM NOTE
Encounter addended by: Dominique Cunningham RN on: 7/5/2022 12:00 PM   Actions taken: Flowsheet accepted

## 2022-07-05 NOTE — PROGRESS NOTES
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:   - Diagnosed 5/7/15; Low grade; staged pT4a pN0 M0 stage IIB; 0/37 LN; no MMR testing; RIGHT sided  - RIGHT hemicolectomy 5/7/15  - adjuvant Xeloda X 6 months  - Recurrent dx diagnosed via RIGHT ureter mass resection (5/1/18)  - FOLFIRI X 9 (7/31-12/4/18)  - s/p XRT RIGHT pelvis total 5320 cGy (12/14/18-1/24/19)  - CARIS report from specimen dated 5/7/15: BRAF V600E mutation (possible benefit from cetuximab), MSI stable, TMB-low, TAURUS-High (33%), TP53 mutated  - CT CAP 1/21/2021: Interim development of right-sided hydronephrosis and a soft tissue mass along the right iliac chain measuring 2.9 x 1.2 cm.  There is a second nodule identified below the aortic bifurcation in the midline measuring 1.4 cm.  This was consistent with disease recurrence versus her new primary small bowel cancer.    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 Lacey). Pathology from the ileum was positive for adenocarcinoma that appeared to be small bowel in origin and represent a new primary malignancy, 2.2cm, grade 1, tumor perforates the visceral peritoneum, pT4pN0, with 0/12 lymph nodes involved.  - Post surgical CT Abd/Pelvis 4/24/21:  Mild thickening of the wall of some portion of the small intestine, could be post-surgical changes. Moderate dilation of the right renal collecting system is unchanged with ureteral double-J stent.    - CT CAP on 3/25 prior to surgery showed no evidence of disease in the chest  - Caris report also showed BRAF V600E mutation consistent with pts prior colon cancer.    -  started Xeloda on 6/5/21 and continued through November 2021  - CT A/P on 11/9/21: probably worsening of peritoneal carcinomatosis  - started Cetuximab in November 2021.  Added Encorafenib in December 2021.     3) Metastatic Breast Cancer:  -RIGHT breast DCIS diagnosed 3/15/06; low-grade; s/p lumpectomy and adjuvant XRT; ER: 100%, NE: 70%  -LEFT breast invasive lobular carcinoma; diagnosed 06/2012; staged pT3 pN0 M0 stage IIB, ER: 83%, NE: 0, HER2: 1+, Ki-67: 26%, treated with mastectomy and SLND 04/2012, adjuvant DD AC and Taxotere, Adjuvant Arimidex then Femara. Discontinued in 2019.  - metastatic breast cancer diagnosed on 2/17/22 from biopsy of an inner thigh skin lesion: adenocarcinoma of breast primary, ER+ (80%), NE+ (5%) and HER2 negative.   - started Faslodex March 2022    4) Anemia:  -multifactorial due to her history of GI bleeding from small bowel ulcer/tumor  -intolerant to oral iron. Treatment with IV iron in the past.   -iron studies on 5/11/12 were normal    5) right hydronephrosis:  -Noted on CT scan from 1/21/2021, secondary to soft tissue mass  -Likely secondary to recurrent colon cancer, biopsy pending  -Cystoscopy and right ureteral stent (Dr. Chi) on 1/28/2021    6) DVT:  -bracheocephalic and subclavian thrombosis March 2021 ([facility])     Malignant neoplasm of female breast (HCC)   3/5/2018 Initial Diagnosis    Breast cancer (CMS/HCC)     3/15/2022 Biopsy    OP BREAST [place]  Plan Provider: Jennifer Schroeder MD PhD  Treatment goal: Palliative  Line of treatment: [No plan line of treatment]     Colon cancer (HCC)   3/5/2018 Initial Diagnosis    Colon cancer (CMS/HCC)     6/28/2021 - 7/7/2021 Chemotherapy    OP SUPPORTIVE ELECTROLYTE REPLACEMENT     10/21/2021 - 10/28/2021 Chemotherapy    OP SUPPORTIVE Ferric Carboxymaltose (INJECTAFER)      11/23/2021 -  Chemotherapy    OP COLORECTAL Cetuximab / Encorafenib      Malignant neoplasm of colon (HCC)   3/5/2018 Initial Diagnosis     Malignant neoplasm of colon (HCC)     Metastasis to bone (HCC)   12/13/2021 Initial Diagnosis    Metastasis to bone (HCC)     Secondary malignant neoplasm of bone (HCC)   4/29/2022 Initial Diagnosis    Secondary malignant neoplasm of bone (HCC)     4/29/2022 -  Radiation    RADIATION THERAPY Treatment Details (Noted on 4/29/2022)  Site: Bone  Technique: 3D CRT  Goal: No goal specified  Planned Treatment Start Date: No planned start date specified         HPI  Patient comes in today for follow-up.  We held treatment last week due to severe decline in her health.  She was sent to the hospital emergency room and unfortunately discharged home.  She lives with her  and her daughter who help take care of her.  The patient's  says they are able to feed her and get her to the bathroom.    I discussed my concerns with the patient and she was barely able to respond verbally.  She seemed to stare into space at times but had extreme difficulty articulating words.  The word she did speak were barely audible.  Considering her rapid decline, I strongly encouraged her to consider hospice.  No further chemotherapy will benefit her since she is so deconditioned.  I am even concerned about the possibility of stroke.    She has been having frequent nosebleeds that are large in volume.  This is very upsetting to the family.  Her platelet count remains normal but I cannot rule out malignancy along the nasal mucosa or adjacent location as she does have metastatic disease in the cranial bones.    We also discussed the fact that her hemoglobin is less than 7 again.  She initially stated that she was agreeable to a blood transfusion.  I discouraged her from getting this as she would spend several hours in the hospital while a difficult match for blood was found.  I do not believe the patient will survive more than a few days.    During our conversation the patient's  tried to give her a drink of water through a straw.   Patient was able to suck up the water but coughed and spit immediately as she choked and gagged on the water.    Review of Systems   Constitutional: Positive for fatigue. Negative for appetite change, diaphoresis, fever, unexpected weight gain and unexpected weight loss.   HENT: Negative for hearing loss, sore throat and voice change.    Eyes: Negative for blurred vision, double vision, pain, redness and visual disturbance.   Respiratory: Negative for cough, shortness of breath and wheezing.    Cardiovascular: Negative for chest pain, palpitations and leg swelling.   Endocrine: Negative for cold intolerance, heat intolerance, polydipsia and polyuria.   Genitourinary: Negative for decreased urine volume, difficulty urinating, frequency and urinary incontinence.   Musculoskeletal: Negative for arthralgias, back pain, joint swelling and myalgias.   Skin: Negative for color change, rash, skin lesions and wound.   Neurological: Negative for dizziness, seizures, numbness and headache.   Hematological: Negative for adenopathy. Does not bruise/bleed easily.   Psychiatric/Behavioral: Negative for depressed mood. The patient is not nervous/anxious.    All other systems reviewed and are negative.      Past Medical History:   Diagnosis Date   • Abdominal pain    • Anemia     LOW HGB, OCCASSIONAL WEAKNESS   • Arthritis    • Breast cancer (HCC)    • Cataract     L EYE   • Colon cancer (HCC)    • Constipation     INTERMITTANTLY   • Diarrhea     INTERMITTANTLY   • DM (diabetes mellitus), type 2 (HCC)    • High cholesterol    • History of blood clots     neck, RESOLVED   • Hypertension     NO MEDS NOW   • Hypokalemia    • Iron deficiency anemia secondary to blood loss (chronic)     GI BLEED   • Paroxysmal A-fib (HCC)     FOLLOWS BAIRES     Past Surgical History:   Procedure Laterality Date   • BACK SURGERY  1977    RUPTURED DISC/PINCHED NERVE LUMBAR SPINE   • BREAST LUMPECTOMY Right    • COLON SURGERY  2015    COLON RESECTION R/T CA,  EEA   • COLONOSCOPY     • CORNEAL TRANSPLANT Right    • CYSTOSCOPY W/ URETERAL STENT PLACEMENT Bilateral     PT HAS LONG TERM URETERAL STENTS    • CYSTOSCOPY W/ URETERAL STENT PLACEMENT Right 8/17/2021    Procedure: CYSTOSCOPY, RIGHT URETERAL STENT INSERTION;  Surgeon: Kelli Chi MD;  Location: [facility] MAIN OR;  Service: Urology;  Laterality: Right;   • CYSTOSCOPY W/ URETERAL STENT PLACEMENT Right 2/17/2022    Procedure: CYSTOSCOPY, RIGHT URETERAL STENT EXCHANGE, EXCISION OF SKIN LESION;  Surgeon: Kelli Chi MD;  Location: [facility] MAIN OR;  Service: Urology;  Laterality: Right;   • HYSTERECTOMY      partial    • JOINT REPLACEMENT      R THR   • MASTECTOMY Left     NO BP OR NS L ARM   • VENOUS ACCESS DEVICE (PORT) INSERTION       Social History     Socioeconomic History   • Marital status:    Tobacco Use   • Smoking status: Never Smoker   • Smokeless tobacco: Never Used   Vaping Use   • Vaping Use: Never used   Substance and Sexual Activity   • Alcohol use: Never   • Drug use: Never   • Sexual activity: Defer     Family History   Problem Relation Age of Onset   • Skin cancer Other    • Colon cancer Sister    • Malig Hyperthermia Neg Hx        Objective   Physical Exam  General: Alert, able to follow commands but severely ill-appearing, cachectic  Eyes: Anicteric sclera, PERRLA  Respiratory: normal respiratory effort  Cardiovascular: no lower extremity edema  Skin: Very pale skin tone, no rash  Psychiatric: Appropriate affect, intact judgment  Neurologic: Patient is able to understand and can articulate limited thoughts but is very dysarthric.  She also has dysphagia.  Musculoskeletal: Extreme muscle weakness, in a wheelchair, unable to fully extend her fingers and grasp objects such as a Kleenex  Extremities: No clubbing, cyanosis, or deformities    Vitals:    07/05/22 1003   BP: 97/48   Pulse: 105   Resp: 16   Temp: 97.6 °F (36.4 °C)   SpO2: 90%   Weight: 64.1 kg (141 lb 5 oz)   PainSc:   8    PainLoc: Generalized               PHQ-9 Total Score:         Result Review :   The following data was reviewed by: Jennifer Schroeder MD PhD on 07/05/2022:  Lab Results   Component Value Date    HGB 6.6 (C) 07/05/2022    HCT 21.2 (L) 07/05/2022    MCV 97.2 (H) 07/05/2022     07/05/2022    WBC 11.94 (H) 07/05/2022    NEUTROABS 5.34 07/05/2022    LYMPHSABS 5.51 (H) 07/05/2022    MONOSABS 0.90 07/05/2022    EOSABS 0.02 07/05/2022    BASOSABS 0.09 07/05/2022     Lab Results   Component Value Date    GLUCOSE 63 (L) 07/05/2022    BUN 22 07/05/2022    CREATININE 1.27 (H) 07/05/2022     07/05/2022    K 3.7 07/05/2022     07/05/2022    CO2 20.1 (L) 07/05/2022    CALCIUM 7.4 (L) 07/05/2022    PROTEINTOT 6.6 07/05/2022    ALBUMIN 2.20 (L) 07/05/2022    BILITOT 0.6 07/05/2022    ALKPHOS 234 (H) 07/05/2022    AST 74 (H) 07/05/2022    ALT 8 07/05/2022          Assessment and Plan    Diagnoses and all orders for this visit:    1. Malignant neoplasm of ascending colon (HCC) (Primary)    2. Malignant neoplasm of breast in female, estrogen receptor positive, unspecified laterality, unspecified site of breast (HCC)    3. Metastasis to bone (HCC)    4. Slurred speech    5. Physical deconditioning      The patient has metastatic colon cancer as well as metastatic breast cancer.  She is no longer a candidate for treatment of any kind due to severe physical deconditioning.  In fact I am concerned that the patient may have had a stroke recently as she is no longer able to articulate words adequately and is no longer able to care for herself.  I strongly encouraged the patient and her  to enroll in hospice as I believe she may only have a few days left to live since she is no longer taking in fluids and is rapidly declining in health.  The patient's  expressed past dissatisfaction with hospice.  The patient agreed and said she did not wish to enroll in hospice.  They have not yet been seen by palliative  care but the consult is pending.  When I asked the patient if she would want to be taken to the hospital if she were to suddenly become unconscious at home, she initially stated that she would.  Later when asked again the patient said she would not want to go to the hospital.  She says she would like to go home.  Ultimately, we agreed to give her 1 L of IV fluids to help with dehydration.  The patient will go home and continue care that is provided by her  and daughter.  She will reach out to clinic if there are any additional ways in which we can help.    Patient was given instructions and counseling regarding her condition or for health maintenance advice. Please see specific information pulled into the AVS if appropriate.

## 2022-07-07 NOTE — TELEPHONE ENCOUNTER
Caller: Lorraine Dominguez    Relationship: Self    Best call back number [phone]    Who are you requesting to speak with (clinical staff, provider,  specific staff member): CLINICAL       What was the call regarding: LORRAINE WAS SUPPOSE TO HAVE HER PAIN MED CALLED IN TO THE PHARMACY IN LIQUID FORM, BUT LUCY HAS NOT RECEIVED ANYTHING, LORRAINE IS HAVING A HARD TIME TAKING HER PILLS    Do you require a callback: YES

## 2022-07-08 NOTE — TELEPHONE ENCOUNTER
FRAAZ IS CALLING STATES THAT THE RX HAS NOT BEEN SENT TO THE PHARMACY AND WAS WANTING TO KNOW WHERE THE MED WILL BE SENT     PLEASE ADVISE

## 2022-07-08 NOTE — TELEPHONE ENCOUNTER
FRANK, [facility] DOES NOT HAVE THIS MEDICATION IN STOCK, CAN YOU PLEASE SENT TO OUR  PHARMACY IN THE [place] [place]?

## 2022-07-11 NOTE — TELEPHONE ENCOUNTER
Provider: DR DUTTON  Caller: NETTA  Relationship to Patient: DAUGHTER    Reason for Call: NETTA IS CALLING STATES THAT KIEL NEEDS TO CANCEL AND R/S HER INFUSION    PLEASE ADVISE

## 2022-07-12 NOTE — TELEPHONE ENCOUNTER
“Please be informed that patient has passed. Patient has been marked  in the system. The date of death is: 2022    Caller: BRIDGER ESPAÑA    Relationship: Emergency Contact    Best call back number:[phone]

## 2022-07-15 ENCOUNTER — SPECIALTY PHARMACY (OUTPATIENT)
Dept: PHARMACY | Facility: TELEHEALTH | Age: 72
End: 2022-07-15

## 2022-07-15 ENCOUNTER — APPOINTMENT (OUTPATIENT)
Dept: RADIATION ONCOLOGY | Facility: HOSPITAL | Age: 72
End: 2022-07-15

## 2022-07-29 ENCOUNTER — TELEPHONE (OUTPATIENT)
Dept: ONCOLOGY | Facility: OTHER | Age: 72
End: 2022-07-29

## 2022-07-29 NOTE — TELEPHONE ENCOUNTER
ANA M WITH [id number] CALLING REGARDING PTS BRAFTOVI. THEY WERE HAVING TROUBLE REACHING PT. INFORMED THEM THAT PT PASSED AWAY 7/12/22.

## 2022-08-10 LAB
ABO GROUP BLD: NORMAL
BB REFERENCE LAB SENDOUT: NORMAL
BB REFERENCE LAB SENDOUT: NORMAL
BLD GP AB SCN SERPL QL: POSITIVE
RH BLD: NEGATIVE
T&S EXPIRATION DATE: NORMAL

## 2024-11-18 NOTE — TELEPHONE ENCOUNTER
Caller: ANA M DÍAZ    Relationship: [id number]    Best call back number: [phone]    What is the best time to reach you: ANYTIME    Who are you requesting to speak with (clinical staff, provider,  specific staff member): DR DUTTON OR NURSE    What was the call regarding: ANA M JUST WANTED TO INFORM DR DUTTON THAT PTS BRAFTOVI IS READY TO SCHEDULE DELIVERY AND THE COPAY WILL BE $9.20. HE JUST WANTED TO GIVE AN UPDATE.     Do you require a callback: NO BUT CAN CALL IF YOU HAVE ANY QUESTIONS.   
Detail Level: Zone
Initiate Treatment: CO2 neck: wash twice a day w gentle , spray, nectar, balm, cool gauze soaks prn \\nFRAXEL chest: wash twice a day, nectar and balm twice a day, apply balm as needed to maintain moisture, can swap for a thick gentle moisturizer as needed.
Initiate Treatment: After healed from laser \\nAM: gentle cleanser, alto advance, gentle moistuzer prn, Elta md spf \\nPM: gentle cleanser,  tretinoin 0.025% gel, 2% glycerin W06 base, 30gram tube, gentle moisturizer \\n\\n- Information about [facility] given to patient

## (undated) DEVICE — PENCL E/S SMOKEEVAC W/TELESCP CANN

## (undated) DEVICE — TOWEL,OR,DSP,ST,BLUE,STD,4/PK,20PK/CS: Brand: MEDLINE

## (undated) DEVICE — GOWN,REINFORCE,POLY,SIRUS,BREATH SLV,XLG: Brand: MEDLINE

## (undated) DEVICE — TRY PREP SCRB VAG PVP

## (undated) DEVICE — CYSTO PACK: Brand: MEDLINE INDUSTRIES, INC.

## (undated) DEVICE — NON-WOVEN ADHESIVE WOUND DRESSING: Brand: PRIMAPORE ADHESIVE WOUND DRSG 7.2*5CM

## (undated) DEVICE — CYSTO/BLADDER IRRIGATION SET, REGULATING CLAMP

## (undated) DEVICE — GLV SURG SENSICARE SLT PF LF 6.5 STRL

## (undated) DEVICE — RADIFOCUS GLIDECATH: Brand: GLIDECATH

## (undated) DEVICE — SOL IRRG H2O BG 3000ML STRL

## (undated) DEVICE — SUT GUT CHRM 3/0 SH 27IN G122H

## (undated) DEVICE — Device

## (undated) DEVICE — GW SUREGLIDE FLXTIP ANG/TP .038 3X150CM

## (undated) DEVICE — GW PTFE/COAT STR/TP STFF/BDY .038 150CM STRL